# Patient Record
Sex: MALE | Race: WHITE | NOT HISPANIC OR LATINO | Employment: OTHER | RURAL
[De-identification: names, ages, dates, MRNs, and addresses within clinical notes are randomized per-mention and may not be internally consistent; named-entity substitution may affect disease eponyms.]

---

## 2020-03-10 ENCOUNTER — HISTORICAL (OUTPATIENT)
Dept: ADMINISTRATIVE | Facility: HOSPITAL | Age: 75
End: 2020-03-10

## 2020-03-10 LAB
APTT PPP: 31.1 SECONDS (ref 25.2–37.3)
BASOPHILS # BLD AUTO: 0.02 X10E3/UL (ref 0–0.2)
BASOPHILS NFR BLD AUTO: 0.1 % (ref 0–1)
BUN SERPL-MCNC: 35 MG/DL (ref 7–18)
CALCIUM SERPL-MCNC: 9.1 MG/DL (ref 8.5–10.1)
CHLORIDE SERPL-SCNC: 99 MMOL/L (ref 98–107)
CK MB SERPL-MCNC: 1.2 NG/ML (ref 1–3.6)
CK SERPL-CCNC: 45 U/L (ref 39–308)
CO2 SERPL-SCNC: 32 MMOL/L (ref 21–32)
CREAT SERPL-MCNC: 1.67 MG/DL (ref 0.7–1.3)
EOSINOPHIL # BLD AUTO: 0.08 X10E3/UL (ref 0–0.5)
EOSINOPHIL NFR BLD AUTO: 0.6 % (ref 1–4)
ERYTHROCYTE [DISTWIDTH] IN BLOOD BY AUTOMATED COUNT: 17.2 % (ref 11.5–14.5)
GLUCOSE SERPL-MCNC: 81 MG/DL (ref 74–106)
HCT VFR BLD AUTO: 42.7 % (ref 40–54)
HGB BLD-MCNC: 12.6 G/DL (ref 13.5–18)
IMM GRANULOCYTES # BLD AUTO: 0.1 X10E3/UL (ref 0–0.04)
IMM GRANULOCYTES NFR BLD: 0.7 % (ref 0–0.4)
INR BLD: 1.12 (ref 0–3.3)
LYMPHOCYTES # BLD AUTO: 1.12 X10E3/UL (ref 1–4.8)
LYMPHOCYTES NFR BLD AUTO: 7.8 % (ref 27–41)
MAGNESIUM SERPL-MCNC: 2 MG/DL (ref 1.7–2.3)
MCH RBC QN AUTO: 25.5 PG (ref 27–31)
MCHC RBC AUTO-ENTMCNC: 29.5 G/DL (ref 32–36)
MCV RBC AUTO: 86.3 FL (ref 80–96)
MONOCYTES # BLD AUTO: 0.93 X10E3/UL (ref 0–0.8)
MONOCYTES NFR BLD AUTO: 6.5 % (ref 2–6)
MPC BLD CALC-MCNC: 11.6 FL (ref 9.4–12.4)
MYOGLOBIN SERPL-MCNC: 95 NG/ML (ref 16–116)
NEUTROPHILS # BLD AUTO: 12.12 X10E3/UL (ref 1.8–7.7)
NEUTROPHILS NFR BLD AUTO: 84.3 % (ref 53–65)
NRBC # BLD AUTO: 0 X10E3/UL (ref 0–0)
NRBC, AUTO (.00): 0 /100 (ref 0–0)
NT-PROBNP SERPL-MCNC: 9515 PG/ML (ref 1–125)
PLATELET # BLD AUTO: 369 X10E3/UL (ref 150–400)
POTASSIUM SERPL-SCNC: 4.3 MMOL/L (ref 3.5–5.1)
PROTHROMBIN TIME: 14.5 SECONDS (ref 11.7–14.7)
RBC # BLD AUTO: 4.95 X10E6/UL (ref 4.6–6.2)
SODIUM SERPL-SCNC: 137 MMOL/L (ref 136–145)
TROPONIN I SERPL-MCNC: 0.03 NG/ML (ref 0–0.06)
WBC # BLD AUTO: 14.37 X10E3/UL (ref 4.5–11)

## 2020-03-11 ENCOUNTER — HISTORICAL (OUTPATIENT)
Dept: ADMINISTRATIVE | Facility: HOSPITAL | Age: 75
End: 2020-03-11

## 2020-03-11 LAB
ALBUMIN SERPL BCP-MCNC: 3.1 G/DL (ref 3.5–5)
ALP SERPL-CCNC: 67 U/L (ref 45–115)
ALT SERPL W P-5'-P-CCNC: 15 U/L (ref 16–61)
ANISOCYTOSIS BLD QL SMEAR: ABNORMAL
AST SERPL W P-5'-P-CCNC: 18 U/L (ref 15–37)
BASOPHILS # BLD AUTO: 0.01 X10E3/UL (ref 0–0.2)
BASOPHILS NFR BLD AUTO: 0.1 % (ref 0–1)
BILIRUB DIRECT SERPL-MCNC: 0.2 MG/DL (ref 0–0.2)
BILIRUB SERPL-MCNC: 0.5 MG/DL (ref 0–1.2)
BUN SERPL-MCNC: 40 MG/DL (ref 7–18)
CALCIUM SERPL-MCNC: 9.4 MG/DL (ref 8.5–10.1)
CHLORIDE SERPL-SCNC: 97 MMOL/L (ref 98–107)
CHOLEST SERPL-MCNC: 110 MG/DL
CHOLEST/HDLC SERPL: 2.4 {RATIO}
CK MB SERPL-MCNC: <1 NG/ML (ref 1–3.6)
CK SERPL-CCNC: 23 U/L (ref 39–308)
CK SERPL-CCNC: 28 U/L (ref 39–308)
CK SERPL-CCNC: 37 U/L (ref 39–308)
CO2 SERPL-SCNC: 33 MMOL/L (ref 21–32)
CREAT SERPL-MCNC: 1.66 MG/DL (ref 0.7–1.3)
EOSINOPHIL # BLD AUTO: 0 X10E3/UL (ref 0–0.5)
EOSINOPHIL NFR BLD AUTO: 0 % (ref 1–4)
ERYTHROCYTE [DISTWIDTH] IN BLOOD BY AUTOMATED COUNT: 17 % (ref 11.5–14.5)
GLUCOSE SERPL-MCNC: 132 MG/DL (ref 74–106)
HCT VFR BLD AUTO: 41.7 % (ref 40–54)
HDLC SERPL-MCNC: 45 MG/DL
HGB BLD-MCNC: 12.9 G/DL (ref 13.5–18)
IMM GRANULOCYTES # BLD AUTO: 0.05 X10E3/UL (ref 0–0.04)
IMM GRANULOCYTES NFR BLD: 0.5 % (ref 0–0.4)
LDLC SERPL CALC-MCNC: 55 MG/DL
LYMPHOCYTES # BLD AUTO: 0.39 X10E3/UL (ref 1–4.8)
LYMPHOCYTES NFR BLD AUTO: 3.7 % (ref 27–41)
LYMPHOCYTES NFR BLD MANUAL: 4 % (ref 27–41)
MAGNESIUM SERPL-MCNC: 2.1 MG/DL (ref 1.7–2.3)
MCH RBC QN AUTO: 26 PG (ref 27–31)
MCHC RBC AUTO-ENTMCNC: 30.9 G/DL (ref 32–36)
MCV RBC AUTO: 83.9 FL (ref 80–96)
MONOCYTES # BLD AUTO: 0.25 X10E3/UL (ref 0–0.8)
MONOCYTES NFR BLD AUTO: 2.4 % (ref 2–6)
MONOCYTES NFR BLD MANUAL: 1 % (ref 2–6)
MPC BLD CALC-MCNC: 11.4 FL (ref 9.4–12.4)
NEUTROPHILS # BLD AUTO: 9.83 X10E3/UL (ref 1.8–7.7)
NEUTROPHILS NFR BLD AUTO: 93.3 % (ref 53–65)
NEUTS SEG NFR BLD MANUAL: 95 % (ref 50–62)
NRBC # BLD AUTO: 0 X10E3/UL (ref 0–0)
NRBC, AUTO (.00): 0 /100 (ref 0–0)
NT-PROBNP SERPL-MCNC: ABNORMAL PG/ML (ref 1–125)
PLATELET # BLD AUTO: 354 X10E3/UL (ref 150–400)
PLATELET MORPHOLOGY: ABNORMAL
POLYCHROMASIA BLD QL SMEAR: ABNORMAL
POTASSIUM SERPL-SCNC: 4.6 MMOL/L (ref 3.5–5.1)
PROT SERPL-MCNC: 7 G/DL (ref 6.4–8.2)
RBC # BLD AUTO: 4.97 X10E6/UL (ref 4.6–6.2)
SODIUM SERPL-SCNC: 137 MMOL/L (ref 136–145)
T4 FREE SERPL-MCNC: 1.12 NG/DL (ref 0.76–1.46)
TRIGL SERPL-MCNC: 50 MG/DL
TROPONIN I SERPL-MCNC: 0.02 NG/ML (ref 0–0.06)
TROPONIN I SERPL-MCNC: 0.02 NG/ML (ref 0–0.06)
TROPONIN I SERPL-MCNC: <0.017 NG/ML (ref 0–0.06)
TSH SERPL DL<=0.005 MIU/L-ACNC: 0.67 UIU/ML (ref 0.36–3.74)
WBC # BLD AUTO: 10.53 X10E3/UL (ref 4.5–11)

## 2020-03-12 ENCOUNTER — HISTORICAL (OUTPATIENT)
Dept: ADMINISTRATIVE | Facility: HOSPITAL | Age: 75
End: 2020-03-12

## 2020-03-12 LAB
ANISOCYTOSIS BLD QL SMEAR: ABNORMAL
BASOPHILS # BLD AUTO: 0.05 X10E3/UL (ref 0–0.2)
BASOPHILS NFR BLD AUTO: 0.2 % (ref 0–1)
BUN SERPL-MCNC: 45 MG/DL (ref 7–18)
CALCIUM SERPL-MCNC: 10.1 MG/DL (ref 8.5–10.1)
CHLORIDE SERPL-SCNC: 91 MMOL/L (ref 98–107)
CO2 SERPL-SCNC: 34 MMOL/L (ref 21–32)
CREAT SERPL-MCNC: 1.96 MG/DL (ref 0.7–1.3)
EOSINOPHIL # BLD AUTO: 0.01 X10E3/UL (ref 0–0.5)
EOSINOPHIL NFR BLD AUTO: 0 % (ref 1–4)
ERYTHROCYTE [DISTWIDTH] IN BLOOD BY AUTOMATED COUNT: 16.4 % (ref 11.5–14.5)
GLUCOSE SERPL-MCNC: 236 MG/DL (ref 74–106)
HCT VFR BLD AUTO: 43.4 % (ref 40–54)
HGB BLD-MCNC: 13.6 G/DL (ref 13.5–18)
IMM GRANULOCYTES # BLD AUTO: 0.25 X10E3/UL (ref 0–0.04)
IMM GRANULOCYTES NFR BLD: 1.1 % (ref 0–0.4)
LYMPHOCYTES # BLD AUTO: 0.51 X10E3/UL (ref 1–4.8)
LYMPHOCYTES NFR BLD AUTO: 2.3 % (ref 27–41)
LYMPHOCYTES NFR BLD MANUAL: 3 % (ref 27–41)
MCH RBC QN AUTO: 26.1 PG (ref 27–31)
MCHC RBC AUTO-ENTMCNC: 31.3 G/DL (ref 32–36)
MCV RBC AUTO: 83.3 FL (ref 80–96)
MONOCYTES # BLD AUTO: 0.55 X10E3/UL (ref 0–0.8)
MONOCYTES NFR BLD AUTO: 2.5 % (ref 2–6)
MONOCYTES NFR BLD MANUAL: 2 % (ref 2–6)
MPC BLD CALC-MCNC: 11 FL (ref 9.4–12.4)
NEUTROPHILS # BLD AUTO: 20.8 X10E3/UL (ref 1.8–7.7)
NEUTROPHILS NFR BLD AUTO: 93.9 % (ref 53–65)
NEUTS SEG NFR BLD MANUAL: 95 % (ref 50–62)
NRBC # BLD AUTO: 0 X10E3/UL (ref 0–0)
NRBC, AUTO (.00): 0 /100 (ref 0–0)
NT-PROBNP SERPL-MCNC: 9812 PG/ML (ref 1–125)
OVALOCYTES BLD QL SMEAR: ABNORMAL
PLATELET # BLD AUTO: 381 X10E3/UL (ref 150–400)
PLATELET MORPHOLOGY: ABNORMAL
POTASSIUM SERPL-SCNC: 3.9 MMOL/L (ref 3.5–5.1)
RBC # BLD AUTO: 5.21 X10E6/UL (ref 4.6–6.2)
SODIUM SERPL-SCNC: 133 MMOL/L (ref 136–145)
TOXIC GRANULES BLD QL SMEAR: ABNORMAL
WBC # BLD AUTO: 22.17 X10E3/UL (ref 4.5–11)

## 2020-03-14 ENCOUNTER — HISTORICAL (OUTPATIENT)
Dept: ADMINISTRATIVE | Facility: HOSPITAL | Age: 75
End: 2020-03-14

## 2020-03-14 LAB
ANISOCYTOSIS BLD QL SMEAR: ABNORMAL
BASOPHILS # BLD AUTO: 0.05 X10E3/UL (ref 0–0.2)
BASOPHILS NFR BLD AUTO: 0.2 % (ref 0–1)
BUN SERPL-MCNC: 60 MG/DL (ref 7–18)
CALCIUM SERPL-MCNC: 9.2 MG/DL (ref 8.5–10.1)
CHLORIDE SERPL-SCNC: 96 MMOL/L (ref 98–107)
CO2 SERPL-SCNC: 36 MMOL/L (ref 21–32)
CREAT SERPL-MCNC: 2.07 MG/DL (ref 0.7–1.3)
EOSINOPHIL # BLD AUTO: 0 X10E3/UL (ref 0–0.5)
EOSINOPHIL NFR BLD AUTO: 0 % (ref 1–4)
ERYTHROCYTE [DISTWIDTH] IN BLOOD BY AUTOMATED COUNT: 16.6 % (ref 11.5–14.5)
GLUCOSE SERPL-MCNC: 110 MG/DL (ref 74–106)
HCT VFR BLD AUTO: 48.8 % (ref 40–54)
HGB BLD-MCNC: 14.7 G/DL (ref 13.5–18)
IMM GRANULOCYTES # BLD AUTO: 0.23 X10E3/UL (ref 0–0.04)
IMM GRANULOCYTES NFR BLD: 1.1 % (ref 0–0.4)
LYMPHOCYTES # BLD AUTO: 0.51 X10E3/UL (ref 1–4.8)
LYMPHOCYTES NFR BLD AUTO: 2.3 % (ref 27–41)
LYMPHOCYTES NFR BLD MANUAL: 2 % (ref 27–41)
MCH RBC QN AUTO: 25.7 PG (ref 27–31)
MCHC RBC AUTO-ENTMCNC: 30.1 G/DL (ref 32–36)
MCV RBC AUTO: 85.3 FL (ref 80–96)
MONOCYTES # BLD AUTO: 1 X10E3/UL (ref 0–0.8)
MONOCYTES NFR BLD AUTO: 4.6 % (ref 2–6)
MONOCYTES NFR BLD MANUAL: 3 % (ref 2–6)
MPC BLD CALC-MCNC: 10.8 FL (ref 9.4–12.4)
NEUTROPHILS # BLD AUTO: 19.94 X10E3/UL (ref 1.8–7.7)
NEUTROPHILS NFR BLD AUTO: 91.8 % (ref 53–65)
NEUTS SEG NFR BLD MANUAL: 95 % (ref 50–62)
NRBC # BLD AUTO: 0 X10E3/UL (ref 0–0)
NRBC, AUTO (.00): 0 /100 (ref 0–0)
OVALOCYTES BLD QL SMEAR: ABNORMAL
PLATELET # BLD AUTO: 395 X10E3/UL (ref 150–400)
PLATELET MORPHOLOGY: ABNORMAL
POLYCHROMASIA BLD QL SMEAR: ABNORMAL
POTASSIUM SERPL-SCNC: 4.6 MMOL/L (ref 3.5–5.1)
RBC # BLD AUTO: 5.72 X10E6/UL (ref 4.6–6.2)
SODIUM SERPL-SCNC: 136 MMOL/L (ref 136–145)
WBC # BLD AUTO: 21.73 X10E3/UL (ref 4.5–11)

## 2020-06-05 ENCOUNTER — HISTORICAL (OUTPATIENT)
Dept: ADMINISTRATIVE | Facility: HOSPITAL | Age: 75
End: 2020-06-05

## 2020-06-05 LAB
APTT PPP: 33.9 SECONDS (ref 25.2–37.3)
BACTERIA #/AREA URNS HPF: ABNORMAL /HPF
BASOPHILS # BLD AUTO: 0.09 X10E3/UL (ref 0–0.2)
BASOPHILS NFR BLD AUTO: 1.1 % (ref 0–1)
BILIRUB UR QL STRIP: NEGATIVE MG/DL
BUN SERPL-MCNC: 22 MG/DL (ref 7–18)
CALCIUM SERPL-MCNC: 8.7 MG/DL (ref 8.5–10.1)
CHLORIDE SERPL-SCNC: 106 MMOL/L (ref 98–107)
CK MB SERPL-MCNC: 1.2 NG/ML (ref 1–3.6)
CK MB SERPL-MCNC: 1.3 NG/ML (ref 1–3.6)
CK SERPL-CCNC: 40 U/L (ref 39–308)
CK SERPL-CCNC: 41 U/L (ref 39–308)
CLARITY UR: CLEAR
CLARITY UR: CLEAR
CO2 SERPL-SCNC: 27 MMOL/L (ref 21–32)
COLOR UR: YELLOW
COLOR UR: YELLOW
CREAT SERPL-MCNC: 1.69 MG/DL (ref 0.7–1.3)
EOSINOPHIL # BLD AUTO: 0.14 X10E3/UL (ref 0–0.5)
EOSINOPHIL NFR BLD AUTO: 1.7 % (ref 1–4)
ERYTHROCYTE [DISTWIDTH] IN BLOOD BY AUTOMATED COUNT: 16.2 % (ref 11.5–14.5)
GLUCOSE SERPL-MCNC: 69 MG/DL (ref 74–106)
GLUCOSE UR STRIP-MCNC: NEGATIVE MG/DL
HCT VFR BLD AUTO: 42.2 % (ref 40–54)
HGB BLD-MCNC: 12.7 G/DL (ref 13.5–18)
HYALINE CASTS #/AREA URNS LPF: ABNORMAL /LPF (ref 0–2)
IMM GRANULOCYTES # BLD AUTO: 0.02 X10E3/UL (ref 0–0.04)
IMM GRANULOCYTES NFR BLD: 0.2 % (ref 0–0.4)
INR BLD: 1.09 (ref 0–3.3)
KETONES UR STRIP-SCNC: NEGATIVE MG/DL
LEUKOCYTE ESTERASE UR QL STRIP: NEGATIVE LEU/UL
LYMPHOCYTES # BLD AUTO: 0.86 X10E3/UL (ref 1–4.8)
LYMPHOCYTES NFR BLD AUTO: 10.4 % (ref 27–41)
MAGNESIUM SERPL-MCNC: 1.7 MG/DL (ref 1.7–2.3)
MCH RBC QN AUTO: 26.8 PG (ref 27–31)
MCHC RBC AUTO-ENTMCNC: 30.1 G/DL (ref 32–36)
MCV RBC AUTO: 89 FL (ref 80–96)
MONOCYTES # BLD AUTO: 0.72 X10E3/UL (ref 0–0.8)
MONOCYTES NFR BLD AUTO: 8.7 % (ref 2–6)
MPC BLD CALC-MCNC: 12.3 FL (ref 9.4–12.4)
MYOGLOBIN SERPL-MCNC: 64 NG/ML (ref 16–116)
NEUTROPHILS # BLD AUTO: 6.41 X10E3/UL (ref 1.8–7.7)
NEUTROPHILS NFR BLD AUTO: 77.9 % (ref 53–65)
NITRITE UR QL STRIP: NEGATIVE
NRBC # BLD AUTO: 0 X10E3/UL (ref 0–0)
NRBC, AUTO (.00): 0 /100 (ref 0–0)
NT-PROBNP SERPL-MCNC: ABNORMAL PG/ML (ref 1–125)
PH UR STRIP: 6 PH UNITS (ref 5–8)
PLATELET # BLD AUTO: 215 X10E3/UL (ref 150–400)
POTASSIUM SERPL-SCNC: 4.2 MMOL/L (ref 3.5–5.1)
PROT UR QL STRIP: 100 MG/DL
PROTHROMBIN TIME: 14.2 SECONDS (ref 11.7–14.7)
RBC # BLD AUTO: 4.74 X10E6/UL (ref 4.6–6.2)
RBC # UR STRIP: NEGATIVE ERY/UL
RBC #/AREA URNS HPF: ABNORMAL /HPF (ref 0–3)
SARS-COV+SARS-COV-2 AG RESP QL IA.RAPID: NEGATIVE
SODIUM SERPL-SCNC: 138 MMOL/L (ref 136–145)
SP GR UR STRIP: >=1.03 (ref 1–1.03)
SQUAMOUS #/AREA URNS LPF: ABNORMAL /LPF
TROPONIN I SERPL-MCNC: 0.09 NG/ML (ref 0–0.06)
TROPONIN I SERPL-MCNC: 0.09 NG/ML (ref 0–0.06)
UROBILINOGEN UR STRIP-ACNC: 0.2 EU/DL
WBC # BLD AUTO: 8.24 X10E3/UL (ref 4.5–11)
WBC #/AREA URNS HPF: ABNORMAL /HPF (ref 0–5)

## 2021-01-01 ENCOUNTER — OFFICE VISIT (OUTPATIENT)
Dept: FAMILY MEDICINE | Facility: CLINIC | Age: 76
End: 2021-01-01
Payer: COMMERCIAL

## 2021-01-01 ENCOUNTER — CLINICAL SUPPORT (OUTPATIENT)
Dept: AUDIOLOGY | Facility: CLINIC | Age: 76
End: 2021-01-01
Payer: COMMERCIAL

## 2021-01-01 ENCOUNTER — OFFICE VISIT (OUTPATIENT)
Dept: FAMILY MEDICINE | Facility: CLINIC | Age: 76
End: 2021-01-01

## 2021-01-01 ENCOUNTER — TELEPHONE (OUTPATIENT)
Dept: CARDIOLOGY | Facility: CLINIC | Age: 76
End: 2021-01-01

## 2021-01-01 ENCOUNTER — OFFICE VISIT (OUTPATIENT)
Dept: OTOLARYNGOLOGY | Facility: CLINIC | Age: 76
End: 2021-01-01
Payer: COMMERCIAL

## 2021-01-01 ENCOUNTER — TELEPHONE (OUTPATIENT)
Dept: FAMILY MEDICINE | Facility: CLINIC | Age: 76
End: 2021-01-01

## 2021-01-01 ENCOUNTER — HOSPITAL ENCOUNTER (INPATIENT)
Facility: HOSPITAL | Age: 76
LOS: 7 days | Discharge: HOME | DRG: 291 | End: 2021-11-16
Attending: EMERGENCY MEDICINE | Admitting: HOSPITALIST
Payer: COMMERCIAL

## 2021-01-01 VITALS
DIASTOLIC BLOOD PRESSURE: 75 MMHG | TEMPERATURE: 98 F | WEIGHT: 184 LBS | RESPIRATION RATE: 22 BRPM | HEIGHT: 69 IN | SYSTOLIC BLOOD PRESSURE: 122 MMHG | BODY MASS INDEX: 27.25 KG/M2 | HEART RATE: 86 BPM

## 2021-01-01 VITALS
BODY MASS INDEX: 27.99 KG/M2 | SYSTOLIC BLOOD PRESSURE: 138 MMHG | HEART RATE: 90 BPM | DIASTOLIC BLOOD PRESSURE: 76 MMHG | HEIGHT: 69 IN | TEMPERATURE: 98 F | RESPIRATION RATE: 20 BRPM | WEIGHT: 189 LBS

## 2021-01-01 VITALS
SYSTOLIC BLOOD PRESSURE: 128 MMHG | DIASTOLIC BLOOD PRESSURE: 78 MMHG | TEMPERATURE: 98 F | BODY MASS INDEX: 26.66 KG/M2 | HEART RATE: 73 BPM | RESPIRATION RATE: 18 BRPM | WEIGHT: 180 LBS | HEIGHT: 69 IN

## 2021-01-01 VITALS
DIASTOLIC BLOOD PRESSURE: 73 MMHG | RESPIRATION RATE: 20 BRPM | SYSTOLIC BLOOD PRESSURE: 123 MMHG | WEIGHT: 184 LBS | HEART RATE: 93 BPM | TEMPERATURE: 98 F | BODY MASS INDEX: 27.25 KG/M2 | HEIGHT: 69 IN | BODY MASS INDEX: 27.99 KG/M2 | WEIGHT: 189 LBS | HEIGHT: 69 IN

## 2021-01-01 VITALS
DIASTOLIC BLOOD PRESSURE: 78 MMHG | BODY MASS INDEX: 26.81 KG/M2 | HEART RATE: 69 BPM | RESPIRATION RATE: 20 BRPM | SYSTOLIC BLOOD PRESSURE: 125 MMHG | WEIGHT: 181 LBS | TEMPERATURE: 98 F | HEIGHT: 69 IN

## 2021-01-01 VITALS
SYSTOLIC BLOOD PRESSURE: 118 MMHG | HEIGHT: 69 IN | HEART RATE: 87 BPM | BODY MASS INDEX: 30.01 KG/M2 | OXYGEN SATURATION: 96 % | TEMPERATURE: 98 F | RESPIRATION RATE: 18 BRPM | DIASTOLIC BLOOD PRESSURE: 64 MMHG | WEIGHT: 202.63 LBS

## 2021-01-01 DIAGNOSIS — H90.A31 MIXED CONDUCTIVE AND SENSORINEURAL HEARING LOSS OF RIGHT EAR WITH RESTRICTED HEARING OF LEFT EAR: Primary | ICD-10-CM

## 2021-01-01 DIAGNOSIS — I25.10 CAD (CORONARY ARTERY DISEASE): ICD-10-CM

## 2021-01-01 DIAGNOSIS — G89.4 CHRONIC PAIN SYNDROME: ICD-10-CM

## 2021-01-01 DIAGNOSIS — G89.29 CHRONIC BILATERAL LOW BACK PAIN: ICD-10-CM

## 2021-01-01 DIAGNOSIS — I48.92 ATRIAL FLUTTER: ICD-10-CM

## 2021-01-01 DIAGNOSIS — R07.9 CHEST PAIN: ICD-10-CM

## 2021-01-01 DIAGNOSIS — J06.9 UPPER RESPIRATORY TRACT INFECTION, UNSPECIFIED TYPE: Primary | ICD-10-CM

## 2021-01-01 DIAGNOSIS — I65.23 BILATERAL CAROTID ARTERY STENOSIS: ICD-10-CM

## 2021-01-01 DIAGNOSIS — R60.0 LOWER EXTREMITY EDEMA: ICD-10-CM

## 2021-01-01 DIAGNOSIS — R05.9 COUGH: ICD-10-CM

## 2021-01-01 DIAGNOSIS — N17.9 AKI (ACUTE KIDNEY INJURY): ICD-10-CM

## 2021-01-01 DIAGNOSIS — I50.23 ACUTE ON CHRONIC SYSTOLIC CONGESTIVE HEART FAILURE: ICD-10-CM

## 2021-01-01 DIAGNOSIS — I10 PRIMARY HYPERTENSION: ICD-10-CM

## 2021-01-01 DIAGNOSIS — I50.9 ACUTE EXACERBATION OF CHF (CONGESTIVE HEART FAILURE): ICD-10-CM

## 2021-01-01 DIAGNOSIS — J44.9 CHRONIC OBSTRUCTIVE PULMONARY DISEASE: ICD-10-CM

## 2021-01-01 DIAGNOSIS — M54.50 CHRONIC BILATERAL LOW BACK PAIN: ICD-10-CM

## 2021-01-01 DIAGNOSIS — I82.409 DEEP VEIN THROMBOSIS (DVT) OF LOWER EXTREMITY: ICD-10-CM

## 2021-01-01 DIAGNOSIS — I50.9 CONGESTIVE HEART FAILURE, UNSPECIFIED HF CHRONICITY, UNSPECIFIED HEART FAILURE TYPE: Primary | ICD-10-CM

## 2021-01-01 DIAGNOSIS — I50.9 CONGESTIVE HEART FAILURE, UNSPECIFIED HF CHRONICITY, UNSPECIFIED HEART FAILURE TYPE: ICD-10-CM

## 2021-01-01 DIAGNOSIS — R60.0 BILATERAL LEG EDEMA: ICD-10-CM

## 2021-01-01 DIAGNOSIS — I65.1 BASILAR ARTERY STENOSIS: ICD-10-CM

## 2021-01-01 DIAGNOSIS — J44.9 CHRONIC OBSTRUCTIVE PULMONARY DISEASE, UNSPECIFIED COPD TYPE: ICD-10-CM

## 2021-01-01 DIAGNOSIS — H90.A12 CONDUCTIVE HEARING LOSS OF LEFT EAR WITH RESTRICTED HEARING OF RIGHT EAR: ICD-10-CM

## 2021-01-01 DIAGNOSIS — H90.3 SENSORINEURAL HEARING LOSS (SNHL) OF BOTH EARS: Primary | ICD-10-CM

## 2021-01-01 DIAGNOSIS — D12.2 ADENOMATOUS POLYP OF ASCENDING COLON: ICD-10-CM

## 2021-01-01 DIAGNOSIS — K21.00 GASTROESOPHAGEAL REFLUX DISEASE WITH ESOPHAGITIS: ICD-10-CM

## 2021-01-01 DIAGNOSIS — I50.9 ACUTE ON CHRONIC CONGESTIVE HEART FAILURE, UNSPECIFIED HEART FAILURE TYPE: Primary | ICD-10-CM

## 2021-01-01 DIAGNOSIS — I50.9 HEART FAILURE: ICD-10-CM

## 2021-01-01 DIAGNOSIS — I10 HYPERTENSION, UNSPECIFIED TYPE: Primary | ICD-10-CM

## 2021-01-01 DIAGNOSIS — R53.1 GENERALIZED WEAKNESS: ICD-10-CM

## 2021-01-01 DIAGNOSIS — J44.1 COPD EXACERBATION: Primary | ICD-10-CM

## 2021-01-01 DIAGNOSIS — J06.9 UPPER RESPIRATORY TRACT INFECTION, UNSPECIFIED TYPE: ICD-10-CM

## 2021-01-01 DIAGNOSIS — I48.11 LONGSTANDING PERSISTENT ATRIAL FIBRILLATION: ICD-10-CM

## 2021-01-01 DIAGNOSIS — J44.1 COPD EXACERBATION: ICD-10-CM

## 2021-01-01 DIAGNOSIS — R06.00 DYSPNEA, UNSPECIFIED TYPE: Primary | ICD-10-CM

## 2021-01-01 DIAGNOSIS — J06.9 UPPER RESPIRATORY TRACT INFECTION: ICD-10-CM

## 2021-01-01 DIAGNOSIS — R05.9 COUGH: Primary | ICD-10-CM

## 2021-01-01 DIAGNOSIS — G89.29 OTHER CHRONIC PAIN: Primary | ICD-10-CM

## 2021-01-01 DIAGNOSIS — H93.19 TINNITUS, UNSPECIFIED LATERALITY: ICD-10-CM

## 2021-01-01 LAB
ALBUMIN SERPL BCP-MCNC: 2.5 G/DL (ref 3.5–5)
ALBUMIN/GLOB SERPL: 0.7 {RATIO}
ALP SERPL-CCNC: 137 U/L (ref 45–115)
ALT SERPL W P-5'-P-CCNC: 31 U/L (ref 16–61)
ANION GAP SERPL CALCULATED.3IONS-SCNC: 10 MMOL/L (ref 7–16)
ANION GAP SERPL CALCULATED.3IONS-SCNC: 13 MMOL/L (ref 7–16)
ANION GAP SERPL CALCULATED.3IONS-SCNC: 14 MMOL/L (ref 7–16)
ANION GAP SERPL CALCULATED.3IONS-SCNC: 15 MMOL/L (ref 7–16)
ANION GAP SERPL CALCULATED.3IONS-SCNC: 15 MMOL/L (ref 7–16)
ANISOCYTOSIS BLD QL SMEAR: ABNORMAL
AST SERPL W P-5'-P-CCNC: 25 U/L (ref 15–37)
BACTERIA #/AREA URNS HPF: ABNORMAL /HPF
BASOPHILS # BLD AUTO: 0.01 K/UL (ref 0–0.2)
BASOPHILS # BLD AUTO: 0.05 K/UL (ref 0–0.2)
BASOPHILS NFR BLD AUTO: 0.1 % (ref 0–1)
BASOPHILS NFR BLD AUTO: 0.6 % (ref 0–1)
BASOPHILS NFR BLD MANUAL: 1 % (ref 0–1)
BILIRUB SERPL-MCNC: 1.6 MG/DL (ref 0–1.2)
BILIRUB UR QL STRIP: NEGATIVE
BUN SERPL-MCNC: 31 MG/DL (ref 7–18)
BUN SERPL-MCNC: 38 MG/DL (ref 7–18)
BUN SERPL-MCNC: 39 MG/DL (ref 7–18)
BUN SERPL-MCNC: 41 MG/DL (ref 7–18)
BUN SERPL-MCNC: 46 MG/DL (ref 7–18)
BUN SERPL-MCNC: 46 MG/DL (ref 7–18)
BUN SERPL-MCNC: 49 MG/DL (ref 7–18)
BUN SERPL-MCNC: 51 MG/DL (ref 7–18)
BUN/CREAT SERPL: 16 (ref 6–20)
BUN/CREAT SERPL: 17 (ref 6–20)
BUN/CREAT SERPL: 17 (ref 6–20)
BUN/CREAT SERPL: 18 (ref 6–20)
BUN/CREAT SERPL: 20 (ref 6–20)
BUN/CREAT SERPL: 20 (ref 6–20)
CALCIUM SERPL-MCNC: 8.3 MG/DL (ref 8.5–10.1)
CALCIUM SERPL-MCNC: 8.4 MG/DL (ref 8.5–10.1)
CALCIUM SERPL-MCNC: 8.5 MG/DL (ref 8.5–10.1)
CALCIUM SERPL-MCNC: 8.7 MG/DL (ref 8.5–10.1)
CALCIUM SERPL-MCNC: 8.7 MG/DL (ref 8.5–10.1)
CALCIUM SERPL-MCNC: 9 MG/DL (ref 8.5–10.1)
CHLORIDE SERPL-SCNC: 101 MMOL/L (ref 98–107)
CHLORIDE SERPL-SCNC: 103 MMOL/L (ref 98–107)
CHLORIDE SERPL-SCNC: 104 MMOL/L (ref 98–107)
CHLORIDE SERPL-SCNC: 96 MMOL/L (ref 98–107)
CHLORIDE SERPL-SCNC: 97 MMOL/L (ref 98–107)
CHLORIDE SERPL-SCNC: 98 MMOL/L (ref 98–107)
CLARITY UR: CLEAR
CO2 SERPL-SCNC: 25 MMOL/L (ref 21–32)
CO2 SERPL-SCNC: 26 MMOL/L (ref 21–32)
CO2 SERPL-SCNC: 27 MMOL/L (ref 21–32)
CO2 SERPL-SCNC: 28 MMOL/L (ref 21–32)
CO2 SERPL-SCNC: 29 MMOL/L (ref 21–32)
CO2 SERPL-SCNC: 32 MMOL/L (ref 21–32)
COLOR UR: YELLOW
CREAT SERPL-MCNC: 1.8 MG/DL (ref 0.7–1.3)
CREAT SERPL-MCNC: 2.25 MG/DL (ref 0.7–1.3)
CREAT SERPL-MCNC: 2.32 MG/DL (ref 0.7–1.3)
CREAT SERPL-MCNC: 2.33 MG/DL (ref 0.7–1.3)
CREAT SERPL-MCNC: 2.49 MG/DL (ref 0.7–1.3)
CREAT SERPL-MCNC: 2.53 MG/DL (ref 0.7–1.3)
CREAT SERPL-MCNC: 2.55 MG/DL (ref 0.7–1.3)
CREAT SERPL-MCNC: 2.56 MG/DL (ref 0.7–1.3)
CRENATED CELLS: ABNORMAL
CTP QC/QA: YES
CTP QC/QA: YES
D DIMER PPP FEU-MCNC: 1.51 ΜG/ML (ref 0–0.47)
DIFFERENTIAL METHOD BLD: ABNORMAL
EOSINOPHIL # BLD AUTO: 0 K/UL (ref 0–0.5)
EOSINOPHIL # BLD AUTO: 0 K/UL (ref 0–0.5)
EOSINOPHIL # BLD AUTO: 0.01 K/UL (ref 0–0.5)
EOSINOPHIL # BLD AUTO: 0.07 K/UL (ref 0–0.5)
EOSINOPHIL # BLD AUTO: 0.16 K/UL (ref 0–0.5)
EOSINOPHIL NFR BLD AUTO: 0 % (ref 1–4)
EOSINOPHIL NFR BLD AUTO: 0 % (ref 1–4)
EOSINOPHIL NFR BLD AUTO: 0.1 % (ref 1–4)
EOSINOPHIL NFR BLD AUTO: 0.9 % (ref 1–4)
EOSINOPHIL NFR BLD AUTO: 1.6 % (ref 1–4)
EOSINOPHIL NFR BLD MANUAL: 1 % (ref 1–4)
ERYTHROCYTE [DISTWIDTH] IN BLOOD BY AUTOMATED COUNT: 17.9 % (ref 11.5–14.5)
ERYTHROCYTE [DISTWIDTH] IN BLOOD BY AUTOMATED COUNT: 18.2 % (ref 11.5–14.5)
ERYTHROCYTE [DISTWIDTH] IN BLOOD BY AUTOMATED COUNT: 18.2 % (ref 11.5–14.5)
ERYTHROCYTE [DISTWIDTH] IN BLOOD BY AUTOMATED COUNT: 18.3 % (ref 11.5–14.5)
ERYTHROCYTE [DISTWIDTH] IN BLOOD BY AUTOMATED COUNT: 18.6 % (ref 11.5–14.5)
FLUAV AG NPH QL: NEGATIVE
FLUAV AG UPPER RESP QL IA.RAPID: NEGATIVE
FLUBV AG NPH QL: NEGATIVE
FLUBV AG UPPER RESP QL IA.RAPID: NEGATIVE
GLOBULIN SER-MCNC: 3.8 G/DL (ref 2–4)
GLUCOSE SERPL-MCNC: 103 MG/DL (ref 74–106)
GLUCOSE SERPL-MCNC: 105 MG/DL (ref 74–106)
GLUCOSE SERPL-MCNC: 111 MG/DL (ref 74–106)
GLUCOSE SERPL-MCNC: 117 MG/DL (ref 74–106)
GLUCOSE SERPL-MCNC: 118 MG/DL (ref 74–106)
GLUCOSE SERPL-MCNC: 81 MG/DL (ref 74–106)
GLUCOSE SERPL-MCNC: 88 MG/DL (ref 74–106)
GLUCOSE SERPL-MCNC: 91 MG/DL (ref 74–106)
GLUCOSE UR STRIP-MCNC: NEGATIVE MG/DL
HCT VFR BLD AUTO: 33.9 % (ref 40–54)
HCT VFR BLD AUTO: 35.1 % (ref 40–54)
HCT VFR BLD AUTO: 36.4 % (ref 40–54)
HCT VFR BLD AUTO: 36.5 % (ref 40–54)
HCT VFR BLD AUTO: 38.1 % (ref 40–54)
HGB BLD-MCNC: 11.1 G/DL (ref 13.5–18)
HGB BLD-MCNC: 11.1 G/DL (ref 13.5–18)
HGB BLD-MCNC: 11.5 G/DL (ref 13.5–18)
HGB BLD-MCNC: 11.6 G/DL (ref 13.5–18)
HGB BLD-MCNC: 12.4 G/DL (ref 13.5–18)
HYALINE CASTS #/AREA URNS LPF: ABNORMAL /LPF
HYPOCHROMIA BLD QL SMEAR: ABNORMAL
IMM GRANULOCYTES # BLD AUTO: 0.03 K/UL (ref 0–0.04)
IMM GRANULOCYTES # BLD AUTO: 0.05 K/UL (ref 0–0.04)
IMM GRANULOCYTES # BLD AUTO: 0.06 K/UL (ref 0–0.04)
IMM GRANULOCYTES # BLD AUTO: 0.07 K/UL (ref 0–0.04)
IMM GRANULOCYTES # BLD AUTO: 0.07 K/UL (ref 0–0.04)
IMM GRANULOCYTES NFR BLD: 0.4 % (ref 0–0.4)
IMM GRANULOCYTES NFR BLD: 0.4 % (ref 0–0.4)
IMM GRANULOCYTES NFR BLD: 0.5 % (ref 0–0.4)
IMM GRANULOCYTES NFR BLD: 0.6 % (ref 0–0.4)
IMM GRANULOCYTES NFR BLD: 0.7 % (ref 0–0.4)
KETONES UR STRIP-SCNC: NEGATIVE MG/DL
LEUKOCYTE ESTERASE UR QL STRIP: NEGATIVE
LYMPHOCYTES # BLD AUTO: 0.21 K/UL (ref 1–4.8)
LYMPHOCYTES # BLD AUTO: 0.23 K/UL (ref 1–4.8)
LYMPHOCYTES # BLD AUTO: 0.24 K/UL (ref 1–4.8)
LYMPHOCYTES # BLD AUTO: 0.31 K/UL (ref 1–4.8)
LYMPHOCYTES # BLD AUTO: 0.43 K/UL (ref 1–4.8)
LYMPHOCYTES NFR BLD AUTO: 1.5 % (ref 27–41)
LYMPHOCYTES NFR BLD AUTO: 1.6 % (ref 27–41)
LYMPHOCYTES NFR BLD AUTO: 2.8 % (ref 27–41)
LYMPHOCYTES NFR BLD AUTO: 3.8 % (ref 27–41)
LYMPHOCYTES NFR BLD AUTO: 4.3 % (ref 27–41)
LYMPHOCYTES NFR BLD MANUAL: 1 % (ref 27–41)
LYMPHOCYTES NFR BLD MANUAL: 1 % (ref 27–41)
LYMPHOCYTES NFR BLD MANUAL: 3 % (ref 27–41)
LYMPHOCYTES NFR BLD MANUAL: 4 % (ref 27–41)
LYMPHOCYTES NFR BLD MANUAL: 8 % (ref 27–41)
MAGNESIUM SERPL-MCNC: 1.6 MG/DL (ref 1.7–2.3)
MAGNESIUM SERPL-MCNC: 1.9 MG/DL (ref 1.7–2.3)
MAGNESIUM SERPL-MCNC: 2 MG/DL (ref 1.7–2.3)
MCH RBC QN AUTO: 25.9 PG (ref 27–31)
MCH RBC QN AUTO: 26 PG (ref 27–31)
MCH RBC QN AUTO: 26.4 PG (ref 27–31)
MCH RBC QN AUTO: 26.4 PG (ref 27–31)
MCH RBC QN AUTO: 26.9 PG (ref 27–31)
MCHC RBC AUTO-ENTMCNC: 31.5 G/DL (ref 32–36)
MCHC RBC AUTO-ENTMCNC: 31.6 G/DL (ref 32–36)
MCHC RBC AUTO-ENTMCNC: 31.9 G/DL (ref 32–36)
MCHC RBC AUTO-ENTMCNC: 32.5 G/DL (ref 32–36)
MCHC RBC AUTO-ENTMCNC: 32.7 G/DL (ref 32–36)
MCV RBC AUTO: 79.4 FL (ref 80–96)
MCV RBC AUTO: 81.2 FL (ref 80–96)
MCV RBC AUTO: 82 FL (ref 80–96)
MCV RBC AUTO: 83.9 FL (ref 80–96)
MCV RBC AUTO: 84.5 FL (ref 80–96)
MONOCYTES # BLD AUTO: 0.35 K/UL (ref 0–0.8)
MONOCYTES # BLD AUTO: 0.41 K/UL (ref 0–0.8)
MONOCYTES # BLD AUTO: 0.56 K/UL (ref 0–0.8)
MONOCYTES # BLD AUTO: 0.89 K/UL (ref 0–0.8)
MONOCYTES # BLD AUTO: 1.04 K/UL (ref 0–0.8)
MONOCYTES NFR BLD AUTO: 4 % (ref 2–6)
MONOCYTES NFR BLD AUTO: 4.1 % (ref 2–6)
MONOCYTES NFR BLD AUTO: 5.1 % (ref 2–6)
MONOCYTES NFR BLD AUTO: 7.4 % (ref 2–6)
MONOCYTES NFR BLD AUTO: 9 % (ref 2–6)
MONOCYTES NFR BLD MANUAL: 10 % (ref 2–6)
MONOCYTES NFR BLD MANUAL: 3 % (ref 2–6)
MONOCYTES NFR BLD MANUAL: 3 % (ref 2–6)
MONOCYTES NFR BLD MANUAL: 5 % (ref 2–6)
MONOCYTES NFR BLD MANUAL: 8 % (ref 2–6)
MPC BLD CALC-MCNC: 10.9 FL (ref 9.4–12.4)
MPC BLD CALC-MCNC: 11.1 FL (ref 9.4–12.4)
MPC BLD CALC-MCNC: 11.2 FL (ref 9.4–12.4)
MPC BLD CALC-MCNC: 11.6 FL (ref 9.4–12.4)
MPC BLD CALC-MCNC: 11.8 FL (ref 9.4–12.4)
MUCOUS THREADS #/AREA URNS HPF: ABNORMAL /HPF
NEUTROPHILS # BLD AUTO: 12.64 K/UL (ref 1.8–7.7)
NEUTROPHILS # BLD AUTO: 12.86 K/UL (ref 1.8–7.7)
NEUTROPHILS # BLD AUTO: 7.23 K/UL (ref 1.8–7.7)
NEUTROPHILS # BLD AUTO: 8.07 K/UL (ref 1.8–7.7)
NEUTROPHILS # BLD AUTO: 8.38 K/UL (ref 1.8–7.7)
NEUTROPHILS NFR BLD AUTO: 84.3 % (ref 53–65)
NEUTROPHILS NFR BLD AUTO: 89.2 % (ref 53–65)
NEUTROPHILS NFR BLD AUTO: 90.3 % (ref 53–65)
NEUTROPHILS NFR BLD AUTO: 92.5 % (ref 53–65)
NEUTROPHILS NFR BLD AUTO: 93.9 % (ref 53–65)
NEUTS BAND NFR BLD MANUAL: 1 % (ref 1–5)
NEUTS BAND NFR BLD MANUAL: 4 % (ref 1–5)
NEUTS BAND NFR BLD MANUAL: 4 % (ref 1–5)
NEUTS SEG NFR BLD MANUAL: 81 % (ref 50–62)
NEUTS SEG NFR BLD MANUAL: 89 % (ref 50–62)
NEUTS SEG NFR BLD MANUAL: 90 % (ref 50–62)
NEUTS SEG NFR BLD MANUAL: 90 % (ref 50–62)
NEUTS SEG NFR BLD MANUAL: 93 % (ref 50–62)
NITRITE UR QL STRIP: NEGATIVE
NRBC # BLD AUTO: 0 X10E3/UL
NRBC BLD MANUAL-RTO: 1 /100 WBC
NRBC, AUTO (.00): 0 %
NT-PROBNP SERPL-MCNC: ABNORMAL PG/ML (ref 1–450)
OVALOCYTES BLD QL SMEAR: ABNORMAL
PH UR STRIP: 5.5 PH UNITS
PLATELET # BLD AUTO: 226 K/UL (ref 150–400)
PLATELET # BLD AUTO: 231 K/UL (ref 150–400)
PLATELET # BLD AUTO: 288 K/UL (ref 150–400)
PLATELET # BLD AUTO: 299 K/UL (ref 150–400)
PLATELET # BLD AUTO: 347 K/UL (ref 150–400)
PLATELET MORPHOLOGY: ABNORMAL
PLATELET MORPHOLOGY: NORMAL
POLYCHROMASIA BLD QL SMEAR: ABNORMAL
POTASSIUM SERPL-SCNC: 4.2 MMOL/L (ref 3.5–5.1)
POTASSIUM SERPL-SCNC: 4.3 MMOL/L (ref 3.5–5.1)
POTASSIUM SERPL-SCNC: 4.6 MMOL/L (ref 3.5–5.1)
POTASSIUM SERPL-SCNC: 4.7 MMOL/L (ref 3.5–5.1)
POTASSIUM SERPL-SCNC: 4.9 MMOL/L (ref 3.5–5.1)
POTASSIUM SERPL-SCNC: 5.4 MMOL/L (ref 3.5–5.1)
POTASSIUM SERPL-SCNC: 5.6 MMOL/L (ref 3.5–5.1)
POTASSIUM SERPL-SCNC: 5.8 MMOL/L (ref 3.5–5.1)
PROT SERPL-MCNC: 6.3 G/DL (ref 6.4–8.2)
PROT UR QL STRIP: 30
RBC # BLD AUTO: 4.27 M/UL (ref 4.6–6.2)
RBC # BLD AUTO: 4.28 M/UL (ref 4.6–6.2)
RBC # BLD AUTO: 4.31 M/UL (ref 4.6–6.2)
RBC # BLD AUTO: 4.35 M/UL (ref 4.6–6.2)
RBC # BLD AUTO: 4.69 M/UL (ref 4.6–6.2)
RBC # UR STRIP: NEGATIVE /UL
RBC #/AREA URNS HPF: ABNORMAL /HPF
S PYO RRNA THROAT QL PROBE: NEGATIVE
SARS-COV+SARS-COV-2 AG RESP QL IA.RAPID: NEGATIVE
SARS-COV-2 AG RESP QL IA.RAPID: NEGATIVE
SODIUM SERPL-SCNC: 133 MMOL/L (ref 136–145)
SODIUM SERPL-SCNC: 134 MMOL/L (ref 136–145)
SODIUM SERPL-SCNC: 135 MMOL/L (ref 136–145)
SODIUM SERPL-SCNC: 137 MMOL/L (ref 136–145)
SODIUM SERPL-SCNC: 139 MMOL/L (ref 136–145)
SODIUM SERPL-SCNC: 139 MMOL/L (ref 136–145)
SP GR UR STRIP: 1.02
SQUAMOUS #/AREA URNS LPF: ABNORMAL /LPF
TARGETS BLD QL SMEAR: ABNORMAL
TRICHOMONAS #/AREA URNS HPF: ABNORMAL /HPF
TROPONIN I SERPL HS-MCNC: 22.8 PG/ML
TROPONIN I SERPL HS-MCNC: 23 PG/ML
TROPONIN I SERPL HS-MCNC: 28 PG/ML
TROPONIN I SERPL HS-MCNC: 29 PG/ML
UA COMPLETE W REFLEX CULTURE PNL UR: NORMAL
UROBILINOGEN UR STRIP-ACNC: 1 MG/DL
WBC # BLD AUTO: 13.7 K/UL (ref 4.5–11)
WBC # BLD AUTO: 14 K/UL (ref 4.5–11)
WBC # BLD AUTO: 8.1 K/UL (ref 4.5–11)
WBC # BLD AUTO: 8.72 K/UL (ref 4.5–11)
WBC # BLD AUTO: 9.94 K/UL (ref 4.5–11)
WBC #/AREA URNS HPF: ABNORMAL /HPF
YEAST #/AREA URNS HPF: ABNORMAL /HPF

## 2021-01-01 PROCEDURE — 25000003 PHARM REV CODE 250: Performed by: STUDENT IN AN ORGANIZED HEALTH CARE EDUCATION/TRAINING PROGRAM

## 2021-01-01 PROCEDURE — 96372 PR INJECTION,THERAP/PROPH/DIAG2ST, IM OR SUBCUT: ICD-10-PCS | Mod: ,,, | Performed by: NURSE PRACTITIONER

## 2021-01-01 PROCEDURE — 94761 N-INVAS EAR/PLS OXIMETRY MLT: CPT

## 2021-01-01 PROCEDURE — 93010 ELECTROCARDIOGRAM REPORT: CPT | Mod: ,,, | Performed by: STUDENT IN AN ORGANIZED HEALTH CARE EDUCATION/TRAINING PROGRAM

## 2021-01-01 PROCEDURE — 25000242 PHARM REV CODE 250 ALT 637 W/ HCPCS: Performed by: HOSPITALIST

## 2021-01-01 PROCEDURE — 80048 BASIC METABOLIC PNL TOTAL CA: CPT | Performed by: NURSE PRACTITIONER

## 2021-01-01 PROCEDURE — 36415 COLL VENOUS BLD VENIPUNCTURE: CPT | Performed by: NURSE PRACTITIONER

## 2021-01-01 PROCEDURE — 99213 OFFICE O/P EST LOW 20 MIN: CPT | Mod: ,,, | Performed by: NURSE PRACTITIONER

## 2021-01-01 PROCEDURE — 87428 SARSCOV & INF VIR A&B AG IA: CPT | Mod: QW,,, | Performed by: NURSE PRACTITIONER

## 2021-01-01 PROCEDURE — 27000221 HC OXYGEN, UP TO 24 HOURS

## 2021-01-01 PROCEDURE — 99232 PR SUBSEQUENT HOSPITAL CARE,LEVL II: ICD-10-PCS | Mod: ,,, | Performed by: INTERNAL MEDICINE

## 2021-01-01 PROCEDURE — 63600175 PHARM REV CODE 636 W HCPCS: Performed by: STUDENT IN AN ORGANIZED HEALTH CARE EDUCATION/TRAINING PROGRAM

## 2021-01-01 PROCEDURE — 99900035 HC TECH TIME PER 15 MIN (STAT)

## 2021-01-01 PROCEDURE — 25000003 PHARM REV CODE 250: Performed by: INTERNAL MEDICINE

## 2021-01-01 PROCEDURE — 80048 BASIC METABOLIC PNL TOTAL CA: CPT | Performed by: STUDENT IN AN ORGANIZED HEALTH CARE EDUCATION/TRAINING PROGRAM

## 2021-01-01 PROCEDURE — 93005 ELECTROCARDIOGRAM TRACING: CPT

## 2021-01-01 PROCEDURE — 96374 THER/PROPH/DIAG INJ IV PUSH: CPT

## 2021-01-01 PROCEDURE — 99214 OFFICE O/P EST MOD 30 MIN: CPT | Mod: PBBFAC | Performed by: OTOLARYNGOLOGY

## 2021-01-01 PROCEDURE — 93010 EKG 12-LEAD: ICD-10-PCS | Mod: ,,, | Performed by: STUDENT IN AN ORGANIZED HEALTH CARE EDUCATION/TRAINING PROGRAM

## 2021-01-01 PROCEDURE — 99233 PR SUBSEQUENT HOSPITAL CARE,LEVL III: ICD-10-PCS | Mod: ,,, | Performed by: HOSPITALIST

## 2021-01-01 PROCEDURE — 25000003 PHARM REV CODE 250: Performed by: HOSPITALIST

## 2021-01-01 PROCEDURE — 36415 COLL VENOUS BLD VENIPUNCTURE: CPT | Performed by: STUDENT IN AN ORGANIZED HEALTH CARE EDUCATION/TRAINING PROGRAM

## 2021-01-01 PROCEDURE — 99204 PR OFFICE/OUTPT VISIT, NEW, LEVL IV, 45-59 MIN: ICD-10-PCS | Mod: S$PBB,,, | Performed by: OTOLARYNGOLOGY

## 2021-01-01 PROCEDURE — 92557 COMPREHENSIVE HEARING TEST: CPT | Mod: PBBFAC | Performed by: AUDIOLOGIST

## 2021-01-01 PROCEDURE — 99239 PR HOSPITAL DISCHARGE DAY,>30 MIN: ICD-10-PCS | Mod: ,,, | Performed by: INTERNAL MEDICINE

## 2021-01-01 PROCEDURE — 83735 ASSAY OF MAGNESIUM: CPT | Performed by: STUDENT IN AN ORGANIZED HEALTH CARE EDUCATION/TRAINING PROGRAM

## 2021-01-01 PROCEDURE — 80053 COMPREHEN METABOLIC PANEL: CPT | Performed by: EMERGENCY MEDICINE

## 2021-01-01 PROCEDURE — 99223 PR INITIAL HOSPITAL CARE,LEVL III: ICD-10-PCS | Mod: ,,, | Performed by: HOSPITALIST

## 2021-01-01 PROCEDURE — 25000242 PHARM REV CODE 250 ALT 637 W/ HCPCS: Performed by: EMERGENCY MEDICINE

## 2021-01-01 PROCEDURE — 94640 AIRWAY INHALATION TREATMENT: CPT

## 2021-01-01 PROCEDURE — 84484 ASSAY OF TROPONIN QUANT: CPT | Performed by: EMERGENCY MEDICINE

## 2021-01-01 PROCEDURE — 87428 POCT SARS-COV2 (COVID) WITH FLU ANTIGEN: ICD-10-PCS | Mod: QW,,, | Performed by: NURSE PRACTITIONER

## 2021-01-01 PROCEDURE — 96372 THER/PROPH/DIAG INJ SC/IM: CPT | Mod: 59

## 2021-01-01 PROCEDURE — 99233 SBSQ HOSP IP/OBS HIGH 50: CPT | Mod: ,,, | Performed by: HOSPITALIST

## 2021-01-01 PROCEDURE — 87880 POCT RAPID STREP A: ICD-10-PCS | Mod: QW,,, | Performed by: NURSE PRACTITIONER

## 2021-01-01 PROCEDURE — 84484 ASSAY OF TROPONIN QUANT: CPT | Performed by: HOSPITALIST

## 2021-01-01 PROCEDURE — 11000001 HC ACUTE MED/SURG PRIVATE ROOM

## 2021-01-01 PROCEDURE — 97161 PT EVAL LOW COMPLEX 20 MIN: CPT

## 2021-01-01 PROCEDURE — 63600175 PHARM REV CODE 636 W HCPCS: Performed by: HOSPITALIST

## 2021-01-01 PROCEDURE — 99213 OFFICE O/P EST LOW 20 MIN: CPT | Mod: 25,,, | Performed by: NURSE PRACTITIONER

## 2021-01-01 PROCEDURE — 97165 OT EVAL LOW COMPLEX 30 MIN: CPT

## 2021-01-01 PROCEDURE — 99213 PR OFFICE/OUTPT VISIT, EST, LEVL III, 20-29 MIN: ICD-10-PCS | Mod: 25,,, | Performed by: NURSE PRACTITIONER

## 2021-01-01 PROCEDURE — 85025 COMPLETE CBC W/AUTO DIFF WBC: CPT | Performed by: STUDENT IN AN ORGANIZED HEALTH CARE EDUCATION/TRAINING PROGRAM

## 2021-01-01 PROCEDURE — 83880 ASSAY OF NATRIURETIC PEPTIDE: CPT | Performed by: EMERGENCY MEDICINE

## 2021-01-01 PROCEDURE — 99223 PR INITIAL HOSPITAL CARE,LEVL III: ICD-10-PCS | Mod: ,,, | Performed by: STUDENT IN AN ORGANIZED HEALTH CARE EDUCATION/TRAINING PROGRAM

## 2021-01-01 PROCEDURE — 96375 TX/PRO/DX INJ NEW DRUG ADDON: CPT

## 2021-01-01 PROCEDURE — 99212 OFFICE O/P EST SF 10 MIN: CPT | Mod: ,,, | Performed by: NURSE PRACTITIONER

## 2021-01-01 PROCEDURE — 63600150 PHARM REV CODE 636: Performed by: STUDENT IN AN ORGANIZED HEALTH CARE EDUCATION/TRAINING PROGRAM

## 2021-01-01 PROCEDURE — 99239 HOSP IP/OBS DSCHRG MGMT >30: CPT | Mod: ,,, | Performed by: INTERNAL MEDICINE

## 2021-01-01 PROCEDURE — 99285 PR EMERGENCY DEPT VISIT,LEVEL V: ICD-10-PCS | Mod: ,,, | Performed by: EMERGENCY MEDICINE

## 2021-01-01 PROCEDURE — 99223 1ST HOSP IP/OBS HIGH 75: CPT | Mod: ,,, | Performed by: STUDENT IN AN ORGANIZED HEALTH CARE EDUCATION/TRAINING PROGRAM

## 2021-01-01 PROCEDURE — 4010F ACE/ARB THERAPY RXD/TAKEN: CPT | Mod: ,,, | Performed by: NURSE PRACTITIONER

## 2021-01-01 PROCEDURE — 85378 FIBRIN DEGRADE SEMIQUANT: CPT | Performed by: HOSPITALIST

## 2021-01-01 PROCEDURE — 83880 ASSAY OF NATRIURETIC PEPTIDE: CPT | Performed by: STUDENT IN AN ORGANIZED HEALTH CARE EDUCATION/TRAINING PROGRAM

## 2021-01-01 PROCEDURE — 87880 STREP A ASSAY W/OPTIC: CPT | Mod: QW,,, | Performed by: NURSE PRACTITIONER

## 2021-01-01 PROCEDURE — 25000003 PHARM REV CODE 250: Performed by: NURSE PRACTITIONER

## 2021-01-01 PROCEDURE — 63600175 PHARM REV CODE 636 W HCPCS: Performed by: EMERGENCY MEDICINE

## 2021-01-01 PROCEDURE — 99441 PR PHYSICIAN TELEPHONE EVALUATION 5-10 MIN: CPT | Mod: 95,,, | Performed by: NURSE PRACTITIONER

## 2021-01-01 PROCEDURE — 84484 ASSAY OF TROPONIN QUANT: CPT | Performed by: STUDENT IN AN ORGANIZED HEALTH CARE EDUCATION/TRAINING PROGRAM

## 2021-01-01 PROCEDURE — 96372 THER/PROPH/DIAG INJ SC/IM: CPT | Mod: ,,, | Performed by: NURSE PRACTITIONER

## 2021-01-01 PROCEDURE — 36415 COLL VENOUS BLD VENIPUNCTURE: CPT | Performed by: EMERGENCY MEDICINE

## 2021-01-01 PROCEDURE — 99232 SBSQ HOSP IP/OBS MODERATE 35: CPT | Mod: ,,, | Performed by: STUDENT IN AN ORGANIZED HEALTH CARE EDUCATION/TRAINING PROGRAM

## 2021-01-01 PROCEDURE — 99441 PR PHYSICIAN TELEPHONE EVALUATION 5-10 MIN: ICD-10-PCS | Mod: 95,,, | Performed by: NURSE PRACTITIONER

## 2021-01-01 PROCEDURE — 97535 SELF CARE MNGMENT TRAINING: CPT

## 2021-01-01 PROCEDURE — 99285 EMERGENCY DEPT VISIT HI MDM: CPT | Mod: 25

## 2021-01-01 PROCEDURE — 99204 OFFICE O/P NEW MOD 45 MIN: CPT | Mod: S$PBB,,, | Performed by: OTOLARYNGOLOGY

## 2021-01-01 PROCEDURE — 4010F PR ACE/ARB THEARPY RXD/TAKEN: ICD-10-PCS | Mod: ,,, | Performed by: NURSE PRACTITIONER

## 2021-01-01 PROCEDURE — 84075 ASSAY ALKALINE PHOSPHATASE: CPT | Performed by: EMERGENCY MEDICINE

## 2021-01-01 PROCEDURE — 97110 THERAPEUTIC EXERCISES: CPT

## 2021-01-01 PROCEDURE — 99232 SBSQ HOSP IP/OBS MODERATE 35: CPT | Mod: ,,, | Performed by: INTERNAL MEDICINE

## 2021-01-01 PROCEDURE — 99285 EMERGENCY DEPT VISIT HI MDM: CPT | Mod: ,,, | Performed by: EMERGENCY MEDICINE

## 2021-01-01 PROCEDURE — 87428 SARSCOV & INF VIR A&B AG IA: CPT | Performed by: EMERGENCY MEDICINE

## 2021-01-01 PROCEDURE — 99212 OFFICE O/P EST SF 10 MIN: CPT | Mod: PBBFAC | Performed by: AUDIOLOGIST

## 2021-01-01 PROCEDURE — 99223 1ST HOSP IP/OBS HIGH 75: CPT | Mod: ,,, | Performed by: HOSPITALIST

## 2021-01-01 PROCEDURE — 87086 URINE CULTURE/COLONY COUNT: CPT | Performed by: EMERGENCY MEDICINE

## 2021-01-01 PROCEDURE — 99213 PR OFFICE/OUTPT VISIT, EST, LEVL III, 20-29 MIN: ICD-10-PCS | Mod: ,,, | Performed by: NURSE PRACTITIONER

## 2021-01-01 PROCEDURE — 81001 URINALYSIS AUTO W/SCOPE: CPT | Performed by: EMERGENCY MEDICINE

## 2021-01-01 PROCEDURE — 36415 COLL VENOUS BLD VENIPUNCTURE: CPT | Performed by: HOSPITALIST

## 2021-01-01 PROCEDURE — 81003 URINALYSIS AUTO W/O SCOPE: CPT | Performed by: EMERGENCY MEDICINE

## 2021-01-01 PROCEDURE — 99233 SBSQ HOSP IP/OBS HIGH 50: CPT | Mod: ,,, | Performed by: STUDENT IN AN ORGANIZED HEALTH CARE EDUCATION/TRAINING PROGRAM

## 2021-01-01 PROCEDURE — 99212 PR OFFICE/OUTPT VISIT, EST, LEVL II, 10-19 MIN: ICD-10-PCS | Mod: ,,, | Performed by: NURSE PRACTITIONER

## 2021-01-01 PROCEDURE — 99232 PR SUBSEQUENT HOSPITAL CARE,LEVL II: ICD-10-PCS | Mod: ,,, | Performed by: STUDENT IN AN ORGANIZED HEALTH CARE EDUCATION/TRAINING PROGRAM

## 2021-01-01 PROCEDURE — 85025 COMPLETE CBC W/AUTO DIFF WBC: CPT | Performed by: EMERGENCY MEDICINE

## 2021-01-01 PROCEDURE — 99233 PR SUBSEQUENT HOSPITAL CARE,LEVL III: ICD-10-PCS | Mod: ,,, | Performed by: STUDENT IN AN ORGANIZED HEALTH CARE EDUCATION/TRAINING PROGRAM

## 2021-01-01 RX ORDER — METOPROLOL SUCCINATE 25 MG/1
12.5 TABLET, EXTENDED RELEASE ORAL DAILY
Qty: 30 TABLET | Refills: 0 | Status: ON HOLD | OUTPATIENT
Start: 2021-01-01 | End: 2022-01-01 | Stop reason: HOSPADM

## 2021-01-01 RX ORDER — NYSTATIN 100000 U/G
OINTMENT TOPICAL 2 TIMES DAILY
Qty: 30 G | Refills: 3 | Status: ON HOLD | OUTPATIENT
Start: 2021-01-01 | End: 2022-01-01 | Stop reason: HOSPADM

## 2021-01-01 RX ORDER — GUAIFENESIN 600 MG/1
600 TABLET, EXTENDED RELEASE ORAL 2 TIMES DAILY
Qty: 14 TABLET | Refills: 0 | Status: ON HOLD | OUTPATIENT
Start: 2021-01-01 | End: 2022-01-01 | Stop reason: HOSPADM

## 2021-01-01 RX ORDER — SUCRALFATE 1 G/1
1 TABLET ORAL 2 TIMES DAILY
Status: ON HOLD | COMMUNITY
Start: 2021-01-01 | End: 2022-01-01 | Stop reason: HOSPADM

## 2021-01-01 RX ORDER — MORPHINE SULFATE 2 MG/ML
2 INJECTION, SOLUTION INTRAMUSCULAR; INTRAVENOUS
Status: COMPLETED | OUTPATIENT
Start: 2021-01-01 | End: 2021-01-01

## 2021-01-01 RX ORDER — DEXAMETHASONE SODIUM PHOSPHATE 4 MG/ML
4 INJECTION, SOLUTION INTRA-ARTICULAR; INTRALESIONAL; INTRAMUSCULAR; INTRAVENOUS; SOFT TISSUE
Status: COMPLETED | OUTPATIENT
Start: 2021-01-01 | End: 2021-01-01

## 2021-01-01 RX ORDER — ALBUTEROL SULFATE 0.83 MG/ML
2.5 SOLUTION RESPIRATORY (INHALATION)
Status: DISCONTINUED | OUTPATIENT
Start: 2021-01-01 | End: 2022-01-01

## 2021-01-01 RX ORDER — AMOXICILLIN AND CLAVULANATE POTASSIUM 875; 125 MG/1; MG/1
1 TABLET, FILM COATED ORAL 2 TIMES DAILY
Qty: 20 TABLET | Refills: 0 | Status: SHIPPED | OUTPATIENT
Start: 2021-01-01 | End: 2021-01-01 | Stop reason: SDUPTHER

## 2021-01-01 RX ORDER — PROMETHAZINE HYDROCHLORIDE AND DEXTROMETHORPHAN HYDROBROMIDE 6.25; 15 MG/5ML; MG/5ML
5 SYRUP ORAL EVERY 8 HOURS PRN
Qty: 473 ML | Refills: 0 | Status: SHIPPED | OUTPATIENT
Start: 2021-01-01 | End: 2021-01-01

## 2021-01-01 RX ORDER — FUROSEMIDE 10 MG/ML
40 INJECTION INTRAMUSCULAR; INTRAVENOUS
Status: DISCONTINUED | OUTPATIENT
Start: 2021-01-01 | End: 2021-01-01

## 2021-01-01 RX ORDER — METHYLPREDNISOLONE 4 MG/1
TABLET ORAL
Qty: 1 PACKAGE | Refills: 0 | Status: SHIPPED | OUTPATIENT
Start: 2021-01-01 | End: 2021-01-01 | Stop reason: SDUPTHER

## 2021-01-01 RX ORDER — FUROSEMIDE 10 MG/ML
20 INJECTION INTRAMUSCULAR; INTRAVENOUS
Status: COMPLETED | OUTPATIENT
Start: 2021-01-01 | End: 2021-01-01

## 2021-01-01 RX ORDER — SIMETHICONE 80 MG
1 TABLET,CHEWABLE ORAL 3 TIMES DAILY PRN
Status: DISCONTINUED | OUTPATIENT
Start: 2021-01-01 | End: 2021-01-01 | Stop reason: HOSPADM

## 2021-01-01 RX ORDER — ONDANSETRON 2 MG/ML
4 INJECTION INTRAMUSCULAR; INTRAVENOUS EVERY 6 HOURS PRN
Status: DISCONTINUED | OUTPATIENT
Start: 2021-01-01 | End: 2021-01-01 | Stop reason: HOSPADM

## 2021-01-01 RX ORDER — PANTOPRAZOLE SODIUM 40 MG/1
40 TABLET, DELAYED RELEASE ORAL DAILY
Qty: 30 TABLET | Refills: 0 | Status: SHIPPED | OUTPATIENT
Start: 2021-01-01 | End: 2022-11-17

## 2021-01-01 RX ORDER — POLYETHYLENE GLYCOL 3350 17 G/17G
17 POWDER, FOR SOLUTION ORAL DAILY
Status: DISCONTINUED | OUTPATIENT
Start: 2021-01-01 | End: 2021-01-01 | Stop reason: HOSPADM

## 2021-01-01 RX ORDER — METOPROLOL SUCCINATE 25 MG/1
25 TABLET, EXTENDED RELEASE ORAL DAILY
Status: DISCONTINUED | OUTPATIENT
Start: 2021-01-01 | End: 2021-01-01

## 2021-01-01 RX ORDER — HYDRALAZINE HYDROCHLORIDE 25 MG/1
25 TABLET, FILM COATED ORAL EVERY 12 HOURS
Status: DISCONTINUED | OUTPATIENT
Start: 2021-01-01 | End: 2021-01-01

## 2021-01-01 RX ORDER — CEFTRIAXONE 500 MG/1
1000 INJECTION, POWDER, FOR SOLUTION INTRAMUSCULAR; INTRAVENOUS
Status: DISCONTINUED | OUTPATIENT
Start: 2021-01-01 | End: 2021-01-01

## 2021-01-01 RX ORDER — DOCUSATE SODIUM 100 MG/1
100 CAPSULE, LIQUID FILLED ORAL 2 TIMES DAILY
Status: ON HOLD | COMMUNITY
Start: 2021-01-01 | End: 2022-01-01 | Stop reason: HOSPADM

## 2021-01-01 RX ORDER — CARVEDILOL 3.12 MG/1
3.12 TABLET ORAL 2 TIMES DAILY
Status: DISCONTINUED | OUTPATIENT
Start: 2021-01-01 | End: 2021-01-01

## 2021-01-01 RX ORDER — METHYLPREDNISOLONE ACETATE 80 MG/ML
80 INJECTION, SUSPENSION INTRA-ARTICULAR; INTRALESIONAL; INTRAMUSCULAR; SOFT TISSUE ONCE
Status: DISCONTINUED | OUTPATIENT
Start: 2021-01-01 | End: 2021-01-01 | Stop reason: HOSPADM

## 2021-01-01 RX ORDER — DEXAMETHASONE SODIUM PHOSPHATE 4 MG/ML
8 INJECTION, SOLUTION INTRA-ARTICULAR; INTRALESIONAL; INTRAMUSCULAR; INTRAVENOUS; SOFT TISSUE ONCE
Status: COMPLETED | OUTPATIENT
Start: 2021-01-01 | End: 2021-01-01

## 2021-01-01 RX ORDER — MORPHINE SULFATE 2 MG/ML
2 INJECTION, SOLUTION INTRAMUSCULAR; INTRAVENOUS ONCE
Status: COMPLETED | OUTPATIENT
Start: 2021-01-01 | End: 2021-01-01

## 2021-01-01 RX ORDER — DOPAMINE HYDROCHLORIDE 320 MG/100ML
5 INJECTION, SOLUTION INTRAVENOUS CONTINUOUS
Status: DISCONTINUED | OUTPATIENT
Start: 2021-01-01 | End: 2021-01-01

## 2021-01-01 RX ORDER — LEVOFLOXACIN 500 MG/1
500 TABLET, FILM COATED ORAL DAILY
Status: DISCONTINUED | OUTPATIENT
Start: 2021-01-01 | End: 2021-01-01

## 2021-01-01 RX ORDER — PANTOPRAZOLE SODIUM 40 MG/1
40 TABLET, DELAYED RELEASE ORAL DAILY
Status: DISCONTINUED | OUTPATIENT
Start: 2021-01-01 | End: 2021-01-01 | Stop reason: HOSPADM

## 2021-01-01 RX ORDER — BUMETANIDE 2 MG/1
2 TABLET ORAL DAILY
Qty: 30 TABLET | Refills: 11 | Status: ON HOLD | OUTPATIENT
Start: 2021-01-01 | End: 2022-01-01 | Stop reason: HOSPADM

## 2021-01-01 RX ORDER — LIDOCAINE 50 MG/G
1 PATCH TOPICAL
Status: DISCONTINUED | OUTPATIENT
Start: 2021-01-01 | End: 2021-01-01

## 2021-01-01 RX ORDER — SENNOSIDES 8.6 MG/1
8.6 TABLET ORAL DAILY PRN
Status: DISCONTINUED | OUTPATIENT
Start: 2021-01-01 | End: 2021-01-01 | Stop reason: HOSPADM

## 2021-01-01 RX ORDER — AZITHROMYCIN 250 MG/1
TABLET, FILM COATED ORAL
Qty: 6 TABLET | Refills: 0 | Status: SHIPPED | OUTPATIENT
Start: 2021-01-01 | End: 2021-01-01

## 2021-01-01 RX ORDER — HYDROCODONE BITARTRATE AND ACETAMINOPHEN 5; 325 MG/1; MG/1
1 TABLET ORAL EVERY 4 HOURS PRN
Status: DISCONTINUED | OUTPATIENT
Start: 2021-01-01 | End: 2021-01-01 | Stop reason: HOSPADM

## 2021-01-01 RX ORDER — ALBUTEROL SULFATE 0.63 MG/3ML
SOLUTION RESPIRATORY (INHALATION)
Qty: 1 BOX | Refills: 1 | Status: ON HOLD | OUTPATIENT
Start: 2021-01-01 | End: 2021-01-01 | Stop reason: HOSPADM

## 2021-01-01 RX ORDER — ASCORBIC ACID 500 MG
500 TABLET ORAL 2 TIMES DAILY
Status: ON HOLD | COMMUNITY
Start: 2021-01-01 | End: 2022-01-01 | Stop reason: HOSPADM

## 2021-01-01 RX ORDER — TORSEMIDE 20 MG/1
20 TABLET ORAL DAILY
Qty: 30 TABLET | Refills: 0 | Status: ON HOLD | OUTPATIENT
Start: 2021-01-01 | End: 2022-01-01 | Stop reason: HOSPADM

## 2021-01-01 RX ORDER — FUROSEMIDE 10 MG/ML
60 INJECTION INTRAMUSCULAR; INTRAVENOUS
Status: COMPLETED | OUTPATIENT
Start: 2021-01-01 | End: 2021-01-01

## 2021-01-01 RX ORDER — PROMETHAZINE HYDROCHLORIDE AND DEXTROMETHORPHAN HYDROBROMIDE 6.25; 15 MG/5ML; MG/5ML
5 SYRUP ORAL EVERY 8 HOURS PRN
Qty: 473 ML | Refills: 0 | Status: SHIPPED | OUTPATIENT
Start: 2021-01-01 | End: 2021-01-01 | Stop reason: SDUPTHER

## 2021-01-01 RX ORDER — GUAIFENESIN 600 MG/1
600 TABLET, EXTENDED RELEASE ORAL 2 TIMES DAILY
Status: DISCONTINUED | OUTPATIENT
Start: 2021-01-01 | End: 2021-01-01 | Stop reason: HOSPADM

## 2021-01-01 RX ORDER — HYDRALAZINE HYDROCHLORIDE 50 MG/1
50 TABLET, FILM COATED ORAL EVERY 12 HOURS
Status: DISCONTINUED | OUTPATIENT
Start: 2021-01-01 | End: 2021-01-01

## 2021-01-01 RX ORDER — CARVEDILOL 3.12 MG/1
3.12 TABLET ORAL 2 TIMES DAILY
Status: ON HOLD | COMMUNITY
Start: 2021-01-01 | End: 2022-01-01 | Stop reason: HOSPADM

## 2021-01-01 RX ORDER — IPRATROPIUM BROMIDE AND ALBUTEROL SULFATE 2.5; .5 MG/3ML; MG/3ML
3 SOLUTION RESPIRATORY (INHALATION) EVERY 6 HOURS
Status: DISCONTINUED | OUTPATIENT
Start: 2021-01-01 | End: 2021-01-01 | Stop reason: HOSPADM

## 2021-01-01 RX ORDER — DEXAMETHASONE SODIUM PHOSPHATE 4 MG/ML
8 INJECTION, SOLUTION INTRA-ARTICULAR; INTRALESIONAL; INTRAMUSCULAR; INTRAVENOUS; SOFT TISSUE ONCE
Status: DISCONTINUED | OUTPATIENT
Start: 2021-01-01 | End: 2021-01-01 | Stop reason: HOSPADM

## 2021-01-01 RX ORDER — ALBUTEROL SULFATE 0.83 MG/ML
2.5 SOLUTION RESPIRATORY (INHALATION)
Status: COMPLETED | OUTPATIENT
Start: 2021-01-01 | End: 2021-01-01

## 2021-01-01 RX ORDER — AZITHROMYCIN 250 MG/1
TABLET, FILM COATED ORAL
Qty: 6 TABLET | Refills: 0 | Status: SHIPPED | OUTPATIENT
Start: 2021-01-01 | End: 2021-01-01 | Stop reason: SDUPTHER

## 2021-01-01 RX ORDER — FUROSEMIDE 10 MG/ML
40 INJECTION INTRAMUSCULAR; INTRAVENOUS ONCE
Status: COMPLETED | OUTPATIENT
Start: 2021-01-01 | End: 2021-01-01

## 2021-01-01 RX ORDER — METHYLPREDNISOLONE ACETATE 80 MG/ML
40 INJECTION, SUSPENSION INTRA-ARTICULAR; INTRALESIONAL; INTRAMUSCULAR; SOFT TISSUE
Status: COMPLETED | OUTPATIENT
Start: 2021-01-01 | End: 2021-01-01

## 2021-01-01 RX ORDER — CEFTRIAXONE 1 G/1
1 INJECTION, POWDER, FOR SOLUTION INTRAMUSCULAR; INTRAVENOUS
Status: COMPLETED | OUTPATIENT
Start: 2021-01-01 | End: 2021-01-01

## 2021-01-01 RX ORDER — POTASSIUM CHLORIDE 20 MEQ/1
20 TABLET, EXTENDED RELEASE ORAL DAILY
Qty: 30 TABLET | Refills: 0 | Status: ON HOLD | OUTPATIENT
Start: 2021-01-01 | End: 2022-01-01 | Stop reason: HOSPADM

## 2021-01-01 RX ORDER — HYDRALAZINE HYDROCHLORIDE 25 MG/1
25 TABLET, FILM COATED ORAL EVERY 8 HOURS
Status: DISCONTINUED | OUTPATIENT
Start: 2021-01-01 | End: 2021-01-01

## 2021-01-01 RX ORDER — IPRATROPIUM BROMIDE AND ALBUTEROL SULFATE 2.5; .5 MG/3ML; MG/3ML
3 SOLUTION RESPIRATORY (INHALATION)
Status: COMPLETED | OUTPATIENT
Start: 2021-01-01 | End: 2021-01-01

## 2021-01-01 RX ORDER — ALBUTEROL SULFATE 0.63 MG/3ML
SOLUTION RESPIRATORY (INHALATION)
Qty: 1 BOX | Refills: 1 | Status: SHIPPED | OUTPATIENT
Start: 2021-01-01 | End: 2021-01-01 | Stop reason: SDUPTHER

## 2021-01-01 RX ORDER — METHYLPREDNISOLONE 4 MG/1
TABLET ORAL
Qty: 1 PACKAGE | Refills: 0 | Status: SHIPPED | OUTPATIENT
Start: 2021-01-01 | End: 2021-01-01

## 2021-01-01 RX ORDER — LEVOFLOXACIN 250 MG/1
250 TABLET ORAL DAILY
Status: DISCONTINUED | OUTPATIENT
Start: 2021-01-01 | End: 2021-01-01 | Stop reason: HOSPADM

## 2021-01-01 RX ORDER — ATORVASTATIN CALCIUM 40 MG/1
40 TABLET, FILM COATED ORAL NIGHTLY
Status: ON HOLD | COMMUNITY
Start: 2021-01-01 | End: 2022-01-01 | Stop reason: SDUPTHER

## 2021-01-01 RX ORDER — CLOPIDOGREL BISULFATE 75 MG/1
75 TABLET ORAL DAILY
Status: DISCONTINUED | OUTPATIENT
Start: 2021-01-01 | End: 2021-01-01 | Stop reason: HOSPADM

## 2021-01-01 RX ORDER — MAGNESIUM SULFATE HEPTAHYDRATE 40 MG/ML
2 INJECTION, SOLUTION INTRAVENOUS ONCE
Status: COMPLETED | OUTPATIENT
Start: 2021-01-01 | End: 2021-01-01

## 2021-01-01 RX ORDER — AMIODARONE HYDROCHLORIDE 200 MG/1
200 TABLET ORAL DAILY
Qty: 30 TABLET | Refills: 0 | Status: ON HOLD | OUTPATIENT
Start: 2021-01-01 | End: 2022-01-01 | Stop reason: SDUPTHER

## 2021-01-01 RX ORDER — SODIUM CHLORIDE 0.9 % (FLUSH) 0.9 %
10 SYRINGE (ML) INJECTION
Status: DISCONTINUED | OUTPATIENT
Start: 2021-01-01 | End: 2021-01-01 | Stop reason: HOSPADM

## 2021-01-01 RX ORDER — PREDNISONE 20 MG/1
40 TABLET ORAL DAILY
Status: DISCONTINUED | OUTPATIENT
Start: 2021-01-01 | End: 2021-01-01

## 2021-01-01 RX ORDER — ATORVASTATIN CALCIUM 40 MG/1
40 TABLET, FILM COATED ORAL NIGHTLY
Status: DISCONTINUED | OUTPATIENT
Start: 2021-01-01 | End: 2021-01-01 | Stop reason: HOSPADM

## 2021-01-01 RX ORDER — AMIODARONE HYDROCHLORIDE 200 MG/1
200 TABLET ORAL DAILY
Status: DISCONTINUED | OUTPATIENT
Start: 2021-01-01 | End: 2021-01-01 | Stop reason: HOSPADM

## 2021-01-01 RX ORDER — ONDANSETRON 2 MG/ML
4 INJECTION INTRAMUSCULAR; INTRAVENOUS
Status: COMPLETED | OUTPATIENT
Start: 2021-01-01 | End: 2021-01-01

## 2021-01-01 RX ORDER — AMIODARONE HYDROCHLORIDE 200 MG/1
TABLET ORAL
COMMUNITY
Start: 2021-01-01 | End: 2021-01-01 | Stop reason: SDUPTHER

## 2021-01-01 RX ORDER — AMOXICILLIN AND CLAVULANATE POTASSIUM 875; 125 MG/1; MG/1
1 TABLET, FILM COATED ORAL 2 TIMES DAILY
Qty: 20 TABLET | Refills: 0 | Status: SHIPPED | OUTPATIENT
Start: 2021-01-01 | End: 2021-01-01

## 2021-01-01 RX ORDER — FUROSEMIDE 10 MG/ML
80 INJECTION INTRAMUSCULAR; INTRAVENOUS
Status: DISCONTINUED | OUTPATIENT
Start: 2021-01-01 | End: 2021-01-01

## 2021-01-01 RX ADMIN — FUROSEMIDE 40 MG: 10 INJECTION, SOLUTION INTRAMUSCULAR; INTRAVENOUS at 05:11

## 2021-01-01 RX ADMIN — IPRATROPIUM BROMIDE AND ALBUTEROL SULFATE 3 ML: 2.5; .5 SOLUTION RESPIRATORY (INHALATION) at 12:11

## 2021-01-01 RX ADMIN — GUAIFENESIN 600 MG: 600 TABLET, EXTENDED RELEASE ORAL at 08:11

## 2021-01-01 RX ADMIN — IPRATROPIUM BROMIDE AND ALBUTEROL SULFATE 3 ML: 2.5; .5 SOLUTION RESPIRATORY (INHALATION) at 01:11

## 2021-01-01 RX ADMIN — POLYETHYLENE GLYCOL 3350 17 G: 17 POWDER, FOR SOLUTION ORAL at 10:11

## 2021-01-01 RX ADMIN — FUROSEMIDE 40 MG: 10 INJECTION INTRAMUSCULAR; INTRAVENOUS at 10:12

## 2021-01-01 RX ADMIN — DOPAMINE HYDROCHLORIDE 2 MCG/KG/MIN: 320 INJECTION, SOLUTION INTRAVENOUS at 04:11

## 2021-01-01 RX ADMIN — METHYLPREDNISOLONE ACETATE 40 MG: 80 INJECTION, SUSPENSION INTRA-ARTICULAR; INTRALESIONAL; INTRAMUSCULAR; SOFT TISSUE at 10:12

## 2021-01-01 RX ADMIN — PANTOPRAZOLE SODIUM 40 MG: 40 TABLET, DELAYED RELEASE ORAL at 09:11

## 2021-01-01 RX ADMIN — ATORVASTATIN CALCIUM 40 MG: 40 TABLET, FILM COATED ORAL at 09:11

## 2021-01-01 RX ADMIN — APIXABAN 5 MG: 5 TABLET, FILM COATED ORAL at 08:11

## 2021-01-01 RX ADMIN — ONDANSETRON 4 MG: 2 INJECTION INTRAMUSCULAR; INTRAVENOUS at 06:11

## 2021-01-01 RX ADMIN — HYDROCODONE BITARTRATE AND ACETAMINOPHEN 1 TABLET: 5; 325 TABLET ORAL at 12:11

## 2021-01-01 RX ADMIN — MORPHINE SULFATE 2 MG: 2 INJECTION, SOLUTION INTRAMUSCULAR; INTRAVENOUS at 12:11

## 2021-01-01 RX ADMIN — APIXABAN 5 MG: 5 TABLET, FILM COATED ORAL at 09:11

## 2021-01-01 RX ADMIN — CLOPIDOGREL 75 MG: 75 TABLET, FILM COATED ORAL at 09:11

## 2021-01-01 RX ADMIN — IPRATROPIUM BROMIDE AND ALBUTEROL SULFATE 3 ML: 2.5; .5 SOLUTION RESPIRATORY (INHALATION) at 07:11

## 2021-01-01 RX ADMIN — PREDNISONE 40 MG: 20 TABLET ORAL at 09:11

## 2021-01-01 RX ADMIN — HYDRALAZINE HYDROCHLORIDE 25 MG: 25 TABLET ORAL at 05:11

## 2021-01-01 RX ADMIN — MORPHINE SULFATE 2 MG: 2 INJECTION, SOLUTION INTRAMUSCULAR; INTRAVENOUS at 01:11

## 2021-01-01 RX ADMIN — DEXAMETHASONE SODIUM PHOSPHATE 4 MG: 4 INJECTION, SOLUTION INTRA-ARTICULAR; INTRALESIONAL; INTRAMUSCULAR; INTRAVENOUS; SOFT TISSUE at 10:12

## 2021-01-01 RX ADMIN — DOPAMINE HYDROCHLORIDE 5 MCG/KG/MIN: 320 INJECTION, SOLUTION INTRAVENOUS at 05:11

## 2021-01-01 RX ADMIN — GUAIFENESIN 600 MG: 600 TABLET, EXTENDED RELEASE ORAL at 10:11

## 2021-01-01 RX ADMIN — DEXAMETHASONE SODIUM PHOSPHATE 4 MG: 4 INJECTION, SOLUTION INTRA-ARTICULAR; INTRALESIONAL; INTRAMUSCULAR; INTRAVENOUS; SOFT TISSUE at 02:09

## 2021-01-01 RX ADMIN — GUAIFENESIN 600 MG: 600 TABLET, EXTENDED RELEASE ORAL at 09:11

## 2021-01-01 RX ADMIN — FUROSEMIDE 40 MG: 10 INJECTION INTRAMUSCULAR; INTRAVENOUS at 05:11

## 2021-01-01 RX ADMIN — SIMETHICONE 80 MG: 80 TABLET, CHEWABLE ORAL at 09:11

## 2021-01-01 RX ADMIN — CARVEDILOL 3.12 MG: 3.12 TABLET, FILM COATED ORAL at 09:11

## 2021-01-01 RX ADMIN — POLYETHYLENE GLYCOL 3350 17 G: 17 POWDER, FOR SOLUTION ORAL at 09:11

## 2021-01-01 RX ADMIN — HYDROCODONE BITARTRATE AND ACETAMINOPHEN 1 TABLET: 5; 325 TABLET ORAL at 09:11

## 2021-01-01 RX ADMIN — LEVOFLOXACIN 250 MG: 250 TABLET, FILM COATED ORAL at 09:11

## 2021-01-01 RX ADMIN — HYDRALAZINE HYDROCHLORIDE 50 MG: 50 TABLET ORAL at 10:11

## 2021-01-01 RX ADMIN — LEVOFLOXACIN 250 MG: 250 TABLET, FILM COATED ORAL at 08:11

## 2021-01-01 RX ADMIN — AMIODARONE HYDROCHLORIDE 200 MG: 200 TABLET ORAL at 09:11

## 2021-01-01 RX ADMIN — LEVOFLOXACIN 500 MG: 500 TABLET, FILM COATED ORAL at 09:11

## 2021-01-01 RX ADMIN — APIXABAN 5 MG: 5 TABLET, FILM COATED ORAL at 10:11

## 2021-01-01 RX ADMIN — HYDRALAZINE HYDROCHLORIDE 25 MG: 25 TABLET ORAL at 01:11

## 2021-01-01 RX ADMIN — DEXAMETHASONE SODIUM PHOSPHATE 8 MG: 4 INJECTION, SOLUTION INTRAMUSCULAR; INTRAVENOUS at 04:11

## 2021-01-01 RX ADMIN — ONDANSETRON 4 MG: 2 INJECTION INTRAMUSCULAR; INTRAVENOUS at 01:11

## 2021-01-01 RX ADMIN — FUROSEMIDE 80 MG: 10 INJECTION, SOLUTION INTRAVENOUS at 04:11

## 2021-01-01 RX ADMIN — IPRATROPIUM BROMIDE AND ALBUTEROL SULFATE 3 ML: .5; 3 SOLUTION RESPIRATORY (INHALATION) at 12:11

## 2021-01-01 RX ADMIN — CEFTRIAXONE 1 G: 1 INJECTION, POWDER, FOR SOLUTION INTRAMUSCULAR; INTRAVENOUS at 02:09

## 2021-01-01 RX ADMIN — ATORVASTATIN CALCIUM 40 MG: 40 TABLET, FILM COATED ORAL at 08:11

## 2021-01-01 RX ADMIN — ATORVASTATIN CALCIUM 40 MG: 40 TABLET, FILM COATED ORAL at 10:11

## 2021-01-01 RX ADMIN — HYDRALAZINE HYDROCHLORIDE 25 MG: 25 TABLET ORAL at 09:11

## 2021-01-01 RX ADMIN — SODIUM ZIRCONIUM CYCLOSILICATE 10 G: 10 POWDER, FOR SUSPENSION ORAL at 09:11

## 2021-01-01 RX ADMIN — METHYLPREDNISOLONE ACETATE 40 MG: 80 INJECTION, SUSPENSION INTRA-ARTICULAR; INTRALESIONAL; INTRAMUSCULAR; SOFT TISSUE at 02:09

## 2021-01-01 RX ADMIN — SENNOSIDES 8.6 MG: 8.6 TABLET ORAL at 09:11

## 2021-01-01 RX ADMIN — POLYETHYLENE GLYCOL 3350 17 G: 17 POWDER, FOR SOLUTION ORAL at 08:11

## 2021-01-01 RX ADMIN — LIDOCAINE 5% 1 PATCH: 700 PATCH TOPICAL at 05:11

## 2021-01-01 RX ADMIN — MAGNESIUM SULFATE IN WATER 2 G: 40 INJECTION, SOLUTION INTRAVENOUS at 02:11

## 2021-01-01 RX ADMIN — AMIODARONE HYDROCHLORIDE 200 MG: 200 TABLET ORAL at 08:11

## 2021-01-01 RX ADMIN — PANTOPRAZOLE SODIUM 40 MG: 40 TABLET, DELAYED RELEASE ORAL at 08:11

## 2021-01-01 RX ADMIN — ALBUTEROL SULFATE 2.5 MG: 2.5 SOLUTION RESPIRATORY (INHALATION) at 04:11

## 2021-01-01 RX ADMIN — METOPROLOL SUCCINATE 12.5 MG: 25 TABLET, EXTENDED RELEASE ORAL at 01:11

## 2021-01-01 RX ADMIN — FUROSEMIDE 40 MG: 10 INJECTION INTRAMUSCULAR; INTRAVENOUS at 04:11

## 2021-01-01 RX ADMIN — FUROSEMIDE 20 MG: 10 INJECTION INTRAMUSCULAR; INTRAVENOUS at 02:09

## 2021-01-01 RX ADMIN — CLOPIDOGREL 75 MG: 75 TABLET, FILM COATED ORAL at 08:11

## 2021-01-01 RX ADMIN — FUROSEMIDE 80 MG: 10 INJECTION, SOLUTION INTRAVENOUS at 06:11

## 2021-01-01 RX ADMIN — HYDROCODONE BITARTRATE AND ACETAMINOPHEN 1 TABLET: 5; 325 TABLET ORAL at 11:11

## 2021-01-01 RX ADMIN — HYDROCODONE BITARTRATE AND ACETAMINOPHEN 1 TABLET: 5; 325 TABLET ORAL at 08:11

## 2021-01-01 RX ADMIN — CARVEDILOL 3.12 MG: 3.12 TABLET, FILM COATED ORAL at 10:11

## 2021-01-01 RX ADMIN — SENNOSIDES 8.6 MG: 8.6 TABLET ORAL at 10:11

## 2021-01-01 RX ADMIN — SODIUM POLYSTYRENE SULFONATE 15 G: 15 SUSPENSION ORAL; RECTAL at 08:11

## 2021-01-01 RX ADMIN — METOPROLOL SUCCINATE 12.5 MG: 25 TABLET, EXTENDED RELEASE ORAL at 04:11

## 2021-01-01 RX ADMIN — METOPROLOL SUCCINATE 12.5 MG: 25 TABLET, EXTENDED RELEASE ORAL at 08:11

## 2021-01-01 RX ADMIN — IPRATROPIUM BROMIDE AND ALBUTEROL SULFATE 3 ML: 2.5; .5 SOLUTION RESPIRATORY (INHALATION) at 08:11

## 2021-01-01 RX ADMIN — FUROSEMIDE 60 MG: 10 INJECTION, SOLUTION INTRAMUSCULAR; INTRAVENOUS at 12:11

## 2021-01-01 RX ADMIN — SODIUM CHLORIDE 250 ML: 9 INJECTION, SOLUTION INTRAVENOUS at 11:11

## 2021-09-07 ENCOUNTER — TELEPHONE (OUTPATIENT)
Dept: FAMILY MEDICINE | Facility: CLINIC | Age: 76
End: 2021-09-07

## 2021-09-14 ENCOUNTER — OFFICE VISIT (OUTPATIENT)
Dept: FAMILY MEDICINE | Facility: CLINIC | Age: 76
End: 2021-09-14
Payer: COMMERCIAL

## 2021-09-14 VITALS
HEIGHT: 69 IN | SYSTOLIC BLOOD PRESSURE: 132 MMHG | WEIGHT: 198 LBS | HEART RATE: 86 BPM | BODY MASS INDEX: 29.33 KG/M2 | TEMPERATURE: 98 F | DIASTOLIC BLOOD PRESSURE: 81 MMHG | RESPIRATION RATE: 20 BRPM

## 2021-09-14 DIAGNOSIS — D12.2 ADENOMATOUS POLYP OF ASCENDING COLON: ICD-10-CM

## 2021-09-14 DIAGNOSIS — R60.0 LOWER EXTREMITY EDEMA: Primary | ICD-10-CM

## 2021-09-14 DIAGNOSIS — I25.10 ARTERIOSCLEROSIS OF CORONARY ARTERY: ICD-10-CM

## 2021-09-14 DIAGNOSIS — I82.4Z9 DEEP VEIN THROMBOSIS (DVT) OF DISTAL VEIN OF LOWER EXTREMITY, UNSPECIFIED CHRONICITY, UNSPECIFIED LATERALITY: ICD-10-CM

## 2021-09-14 DIAGNOSIS — I65.23 BILATERAL CAROTID ARTERY STENOSIS: ICD-10-CM

## 2021-09-14 DIAGNOSIS — I48.92 ATRIAL FLUTTER, UNSPECIFIED TYPE: ICD-10-CM

## 2021-09-14 DIAGNOSIS — R60.0 BILATERAL LEG EDEMA: ICD-10-CM

## 2021-09-14 DIAGNOSIS — I65.1 BASILAR ARTERY STENOSIS: ICD-10-CM

## 2021-09-14 DIAGNOSIS — I50.9 CONGESTIVE HEART FAILURE, UNSPECIFIED HF CHRONICITY, UNSPECIFIED HEART FAILURE TYPE: ICD-10-CM

## 2021-09-14 DIAGNOSIS — J44.9 CHRONIC OBSTRUCTIVE PULMONARY DISEASE, UNSPECIFIED COPD TYPE: ICD-10-CM

## 2021-09-14 PROCEDURE — 99213 OFFICE O/P EST LOW 20 MIN: CPT | Mod: ,,, | Performed by: NURSE PRACTITIONER

## 2021-09-14 PROCEDURE — 99213 PR OFFICE/OUTPT VISIT, EST, LEVL III, 20-29 MIN: ICD-10-PCS | Mod: ,,, | Performed by: NURSE PRACTITIONER

## 2021-09-14 RX ORDER — FUROSEMIDE 40 MG/1
40 TABLET ORAL DAILY
COMMUNITY
Start: 2021-04-24 | End: 2021-09-14

## 2021-09-14 RX ORDER — HYDROCODONE BITARTRATE AND ACETAMINOPHEN 10; 325 MG/1; MG/1
1 TABLET ORAL
COMMUNITY
Start: 2011-11-23 | End: 2021-09-14

## 2021-09-14 RX ORDER — METHOCARBAMOL 750 MG/1
TABLET, FILM COATED ORAL
COMMUNITY
End: 2022-01-01 | Stop reason: SDUPTHER

## 2021-09-14 RX ORDER — ROSUVASTATIN CALCIUM 20 MG/1
TABLET, COATED ORAL
Status: ON HOLD | COMMUNITY
Start: 2020-08-13 | End: 2022-01-01 | Stop reason: HOSPADM

## 2021-09-14 RX ORDER — CARVEDILOL 6.25 MG/1
3.12 TABLET ORAL
Status: ON HOLD | COMMUNITY
Start: 2021-06-23 | End: 2021-01-01 | Stop reason: HOSPADM

## 2021-09-14 RX ORDER — FUROSEMIDE 40 MG/1
TABLET ORAL
COMMUNITY
End: 2021-09-14

## 2021-09-14 RX ORDER — CLOPIDOGREL BISULFATE 75 MG/1
TABLET ORAL
Status: ON HOLD | COMMUNITY
Start: 2020-01-10 | End: 2022-01-01 | Stop reason: HOSPADM

## 2021-09-14 RX ORDER — AMIODARONE HYDROCHLORIDE 200 MG/1
TABLET ORAL
Status: ON HOLD | COMMUNITY
Start: 2021-06-24 | End: 2021-01-01 | Stop reason: HOSPADM

## 2021-09-14 RX ORDER — SACUBITRIL AND VALSARTAN 24; 26 MG/1; MG/1
TABLET, FILM COATED ORAL
Status: ON HOLD | COMMUNITY
Start: 2019-06-13 | End: 2021-01-01 | Stop reason: HOSPADM

## 2021-09-14 RX ORDER — ALBUTEROL SULFATE 0.63 MG/3ML
SOLUTION RESPIRATORY (INHALATION)
COMMUNITY
End: 2021-01-01 | Stop reason: SDUPTHER

## 2021-09-14 RX ORDER — TORSEMIDE 20 MG/1
TABLET ORAL
COMMUNITY
Start: 2020-09-16 | End: 2021-09-14

## 2021-09-14 RX ORDER — FUROSEMIDE 20 MG/1
TABLET ORAL
Status: ON HOLD | COMMUNITY
Start: 2021-04-20 | End: 2021-01-01 | Stop reason: HOSPADM

## 2021-09-14 RX ORDER — POTASSIUM CHLORIDE 20 MEQ/1
TABLET, EXTENDED RELEASE ORAL
Status: ON HOLD | COMMUNITY
Start: 2020-09-11 | End: 2021-01-01 | Stop reason: HOSPADM

## 2021-09-14 RX ORDER — NITROGLYCERIN 400 UG/1
SPRAY ORAL
Status: ON HOLD | COMMUNITY
Start: 2021-01-07 | End: 2022-01-01 | Stop reason: HOSPADM

## 2021-09-14 RX ORDER — ONDANSETRON 4 MG/1
4 TABLET, FILM COATED ORAL EVERY 8 HOURS PRN
Status: CANCELLED | OUTPATIENT
Start: 2021-09-14

## 2021-09-14 RX ORDER — HYDROCODONE BITARTRATE AND ACETAMINOPHEN 10; 325 MG/1; MG/1
TABLET ORAL
COMMUNITY
Start: 2019-05-10 | End: 2021-09-14

## 2021-09-17 PROBLEM — J44.9 CHRONIC OBSTRUCTIVE PULMONARY DISEASE: Status: ACTIVE | Noted: 2019-04-10

## 2021-09-17 PROBLEM — K21.00 GASTROESOPHAGEAL REFLUX DISEASE WITH ESOPHAGITIS: Status: ACTIVE | Noted: 2017-12-18

## 2021-09-17 PROBLEM — D12.2 ADENOMATOUS POLYP OF ASCENDING COLON: Status: ACTIVE | Noted: 2017-12-18

## 2021-09-17 PROBLEM — I65.1 BASILAR ARTERY STENOSIS: Status: ACTIVE | Noted: 2020-08-13

## 2021-09-17 PROBLEM — I25.10 ARTERIOSCLEROSIS OF CORONARY ARTERY: Status: ACTIVE | Noted: 2019-04-10

## 2021-09-17 PROBLEM — I50.9 CONGESTIVE HEART FAILURE: Status: ACTIVE | Noted: 2019-04-10

## 2021-09-17 PROBLEM — I65.23 BILATERAL CAROTID ARTERY STENOSIS: Status: ACTIVE | Noted: 2020-01-10

## 2021-09-17 PROBLEM — I48.92 ATRIAL FLUTTER: Status: ACTIVE | Noted: 2019-04-10

## 2021-09-17 PROBLEM — I82.409 DEEP VEIN THROMBOSIS (DVT) OF LOWER EXTREMITY: Status: ACTIVE | Noted: 2021-05-12

## 2021-09-17 PROBLEM — R60.0 BILATERAL LEG EDEMA: Status: ACTIVE | Noted: 2021-07-02

## 2021-09-23 PROBLEM — J06.9 UPPER RESPIRATORY TRACT INFECTION: Status: ACTIVE | Noted: 2021-01-01

## 2021-09-30 PROBLEM — R05.9 COUGH: Status: ACTIVE | Noted: 2021-01-01

## 2021-10-15 PROBLEM — G89.4 CHRONIC PAIN SYNDROME: Status: ACTIVE | Noted: 2021-01-01

## 2021-10-15 PROBLEM — J44.1 COPD EXACERBATION: Status: ACTIVE | Noted: 2021-01-01

## 2021-11-10 PROBLEM — I48.11 LONGSTANDING PERSISTENT ATRIAL FIBRILLATION: Status: ACTIVE | Noted: 2021-01-01

## 2021-11-10 PROBLEM — G89.29 CHRONIC BILATERAL LOW BACK PAIN: Status: ACTIVE | Noted: 2021-01-01

## 2021-11-10 PROBLEM — M54.50 CHRONIC BILATERAL LOW BACK PAIN: Status: ACTIVE | Noted: 2021-01-01

## 2021-11-10 PROBLEM — I10 PRIMARY HYPERTENSION: Status: ACTIVE | Noted: 2021-01-01

## 2021-11-11 PROBLEM — R53.1 GENERALIZED WEAKNESS: Status: ACTIVE | Noted: 2021-01-01

## 2021-11-14 PROBLEM — N17.9 AKI (ACUTE KIDNEY INJURY): Status: ACTIVE | Noted: 2021-01-01

## 2021-11-16 PROBLEM — I50.9 ACUTE EXACERBATION OF CHF (CONGESTIVE HEART FAILURE): Status: RESOLVED | Noted: 2019-04-10 | Resolved: 2021-01-01

## 2021-12-01 PROBLEM — N17.9 ACUTE RENAL FAILURE: Status: ACTIVE | Noted: 2021-01-01

## 2021-12-01 PROBLEM — K56.7 ILEUS: Status: ACTIVE | Noted: 2021-01-01

## 2021-12-01 PROBLEM — Z95.810 AICD (AUTOMATIC CARDIOVERTER/DEFIBRILLATOR) PRESENT: Status: ACTIVE | Noted: 2021-01-01

## 2021-12-01 PROBLEM — I13.10 HYPERTENSIVE HEART AND CHRONIC KIDNEY DISEASE WITHOUT HEART FAILURE, WITH STAGE 1 THROUGH STAGE 4 CHRONIC KIDNEY DISEASE, OR UNSPECIFIED CHRONIC KIDNEY DISEASE: Status: ACTIVE | Noted: 2019-04-10

## 2021-12-01 PROBLEM — I42.9 CARDIOMYOPATHY: Status: ACTIVE | Noted: 2021-01-01

## 2021-12-01 PROBLEM — G89.29 CHRONIC PAIN: Status: ACTIVE | Noted: 2021-01-01

## 2021-12-01 PROBLEM — Z79.01 CHRONIC ANTICOAGULATION: Status: ACTIVE | Noted: 2021-01-01

## 2021-12-15 PROBLEM — J44.1 ACUTE EXACERBATION OF CHRONIC OBSTRUCTIVE AIRWAYS DISEASE: Status: ACTIVE | Noted: 2021-01-01

## 2021-12-15 PROBLEM — R15.9 FECAL INCONTINENCE: Status: ACTIVE | Noted: 2017-12-18

## 2021-12-15 PROBLEM — E78.5 DYSLIPIDEMIA: Status: ACTIVE | Noted: 2021-01-01

## 2021-12-15 PROBLEM — I51.9 HEART DISEASE: Status: ACTIVE | Noted: 2018-02-10

## 2021-12-15 PROBLEM — J44.9 COPD (CHRONIC OBSTRUCTIVE PULMONARY DISEASE): Status: ACTIVE | Noted: 2021-01-01

## 2021-12-15 PROBLEM — K85.90 PANCREATITIS: Status: ACTIVE | Noted: 2017-12-18

## 2021-12-15 PROBLEM — K44.9 HH (HIATUS HERNIA): Status: ACTIVE | Noted: 2017-12-18

## 2021-12-15 PROBLEM — G47.33 OBSTRUCTIVE SLEEP APNEA: Status: ACTIVE | Noted: 2019-04-10

## 2021-12-15 PROBLEM — Z72.0 TOBACCO ABUSE: Status: ACTIVE | Noted: 2021-01-01

## 2021-12-15 PROBLEM — Z95.810 HISTORY OF CARDIAC DEFIBRILLATOR PLACEMENT: Status: ACTIVE | Noted: 2021-01-01

## 2021-12-15 PROBLEM — Z86.010 HISTORY OF COLON POLYPS: Status: ACTIVE | Noted: 2017-12-18

## 2021-12-15 PROBLEM — Z29.9 PROPHYLACTIC MEASURE: Status: ACTIVE | Noted: 2021-01-01

## 2021-12-15 PROBLEM — I49.5 SICK SINUS SYNDROME: Status: ACTIVE | Noted: 2019-04-10

## 2021-12-15 PROBLEM — R09.89 PULMONARY CONGESTION: Status: ACTIVE | Noted: 2021-01-01

## 2021-12-15 PROBLEM — I34.0 MITRAL VALVE REGURGITATION: Status: ACTIVE | Noted: 2020-12-01

## 2021-12-15 PROBLEM — K29.80 DUODENITIS: Status: ACTIVE | Noted: 2017-12-18

## 2021-12-15 PROBLEM — R42 DIZZINESS: Status: ACTIVE | Noted: 2017-12-08

## 2021-12-15 PROBLEM — K57.30 DIVERTICULAR DISEASE OF COLON: Status: ACTIVE | Noted: 2017-12-18

## 2021-12-15 PROBLEM — I50.23 ACUTE ON CHRONIC SYSTOLIC CHF (CONGESTIVE HEART FAILURE): Status: ACTIVE | Noted: 2021-01-01

## 2021-12-15 PROBLEM — Z95.1 HX OF CABG: Status: ACTIVE | Noted: 2021-01-01

## 2021-12-15 PROBLEM — I50.9 CONGESTIVE HEART FAILURE: Status: ACTIVE | Noted: 2021-01-01

## 2021-12-15 PROBLEM — E78.5 HYPERLIPIDEMIA: Status: ACTIVE | Noted: 2019-04-10

## 2021-12-15 PROBLEM — R19.7 DIARRHEA: Status: ACTIVE | Noted: 2017-12-18

## 2021-12-15 PROBLEM — E55.9 VITAMIN D DEFICIENCY, UNSPECIFIED: Status: ACTIVE | Noted: 2019-09-24

## 2021-12-15 PROBLEM — K29.71 GASTRITIS WITH HEMORRHAGE: Status: ACTIVE | Noted: 2017-12-18

## 2021-12-15 PROBLEM — F17.200 NICOTINE ADDICTION: Status: ACTIVE | Noted: 2021-01-01

## 2021-12-15 PROBLEM — K31.84 GASTROPARESIS: Status: ACTIVE | Noted: 2017-12-18

## 2022-01-01 ENCOUNTER — OFFICE VISIT (OUTPATIENT)
Dept: FAMILY MEDICINE | Facility: CLINIC | Age: 77
End: 2022-01-01
Payer: MEDICARE

## 2022-01-01 ENCOUNTER — HOSPITAL ENCOUNTER (INPATIENT)
Facility: HOSPITAL | Age: 77
LOS: 7 days | Discharge: NURSING, REHAB OR LONG-TERM CARE | DRG: 069 | End: 2022-02-22
Attending: EMERGENCY MEDICINE | Admitting: INTERNAL MEDICINE
Payer: MEDICARE

## 2022-01-01 ENCOUNTER — OFFICE VISIT (OUTPATIENT)
Dept: CARDIOLOGY | Facility: CLINIC | Age: 77
End: 2022-01-01
Payer: MEDICARE

## 2022-01-01 ENCOUNTER — OUTSIDE PLACE OF SERVICE (OUTPATIENT)
Dept: FAMILY MEDICINE | Facility: CLINIC | Age: 77
End: 2022-01-01
Payer: MEDICARE

## 2022-01-01 ENCOUNTER — TELEPHONE (OUTPATIENT)
Dept: FAMILY MEDICINE | Facility: CLINIC | Age: 77
End: 2022-01-01
Payer: MEDICARE

## 2022-01-01 ENCOUNTER — TELEPHONE (OUTPATIENT)
Dept: CARDIOLOGY | Facility: HOSPITAL | Age: 77
End: 2022-01-01
Payer: MEDICARE

## 2022-01-01 ENCOUNTER — HOSPITAL ENCOUNTER (OUTPATIENT)
Dept: RADIOLOGY | Facility: HOSPITAL | Age: 77
Discharge: HOME | End: 2022-02-10
Attending: STUDENT IN AN ORGANIZED HEALTH CARE EDUCATION/TRAINING PROGRAM
Payer: MEDICARE

## 2022-01-01 ENCOUNTER — HOSPITAL ENCOUNTER (INPATIENT)
Facility: HOSPITAL | Age: 77
LOS: 6 days | DRG: 292 | End: 2022-09-19
Attending: EMERGENCY MEDICINE | Admitting: STUDENT IN AN ORGANIZED HEALTH CARE EDUCATION/TRAINING PROGRAM
Payer: MEDICARE

## 2022-01-01 ENCOUNTER — TELEPHONE (OUTPATIENT)
Dept: CARDIOLOGY | Facility: CLINIC | Age: 77
End: 2022-01-01
Payer: MEDICARE

## 2022-01-01 ENCOUNTER — HOSPITAL ENCOUNTER (OUTPATIENT)
Dept: RADIOLOGY | Facility: HOSPITAL | Age: 77
Discharge: HOME | End: 2022-02-10
Attending: NURSE PRACTITIONER
Payer: MEDICARE

## 2022-01-01 ENCOUNTER — HOSPITAL ENCOUNTER (OUTPATIENT)
Facility: HOSPITAL | Age: 77
Discharge: HOME | End: 2022-01-08
Attending: EMERGENCY MEDICINE | Admitting: INTERNAL MEDICINE
Payer: MEDICARE

## 2022-01-01 ENCOUNTER — HOSPITAL ENCOUNTER (EMERGENCY)
Facility: HOSPITAL | Age: 77
Discharge: HOME | End: 2022-01-01
Attending: EMERGENCY MEDICINE
Payer: MEDICARE

## 2022-01-01 VITALS
DIASTOLIC BLOOD PRESSURE: 78 MMHG | WEIGHT: 185 LBS | RESPIRATION RATE: 20 BRPM | SYSTOLIC BLOOD PRESSURE: 133 MMHG | SYSTOLIC BLOOD PRESSURE: 116 MMHG | BODY MASS INDEX: 28.86 KG/M2 | OXYGEN SATURATION: 96 % | DIASTOLIC BLOOD PRESSURE: 81 MMHG | OXYGEN SATURATION: 98 % | HEART RATE: 104 BPM | TEMPERATURE: 97 F | RESPIRATION RATE: 20 BRPM | WEIGHT: 195.38 LBS | BODY MASS INDEX: 27.32 KG/M2 | TEMPERATURE: 98 F | HEART RATE: 96 BPM

## 2022-01-01 VITALS
WEIGHT: 196 LBS | HEIGHT: 69 IN | OXYGEN SATURATION: 95 % | RESPIRATION RATE: 18 BRPM | HEART RATE: 73 BPM | TEMPERATURE: 97 F | DIASTOLIC BLOOD PRESSURE: 72 MMHG | HEART RATE: 87 BPM | SYSTOLIC BLOOD PRESSURE: 127 MMHG | BODY MASS INDEX: 29.03 KG/M2 | SYSTOLIC BLOOD PRESSURE: 121 MMHG | HEIGHT: 69 IN | TEMPERATURE: 98 F | DIASTOLIC BLOOD PRESSURE: 80 MMHG | WEIGHT: 183.19 LBS | RESPIRATION RATE: 18 BRPM | BODY MASS INDEX: 27.13 KG/M2

## 2022-01-01 VITALS
OXYGEN SATURATION: 99 % | HEART RATE: 62 BPM | TEMPERATURE: 96 F | HEIGHT: 69 IN | BODY MASS INDEX: 29.42 KG/M2 | RESPIRATION RATE: 20 BRPM | DIASTOLIC BLOOD PRESSURE: 59 MMHG | SYSTOLIC BLOOD PRESSURE: 93 MMHG | WEIGHT: 198.63 LBS

## 2022-01-01 VITALS
RESPIRATION RATE: 20 BRPM | BODY MASS INDEX: 27.11 KG/M2 | WEIGHT: 183 LBS | DIASTOLIC BLOOD PRESSURE: 84 MMHG | HEIGHT: 69 IN | HEIGHT: 69 IN | SYSTOLIC BLOOD PRESSURE: 130 MMHG | DIASTOLIC BLOOD PRESSURE: 78 MMHG | HEART RATE: 72 BPM | SYSTOLIC BLOOD PRESSURE: 132 MMHG | TEMPERATURE: 98 F | BODY MASS INDEX: 29.03 KG/M2 | HEART RATE: 93 BPM | WEIGHT: 196 LBS | RESPIRATION RATE: 14 BRPM

## 2022-01-01 VITALS
WEIGHT: 196 LBS | RESPIRATION RATE: 16 BRPM | SYSTOLIC BLOOD PRESSURE: 110 MMHG | BODY MASS INDEX: 29.03 KG/M2 | DIASTOLIC BLOOD PRESSURE: 70 MMHG | HEIGHT: 69 IN

## 2022-01-01 VITALS
HEART RATE: 87 BPM | DIASTOLIC BLOOD PRESSURE: 65 MMHG | SYSTOLIC BLOOD PRESSURE: 103 MMHG | HEIGHT: 69 IN | WEIGHT: 190 LBS | BODY MASS INDEX: 28.14 KG/M2 | RESPIRATION RATE: 18 BRPM | TEMPERATURE: 98 F

## 2022-01-01 VITALS
OXYGEN SATURATION: 97 % | RESPIRATION RATE: 18 BRPM | DIASTOLIC BLOOD PRESSURE: 51 MMHG | WEIGHT: 168.19 LBS | TEMPERATURE: 98 F | HEIGHT: 69 IN | BODY MASS INDEX: 24.91 KG/M2 | HEART RATE: 69 BPM | SYSTOLIC BLOOD PRESSURE: 123 MMHG

## 2022-01-01 VITALS
DIASTOLIC BLOOD PRESSURE: 70 MMHG | WEIGHT: 194 LBS | SYSTOLIC BLOOD PRESSURE: 100 MMHG | RESPIRATION RATE: 16 BRPM | BODY MASS INDEX: 28.73 KG/M2 | HEART RATE: 83 BPM | HEIGHT: 69 IN

## 2022-01-01 DIAGNOSIS — J44.9 CHRONIC OBSTRUCTIVE PULMONARY DISEASE: ICD-10-CM

## 2022-01-01 DIAGNOSIS — I48.91 A-FIB: ICD-10-CM

## 2022-01-01 DIAGNOSIS — N17.9 ACUTE RENAL FAILURE: ICD-10-CM

## 2022-01-01 DIAGNOSIS — M79.89 LEG SWELLING: ICD-10-CM

## 2022-01-01 DIAGNOSIS — R05.9 COUGH: ICD-10-CM

## 2022-01-01 DIAGNOSIS — I50.43 ACUTE ON CHRONIC COMBINED SYSTOLIC AND DIASTOLIC CONGESTIVE HEART FAILURE: ICD-10-CM

## 2022-01-01 DIAGNOSIS — R06.02 SHORTNESS OF BREATH: ICD-10-CM

## 2022-01-01 DIAGNOSIS — K21.00 GASTROESOPHAGEAL REFLUX DISEASE WITH ESOPHAGITIS: ICD-10-CM

## 2022-01-01 DIAGNOSIS — N18.4 CKD (CHRONIC KIDNEY DISEASE), STAGE IV: ICD-10-CM

## 2022-01-01 DIAGNOSIS — K29.80 DUODENITIS: ICD-10-CM

## 2022-01-01 DIAGNOSIS — I25.10 CAD (CORONARY ARTERY DISEASE): ICD-10-CM

## 2022-01-01 DIAGNOSIS — I50.814 BIVENTRICULAR HEART FAILURE WITH REDUCED LEFT VENTRICULAR FUNCTION: ICD-10-CM

## 2022-01-01 DIAGNOSIS — I10 HYPERTENSION, UNSPECIFIED TYPE: ICD-10-CM

## 2022-01-01 DIAGNOSIS — R40.4 TRANSIENT ALTERATION OF AWARENESS: ICD-10-CM

## 2022-01-01 DIAGNOSIS — K63.5 COLON POLYP: ICD-10-CM

## 2022-01-01 DIAGNOSIS — I50.9 ACUTE ON CHRONIC CONGESTIVE HEART FAILURE, UNSPECIFIED HEART FAILURE TYPE: Primary | ICD-10-CM

## 2022-01-01 DIAGNOSIS — I50.9 CONGESTIVE HEART FAILURE, UNSPECIFIED HF CHRONICITY, UNSPECIFIED HEART FAILURE TYPE: Primary | ICD-10-CM

## 2022-01-01 DIAGNOSIS — I50.9 ACUTE ON CHRONIC CONGESTIVE HEART FAILURE: ICD-10-CM

## 2022-01-01 DIAGNOSIS — I25.10 ARTERIOSCLEROSIS OF CORONARY ARTERY: ICD-10-CM

## 2022-01-01 DIAGNOSIS — I65.1 BASILAR ARTERY STENOSIS: ICD-10-CM

## 2022-01-01 DIAGNOSIS — I50.23 ACUTE ON CHRONIC SYSTOLIC CONGESTIVE HEART FAILURE: ICD-10-CM

## 2022-01-01 DIAGNOSIS — J44.1 ACUTE EXACERBATION OF CHRONIC OBSTRUCTIVE AIRWAYS DISEASE: ICD-10-CM

## 2022-01-01 DIAGNOSIS — G93.41 METABOLIC ENCEPHALOPATHY: ICD-10-CM

## 2022-01-01 DIAGNOSIS — I48.11 LONGSTANDING PERSISTENT ATRIAL FIBRILLATION: ICD-10-CM

## 2022-01-01 DIAGNOSIS — R60.0 BILATERAL LEG EDEMA: ICD-10-CM

## 2022-01-01 DIAGNOSIS — J44.1 COPD EXACERBATION: ICD-10-CM

## 2022-01-01 DIAGNOSIS — F17.200 NICOTINE ADDICTION: ICD-10-CM

## 2022-01-01 DIAGNOSIS — I25.5 ISCHEMIC CARDIOMYOPATHY: ICD-10-CM

## 2022-01-01 DIAGNOSIS — I42.9 CARDIOMYOPATHY: ICD-10-CM

## 2022-01-01 DIAGNOSIS — B35.6 TINEA CRURIS: Primary | ICD-10-CM

## 2022-01-01 DIAGNOSIS — J44.9 COPD (CHRONIC OBSTRUCTIVE PULMONARY DISEASE): ICD-10-CM

## 2022-01-01 DIAGNOSIS — N18.4 CKD (CHRONIC KIDNEY DISEASE) STAGE 4, GFR 15-29 ML/MIN: ICD-10-CM

## 2022-01-01 DIAGNOSIS — G47.33 OBSTRUCTIVE SLEEP APNEA: ICD-10-CM

## 2022-01-01 DIAGNOSIS — R06.00 DYSPNEA: ICD-10-CM

## 2022-01-01 DIAGNOSIS — I48.91 ATRIAL FIBRILLATION, UNSPECIFIED TYPE: Primary | ICD-10-CM

## 2022-01-01 DIAGNOSIS — M54.50 CHRONIC BILATERAL LOW BACK PAIN WITHOUT SCIATICA: ICD-10-CM

## 2022-01-01 DIAGNOSIS — I50.9 CHRONIC CONGESTIVE HEART FAILURE, UNSPECIFIED HEART FAILURE TYPE: ICD-10-CM

## 2022-01-01 DIAGNOSIS — I48.92 ATRIAL FLUTTER, UNSPECIFIED TYPE: ICD-10-CM

## 2022-01-01 DIAGNOSIS — Z79.01 CHRONIC ANTICOAGULATION: ICD-10-CM

## 2022-01-01 DIAGNOSIS — I82.409 DEEP VEIN THROMBOSIS (DVT) OF LOWER EXTREMITY: ICD-10-CM

## 2022-01-01 DIAGNOSIS — Z95.810 HISTORY OF CARDIAC DEFIBRILLATOR PLACEMENT: ICD-10-CM

## 2022-01-01 DIAGNOSIS — R06.2 WHEEZING: ICD-10-CM

## 2022-01-01 DIAGNOSIS — Z95.1 HX OF CABG: ICD-10-CM

## 2022-01-01 DIAGNOSIS — R07.9 CHEST PAIN: ICD-10-CM

## 2022-01-01 DIAGNOSIS — I25.810 CORONARY ARTERY DISEASE INVOLVING CORONARY BYPASS GRAFT OF NATIVE HEART WITHOUT ANGINA PECTORIS: ICD-10-CM

## 2022-01-01 DIAGNOSIS — E78.5 HYPERLIPIDEMIA: ICD-10-CM

## 2022-01-01 DIAGNOSIS — R60.0 LOWER EXTREMITY EDEMA: ICD-10-CM

## 2022-01-01 DIAGNOSIS — D12.2 ADENOMATOUS POLYP OF ASCENDING COLON: ICD-10-CM

## 2022-01-01 DIAGNOSIS — I10 HYPERTENSION: ICD-10-CM

## 2022-01-01 DIAGNOSIS — Z13.6 ENCOUNTER FOR SCREENING FOR ABDOMINAL AORTIC ANEURYSM (AAA) IN PATIENT 50 YEARS OF AGE OR OLDER WITHOUT OTHER RISK FACTORS FOR AAA: ICD-10-CM

## 2022-01-01 DIAGNOSIS — I71.40 ABDOMINAL AORTIC ANEURYSM (AAA) WITHOUT RUPTURE: ICD-10-CM

## 2022-01-01 DIAGNOSIS — R94.31 ABNORMAL EKG: ICD-10-CM

## 2022-01-01 DIAGNOSIS — J44.9 CHRONIC OBSTRUCTIVE PULMONARY DISEASE, UNSPECIFIED COPD TYPE: ICD-10-CM

## 2022-01-01 DIAGNOSIS — I50.9 CONGESTIVE HEART FAILURE, UNSPECIFIED HF CHRONICITY, UNSPECIFIED HEART FAILURE TYPE: ICD-10-CM

## 2022-01-01 DIAGNOSIS — R29.898 WEAKNESS OF BOTH LOWER EXTREMITIES: Primary | ICD-10-CM

## 2022-01-01 DIAGNOSIS — G89.29 CHRONIC BILATERAL LOW BACK PAIN WITHOUT SCIATICA: ICD-10-CM
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
## 2022-01-01 DIAGNOSIS — R29.898 WEAKNESS OF BOTH LOWER EXTREMITIES: ICD-10-CM

## 2022-01-01 DIAGNOSIS — Z20.822 COVID-19 RULED OUT BY LABORATORY TESTING: Primary | ICD-10-CM

## 2022-01-01 DIAGNOSIS — M62.81 MUSCLE WEAKNESS: Primary | ICD-10-CM

## 2022-01-01 DIAGNOSIS — I13.10 HYPERTENSIVE HEART AND CHRONIC KIDNEY DISEASE WITHOUT HEART FAILURE, WITH STAGE 1 THROUGH STAGE 4 CHRONIC KIDNEY DISEASE, OR UNSPECIFIED CHRONIC KIDNEY DISEASE: ICD-10-CM

## 2022-01-01 DIAGNOSIS — R57.0 CARDIOGENIC SHOCK: ICD-10-CM

## 2022-01-01 DIAGNOSIS — I95.2 HYPOTENSION DUE TO DRUGS: ICD-10-CM

## 2022-01-01 DIAGNOSIS — R60.0 PERIPHERAL EDEMA: Primary | ICD-10-CM

## 2022-01-01 DIAGNOSIS — I50.9 ACUTE DECOMPENSATED HEART FAILURE: ICD-10-CM

## 2022-01-01 DIAGNOSIS — Z12.11 COLON CANCER SCREENING: Primary | ICD-10-CM

## 2022-01-01 DIAGNOSIS — I50.9 HEART FAILURE: ICD-10-CM

## 2022-01-01 DIAGNOSIS — R60.0 PERIPHERAL EDEMA: ICD-10-CM

## 2022-01-01 DIAGNOSIS — I42.9 CARDIOMYOPATHY, UNSPECIFIED TYPE: ICD-10-CM

## 2022-01-01 DIAGNOSIS — I50.23 ACUTE ON CHRONIC SYSTOLIC CONGESTIVE HEART FAILURE: Primary | ICD-10-CM

## 2022-01-01 LAB
ALBUMIN SERPL BCP-MCNC: 3.1 G/DL (ref 3.5–5)
ALBUMIN SERPL BCP-MCNC: 3.2 G/DL (ref 3.5–5)
ALBUMIN SERPL BCP-MCNC: 3.3 G/DL (ref 3.5–5)
ALBUMIN SERPL BCP-MCNC: 3.4 G/DL (ref 3.5–5)
ALBUMIN SERPL BCP-MCNC: 3.4 G/DL (ref 3.5–5)
ALBUMIN SERPL BCP-MCNC: 3.5 G/DL (ref 3.5–5)
ALBUMIN SERPL BCP-MCNC: 3.8 G/DL (ref 3.5–5)
ALBUMIN/GLOB SERPL: 1 {RATIO}
ALBUMIN/GLOB SERPL: 1.1 {RATIO}
ALBUMIN/GLOB SERPL: 1.3 {RATIO}
ALP SERPL-CCNC: 170 U/L (ref 45–115)
ALP SERPL-CCNC: 71 U/L (ref 45–115)
ALP SERPL-CCNC: 71 U/L (ref 45–115)
ALP SERPL-CCNC: 74 U/L (ref 45–115)
ALP SERPL-CCNC: 76 U/L (ref 45–115)
ALP SERPL-CCNC: 81 U/L (ref 45–115)
ALP SERPL-CCNC: 84 U/L (ref 45–115)
ALP SERPL-CCNC: 84 U/L (ref 45–115)
ALP SERPL-CCNC: 86 U/L (ref 45–115)
ALP SERPL-CCNC: 98 U/L (ref 45–115)
ALT SERPL W P-5'-P-CCNC: 10 U/L (ref 16–61)
ALT SERPL W P-5'-P-CCNC: 11 U/L (ref 16–61)
ALT SERPL W P-5'-P-CCNC: 12 U/L (ref 16–61)
ALT SERPL W P-5'-P-CCNC: 12 U/L (ref 16–61)
ALT SERPL W P-5'-P-CCNC: 13 U/L (ref 16–61)
ALT SERPL W P-5'-P-CCNC: 273 U/L (ref 16–61)
ALT SERPL W P-5'-P-CCNC: 5 U/L (ref 16–61)
ALT SERPL W P-5'-P-CCNC: 9 U/L (ref 16–61)
AMMONIA PLAS-SCNC: 15 ΜMOL/L (ref 11–32)
AMMONIA PLAS-SCNC: 31 ΜMOL/L (ref 11–32)
AMPHET UR QL SCN: NEGATIVE
ANION GAP SERPL CALCULATED.3IONS-SCNC: 10 MMOL/L (ref 7–16)
ANION GAP SERPL CALCULATED.3IONS-SCNC: 11 MMOL/L (ref 7–16)
ANION GAP SERPL CALCULATED.3IONS-SCNC: 11 MMOL/L (ref 7–16)
ANION GAP SERPL CALCULATED.3IONS-SCNC: 12 MMOL/L (ref 7–16)
ANION GAP SERPL CALCULATED.3IONS-SCNC: 13 MMOL/L (ref 7–16)
ANION GAP SERPL CALCULATED.3IONS-SCNC: 14 MMOL/L (ref 7–16)
ANION GAP SERPL CALCULATED.3IONS-SCNC: 15 MMOL/L (ref 7–16)
ANION GAP SERPL CALCULATED.3IONS-SCNC: 16 MMOL/L (ref 7–16)
ANION GAP SERPL CALCULATED.3IONS-SCNC: 17 MMOL/L (ref 7–16)
ANISOCYTOSIS BLD QL SMEAR: ABNORMAL
AORTIC ROOT ANNULUS: 2.7 CM
AORTIC VALVE CUSP SEPERATION: 1.73 CM
APAP SERPL-MCNC: <2 ΜG/ML (ref 10–30)
APTT PPP: 29.8 SECONDS (ref 25.2–37.3)
APTT PPP: 40.9 SECONDS (ref 25.2–37.3)
AST SERPL W P-5'-P-CCNC: 15 U/L (ref 15–37)
AST SERPL W P-5'-P-CCNC: 16 U/L (ref 15–37)
AST SERPL W P-5'-P-CCNC: 17 U/L (ref 15–37)
AST SERPL W P-5'-P-CCNC: 18 U/L (ref 15–37)
AST SERPL W P-5'-P-CCNC: 181 U/L (ref 15–37)
AST SERPL W P-5'-P-CCNC: 19 U/L (ref 15–37)
AST SERPL W P-5'-P-CCNC: 21 U/L (ref 15–37)
AST SERPL W P-5'-P-CCNC: 21 U/L (ref 15–37)
AV INDEX (PROSTH): 1
AV MEAN GRADIENT: 1 MMHG
AV PEAK GRADIENT: 2 MMHG
AV VALVE AREA: 2.27 CM2
AV VELOCITY RATIO: 0.86
BACTERIA BLD CULT: NORMAL
BACTERIA BLD CULT: NORMAL
BARBITURATES UR QL SCN: NEGATIVE
BASOPHILS # BLD AUTO: 0.05 K/UL (ref 0–0.2)
BASOPHILS # BLD AUTO: 0.07 K/UL (ref 0–0.2)
BASOPHILS # BLD AUTO: 0.07 K/UL (ref 0–0.2)
BASOPHILS # BLD AUTO: 0.08 K/UL (ref 0–0.2)
BASOPHILS # BLD AUTO: 0.09 K/UL (ref 0–0.2)
BASOPHILS # BLD AUTO: 0.11 K/UL (ref 0–0.2)
BASOPHILS # BLD AUTO: 0.12 K/UL (ref 0–0.2)
BASOPHILS # BLD AUTO: 0.13 K/UL (ref 0–0.2)
BASOPHILS NFR BLD AUTO: 0.5 % (ref 0–1)
BASOPHILS NFR BLD AUTO: 0.9 % (ref 0–1)
BASOPHILS NFR BLD AUTO: 0.9 % (ref 0–1)
BASOPHILS NFR BLD AUTO: 1 % (ref 0–1)
BASOPHILS NFR BLD AUTO: 1.1 % (ref 0–1)
BASOPHILS NFR BLD AUTO: 1.2 % (ref 0–1)
BASOPHILS NFR BLD AUTO: 1.4 % (ref 0–1)
BASOPHILS NFR BLD AUTO: 1.4 % (ref 0–1)
BASOPHILS NFR BLD AUTO: 1.5 % (ref 0–1)
BASOPHILS NFR BLD MANUAL: 1 % (ref 0–1)
BASOPHILS NFR BLD MANUAL: 2 % (ref 0–1)
BENZODIAZ METAB UR QL SCN: NEGATIVE
BILIRUB SERPL-MCNC: 1.4 MG/DL (ref 0–1.2)
BILIRUB SERPL-MCNC: 1.4 MG/DL (ref ?–1.2)
BILIRUB SERPL-MCNC: 1.5 MG/DL (ref 0–1.2)
BILIRUB SERPL-MCNC: 1.5 MG/DL (ref 0–1.2)
BILIRUB SERPL-MCNC: 1.7 MG/DL (ref 0–1.2)
BILIRUB SERPL-MCNC: 1.7 MG/DL (ref 0–1.2)
BILIRUB SERPL-MCNC: 1.9 MG/DL (ref 0–1.2)
BILIRUB SERPL-MCNC: 2 MG/DL (ref 0–1.2)
BILIRUB UR QL STRIP: NEGATIVE
BILIRUB UR QL STRIP: NEGATIVE
BSA FOR ECHO PROCEDURE: 2.11 M2
BUN SERPL-MCNC: 32 MG/DL (ref 7–18)
BUN SERPL-MCNC: 35 MG/DL (ref 7–18)
BUN SERPL-MCNC: 36 MG/DL (ref 7–18)
BUN SERPL-MCNC: 36 MG/DL (ref 7–18)
BUN SERPL-MCNC: 38 MG/DL (ref 7–18)
BUN SERPL-MCNC: 39 MG/DL (ref 7–18)
BUN SERPL-MCNC: 40 MG/DL (ref 7–18)
BUN SERPL-MCNC: 43 MG/DL (ref 7–18)
BUN SERPL-MCNC: 43 MG/DL (ref 7–18)
BUN SERPL-MCNC: 47 MG/DL (ref 7–18)
BUN SERPL-MCNC: 50 MG/DL (ref 7–18)
BUN SERPL-MCNC: 52 MG/DL (ref 7–18)
BUN SERPL-MCNC: 53 MG/DL (ref 7–18)
BUN SERPL-MCNC: 53 MG/DL (ref 7–18)
BUN/CREAT SERPL: 10 (ref 6–20)
BUN/CREAT SERPL: 12 (ref 6–20)
BUN/CREAT SERPL: 12 (ref 6–20)
BUN/CREAT SERPL: 13 (ref 6–20)
BUN/CREAT SERPL: 14 (ref 6–20)
BUN/CREAT SERPL: 15 (ref 6–20)
BUN/CREAT SERPL: 15 (ref 6–20)
BUN/CREAT SERPL: 16 (ref 6–20)
BUN/CREAT SERPL: 17 (ref 6–20)
BUN/CREAT SERPL: 18 (ref 6–20)
BUN/CREAT SERPL: 18 (ref 6–20)
BUN/CREAT SERPL: 19 (ref 6–20)
BUN/CREAT SERPL: 9 (ref 6–20)
BUN/CREAT SERPL: 9 (ref 6–20)
CALCIUM SERPL-MCNC: 8.4 MG/DL (ref 8.5–10.1)
CALCIUM SERPL-MCNC: 8.4 MG/DL (ref 8.5–10.1)
CALCIUM SERPL-MCNC: 8.5 MG/DL (ref 8.5–10.1)
CALCIUM SERPL-MCNC: 8.5 MG/DL (ref 8.5–10.1)
CALCIUM SERPL-MCNC: 8.7 MG/DL (ref 8.5–10.1)
CALCIUM SERPL-MCNC: 8.8 MG/DL (ref 8.5–10.1)
CALCIUM SERPL-MCNC: 8.9 MG/DL (ref 8.5–10.1)
CALCIUM SERPL-MCNC: 9 MG/DL (ref 8.5–10.1)
CALCIUM SERPL-MCNC: 9.2 MG/DL (ref 8.5–10.1)
CALCIUM SERPL-MCNC: 9.3 MG/DL (ref 8.5–10.1)
CALCIUM SERPL-MCNC: 9.5 MG/DL (ref 8.5–10.1)
CANNABINOIDS UR QL SCN: NEGATIVE
CHLORIDE SERPL-SCNC: 100 MMOL/L (ref 98–107)
CHLORIDE SERPL-SCNC: 101 MMOL/L (ref 98–107)
CHLORIDE SERPL-SCNC: 102 MMOL/L (ref 98–107)
CHLORIDE SERPL-SCNC: 106 MMOL/L (ref 98–107)
CHLORIDE SERPL-SCNC: 96 MMOL/L (ref 98–107)
CHLORIDE SERPL-SCNC: 96 MMOL/L (ref 98–107)
CHLORIDE SERPL-SCNC: 97 MMOL/L (ref 98–107)
CHLORIDE SERPL-SCNC: 98 MMOL/L (ref 98–107)
CHLORIDE SERPL-SCNC: 98 MMOL/L (ref 98–107)
CHLORIDE SERPL-SCNC: 99 MMOL/L (ref 98–107)
CHOLEST SERPL-MCNC: 133 MG/DL (ref 0–200)
CHOLEST/HDLC SERPL: 3 {RATIO}
CLARITY UR: CLEAR
CLARITY UR: CLEAR
CO2 SERPL-SCNC: 23 MMOL/L (ref 21–32)
CO2 SERPL-SCNC: 25 MMOL/L (ref 21–32)
CO2 SERPL-SCNC: 26 MMOL/L (ref 21–32)
CO2 SERPL-SCNC: 26 MMOL/L (ref 21–32)
CO2 SERPL-SCNC: 27 MMOL/L (ref 21–32)
CO2 SERPL-SCNC: 28 MMOL/L (ref 21–32)
CO2 SERPL-SCNC: 29 MMOL/L (ref 21–32)
CO2 SERPL-SCNC: 30 MMOL/L (ref 21–32)
CO2 SERPL-SCNC: 31 MMOL/L (ref 21–32)
CO2 SERPL-SCNC: 32 MMOL/L (ref 21–32)
CO2 SERPL-SCNC: 33 MMOL/L (ref 21–32)
COCAINE UR QL SCN: NEGATIVE
COLOR UR: ABNORMAL
COLOR UR: NORMAL
CREAT SERPL-MCNC: 1.91 MG/DL (ref 0.7–1.3)
CREAT SERPL-MCNC: 2.06 MG/DL (ref 0.7–1.3)
CREAT SERPL-MCNC: 2.07 MG/DL (ref 0.7–1.3)
CREAT SERPL-MCNC: 2.15 MG/DL (ref 0.7–1.3)
CREAT SERPL-MCNC: 2.2 MG/DL (ref 0.7–1.3)
CREAT SERPL-MCNC: 2.5 MG/DL (ref 0.7–1.3)
CREAT SERPL-MCNC: 2.52 MG/DL (ref 0.7–1.3)
CREAT SERPL-MCNC: 2.58 MG/DL (ref 0.7–1.3)
CREAT SERPL-MCNC: 2.72 MG/DL (ref 0.7–1.3)
CREAT SERPL-MCNC: 2.92 MG/DL (ref 0.7–1.3)
CREAT SERPL-MCNC: 2.94 MG/DL (ref 0.7–1.3)
CREAT SERPL-MCNC: 3.03 MG/DL (ref 0.7–1.3)
CREAT SERPL-MCNC: 3.06 MG/DL (ref 0.7–1.3)
CREAT SERPL-MCNC: 3.93 MG/DL (ref 0.7–1.3)
CREAT SERPL-MCNC: 4.04 MG/DL (ref 0.7–1.3)
CREAT SERPL-MCNC: 4.07 MG/DL (ref 0.7–1.3)
CREAT SERPL-MCNC: 4.56 MG/DL (ref 0.7–1.3)
CREAT SERPL-MCNC: 4.59 MG/DL (ref 0.7–1.3)
CREAT SERPL-MCNC: 4.59 MG/DL (ref 0.7–1.3)
CREAT SERPL-MCNC: 4.6 MG/DL (ref 0.7–1.3)
CTP QC/QA: YES
CV ECHO LV RWT: 0.43 CM
DIFFERENTIAL METHOD BLD: ABNORMAL
DOP CALC AO PEAK VEL: 0.7 M/S
DOP CALC AO VTI: 6 CM
DOP CALC LVOT AREA: 2.3 CM2
DOP CALC LVOT DIAMETER: 1.7 CM
DOP CALC LVOT PEAK VEL: 0.6 M/S
DOP CALC LVOT STROKE VOLUME: 13.61 CM3
DOP CALC MV VTI: 56.5 CM
DOP CALCLVOT PEAK VEL VTI: 6 CM
E WAVE DECELERATION TIME: 163 MSEC
ECHO EF ESTIMATED: 20 %
ECHO LV POSTERIOR WALL: 1.29 CM (ref 0.6–1.1)
EGFR (NO RACE VARIABLE) (RUSH/TITUS): 13 ML/MIN/1.73M²
EGFR (NO RACE VARIABLE) (RUSH/TITUS): 15 ML/MIN/1.73M²
EJECTION FRACTION: 15 %
EOSINOPHIL # BLD AUTO: 0 K/UL (ref 0–0.5)
EOSINOPHIL # BLD AUTO: 0.07 K/UL (ref 0–0.5)
EOSINOPHIL # BLD AUTO: 0.09 K/UL (ref 0–0.5)
EOSINOPHIL # BLD AUTO: 0.12 K/UL (ref 0–0.5)
EOSINOPHIL # BLD AUTO: 0.12 K/UL (ref 0–0.5)
EOSINOPHIL # BLD AUTO: 0.13 K/UL (ref 0–0.5)
EOSINOPHIL # BLD AUTO: 0.14 K/UL (ref 0–0.5)
EOSINOPHIL # BLD AUTO: 0.14 K/UL (ref 0–0.5)
EOSINOPHIL # BLD AUTO: 0.15 K/UL (ref 0–0.5)
EOSINOPHIL # BLD AUTO: 0.2 K/UL (ref 0–0.5)
EOSINOPHIL # BLD AUTO: 0.22 K/UL (ref 0–0.5)
EOSINOPHIL # BLD AUTO: 0.3 K/UL (ref 0–0.5)
EOSINOPHIL # BLD AUTO: 0.31 K/UL (ref 0–0.5)
EOSINOPHIL # BLD AUTO: 0.37 K/UL (ref 0–0.5)
EOSINOPHIL # BLD AUTO: 0.38 K/UL (ref 0–0.5)
EOSINOPHIL # BLD AUTO: 0.38 K/UL (ref 0–0.5)
EOSINOPHIL NFR BLD AUTO: 0 % (ref 1–4)
EOSINOPHIL NFR BLD AUTO: 0.8 % (ref 1–4)
EOSINOPHIL NFR BLD AUTO: 1.2 % (ref 1–4)
EOSINOPHIL NFR BLD AUTO: 1.4 % (ref 1–4)
EOSINOPHIL NFR BLD AUTO: 1.4 % (ref 1–4)
EOSINOPHIL NFR BLD AUTO: 1.5 % (ref 1–4)
EOSINOPHIL NFR BLD AUTO: 1.6 % (ref 1–4)
EOSINOPHIL NFR BLD AUTO: 1.9 % (ref 1–4)
EOSINOPHIL NFR BLD AUTO: 2 % (ref 1–4)
EOSINOPHIL NFR BLD AUTO: 2.3 % (ref 1–4)
EOSINOPHIL NFR BLD AUTO: 2.8 % (ref 1–4)
EOSINOPHIL NFR BLD AUTO: 2.9 % (ref 1–4)
EOSINOPHIL NFR BLD AUTO: 3.8 % (ref 1–4)
EOSINOPHIL NFR BLD AUTO: 3.9 % (ref 1–4)
EOSINOPHIL NFR BLD AUTO: 4.3 % (ref 1–4)
EOSINOPHIL NFR BLD AUTO: 6 % (ref 1–4)
EOSINOPHIL NFR BLD MANUAL: 1 % (ref 1–4)
EOSINOPHIL NFR BLD MANUAL: 1 % (ref 1–4)
EOSINOPHIL NFR BLD MANUAL: 2 % (ref 1–4)
EOSINOPHIL NFR BLD MANUAL: 2 % (ref 1–4)
EOSINOPHIL NFR BLD MANUAL: 5 % (ref 1–4)
EOSINOPHIL NFR BLD MANUAL: 6 % (ref 1–4)
ERYTHROCYTE [DISTWIDTH] IN BLOOD BY AUTOMATED COUNT: 15.8 % (ref 11.5–14.5)
ERYTHROCYTE [DISTWIDTH] IN BLOOD BY AUTOMATED COUNT: 15.9 % (ref 11.5–14.5)
ERYTHROCYTE [DISTWIDTH] IN BLOOD BY AUTOMATED COUNT: 16 % (ref 11.5–14.5)
ERYTHROCYTE [DISTWIDTH] IN BLOOD BY AUTOMATED COUNT: 16.3 % (ref 11.5–14.5)
ERYTHROCYTE [DISTWIDTH] IN BLOOD BY AUTOMATED COUNT: 16.3 % (ref 11.5–14.5)
ERYTHROCYTE [DISTWIDTH] IN BLOOD BY AUTOMATED COUNT: 16.5 % (ref 11.5–14.5)
ERYTHROCYTE [DISTWIDTH] IN BLOOD BY AUTOMATED COUNT: 16.5 % (ref 11.5–14.5)
ERYTHROCYTE [DISTWIDTH] IN BLOOD BY AUTOMATED COUNT: 16.6 % (ref 11.5–14.5)
ERYTHROCYTE [DISTWIDTH] IN BLOOD BY AUTOMATED COUNT: 16.6 % (ref 11.5–14.5)
ERYTHROCYTE [DISTWIDTH] IN BLOOD BY AUTOMATED COUNT: 17 % (ref 11.5–14.5)
ERYTHROCYTE [DISTWIDTH] IN BLOOD BY AUTOMATED COUNT: 17.6 % (ref 11.5–14.5)
ERYTHROCYTE [DISTWIDTH] IN BLOOD BY AUTOMATED COUNT: 17.9 % (ref 11.5–14.5)
ERYTHROCYTE [DISTWIDTH] IN BLOOD BY AUTOMATED COUNT: 17.9 % (ref 11.5–14.5)
ERYTHROCYTE [DISTWIDTH] IN BLOOD BY AUTOMATED COUNT: 18.1 % (ref 11.5–14.5)
ERYTHROCYTE [DISTWIDTH] IN BLOOD BY AUTOMATED COUNT: 18.1 % (ref 11.5–14.5)
ERYTHROCYTE [DISTWIDTH] IN BLOOD BY AUTOMATED COUNT: 18.2 % (ref 11.5–14.5)
ETHANOL, BLOOD (CATEGORY): NOT DETECTED
FLUAV AG NPH QL: NEGATIVE
FLUAV AG UPPER RESP QL IA.RAPID: NEGATIVE
FLUBV AG NPH QL: NEGATIVE
FLUBV AG UPPER RESP QL IA.RAPID: NEGATIVE
FRACTIONAL SHORTENING: 9 % (ref 28–44)
GLOBULIN SER-MCNC: 2.5 G/DL (ref 2–4)
GLOBULIN SER-MCNC: 2.7 G/DL (ref 2–4)
GLOBULIN SER-MCNC: 2.9 G/DL (ref 2–4)
GLOBULIN SER-MCNC: 3 G/DL (ref 2–4)
GLOBULIN SER-MCNC: 3 G/DL (ref 2–4)
GLOBULIN SER-MCNC: 3.3 G/DL (ref 2–4)
GLOBULIN SER-MCNC: 3.3 G/DL (ref 2–4)
GLUCOSE SERPL-MCNC: 101 MG/DL (ref 74–106)
GLUCOSE SERPL-MCNC: 108 MG/DL (ref 70–105)
GLUCOSE SERPL-MCNC: 121 MG/DL (ref 70–105)
GLUCOSE SERPL-MCNC: 129 MG/DL (ref 74–106)
GLUCOSE SERPL-MCNC: 133 MG/DL (ref 74–106)
GLUCOSE SERPL-MCNC: 140 MG/DL (ref 70–105)
GLUCOSE SERPL-MCNC: 147 MG/DL (ref 74–106)
GLUCOSE SERPL-MCNC: 75 MG/DL (ref 74–106)
GLUCOSE SERPL-MCNC: 75 MG/DL (ref 74–106)
GLUCOSE SERPL-MCNC: 77 MG/DL (ref 74–106)
GLUCOSE SERPL-MCNC: 78 MG/DL (ref 74–106)
GLUCOSE SERPL-MCNC: 82 MG/DL (ref 74–106)
GLUCOSE SERPL-MCNC: 84 MG/DL (ref 74–106)
GLUCOSE SERPL-MCNC: 85 MG/DL (ref 74–106)
GLUCOSE SERPL-MCNC: 88 MG/DL (ref 70–105)
GLUCOSE SERPL-MCNC: 88 MG/DL (ref 74–106)
GLUCOSE SERPL-MCNC: 89 MG/DL (ref 74–106)
GLUCOSE SERPL-MCNC: 90 MG/DL (ref 74–106)
GLUCOSE SERPL-MCNC: 90 MG/DL (ref 74–106)
GLUCOSE SERPL-MCNC: 93 MG/DL (ref 74–106)
GLUCOSE SERPL-MCNC: 94 MG/DL (ref 74–106)
GLUCOSE SERPL-MCNC: 96 MG/DL (ref 74–106)
GLUCOSE UR STRIP-MCNC: NEGATIVE MG/DL
GLUCOSE UR STRIP-MCNC: NORMAL MG/DL
HAV IGM SER QL: NORMAL
HAV IGM SER QL: NORMAL
HBV CORE IGM SER QL: NORMAL
HBV CORE IGM SER QL: NORMAL
HBV SURFACE AG SERPL QL IA: NORMAL
HBV SURFACE AG SERPL QL IA: NORMAL
HCT VFR BLD AUTO: 29 % (ref 40–54)
HCT VFR BLD AUTO: 31.5 % (ref 40–54)
HCT VFR BLD AUTO: 31.6 % (ref 40–54)
HCT VFR BLD AUTO: 33.5 % (ref 40–54)
HCT VFR BLD AUTO: 35.1 % (ref 40–54)
HCT VFR BLD AUTO: 35.3 % (ref 40–54)
HCT VFR BLD AUTO: 35.5 % (ref 40–54)
HCT VFR BLD AUTO: 36.2 % (ref 40–54)
HCT VFR BLD AUTO: 36.5 % (ref 40–54)
HCT VFR BLD AUTO: 36.6 % (ref 40–54)
HCT VFR BLD AUTO: 36.9 % (ref 40–54)
HCT VFR BLD AUTO: 37.2 % (ref 40–54)
HCT VFR BLD AUTO: 37.7 % (ref 40–54)
HCT VFR BLD AUTO: 37.8 % (ref 40–54)
HCT VFR BLD AUTO: 38 % (ref 40–54)
HCT VFR BLD AUTO: 41.3 % (ref 40–54)
HCV AB SER QL: NORMAL
HCV AB SER QL: NORMAL
HDLC SERPL-MCNC: 45 MG/DL (ref 40–60)
HGB BLD-MCNC: 10.1 G/DL (ref 13.5–18)
HGB BLD-MCNC: 10.2 G/DL (ref 13.5–18)
HGB BLD-MCNC: 10.2 G/DL (ref 13.5–18)
HGB BLD-MCNC: 11.1 G/DL (ref 13.5–18)
HGB BLD-MCNC: 11.3 G/DL (ref 13.5–18)
HGB BLD-MCNC: 11.4 G/DL (ref 13.5–18)
HGB BLD-MCNC: 11.5 G/DL (ref 13.5–18)
HGB BLD-MCNC: 11.5 G/DL (ref 13.5–18)
HGB BLD-MCNC: 11.6 G/DL (ref 13.5–18)
HGB BLD-MCNC: 12 G/DL (ref 13.5–18)
HGB BLD-MCNC: 12 G/DL (ref 13.5–18)
HGB BLD-MCNC: 12.1 G/DL (ref 13.5–18)
HGB BLD-MCNC: 12.7 G/DL (ref 13.5–18)
HGB BLD-MCNC: 8.9 G/DL (ref 13.5–18)
HYPOCHROMIA BLD QL SMEAR: ABNORMAL
IMM GRANULOCYTES # BLD AUTO: 0.01 K/UL (ref 0–0.04)
IMM GRANULOCYTES # BLD AUTO: 0.02 K/UL (ref 0–0.04)
IMM GRANULOCYTES # BLD AUTO: 0.03 K/UL (ref 0–0.04)
IMM GRANULOCYTES # BLD AUTO: 0.04 K/UL (ref 0–0.04)
IMM GRANULOCYTES NFR BLD: 0.2 % (ref 0–0.4)
IMM GRANULOCYTES NFR BLD: 0.2 % (ref 0–0.4)
IMM GRANULOCYTES NFR BLD: 0.3 % (ref 0–0.4)
IMM GRANULOCYTES NFR BLD: 0.4 % (ref 0–0.4)
IMM GRANULOCYTES NFR BLD: 0.5 % (ref 0–0.4)
INR BLD: 1.07 (ref 0.9–1.1)
INR BLD: 2.46
INTERVENTRICULAR SEPTUM: 1.3 CM (ref 0.6–1.1)
IVC OSTIUM: 2 CM
KETONES UR STRIP-SCNC: NEGATIVE MG/DL
KETONES UR STRIP-SCNC: NEGATIVE MG/DL
LACTATE SERPL-SCNC: 1.4 MMOL/L (ref 0.4–2)
LDLC SERPL CALC-MCNC: 73 MG/DL
LDLC/HDLC SERPL: 1.6 {RATIO}
LEFT ATRIUM SIZE: 4.3 CM
LEFT INTERNAL DIMENSION IN SYSTOLE: 5.5 CM (ref 2.1–4)
LEFT VENTRICLE DIASTOLIC VOLUME INDEX: 89.28 ML/M2
LEFT VENTRICLE DIASTOLIC VOLUME: 184.8 ML
LEFT VENTRICLE MASS INDEX: 171 G/M2
LEFT VENTRICLE SYSTOLIC VOLUME INDEX: 71.5 ML/M2
LEFT VENTRICLE SYSTOLIC VOLUME: 148 ML
LEFT VENTRICULAR INTERNAL DIMENSION IN DIASTOLE: 6.06 CM (ref 3.5–6)
LEFT VENTRICULAR MASS: 353.93 G
LEUKOCYTE ESTERASE UR QL STRIP: NEGATIVE
LEUKOCYTE ESTERASE UR QL STRIP: NEGATIVE
LVOT MG: 1 MMHG
LYMPHOCYTES # BLD AUTO: 0.47 K/UL (ref 1–4.8)
LYMPHOCYTES # BLD AUTO: 0.5 K/UL (ref 1–4.8)
LYMPHOCYTES # BLD AUTO: 0.51 K/UL (ref 1–4.8)
LYMPHOCYTES # BLD AUTO: 0.55 K/UL (ref 1–4.8)
LYMPHOCYTES # BLD AUTO: 0.59 K/UL (ref 1–4.8)
LYMPHOCYTES # BLD AUTO: 0.59 K/UL (ref 1–4.8)
LYMPHOCYTES # BLD AUTO: 0.6 K/UL (ref 1–4.8)
LYMPHOCYTES # BLD AUTO: 0.61 K/UL (ref 1–4.8)
LYMPHOCYTES # BLD AUTO: 0.64 K/UL (ref 1–4.8)
LYMPHOCYTES # BLD AUTO: 0.68 K/UL (ref 1–4.8)
LYMPHOCYTES # BLD AUTO: 0.69 K/UL (ref 1–4.8)
LYMPHOCYTES # BLD AUTO: 0.71 K/UL (ref 1–4.8)
LYMPHOCYTES # BLD AUTO: 0.73 K/UL (ref 1–4.8)
LYMPHOCYTES # BLD AUTO: 0.79 K/UL (ref 1–4.8)
LYMPHOCYTES # BLD AUTO: 0.84 K/UL (ref 1–4.8)
LYMPHOCYTES # BLD AUTO: 0.86 K/UL (ref 1–4.8)
LYMPHOCYTES NFR BLD AUTO: 10.1 % (ref 27–41)
LYMPHOCYTES NFR BLD AUTO: 5 % (ref 27–41)
LYMPHOCYTES NFR BLD AUTO: 5.1 % (ref 27–41)
LYMPHOCYTES NFR BLD AUTO: 5.7 % (ref 27–41)
LYMPHOCYTES NFR BLD AUTO: 6 % (ref 27–41)
LYMPHOCYTES NFR BLD AUTO: 6.8 % (ref 27–41)
LYMPHOCYTES NFR BLD AUTO: 7.4 % (ref 27–41)
LYMPHOCYTES NFR BLD AUTO: 7.6 % (ref 27–41)
LYMPHOCYTES NFR BLD AUTO: 7.8 % (ref 27–41)
LYMPHOCYTES NFR BLD AUTO: 8.1 % (ref 27–41)
LYMPHOCYTES NFR BLD AUTO: 8.6 % (ref 27–41)
LYMPHOCYTES NFR BLD AUTO: 8.7 % (ref 27–41)
LYMPHOCYTES NFR BLD AUTO: 9 % (ref 27–41)
LYMPHOCYTES NFR BLD AUTO: 9.1 % (ref 27–41)
LYMPHOCYTES NFR BLD AUTO: 9.6 % (ref 27–41)
LYMPHOCYTES NFR BLD AUTO: 9.9 % (ref 27–41)
LYMPHOCYTES NFR BLD MANUAL: 1 % (ref 27–41)
LYMPHOCYTES NFR BLD MANUAL: 10 % (ref 27–41)
LYMPHOCYTES NFR BLD MANUAL: 4 % (ref 27–41)
LYMPHOCYTES NFR BLD MANUAL: 4 % (ref 27–41)
LYMPHOCYTES NFR BLD MANUAL: 6 % (ref 27–41)
LYMPHOCYTES NFR BLD MANUAL: 6 % (ref 27–41)
LYMPHOCYTES NFR BLD MANUAL: 7 % (ref 27–41)
LYMPHOCYTES NFR BLD MANUAL: 7 % (ref 27–41)
MAGNESIUM SERPL-MCNC: 1.6 MG/DL (ref 1.7–2.3)
MAGNESIUM SERPL-MCNC: 1.8 MG/DL (ref 1.7–2.3)
MAGNESIUM SERPL-MCNC: 1.9 MG/DL (ref 1.7–2.3)
MAGNESIUM SERPL-MCNC: 2 MG/DL (ref 1.7–2.3)
MAGNESIUM SERPL-MCNC: 2.1 MG/DL (ref 1.7–2.3)
MCH RBC QN AUTO: 26.7 PG (ref 27–31)
MCH RBC QN AUTO: 26.9 PG (ref 27–31)
MCH RBC QN AUTO: 27 PG (ref 27–31)
MCH RBC QN AUTO: 27 PG (ref 27–31)
MCH RBC QN AUTO: 27.1 PG (ref 27–31)
MCH RBC QN AUTO: 27.1 PG (ref 27–31)
MCH RBC QN AUTO: 27.2 PG (ref 27–31)
MCH RBC QN AUTO: 27.2 PG (ref 27–31)
MCH RBC QN AUTO: 27.3 PG (ref 27–31)
MCH RBC QN AUTO: 29.9 PG (ref 27–31)
MCH RBC QN AUTO: 30.2 PG (ref 27–31)
MCH RBC QN AUTO: 30.2 PG (ref 27–31)
MCH RBC QN AUTO: 30.4 PG (ref 27–31)
MCHC RBC AUTO-ENTMCNC: 30.1 G/DL (ref 32–36)
MCHC RBC AUTO-ENTMCNC: 30.4 G/DL (ref 32–36)
MCHC RBC AUTO-ENTMCNC: 30.4 G/DL (ref 32–36)
MCHC RBC AUTO-ENTMCNC: 30.7 G/DL (ref 32–36)
MCHC RBC AUTO-ENTMCNC: 30.8 G/DL (ref 32–36)
MCHC RBC AUTO-ENTMCNC: 31.5 G/DL (ref 32–36)
MCHC RBC AUTO-ENTMCNC: 31.5 G/DL (ref 32–36)
MCHC RBC AUTO-ENTMCNC: 31.6 G/DL (ref 32–36)
MCHC RBC AUTO-ENTMCNC: 31.7 G/DL (ref 32–36)
MCHC RBC AUTO-ENTMCNC: 31.8 G/DL (ref 32–36)
MCHC RBC AUTO-ENTMCNC: 32 G/DL (ref 32–36)
MCHC RBC AUTO-ENTMCNC: 32.2 G/DL (ref 32–36)
MCHC RBC AUTO-ENTMCNC: 32.4 G/DL (ref 32–36)
MCHC RBC AUTO-ENTMCNC: 32.4 G/DL (ref 32–36)
MCV RBC AUTO: 83.8 FL (ref 80–96)
MCV RBC AUTO: 84 FL (ref 80–96)
MCV RBC AUTO: 84.3 FL (ref 80–96)
MCV RBC AUTO: 84.6 FL (ref 80–96)
MCV RBC AUTO: 84.7 FL (ref 80–96)
MCV RBC AUTO: 84.7 FL (ref 80–96)
MCV RBC AUTO: 84.9 FL (ref 80–96)
MCV RBC AUTO: 85.1 FL (ref 80–96)
MCV RBC AUTO: 86.2 FL (ref 80–96)
MCV RBC AUTO: 88.2 FL (ref 80–96)
MCV RBC AUTO: 88.8 FL (ref 80–96)
MCV RBC AUTO: 88.9 FL (ref 80–96)
MCV RBC AUTO: 93.8 FL (ref 80–96)
MCV RBC AUTO: 94.6 FL (ref 80–96)
MCV RBC AUTO: 98.2 FL (ref 80–96)
MCV RBC AUTO: 98.3 FL (ref 80–96)
MONOCYTES # BLD AUTO: 0.65 K/UL (ref 0–0.8)
MONOCYTES # BLD AUTO: 0.71 K/UL (ref 0–0.8)
MONOCYTES # BLD AUTO: 0.72 K/UL (ref 0–0.8)
MONOCYTES # BLD AUTO: 0.72 K/UL (ref 0–0.8)
MONOCYTES # BLD AUTO: 0.74 K/UL (ref 0–0.8)
MONOCYTES # BLD AUTO: 0.75 K/UL (ref 0–0.8)
MONOCYTES # BLD AUTO: 0.75 K/UL (ref 0–0.8)
MONOCYTES # BLD AUTO: 0.78 K/UL (ref 0–0.8)
MONOCYTES # BLD AUTO: 0.79 K/UL (ref 0–0.8)
MONOCYTES # BLD AUTO: 0.8 K/UL (ref 0–0.8)
MONOCYTES # BLD AUTO: 0.81 K/UL (ref 0–0.8)
MONOCYTES # BLD AUTO: 0.82 K/UL (ref 0–0.8)
MONOCYTES # BLD AUTO: 0.82 K/UL (ref 0–0.8)
MONOCYTES # BLD AUTO: 0.92 K/UL (ref 0–0.8)
MONOCYTES # BLD AUTO: 0.92 K/UL (ref 0–0.8)
MONOCYTES # BLD AUTO: 1.14 K/UL (ref 0–0.8)
MONOCYTES NFR BLD AUTO: 10.6 % (ref 2–6)
MONOCYTES NFR BLD AUTO: 10.8 % (ref 2–6)
MONOCYTES NFR BLD AUTO: 11.7 % (ref 2–6)
MONOCYTES NFR BLD AUTO: 11.7 % (ref 2–6)
MONOCYTES NFR BLD AUTO: 11.9 % (ref 2–6)
MONOCYTES NFR BLD AUTO: 7.4 % (ref 2–6)
MONOCYTES NFR BLD AUTO: 8.4 % (ref 2–6)
MONOCYTES NFR BLD AUTO: 8.6 % (ref 2–6)
MONOCYTES NFR BLD AUTO: 8.6 % (ref 2–6)
MONOCYTES NFR BLD AUTO: 8.7 % (ref 2–6)
MONOCYTES NFR BLD AUTO: 8.9 % (ref 2–6)
MONOCYTES NFR BLD AUTO: 8.9 % (ref 2–6)
MONOCYTES NFR BLD AUTO: 9 % (ref 2–6)
MONOCYTES NFR BLD AUTO: 9.3 % (ref 2–6)
MONOCYTES NFR BLD AUTO: 9.3 % (ref 2–6)
MONOCYTES NFR BLD AUTO: 9.9 % (ref 2–6)
MONOCYTES NFR BLD MANUAL: 11 % (ref 2–6)
MONOCYTES NFR BLD MANUAL: 2 % (ref 2–6)
MONOCYTES NFR BLD MANUAL: 4 % (ref 2–6)
MONOCYTES NFR BLD MANUAL: 4 % (ref 2–6)
MONOCYTES NFR BLD MANUAL: 5 % (ref 2–6)
MONOCYTES NFR BLD MANUAL: 7 % (ref 2–6)
MONOCYTES NFR BLD MANUAL: 8 % (ref 2–6)
MONOCYTES NFR BLD MANUAL: 8 % (ref 2–6)
MONOCYTES NFR BLD MANUAL: 9 % (ref 2–6)
MONOCYTES NFR BLD MANUAL: 9 % (ref 2–6)
MPC BLD CALC-MCNC: 11.2 FL (ref 9.4–12.4)
MPC BLD CALC-MCNC: 11.4 FL (ref 9.4–12.4)
MPC BLD CALC-MCNC: 11.5 FL (ref 9.4–12.4)
MPC BLD CALC-MCNC: 11.7 FL (ref 9.4–12.4)
MPC BLD CALC-MCNC: 11.8 FL (ref 9.4–12.4)
MPC BLD CALC-MCNC: 11.9 FL (ref 9.4–12.4)
MPC BLD CALC-MCNC: 12.1 FL (ref 9.4–12.4)
MPC BLD CALC-MCNC: 12.1 FL (ref 9.4–12.4)
MPC BLD CALC-MCNC: 12.3 FL (ref 9.4–12.4)
MPC BLD CALC-MCNC: 12.4 FL (ref 9.4–12.4)
MPC BLD CALC-MCNC: 12.5 FL (ref 9.4–12.4)
MPC BLD CALC-MCNC: 12.8 FL (ref 9.4–12.4)
MPC BLD CALC-MCNC: 12.8 FL (ref 9.4–12.4)
MPC BLD CALC-MCNC: 12.9 FL (ref 9.4–12.4)
MPC BLD CALC-MCNC: 13 FL (ref 9.4–12.4)
MPC BLD CALC-MCNC: 13.7 FL (ref 9.4–12.4)
MV MEAN GRADIENT: 22 MMHG
MV PEAK E VEL: 2.18 M/S
MV PEAK GRADIENT: 36 MMHG
MV STENOSIS PRESSURE HALF TIME: 79 MS
MV VALVE AREA BY CONTINUITY EQUATION: 0.24 CM2
MV VALVE AREA P 1/2 METHOD: 2.78 CM2
NEUTROPHILS # BLD AUTO: 4.56 K/UL (ref 1.8–7.7)
NEUTROPHILS # BLD AUTO: 5.79 K/UL (ref 1.8–7.7)
NEUTROPHILS # BLD AUTO: 5.96 K/UL (ref 1.8–7.7)
NEUTROPHILS # BLD AUTO: 5.97 K/UL (ref 1.8–7.7)
NEUTROPHILS # BLD AUTO: 6.01 K/UL (ref 1.8–7.7)
NEUTROPHILS # BLD AUTO: 6.02 K/UL (ref 1.8–7.7)
NEUTROPHILS # BLD AUTO: 6.27 K/UL (ref 1.8–7.7)
NEUTROPHILS # BLD AUTO: 6.31 K/UL (ref 1.8–7.7)
NEUTROPHILS # BLD AUTO: 6.49 K/UL (ref 1.8–7.7)
NEUTROPHILS # BLD AUTO: 6.93 K/UL (ref 1.8–7.7)
NEUTROPHILS # BLD AUTO: 7.03 K/UL (ref 1.8–7.7)
NEUTROPHILS # BLD AUTO: 7.08 K/UL (ref 1.8–7.7)
NEUTROPHILS # BLD AUTO: 7.21 K/UL (ref 1.8–7.7)
NEUTROPHILS # BLD AUTO: 8.04 K/UL (ref 1.8–7.7)
NEUTROPHILS # BLD AUTO: 8.13 K/UL (ref 1.8–7.7)
NEUTROPHILS # BLD AUTO: 8.77 K/UL (ref 1.8–7.7)
NEUTROPHILS NFR BLD AUTO: 72.3 % (ref 53–65)
NEUTROPHILS NFR BLD AUTO: 73.2 % (ref 53–65)
NEUTROPHILS NFR BLD AUTO: 74.3 % (ref 53–65)
NEUTROPHILS NFR BLD AUTO: 74.7 % (ref 53–65)
NEUTROPHILS NFR BLD AUTO: 77 % (ref 53–65)
NEUTROPHILS NFR BLD AUTO: 77.4 % (ref 53–65)
NEUTROPHILS NFR BLD AUTO: 77.9 % (ref 53–65)
NEUTROPHILS NFR BLD AUTO: 78.4 % (ref 53–65)
NEUTROPHILS NFR BLD AUTO: 79.8 % (ref 53–65)
NEUTROPHILS NFR BLD AUTO: 79.9 % (ref 53–65)
NEUTROPHILS NFR BLD AUTO: 80.5 % (ref 53–65)
NEUTROPHILS NFR BLD AUTO: 80.6 % (ref 53–65)
NEUTROPHILS NFR BLD AUTO: 82.7 % (ref 53–65)
NEUTROPHILS NFR BLD AUTO: 83 % (ref 53–65)
NEUTROPHILS NFR BLD AUTO: 83.7 % (ref 53–65)
NEUTROPHILS NFR BLD AUTO: 85.6 % (ref 53–65)
NEUTS BAND NFR BLD MANUAL: 1 % (ref 1–5)
NEUTS BAND NFR BLD MANUAL: 2 % (ref 1–5)
NEUTS SEG NFR BLD MANUAL: 75 % (ref 50–62)
NEUTS SEG NFR BLD MANUAL: 76 % (ref 50–62)
NEUTS SEG NFR BLD MANUAL: 78 % (ref 50–62)
NEUTS SEG NFR BLD MANUAL: 83 % (ref 50–62)
NEUTS SEG NFR BLD MANUAL: 84 % (ref 50–62)
NEUTS SEG NFR BLD MANUAL: 86 % (ref 50–62)
NEUTS SEG NFR BLD MANUAL: 86 % (ref 50–62)
NEUTS SEG NFR BLD MANUAL: 87 % (ref 50–62)
NEUTS SEG NFR BLD MANUAL: 91 % (ref 50–62)
NEUTS SEG NFR BLD MANUAL: 91 % (ref 50–62)
NITRITE UR QL STRIP: NEGATIVE
NITRITE UR QL STRIP: NEGATIVE
NONHDLC SERPL-MCNC: 88 MG/DL
NRBC # BLD AUTO: 0 X10E3/UL
NRBC # BLD AUTO: 0.02 X10E3/UL
NRBC # BLD AUTO: 0.03 X10E3/UL
NRBC # BLD AUTO: 0.03 X10E3/UL
NRBC # BLD AUTO: 0.05 X10E3/UL
NRBC, AUTO (.00): 0 %
NRBC, AUTO (.00): 0.3 %
NRBC, AUTO (.00): 0.6 %
NT-PROBNP SERPL-MCNC: ABNORMAL PG/ML (ref 1–450)
OPIATES UR QL SCN: NEGATIVE
OVALOCYTES BLD QL SMEAR: ABNORMAL
PCP UR QL SCN: NEGATIVE
PH UR STRIP: 5 PH UNITS
PH UR STRIP: 6 PH UNITS
PHOSPHATE SERPL-MCNC: 4.4 MG/DL (ref 2.5–4.5)
PISA TR MAX VEL: 2.4 M/S
PLATELET # BLD AUTO: 125 K/UL (ref 150–400)
PLATELET # BLD AUTO: 149 K/UL (ref 150–400)
PLATELET # BLD AUTO: 151 K/UL (ref 150–400)
PLATELET # BLD AUTO: 153 K/UL (ref 150–400)
PLATELET # BLD AUTO: 156 K/UL (ref 150–400)
PLATELET # BLD AUTO: 157 K/UL (ref 150–400)
PLATELET # BLD AUTO: 158 K/UL (ref 150–400)
PLATELET # BLD AUTO: 162 K/UL (ref 150–400)
PLATELET # BLD AUTO: 164 K/UL (ref 150–400)
PLATELET # BLD AUTO: 167 K/UL (ref 150–400)
PLATELET # BLD AUTO: 177 K/UL (ref 150–400)
PLATELET # BLD AUTO: 178 K/UL (ref 150–400)
PLATELET # BLD AUTO: 182 K/UL (ref 150–400)
PLATELET # BLD AUTO: 188 K/UL (ref 150–400)
PLATELET # BLD AUTO: 192 K/UL (ref 150–400)
PLATELET # BLD AUTO: 195 K/UL (ref 150–400)
PLATELET MORPHOLOGY: ABNORMAL
PLATELET MORPHOLOGY: NORMAL
POLYCHROMASIA BLD QL SMEAR: ABNORMAL
POTASSIUM SERPL-SCNC: 3.4 MMOL/L (ref 3.5–5.1)
POTASSIUM SERPL-SCNC: 3.7 MMOL/L (ref 3.5–5.1)
POTASSIUM SERPL-SCNC: 3.8 MMOL/L (ref 3.5–5.1)
POTASSIUM SERPL-SCNC: 4.1 MMOL/L (ref 3.5–5.1)
POTASSIUM SERPL-SCNC: 4.2 MMOL/L (ref 3.5–5.1)
POTASSIUM SERPL-SCNC: 4.2 MMOL/L (ref 3.5–5.1)
POTASSIUM SERPL-SCNC: 4.3 MMOL/L (ref 3.5–5.1)
POTASSIUM SERPL-SCNC: 4.4 MMOL/L (ref 3.5–5.1)
POTASSIUM SERPL-SCNC: 4.4 MMOL/L (ref 3.5–5.1)
POTASSIUM SERPL-SCNC: 4.6 MMOL/L (ref 3.5–5.1)
POTASSIUM SERPL-SCNC: 4.6 MMOL/L (ref 3.5–5.1)
POTASSIUM SERPL-SCNC: 4.7 MMOL/L (ref 3.5–5.1)
POTASSIUM SERPL-SCNC: 4.8 MMOL/L (ref 3.5–5.1)
POTASSIUM SERPL-SCNC: 5.2 MMOL/L (ref 3.5–5.1)
POTASSIUM SERPL-SCNC: 5.3 MMOL/L (ref 3.5–5.1)
POTASSIUM SERPL-SCNC: 5.3 MMOL/L (ref 3.5–5.1)
POTASSIUM SERPL-SCNC: 5.5 MMOL/L (ref 3.5–5.1)
POTASSIUM SERPL-SCNC: 6 MMOL/L (ref 3.5–5.1)
PROCALCITONIN SERPL-MCNC: 0.32 NG/ML
PROT SERPL-MCNC: 5.8 G/DL (ref 6.4–8.2)
PROT SERPL-MCNC: 6.1 G/DL (ref 6.4–8.2)
PROT SERPL-MCNC: 6.2 G/DL (ref 6.4–8.2)
PROT SERPL-MCNC: 6.2 G/DL (ref 6.4–8.2)
PROT SERPL-MCNC: 6.8 G/DL (ref 6.4–8.2)
PROT UR QL STRIP: 10
PROT UR QL STRIP: NEGATIVE
PROTHROMBIN TIME: 13.9 SECONDS (ref 11.7–14.7)
PROTHROMBIN TIME: 25.6 SECONDS (ref 11.7–14.7)
RA MAJOR: 6 CM
RA PRESSURE: 15 MMHG
RBC # BLD AUTO: 2.95 M/UL (ref 4.6–6.2)
RBC # BLD AUTO: 3.34 M/UL (ref 4.6–6.2)
RBC # BLD AUTO: 3.36 M/UL (ref 4.6–6.2)
RBC # BLD AUTO: 3.41 M/UL (ref 4.6–6.2)
RBC # BLD AUTO: 4.15 M/UL (ref 4.6–6.2)
RBC # BLD AUTO: 4.15 M/UL (ref 4.6–6.2)
RBC # BLD AUTO: 4.19 M/UL (ref 4.6–6.2)
RBC # BLD AUTO: 4.19 M/UL (ref 4.6–6.2)
RBC # BLD AUTO: 4.2 M/UL (ref 4.6–6.2)
RBC # BLD AUTO: 4.21 M/UL (ref 4.6–6.2)
RBC # BLD AUTO: 4.31 M/UL (ref 4.6–6.2)
RBC # BLD AUTO: 4.32 M/UL (ref 4.6–6.2)
RBC # BLD AUTO: 4.45 M/UL (ref 4.6–6.2)
RBC # BLD AUTO: 4.49 M/UL (ref 4.6–6.2)
RBC # BLD AUTO: 4.49 M/UL (ref 4.6–6.2)
RBC # BLD AUTO: 4.68 M/UL (ref 4.6–6.2)
RBC # UR STRIP: NEGATIVE /UL
RBC # UR STRIP: NEGATIVE /UL
RIGHT VENTRICULAR END-DIASTOLIC DIMENSION: 5.4 CM
SARS-COV+SARS-COV-2 AG RESP QL IA.RAPID: NEGATIVE
SARS-COV+SARS-COV-2 AG RESP QL IA.RAPID: NEGATIVE
SARS-COV-2 AG RESP QL IA.RAPID: NEGATIVE
SARS-COV-2 RDRP RESP QL NAA+PROBE: NEGATIVE
SARS-COV-2 RDRP RESP QL NAA+PROBE: NEGATIVE
SARS-COV-2 RNA RESP QL NAA+PROBE: NEGATIVE
SODIUM SERPL-SCNC: 132 MMOL/L (ref 136–145)
SODIUM SERPL-SCNC: 132 MMOL/L (ref 136–145)
SODIUM SERPL-SCNC: 133 MMOL/L (ref 136–145)
SODIUM SERPL-SCNC: 134 MMOL/L (ref 136–145)
SODIUM SERPL-SCNC: 134 MMOL/L (ref 136–145)
SODIUM SERPL-SCNC: 135 MMOL/L (ref 136–145)
SODIUM SERPL-SCNC: 136 MMOL/L (ref 136–145)
SODIUM SERPL-SCNC: 138 MMOL/L (ref 136–145)
SODIUM SERPL-SCNC: 138 MMOL/L (ref 136–145)
SODIUM SERPL-SCNC: 139 MMOL/L (ref 136–145)
SODIUM SERPL-SCNC: 139 MMOL/L (ref 136–145)
SODIUM SERPL-SCNC: 140 MMOL/L (ref 136–145)
SODIUM SERPL-SCNC: 141 MMOL/L (ref 136–145)
SODIUM SERPL-SCNC: 141 MMOL/L (ref 136–145)
SODIUM SERPL-SCNC: 142 MMOL/L (ref 136–145)
SP GR UR STRIP: 1.01
SP GR UR STRIP: 1.01
T4 FREE SERPL-MCNC: 1.78 NG/DL (ref 0.76–1.46)
TARGETS BLD QL SMEAR: ABNORMAL
TR MAX PG: 23 MMHG
TRICUSPID ANNULAR PLANE SYSTOLIC EXCURSION: 1.6 CM
TRIGL SERPL-MCNC: 73 MG/DL (ref 35–150)
TROPONIN I SERPL HS-MCNC: 117.5 PG/ML
TROPONIN I SERPL HS-MCNC: 121.2 PG/ML
TROPONIN I SERPL HS-MCNC: 1247.6 PG/ML
TROPONIN I SERPL HS-MCNC: 1295.1 PG/ML
TROPONIN I SERPL HS-MCNC: 1614.2 PG/ML
TROPONIN I SERPL HS-MCNC: 1709.3 PG/ML
TROPONIN I SERPL HS-MCNC: 457.2 PG/ML
TROPONIN I SERPL HS-MCNC: 520.9 PG/ML
TROPONIN I SERPL HS-MCNC: 53 PG/ML
TROPONIN I SERPL HS-MCNC: 66.7 PG/ML
TROPONIN I SERPL HS-MCNC: 80.8 PG/ML
TROPONIN I SERPL HS-MCNC: 85.9 PG/ML
TROPONIN I SERPL HS-MCNC: 86.8 PG/ML
TROPONIN I SERPL HS-MCNC: 98.9 PG/ML
TSH SERPL DL<=0.005 MIU/L-ACNC: 0.42 UIU/ML (ref 0.36–3.74)
TV REST PULMONARY ARTERY PRESSURE: 38 MMHG
UROBILINOGEN UR STRIP-ACNC: 0.2 MG/DL
UROBILINOGEN UR STRIP-ACNC: NORMAL MG/DL
VLDLC SERPL-MCNC: 15 MG/DL
WBC # BLD AUTO: 10.61 K/UL (ref 4.5–11)
WBC # BLD AUTO: 6.31 K/UL (ref 4.5–11)
WBC # BLD AUTO: 7.52 K/UL (ref 4.5–11)
WBC # BLD AUTO: 7.59 K/UL (ref 4.5–11)
WBC # BLD AUTO: 7.72 K/UL (ref 4.5–11)
WBC # BLD AUTO: 7.78 K/UL (ref 4.5–11)
WBC # BLD AUTO: 7.9 K/UL (ref 4.5–11)
WBC # BLD AUTO: 7.98 K/UL (ref 4.5–11)
WBC # BLD AUTO: 8.06 K/UL (ref 4.5–11)
WBC # BLD AUTO: 8.47 K/UL (ref 4.5–11)
WBC # BLD AUTO: 8.55 K/UL (ref 4.5–11)
WBC # BLD AUTO: 8.62 K/UL (ref 4.5–11)
WBC # BLD AUTO: 8.86 K/UL (ref 4.5–11)
WBC # BLD AUTO: 9.4 K/UL (ref 4.5–11)
WBC # BLD AUTO: 9.72 K/UL (ref 4.5–11)
WBC # BLD AUTO: 9.73 K/UL (ref 4.5–11)

## 2022-01-01 PROCEDURE — 25000003 PHARM REV CODE 250: Performed by: NURSE PRACTITIONER

## 2022-01-01 PROCEDURE — 96376 TX/PRO/DX INJ SAME DRUG ADON: CPT | Mod: 59

## 2022-01-01 PROCEDURE — 76706 US AAA SCREENING: ICD-10-PCS | Mod: 26,,, | Performed by: RADIOLOGY

## 2022-01-01 PROCEDURE — 99308 SBSQ NF CARE LOW MDM 20: CPT | Mod: ,,, | Performed by: FAMILY MEDICINE

## 2022-01-01 PROCEDURE — 27000221 HC OXYGEN, UP TO 24 HOURS

## 2022-01-01 PROCEDURE — 25000003 PHARM REV CODE 250: Performed by: INTERNAL MEDICINE

## 2022-01-01 PROCEDURE — 63600175 PHARM REV CODE 636 W HCPCS: Performed by: EMERGENCY MEDICINE

## 2022-01-01 PROCEDURE — 99285 PR EMERGENCY DEPT VISIT,LEVEL V: ICD-10-PCS | Mod: ,,, | Performed by: EMERGENCY MEDICINE

## 2022-01-01 PROCEDURE — 84484 ASSAY OF TROPONIN QUANT: CPT | Performed by: EMERGENCY MEDICINE

## 2022-01-01 PROCEDURE — 1159F PR MEDICATION LIST DOCUMENTED IN MEDICAL RECORD: ICD-10-PCS | Mod: CPTII,,, | Performed by: STUDENT IN AN ORGANIZED HEALTH CARE EDUCATION/TRAINING PROGRAM

## 2022-01-01 PROCEDURE — 3008F BODY MASS INDEX DOCD: CPT | Mod: CPTII,,, | Performed by: STUDENT IN AN ORGANIZED HEALTH CARE EDUCATION/TRAINING PROGRAM

## 2022-01-01 PROCEDURE — 99212 OFFICE O/P EST SF 10 MIN: CPT | Mod: ,,, | Performed by: NURSE PRACTITIONER

## 2022-01-01 PROCEDURE — 94761 N-INVAS EAR/PLS OXIMETRY MLT: CPT | Mod: 59

## 2022-01-01 PROCEDURE — 4010F ACE/ARB THERAPY RXD/TAKEN: CPT | Mod: CPTII,,, | Performed by: STUDENT IN AN ORGANIZED HEALTH CARE EDUCATION/TRAINING PROGRAM

## 2022-01-01 PROCEDURE — 87426 SARSCOV CORONAVIRUS AG IA: CPT | Performed by: HOSPITALIST

## 2022-01-01 PROCEDURE — 99215 PR OFFICE/OUTPT VISIT, EST, LEVL V, 40-54 MIN: ICD-10-PCS | Mod: S$PBB,,, | Performed by: STUDENT IN AN ORGANIZED HEALTH CARE EDUCATION/TRAINING PROGRAM

## 2022-01-01 PROCEDURE — 85730 THROMBOPLASTIN TIME PARTIAL: CPT | Performed by: EMERGENCY MEDICINE

## 2022-01-01 PROCEDURE — 93010 EKG 12-LEAD: ICD-10-PCS | Mod: ,,, | Performed by: INTERNAL MEDICINE

## 2022-01-01 PROCEDURE — 11000001 HC ACUTE MED/SURG PRIVATE ROOM

## 2022-01-01 PROCEDURE — 99232 PR SUBSEQUENT HOSPITAL CARE,LEVL II: ICD-10-PCS | Mod: ,,, | Performed by: INTERNAL MEDICINE

## 2022-01-01 PROCEDURE — 99214 OFFICE O/P EST MOD 30 MIN: CPT | Mod: S$PBB,,, | Performed by: STUDENT IN AN ORGANIZED HEALTH CARE EDUCATION/TRAINING PROGRAM

## 2022-01-01 PROCEDURE — 99239 PR HOSPITAL DISCHARGE DAY,>30 MIN: ICD-10-PCS | Mod: ,,, | Performed by: FAMILY MEDICINE

## 2022-01-01 PROCEDURE — 87428 POCT SARS-COV2 (COVID) WITH FLU ANTIGEN: ICD-10-PCS | Mod: QW,,, | Performed by: NURSE PRACTITIONER

## 2022-01-01 PROCEDURE — 84132 ASSAY OF SERUM POTASSIUM: CPT | Performed by: NURSE PRACTITIONER

## 2022-01-01 PROCEDURE — 63600175 PHARM REV CODE 636 W HCPCS

## 2022-01-01 PROCEDURE — 99307 PR NURSING FAC CARE, SUBSEQ, IMPROVING: ICD-10-PCS | Mod: ,,, | Performed by: FAMILY MEDICINE

## 2022-01-01 PROCEDURE — 36415 COLL VENOUS BLD VENIPUNCTURE: CPT | Performed by: HOSPITALIST

## 2022-01-01 PROCEDURE — 93925 LOWER EXTREMITY STUDY: CPT | Mod: 26,,, | Performed by: RADIOLOGY

## 2022-01-01 PROCEDURE — 1159F PR MEDICATION LIST DOCUMENTED IN MEDICAL RECORD: ICD-10-PCS | Mod: ,,, | Performed by: NURSE PRACTITIONER

## 2022-01-01 PROCEDURE — 84484 ASSAY OF TROPONIN QUANT: CPT | Performed by: NURSE PRACTITIONER

## 2022-01-01 PROCEDURE — 93010 ELECTROCARDIOGRAM REPORT: CPT | Mod: ,,, | Performed by: HOSPITALIST

## 2022-01-01 PROCEDURE — 36415 COLL VENOUS BLD VENIPUNCTURE: CPT | Performed by: NURSE PRACTITIONER

## 2022-01-01 PROCEDURE — 96376 TX/PRO/DX INJ SAME DRUG ADON: CPT

## 2022-01-01 PROCEDURE — 25000003 PHARM REV CODE 250: Performed by: HOSPITALIST

## 2022-01-01 PROCEDURE — 63600175 PHARM REV CODE 636 W HCPCS: Performed by: REGISTERED NURSE

## 2022-01-01 PROCEDURE — 3075F SYST BP GE 130 - 139MM HG: CPT | Mod: CPTII,,, | Performed by: STUDENT IN AN ORGANIZED HEALTH CARE EDUCATION/TRAINING PROGRAM

## 2022-01-01 PROCEDURE — 99285 EMERGENCY DEPT VISIT HI MDM: CPT | Mod: 25

## 2022-01-01 PROCEDURE — 4010F PR ACE/ARB THEARPY RXD/TAKEN: ICD-10-PCS | Mod: ,,, | Performed by: NURSE PRACTITIONER

## 2022-01-01 PROCEDURE — 84145 PROCALCITONIN (PCT): CPT | Mod: 90 | Performed by: HOSPITALIST

## 2022-01-01 PROCEDURE — 25000003 PHARM REV CODE 250

## 2022-01-01 PROCEDURE — 99232 SBSQ HOSP IP/OBS MODERATE 35: CPT | Mod: ,,, | Performed by: FAMILY MEDICINE

## 2022-01-01 PROCEDURE — 93010 ELECTROCARDIOGRAM REPORT: CPT | Mod: ,,, | Performed by: INTERNAL MEDICINE

## 2022-01-01 PROCEDURE — 80053 COMPREHEN METABOLIC PANEL: CPT | Performed by: EMERGENCY MEDICINE

## 2022-01-01 PROCEDURE — 87040 BLOOD CULTURE FOR BACTERIA: CPT | Performed by: HOSPITALIST

## 2022-01-01 PROCEDURE — 63600175 PHARM REV CODE 636 W HCPCS: Performed by: NURSE PRACTITIONER

## 2022-01-01 PROCEDURE — 3078F PR MOST RECENT DIASTOLIC BLOOD PRESSURE < 80 MM HG: ICD-10-PCS | Mod: CPTII,,, | Performed by: STUDENT IN AN ORGANIZED HEALTH CARE EDUCATION/TRAINING PROGRAM

## 2022-01-01 PROCEDURE — 99233 SBSQ HOSP IP/OBS HIGH 50: CPT | Mod: ,,, | Performed by: INTERNAL MEDICINE

## 2022-01-01 PROCEDURE — 93461 R&L HRT ART/VENTRICLE ANGIO: CPT | Mod: 26,,, | Performed by: INTERNAL MEDICINE

## 2022-01-01 PROCEDURE — 36415 COLL VENOUS BLD VENIPUNCTURE: CPT | Performed by: EMERGENCY MEDICINE

## 2022-01-01 PROCEDURE — 36415 COLL VENOUS BLD VENIPUNCTURE: CPT | Performed by: INTERNAL MEDICINE

## 2022-01-01 PROCEDURE — G0378 HOSPITAL OBSERVATION PER HR: HCPCS

## 2022-01-01 PROCEDURE — 93922 UPR/L XTREMITY ART 2 LEVELS: CPT | Mod: 26,,, | Performed by: RADIOLOGY

## 2022-01-01 PROCEDURE — 3074F PR MOST RECENT SYSTOLIC BLOOD PRESSURE < 130 MM HG: ICD-10-PCS | Mod: ,,, | Performed by: NURSE PRACTITIONER

## 2022-01-01 PROCEDURE — 84100 ASSAY OF PHOSPHORUS: CPT | Performed by: INTERNAL MEDICINE

## 2022-01-01 PROCEDURE — 99215 OFFICE O/P EST HI 40 MIN: CPT | Mod: PBBFAC | Performed by: STUDENT IN AN ORGANIZED HEALTH CARE EDUCATION/TRAINING PROGRAM

## 2022-01-01 PROCEDURE — 85025 COMPLETE CBC W/AUTO DIFF WBC: CPT | Performed by: NURSE PRACTITIONER

## 2022-01-01 PROCEDURE — 20000000 HC ICU ROOM

## 2022-01-01 PROCEDURE — 25000242 PHARM REV CODE 250 ALT 637 W/ HCPCS: Performed by: INTERNAL MEDICINE

## 2022-01-01 PROCEDURE — 80074 ACUTE HEPATITIS PANEL: CPT | Performed by: HOSPITALIST

## 2022-01-01 PROCEDURE — 99223 PR INITIAL HOSPITAL CARE,LEVL III: ICD-10-PCS | Mod: $0,,, | Performed by: HOSPITALIST

## 2022-01-01 PROCEDURE — 99232 PR SUBSEQUENT HOSPITAL CARE,LEVL II: ICD-10-PCS | Mod: ,,, | Performed by: FAMILY MEDICINE

## 2022-01-01 PROCEDURE — 81003 URINALYSIS AUTO W/O SCOPE: CPT | Performed by: EMERGENCY MEDICINE

## 2022-01-01 PROCEDURE — 85610 PROTHROMBIN TIME: CPT | Performed by: EMERGENCY MEDICINE

## 2022-01-01 PROCEDURE — 93005 ELECTROCARDIOGRAM TRACING: CPT

## 2022-01-01 PROCEDURE — 94761 N-INVAS EAR/PLS OXIMETRY MLT: CPT

## 2022-01-01 PROCEDURE — 93010 EKG 12-LEAD: ICD-10-PCS | Mod: S$PBB,,, | Performed by: STUDENT IN AN ORGANIZED HEALTH CARE EDUCATION/TRAINING PROGRAM

## 2022-01-01 PROCEDURE — 3074F SYST BP LT 130 MM HG: CPT | Mod: ,,, | Performed by: NURSE PRACTITIONER

## 2022-01-01 PROCEDURE — 3074F PR MOST RECENT SYSTOLIC BLOOD PRESSURE < 130 MM HG: ICD-10-PCS | Mod: CPTII,,, | Performed by: STUDENT IN AN ORGANIZED HEALTH CARE EDUCATION/TRAINING PROGRAM

## 2022-01-01 PROCEDURE — 85025 COMPLETE CBC W/AUTO DIFF WBC: CPT | Performed by: EMERGENCY MEDICINE

## 2022-01-01 PROCEDURE — 80053 COMPREHEN METABOLIC PANEL: CPT | Performed by: NURSE PRACTITIONER

## 2022-01-01 PROCEDURE — 93010 ELECTROCARDIOGRAM REPORT: CPT | Mod: S$PBB,,, | Performed by: STUDENT IN AN ORGANIZED HEALTH CARE EDUCATION/TRAINING PROGRAM

## 2022-01-01 PROCEDURE — 3078F PR MOST RECENT DIASTOLIC BLOOD PRESSURE < 80 MM HG: ICD-10-PCS | Mod: ,,, | Performed by: NURSE PRACTITIONER

## 2022-01-01 PROCEDURE — 3079F PR MOST RECENT DIASTOLIC BLOOD PRESSURE 80-89 MM HG: ICD-10-PCS | Mod: ,,, | Performed by: NURSE PRACTITIONER

## 2022-01-01 PROCEDURE — 36415 COLL VENOUS BLD VENIPUNCTURE: CPT

## 2022-01-01 PROCEDURE — 94640 AIRWAY INHALATION TREATMENT: CPT

## 2022-01-01 PROCEDURE — 63600175 PHARM REV CODE 636 W HCPCS: Performed by: INTERNAL MEDICINE

## 2022-01-01 PROCEDURE — 25000003 PHARM REV CODE 250: Performed by: FAMILY MEDICINE

## 2022-01-01 PROCEDURE — 99309 SBSQ NF CARE MODERATE MDM 30: CPT | Mod: ,,, | Performed by: FAMILY MEDICINE

## 2022-01-01 PROCEDURE — 99284 PR EMERGENCY DEPT VISIT,LEVEL IV: ICD-10-PCS | Mod: ,,, | Performed by: EMERGENCY MEDICINE

## 2022-01-01 PROCEDURE — 96374 THER/PROPH/DIAG INJ IV PUSH: CPT

## 2022-01-01 PROCEDURE — 83880 ASSAY OF NATRIURETIC PEPTIDE: CPT | Performed by: EMERGENCY MEDICINE

## 2022-01-01 PROCEDURE — 99219 PR INITIAL OBSERVATION CARE,LEVL II: CPT | Mod: ,,, | Performed by: INTERNAL MEDICINE

## 2022-01-01 PROCEDURE — 87635 SARS-COV-2 COVID-19 AMP PRB: CPT | Performed by: FAMILY MEDICINE

## 2022-01-01 PROCEDURE — 84484 ASSAY OF TROPONIN QUANT: CPT | Performed by: INTERNAL MEDICINE

## 2022-01-01 PROCEDURE — 85025 COMPLETE CBC W/AUTO DIFF WBC: CPT | Performed by: HOSPITALIST

## 2022-01-01 PROCEDURE — 99304 1ST NF CARE SF/LOW MDM 25: CPT | Mod: ,,, | Performed by: FAMILY MEDICINE

## 2022-01-01 PROCEDURE — 63600175 PHARM REV CODE 636 W HCPCS: Performed by: HOSPITALIST

## 2022-01-01 PROCEDURE — 99223 1ST HOSP IP/OBS HIGH 75: CPT | Mod: ,,, | Performed by: INTERNAL MEDICINE

## 2022-01-01 PROCEDURE — 93925 US ARTERIAL LOWER EXTREMITY BILAT WITH ABI (XPD): ICD-10-PCS | Mod: 26,,, | Performed by: RADIOLOGY

## 2022-01-01 PROCEDURE — 99283 EMERGENCY DEPT VISIT LOW MDM: CPT | Mod: ,,, | Performed by: EMERGENCY MEDICINE

## 2022-01-01 PROCEDURE — 99213 PR OFFICE/OUTPT VISIT, EST, LEVL III, 20-29 MIN: ICD-10-PCS | Mod: ,,, | Performed by: NURSE PRACTITIONER

## 2022-01-01 PROCEDURE — 4010F ACE/ARB THERAPY RXD/TAKEN: CPT | Mod: ,,, | Performed by: NURSE PRACTITIONER

## 2022-01-01 PROCEDURE — 83735 ASSAY OF MAGNESIUM: CPT | Performed by: EMERGENCY MEDICINE

## 2022-01-01 PROCEDURE — 80053 COMPREHEN METABOLIC PANEL: CPT | Performed by: INTERNAL MEDICINE

## 2022-01-01 PROCEDURE — 82140 ASSAY OF AMMONIA: CPT | Performed by: NURSE PRACTITIONER

## 2022-01-01 PROCEDURE — 25000003 PHARM REV CODE 250: Performed by: STUDENT IN AN ORGANIZED HEALTH CARE EDUCATION/TRAINING PROGRAM

## 2022-01-01 PROCEDURE — 83735 ASSAY OF MAGNESIUM: CPT | Performed by: HOSPITALIST

## 2022-01-01 PROCEDURE — 3288F PR FALLS RISK ASSESSMENT DOCUMENTED: ICD-10-PCS | Mod: ,,, | Performed by: NURSE PRACTITIONER

## 2022-01-01 PROCEDURE — 99284 EMERGENCY DEPT VISIT MOD MDM: CPT | Mod: ,,, | Performed by: EMERGENCY MEDICINE

## 2022-01-01 PROCEDURE — 99220 PR INITIAL OBSERVATION CARE,LEVL III: ICD-10-PCS | Mod: GC,,, | Performed by: INTERNAL MEDICINE

## 2022-01-01 PROCEDURE — 93010 EKG 12-LEAD: ICD-10-PCS | Mod: ,,, | Performed by: HOSPITALIST

## 2022-01-01 PROCEDURE — 3008F PR BODY MASS INDEX (BMI) DOCUMENTED: ICD-10-PCS | Mod: ,,, | Performed by: NURSE PRACTITIONER

## 2022-01-01 PROCEDURE — 97116 GAIT TRAINING THERAPY: CPT

## 2022-01-01 PROCEDURE — 3288F FALL RISK ASSESSMENT DOCD: CPT | Mod: ,,, | Performed by: NURSE PRACTITIONER

## 2022-01-01 PROCEDURE — 99217 PR OBSERVATION CARE DISCHARGE: CPT | Mod: GC,,, | Performed by: FAMILY MEDICINE

## 2022-01-01 PROCEDURE — 1100F PR PT FALLS ASSESS DOC 2+ FALLS/FALL W/INJURY/YR: ICD-10-PCS | Mod: ,,, | Performed by: NURSE PRACTITIONER

## 2022-01-01 PROCEDURE — 99283 PR EMERGENCY DEPT VISIT,LEVEL III: ICD-10-PCS | Mod: ,,, | Performed by: EMERGENCY MEDICINE

## 2022-01-01 PROCEDURE — 85025 COMPLETE CBC W/AUTO DIFF WBC: CPT

## 2022-01-01 PROCEDURE — 80048 BASIC METABOLIC PNL TOTAL CA: CPT

## 2022-01-01 PROCEDURE — 3075F PR MOST RECENT SYSTOLIC BLOOD PRESS GE 130-139MM HG: ICD-10-PCS | Mod: CPTII,,, | Performed by: STUDENT IN AN ORGANIZED HEALTH CARE EDUCATION/TRAINING PROGRAM

## 2022-01-01 PROCEDURE — 82962 GLUCOSE BLOOD TEST: CPT

## 2022-01-01 PROCEDURE — 3079F DIAST BP 80-89 MM HG: CPT | Mod: ,,, | Performed by: NURSE PRACTITIONER

## 2022-01-01 PROCEDURE — 97530 THERAPEUTIC ACTIVITIES: CPT

## 2022-01-01 PROCEDURE — 99233 PR SUBSEQUENT HOSPITAL CARE,LEVL III: ICD-10-PCS | Mod: ,,, | Performed by: INTERNAL MEDICINE

## 2022-01-01 PROCEDURE — 92610 EVALUATE SWALLOWING FUNCTION: CPT

## 2022-01-01 PROCEDURE — 3074F SYST BP LT 130 MM HG: CPT | Mod: CPTII,,, | Performed by: STUDENT IN AN ORGANIZED HEALTH CARE EDUCATION/TRAINING PROGRAM

## 2022-01-01 PROCEDURE — 99239 HOSP IP/OBS DSCHRG MGMT >30: CPT | Mod: ,,, | Performed by: FAMILY MEDICINE

## 2022-01-01 PROCEDURE — G0508 PR CRITICAL CARE TELEHLTH INITIAL CONSULT 60MIN: ICD-10-PCS | Mod: GT,,, | Performed by: PSYCHIATRY & NEUROLOGY

## 2022-01-01 PROCEDURE — 99285 EMERGENCY DEPT VISIT HI MDM: CPT | Mod: 25,CS | Performed by: EMERGENCY MEDICINE

## 2022-01-01 PROCEDURE — 51798 US URINE CAPACITY MEASURE: CPT

## 2022-01-01 PROCEDURE — 1160F PR REVIEW ALL MEDS BY PRESCRIBER/CLIN PHARMACIST DOCUMENTED: ICD-10-PCS | Mod: ,,, | Performed by: NURSE PRACTITIONER

## 2022-01-01 PROCEDURE — 3078F DIAST BP <80 MM HG: CPT | Mod: ,,, | Performed by: NURSE PRACTITIONER

## 2022-01-01 PROCEDURE — 99900035 HC TECH TIME PER 15 MIN (STAT)

## 2022-01-01 PROCEDURE — C1760 CLOSURE DEV, VASC: HCPCS | Performed by: INTERNAL MEDICINE

## 2022-01-01 PROCEDURE — 4010F PR ACE/ARB THEARPY RXD/TAKEN: ICD-10-PCS | Mod: CPTII,,, | Performed by: STUDENT IN AN ORGANIZED HEALTH CARE EDUCATION/TRAINING PROGRAM

## 2022-01-01 PROCEDURE — 1160F RVW MEDS BY RX/DR IN RCRD: CPT | Mod: ,,, | Performed by: NURSE PRACTITIONER

## 2022-01-01 PROCEDURE — 97535 SELF CARE MNGMENT TRAINING: CPT

## 2022-01-01 PROCEDURE — 3008F BODY MASS INDEX DOCD: CPT | Mod: ,,, | Performed by: NURSE PRACTITIONER

## 2022-01-01 PROCEDURE — 99223 1ST HOSP IP/OBS HIGH 75: CPT | Mod: AI,,, | Performed by: INTERNAL MEDICINE

## 2022-01-01 PROCEDURE — 99304 PR NURSING FACILITY CARE, INIT, LOW SEVERITY: ICD-10-PCS | Mod: ,,, | Performed by: FAMILY MEDICINE

## 2022-01-01 PROCEDURE — 1159F MED LIST DOCD IN RCRD: CPT | Mod: CPTII,,, | Performed by: STUDENT IN AN ORGANIZED HEALTH CARE EDUCATION/TRAINING PROGRAM

## 2022-01-01 PROCEDURE — 99223 1ST HOSP IP/OBS HIGH 75: CPT | Mod: ,,, | Performed by: STUDENT IN AN ORGANIZED HEALTH CARE EDUCATION/TRAINING PROGRAM

## 2022-01-01 PROCEDURE — 99233 SBSQ HOSP IP/OBS HIGH 50: CPT | Mod: ,,, | Performed by: STUDENT IN AN ORGANIZED HEALTH CARE EDUCATION/TRAINING PROGRAM

## 2022-01-01 PROCEDURE — 93925 LOWER EXTREMITY STUDY: CPT | Mod: TC

## 2022-01-01 PROCEDURE — G0508 CRIT CARE TELEHEA CONSULT 60: HCPCS | Mod: GT,,, | Performed by: PSYCHIATRY & NEUROLOGY

## 2022-01-01 PROCEDURE — 93461 R&L HRT ART/VENTRICLE ANGIO: CPT | Performed by: INTERNAL MEDICINE

## 2022-01-01 PROCEDURE — 99285 EMERGENCY DEPT VISIT HI MDM: CPT | Mod: ,,, | Performed by: EMERGENCY MEDICINE

## 2022-01-01 PROCEDURE — 99223 PR INITIAL HOSPITAL CARE,LEVL III: ICD-10-PCS | Mod: AI,,, | Performed by: INTERNAL MEDICINE

## 2022-01-01 PROCEDURE — 83880 ASSAY OF NATRIURETIC PEPTIDE: CPT | Performed by: INTERNAL MEDICINE

## 2022-01-01 PROCEDURE — 87635 SARS-COV-2 COVID-19 AMP PRB: CPT | Performed by: EMERGENCY MEDICINE

## 2022-01-01 PROCEDURE — 96375 TX/PRO/DX INJ NEW DRUG ADDON: CPT

## 2022-01-01 PROCEDURE — 93922 US ARTERIAL LOWER EXTREMITY BILAT WITH ABI (XPD): ICD-10-PCS | Mod: 26,,, | Performed by: RADIOLOGY

## 2022-01-01 PROCEDURE — 1101F PT FALLS ASSESS-DOCD LE1/YR: CPT | Mod: ,,, | Performed by: NURSE PRACTITIONER

## 2022-01-01 PROCEDURE — 84439 ASSAY OF FREE THYROXINE: CPT | Performed by: INTERNAL MEDICINE

## 2022-01-01 PROCEDURE — 97110 THERAPEUTIC EXERCISES: CPT | Mod: CQ

## 2022-01-01 PROCEDURE — 1101F PR PT FALLS ASSESS DOC 0-1 FALLS W/OUT INJ PAST YR: ICD-10-PCS | Mod: ,,, | Performed by: NURSE PRACTITIONER

## 2022-01-01 PROCEDURE — 99495 TCM SERVICES (MODERATE COMPLEXITY): ICD-10-PCS | Mod: ,,, | Performed by: NURSE PRACTITIONER

## 2022-01-01 PROCEDURE — 96366 THER/PROPH/DIAG IV INF ADDON: CPT

## 2022-01-01 PROCEDURE — 63600175 PHARM REV CODE 636 W HCPCS: Performed by: STUDENT IN AN ORGANIZED HEALTH CARE EDUCATION/TRAINING PROGRAM

## 2022-01-01 PROCEDURE — 93461 PR CATH PLACE/COR ANG,IMG SUPR/INTRP,BYPASS ANG, R&L CATH, L HRT VENTRIC: ICD-10-PCS | Mod: 26,,, | Performed by: INTERNAL MEDICINE

## 2022-01-01 PROCEDURE — 82310 ASSAY OF CALCIUM: CPT

## 2022-01-01 PROCEDURE — 99225 PR SUBSEQUENT OBSERVATION CARE,LEVEL II: CPT | Mod: GC,,, | Performed by: INTERNAL MEDICINE

## 2022-01-01 PROCEDURE — 99217 PR OBSERVATION CARE DISCHARGE: ICD-10-PCS | Mod: GC,,, | Performed by: FAMILY MEDICINE

## 2022-01-01 PROCEDURE — 82077 ASSAY SPEC XCP UR&BREATH IA: CPT | Performed by: NURSE PRACTITIONER

## 2022-01-01 PROCEDURE — 3008F PR BODY MASS INDEX (BMI) DOCUMENTED: ICD-10-PCS | Mod: CPTII,,, | Performed by: STUDENT IN AN ORGANIZED HEALTH CARE EDUCATION/TRAINING PROGRAM

## 2022-01-01 PROCEDURE — 1159F MED LIST DOCD IN RCRD: CPT | Mod: ,,, | Performed by: NURSE PRACTITIONER

## 2022-01-01 PROCEDURE — 97162 PT EVAL MOD COMPLEX 30 MIN: CPT

## 2022-01-01 PROCEDURE — 99232 SBSQ HOSP IP/OBS MODERATE 35: CPT | Mod: ,,, | Performed by: INTERNAL MEDICINE

## 2022-01-01 PROCEDURE — 97110 THERAPEUTIC EXERCISES: CPT

## 2022-01-01 PROCEDURE — 84484 ASSAY OF TROPONIN QUANT: CPT | Performed by: HOSPITALIST

## 2022-01-01 PROCEDURE — 3075F PR MOST RECENT SYSTOLIC BLOOD PRESS GE 130-139MM HG: ICD-10-PCS | Mod: ,,, | Performed by: NURSE PRACTITIONER

## 2022-01-01 PROCEDURE — 96374 THER/PROPH/DIAG INJ IV PUSH: CPT | Performed by: EMERGENCY MEDICINE

## 2022-01-01 PROCEDURE — 99225 PR SUBSEQUENT OBSERVATION CARE,LEVEL II: ICD-10-PCS | Mod: GC,,, | Performed by: FAMILY MEDICINE

## 2022-01-01 PROCEDURE — 99219 PR INITIAL OBSERVATION CARE,LEVL II: ICD-10-PCS | Mod: ,,, | Performed by: INTERNAL MEDICINE

## 2022-01-01 PROCEDURE — 93005 ELECTROCARDIOGRAM TRACING: CPT | Mod: PBBFAC | Performed by: STUDENT IN AN ORGANIZED HEALTH CARE EDUCATION/TRAINING PROGRAM

## 2022-01-01 PROCEDURE — 97166 OT EVAL MOD COMPLEX 45 MIN: CPT

## 2022-01-01 PROCEDURE — 3078F DIAST BP <80 MM HG: CPT | Mod: CPTII,,, | Performed by: STUDENT IN AN ORGANIZED HEALTH CARE EDUCATION/TRAINING PROGRAM

## 2022-01-01 PROCEDURE — 99214 OFFICE O/P EST MOD 30 MIN: CPT | Mod: PBBFAC | Performed by: STUDENT IN AN ORGANIZED HEALTH CARE EDUCATION/TRAINING PROGRAM

## 2022-01-01 PROCEDURE — 99213 OFFICE O/P EST LOW 20 MIN: CPT | Mod: ,,, | Performed by: NURSE PRACTITIONER

## 2022-01-01 PROCEDURE — 99307 SBSQ NF CARE SF MDM 10: CPT | Mod: ,,, | Performed by: FAMILY MEDICINE

## 2022-01-01 PROCEDURE — 36592 COLLECT BLOOD FROM PICC: CPT | Performed by: HOSPITALIST

## 2022-01-01 PROCEDURE — C1769 GUIDE WIRE: HCPCS | Performed by: INTERNAL MEDICINE

## 2022-01-01 PROCEDURE — 99233 PR SUBSEQUENT HOSPITAL CARE,LEVL III: ICD-10-PCS | Mod: ,,, | Performed by: STUDENT IN AN ORGANIZED HEALTH CARE EDUCATION/TRAINING PROGRAM

## 2022-01-01 PROCEDURE — 99152 MOD SED SAME PHYS/QHP 5/>YRS: CPT | Performed by: INTERNAL MEDICINE

## 2022-01-01 PROCEDURE — 87428 SARSCOV & INF VIR A&B AG IA: CPT | Mod: QW,,, | Performed by: NURSE PRACTITIONER

## 2022-01-01 PROCEDURE — 99225 PR SUBSEQUENT OBSERVATION CARE,LEVEL II: ICD-10-PCS | Mod: GC,,, | Performed by: INTERNAL MEDICINE

## 2022-01-01 PROCEDURE — 80048 BASIC METABOLIC PNL TOTAL CA: CPT | Performed by: HOSPITALIST

## 2022-01-01 PROCEDURE — 80048 BASIC METABOLIC PNL TOTAL CA: CPT | Performed by: NURSE PRACTITIONER

## 2022-01-01 PROCEDURE — 92523 SPEECH SOUND LANG COMPREHEN: CPT

## 2022-01-01 PROCEDURE — 27100168 OPTIME MED/SURG SUP & DEVICES NON-STERILE SUPPLY: Performed by: INTERNAL MEDICINE

## 2022-01-01 PROCEDURE — 99213 OFFICE O/P EST LOW 20 MIN: CPT | Mod: PBBFAC | Performed by: STUDENT IN AN ORGANIZED HEALTH CARE EDUCATION/TRAINING PROGRAM

## 2022-01-01 PROCEDURE — 27000080 OPTIME MED/SURG SUP & DEVICES GENERAL CLASSIFICATION: Performed by: INTERNAL MEDICINE

## 2022-01-01 PROCEDURE — 96365 THER/PROPH/DIAG IV INF INIT: CPT

## 2022-01-01 PROCEDURE — 99309 PR NURSING FAC CARE, SUBSEQ, SIGNIF COMPLIC: ICD-10-PCS | Mod: ,,, | Performed by: FAMILY MEDICINE

## 2022-01-01 PROCEDURE — 99495 TRANSJ CARE MGMT MOD F2F 14D: CPT | Mod: ,,, | Performed by: NURSE PRACTITIONER

## 2022-01-01 PROCEDURE — 99212 PR OFFICE/OUTPT VISIT, EST, LEVL II, 10-19 MIN: ICD-10-PCS | Mod: ,,, | Performed by: NURSE PRACTITIONER

## 2022-01-01 PROCEDURE — 83735 ASSAY OF MAGNESIUM: CPT | Performed by: REGISTERED NURSE

## 2022-01-01 PROCEDURE — 25500020 PHARM REV CODE 255: Performed by: INTERNAL MEDICINE

## 2022-01-01 PROCEDURE — 27800903 OPTIME MED/SURG SUP & DEVICES OTHER IMPLANTS: Performed by: INTERNAL MEDICINE

## 2022-01-01 PROCEDURE — 76706 US ABDL AORTA SCREEN AAA: CPT | Mod: 26,,, | Performed by: RADIOLOGY

## 2022-01-01 PROCEDURE — 80307 DRUG TEST PRSMV CHEM ANLYZR: CPT | Performed by: NURSE PRACTITIONER

## 2022-01-01 PROCEDURE — 80061 LIPID PANEL: CPT | Performed by: INTERNAL MEDICINE

## 2022-01-01 PROCEDURE — 1111F DSCHRG MED/CURRENT MED MERGE: CPT | Mod: ,,, | Performed by: FAMILY MEDICINE

## 2022-01-01 PROCEDURE — 83605 ASSAY OF LACTIC ACID: CPT | Performed by: EMERGENCY MEDICINE

## 2022-01-01 PROCEDURE — 84443 ASSAY THYROID STIM HORMONE: CPT | Performed by: INTERNAL MEDICINE

## 2022-01-01 PROCEDURE — 99215 OFFICE O/P EST HI 40 MIN: CPT | Mod: S$PBB,,, | Performed by: STUDENT IN AN ORGANIZED HEALTH CARE EDUCATION/TRAINING PROGRAM

## 2022-01-01 PROCEDURE — 83735 ASSAY OF MAGNESIUM: CPT | Performed by: INTERNAL MEDICINE

## 2022-01-01 PROCEDURE — 83735 ASSAY OF MAGNESIUM: CPT

## 2022-01-01 PROCEDURE — 99232 PR SUBSEQUENT HOSPITAL CARE,LEVL II: ICD-10-PCS | Mod: ,,, | Performed by: STUDENT IN AN ORGANIZED HEALTH CARE EDUCATION/TRAINING PROGRAM

## 2022-01-01 PROCEDURE — 63600150 PHARM REV CODE 636: Performed by: HOSPITALIST

## 2022-01-01 PROCEDURE — 80074 ACUTE HEPATITIS PANEL: CPT | Performed by: INTERNAL MEDICINE

## 2022-01-01 PROCEDURE — 3075F SYST BP GE 130 - 139MM HG: CPT | Mod: ,,, | Performed by: NURSE PRACTITIONER

## 2022-01-01 PROCEDURE — 99223 PR INITIAL HOSPITAL CARE,LEVL III: ICD-10-PCS | Mod: ,,, | Performed by: INTERNAL MEDICINE

## 2022-01-01 PROCEDURE — 99220 PR INITIAL OBSERVATION CARE,LEVL III: CPT | Mod: GC,,, | Performed by: INTERNAL MEDICINE

## 2022-01-01 PROCEDURE — C1751 CATH, INF, PER/CENT/MIDLINE: HCPCS | Performed by: INTERNAL MEDICINE

## 2022-01-01 PROCEDURE — C1894 INTRO/SHEATH, NON-LASER: HCPCS | Performed by: INTERNAL MEDICINE

## 2022-01-01 PROCEDURE — 99214 PR OFFICE/OUTPT VISIT, EST, LEVL IV, 30-39 MIN: ICD-10-PCS | Mod: S$PBB,,, | Performed by: STUDENT IN AN ORGANIZED HEALTH CARE EDUCATION/TRAINING PROGRAM

## 2022-01-01 PROCEDURE — 99223 1ST HOSP IP/OBS HIGH 75: CPT | Mod: $0,,, | Performed by: HOSPITALIST

## 2022-01-01 PROCEDURE — C1887 CATHETER, GUIDING: HCPCS | Performed by: INTERNAL MEDICINE

## 2022-01-01 PROCEDURE — 1100F PTFALLS ASSESS-DOCD GE2>/YR: CPT | Mod: ,,, | Performed by: NURSE PRACTITIONER

## 2022-01-01 PROCEDURE — 96376 TX/PRO/DX INJ SAME DRUG ADON: CPT | Mod: 59 | Performed by: EMERGENCY MEDICINE

## 2022-01-01 PROCEDURE — 97161 PT EVAL LOW COMPLEX 20 MIN: CPT

## 2022-01-01 PROCEDURE — 99225 PR SUBSEQUENT OBSERVATION CARE,LEVEL II: CPT | Mod: GC,,, | Performed by: FAMILY MEDICINE

## 2022-01-01 PROCEDURE — 81003 URINALYSIS AUTO W/O SCOPE: CPT | Mod: 59 | Performed by: NURSE PRACTITIONER

## 2022-01-01 PROCEDURE — 87428 SARSCOV & INF VIR A&B AG IA: CPT | Performed by: EMERGENCY MEDICINE

## 2022-01-01 PROCEDURE — 96375 TX/PRO/DX INJ NEW DRUG ADDON: CPT | Performed by: EMERGENCY MEDICINE

## 2022-01-01 PROCEDURE — 63600150 PHARM REV CODE 636: Performed by: INTERNAL MEDICINE

## 2022-01-01 PROCEDURE — 1111F PR DISCHARGE MEDS RECONCILED W/ CURRENT OUTPATIENT MED LIST: ICD-10-PCS | Mod: ,,, | Performed by: FAMILY MEDICINE

## 2022-01-01 PROCEDURE — 99308 PR NURSING FAC CARE, SUBSEQ, MINOR COMPLIC: ICD-10-PCS | Mod: ,,, | Performed by: FAMILY MEDICINE

## 2022-01-01 PROCEDURE — 99232 SBSQ HOSP IP/OBS MODERATE 35: CPT | Mod: ,,, | Performed by: STUDENT IN AN ORGANIZED HEALTH CARE EDUCATION/TRAINING PROGRAM

## 2022-01-01 PROCEDURE — 76706 US ABDL AORTA SCREEN AAA: CPT | Mod: TC

## 2022-01-01 PROCEDURE — 84484 ASSAY OF TROPONIN QUANT: CPT | Performed by: REGISTERED NURSE

## 2022-01-01 PROCEDURE — 99153 MOD SED SAME PHYS/QHP EA: CPT | Performed by: INTERNAL MEDICINE

## 2022-01-01 PROCEDURE — 27201423 OPTIME MED/SURG SUP & DEVICES STERILE SUPPLY: Performed by: INTERNAL MEDICINE

## 2022-01-01 PROCEDURE — 80143 DRUG ASSAY ACETAMINOPHEN: CPT | Performed by: HOSPITALIST

## 2022-01-01 PROCEDURE — 99223 PR INITIAL HOSPITAL CARE,LEVL III: ICD-10-PCS | Mod: ,,, | Performed by: STUDENT IN AN ORGANIZED HEALTH CARE EDUCATION/TRAINING PROGRAM

## 2022-01-01 RX ORDER — MIDODRINE HYDROCHLORIDE 5 MG/1
5 TABLET ORAL 2 TIMES DAILY WITH MEALS
Status: DISCONTINUED | OUTPATIENT
Start: 2022-01-01 | End: 2022-01-01

## 2022-01-01 RX ORDER — HYDRALAZINE HYDROCHLORIDE 25 MG/1
25 TABLET, FILM COATED ORAL EVERY 12 HOURS
Qty: 60 TABLET | Refills: 1 | Status: SHIPPED | OUTPATIENT
Start: 2022-01-01 | End: 2022-01-01 | Stop reason: ALTCHOICE

## 2022-01-01 RX ORDER — FUROSEMIDE 10 MG/ML
40 INJECTION INTRAMUSCULAR; INTRAVENOUS
Status: COMPLETED | OUTPATIENT
Start: 2022-01-01 | End: 2022-01-01

## 2022-01-01 RX ORDER — HYDROCODONE BITARTRATE AND ACETAMINOPHEN 5; 325 MG/1; MG/1
1 TABLET ORAL EVERY 6 HOURS PRN
Status: DISCONTINUED | OUTPATIENT
Start: 2022-01-01 | End: 2022-01-01 | Stop reason: HOSPADM

## 2022-01-01 RX ORDER — HYDROCODONE BITARTRATE AND ACETAMINOPHEN 5; 325 MG/1; MG/1
1 TABLET ORAL EVERY 4 HOURS PRN
COMMUNITY

## 2022-01-01 RX ORDER — ACETAMINOPHEN 500 MG
1000 TABLET ORAL EVERY 8 HOURS PRN
Status: DISCONTINUED | OUTPATIENT
Start: 2022-01-01 | End: 2022-01-01 | Stop reason: HOSPADM

## 2022-01-01 RX ORDER — HYDROCODONE BITARTRATE AND ACETAMINOPHEN 5; 325 MG/1; MG/1
1 TABLET ORAL EVERY 6 HOURS PRN
Qty: 12 TABLET | Refills: 0 | Status: ON HOLD | OUTPATIENT
Start: 2022-01-01 | End: 2022-01-01 | Stop reason: HOSPADM

## 2022-01-01 RX ORDER — HYDRALAZINE HYDROCHLORIDE 25 MG/1
25 TABLET, FILM COATED ORAL EVERY 8 HOURS
Status: DISCONTINUED | OUTPATIENT
Start: 2022-01-01 | End: 2022-01-01 | Stop reason: HOSPADM

## 2022-01-01 RX ORDER — SACUBITRIL AND VALSARTAN 24; 26 MG/1; MG/1
1 TABLET, FILM COATED ORAL 2 TIMES DAILY
COMMUNITY
End: 2022-01-01 | Stop reason: SDUPTHER

## 2022-01-01 RX ORDER — DOBUTAMINE HYDROCHLORIDE 400 MG/100ML
3 INJECTION INTRAVENOUS CONTINUOUS
Status: DISCONTINUED | OUTPATIENT
Start: 2022-01-01 | End: 2022-01-01

## 2022-01-01 RX ORDER — LANOLIN ALCOHOL/MO/W.PET/CERES
800 CREAM (GRAM) TOPICAL
Status: DISCONTINUED | OUTPATIENT
Start: 2022-01-01 | End: 2022-01-01 | Stop reason: HOSPADM

## 2022-01-01 RX ORDER — FUROSEMIDE 10 MG/ML
40 INJECTION INTRAMUSCULAR; INTRAVENOUS 2 TIMES DAILY WITH MEALS
Status: DISCONTINUED | OUTPATIENT
Start: 2022-01-01 | End: 2022-01-01

## 2022-01-01 RX ORDER — LEVOTHYROXINE SODIUM 75 UG/1
75 TABLET ORAL
COMMUNITY

## 2022-01-01 RX ORDER — SODIUM CHLORIDE 0.9 % (FLUSH) 0.9 %
10 SYRINGE (ML) INJECTION EVERY 12 HOURS PRN
Status: DISCONTINUED | OUTPATIENT
Start: 2022-01-01 | End: 2022-01-01 | Stop reason: HOSPADM

## 2022-01-01 RX ORDER — DAPAGLIFLOZIN 10 MG/1
10 TABLET, FILM COATED ORAL DAILY
Qty: 90 TABLET | Refills: 3 | Status: ON HOLD | OUTPATIENT
Start: 2022-01-01 | End: 2022-01-01 | Stop reason: HOSPADM

## 2022-01-01 RX ORDER — BUTALBITAL, ACETAMINOPHEN AND CAFFEINE 50; 325; 40 MG/1; MG/1; MG/1
1 TABLET ORAL EVERY 4 HOURS PRN
Status: DISCONTINUED | OUTPATIENT
Start: 2022-01-01 | End: 2022-01-01 | Stop reason: HOSPADM

## 2022-01-01 RX ORDER — HYDRALAZINE HYDROCHLORIDE 10 MG/1
10 TABLET, FILM COATED ORAL EVERY 8 HOURS
Status: DISCONTINUED | OUTPATIENT
Start: 2022-01-01 | End: 2022-01-01 | Stop reason: HOSPADM

## 2022-01-01 RX ORDER — ATORVASTATIN CALCIUM 40 MG/1
40 TABLET, FILM COATED ORAL NIGHTLY
Qty: 30 TABLET | Refills: 1 | Status: SHIPPED | OUTPATIENT
Start: 2022-01-01 | End: 2022-01-01 | Stop reason: ALTCHOICE

## 2022-01-01 RX ORDER — NITROGLYCERIN 0.4 MG/1
0.4 TABLET SUBLINGUAL EVERY 5 MIN PRN
Status: DISCONTINUED | OUTPATIENT
Start: 2022-01-01 | End: 2022-01-01 | Stop reason: HOSPADM

## 2022-01-01 RX ORDER — AMIODARONE HYDROCHLORIDE 200 MG/1
200 TABLET ORAL DAILY
Status: DISCONTINUED | OUTPATIENT
Start: 2022-01-01 | End: 2022-01-01

## 2022-01-01 RX ORDER — CALCIUM GLUCONATE 20 MG/ML
1 INJECTION, SOLUTION INTRAVENOUS ONCE
Status: COMPLETED | OUTPATIENT
Start: 2022-01-01 | End: 2022-01-01

## 2022-01-01 RX ORDER — ACETAMINOPHEN 325 MG/1
650 TABLET ORAL EVERY 4 HOURS PRN
Status: DISCONTINUED | OUTPATIENT
Start: 2022-01-01 | End: 2022-01-01 | Stop reason: HOSPADM

## 2022-01-01 RX ORDER — POTASSIUM CHLORIDE 20 MEQ/1
20 TABLET, EXTENDED RELEASE ORAL DAILY
Status: DISCONTINUED | OUTPATIENT
Start: 2022-01-01 | End: 2022-01-01 | Stop reason: HOSPADM

## 2022-01-01 RX ORDER — ONDANSETRON HYDROCHLORIDE 8 MG/1
TABLET, FILM COATED ORAL EVERY 6 HOURS PRN
COMMUNITY

## 2022-01-01 RX ORDER — HYDROCODONE BITARTRATE AND ACETAMINOPHEN 10; 325 MG/1; MG/1
1 TABLET ORAL EVERY 6 HOURS PRN
Status: DISCONTINUED | OUTPATIENT
Start: 2022-01-01 | End: 2022-01-01 | Stop reason: HOSPADM

## 2022-01-01 RX ORDER — GLUCAGON 1 MG
1 KIT INJECTION
Status: DISCONTINUED | OUTPATIENT
Start: 2022-01-01 | End: 2022-01-01 | Stop reason: HOSPADM

## 2022-01-01 RX ORDER — MAG HYDROX/ALUMINUM HYD/SIMETH 200-200-20
30 SUSPENSION, ORAL (FINAL DOSE FORM) ORAL ONCE
Status: COMPLETED | OUTPATIENT
Start: 2022-01-01 | End: 2022-01-01

## 2022-01-01 RX ORDER — DOBUTAMINE HYDROCHLORIDE 400 MG/100ML
5 INJECTION INTRAVENOUS CONTINUOUS
Status: DISCONTINUED | OUTPATIENT
Start: 2022-01-01 | End: 2022-01-01

## 2022-01-01 RX ORDER — FUROSEMIDE 10 MG/ML
60 INJECTION INTRAMUSCULAR; INTRAVENOUS
Status: COMPLETED | OUTPATIENT
Start: 2022-01-01 | End: 2022-01-01

## 2022-01-01 RX ORDER — METOPROLOL TARTRATE 1 MG/ML
5 INJECTION, SOLUTION INTRAVENOUS EVERY 5 MIN PRN
Status: DISCONTINUED | OUTPATIENT
Start: 2022-01-01 | End: 2022-01-01 | Stop reason: HOSPADM

## 2022-01-01 RX ORDER — SACUBITRIL AND VALSARTAN 24; 26 MG/1; MG/1
1 TABLET, FILM COATED ORAL 2 TIMES DAILY
Status: ON HOLD | COMMUNITY
End: 2022-01-01 | Stop reason: HOSPADM

## 2022-01-01 RX ORDER — POLYETHYLENE GLYCOL 3350 17 G/17G
17 POWDER, FOR SOLUTION ORAL DAILY PRN
Status: DISCONTINUED | OUTPATIENT
Start: 2022-01-01 | End: 2022-01-01 | Stop reason: HOSPADM

## 2022-01-01 RX ORDER — HEPARIN SODIUM 5000 [USP'U]/ML
5000 INJECTION, SOLUTION INTRAVENOUS; SUBCUTANEOUS EVERY 8 HOURS
Status: DISCONTINUED | OUTPATIENT
Start: 2022-01-01 | End: 2022-01-01

## 2022-01-01 RX ORDER — LIDOCAINE HYDROCHLORIDE 10 MG/ML
INJECTION, SOLUTION EPIDURAL; INFILTRATION; INTRACAUDAL; PERINEURAL
Status: DISCONTINUED | OUTPATIENT
Start: 2022-01-01 | End: 2022-01-01 | Stop reason: HOSPADM

## 2022-01-01 RX ORDER — PRAVASTATIN SODIUM 10 MG/1
20 TABLET ORAL DAILY
Status: DISCONTINUED | OUTPATIENT
Start: 2022-01-01 | End: 2022-01-01 | Stop reason: HOSPADM

## 2022-01-01 RX ORDER — CLOPIDOGREL BISULFATE 75 MG/1
75 TABLET ORAL DAILY
Status: DISCONTINUED | OUTPATIENT
Start: 2022-01-01 | End: 2022-01-01 | Stop reason: HOSPADM

## 2022-01-01 RX ORDER — HYDROCODONE BITARTRATE AND ACETAMINOPHEN 5; 325 MG/1; MG/1
1 TABLET ORAL EVERY 6 HOURS PRN
Status: ON HOLD | COMMUNITY
End: 2022-01-01 | Stop reason: HOSPADM

## 2022-01-01 RX ORDER — TALC
6 POWDER (GRAM) TOPICAL NIGHTLY PRN
Status: DISCONTINUED | OUTPATIENT
Start: 2022-01-01 | End: 2022-01-01 | Stop reason: HOSPADM

## 2022-01-01 RX ORDER — PRAVASTATIN SODIUM 20 MG/1
20 TABLET ORAL DAILY
Qty: 90 TABLET | Refills: 3 | Status: SHIPPED | OUTPATIENT
Start: 2022-01-01 | End: 2023-02-04

## 2022-01-01 RX ORDER — PANTOPRAZOLE SODIUM 40 MG/1
40 TABLET, DELAYED RELEASE ORAL DAILY
Status: DISCONTINUED | OUTPATIENT
Start: 2022-01-01 | End: 2022-01-01 | Stop reason: HOSPADM

## 2022-01-01 RX ORDER — ATORVASTATIN CALCIUM 40 MG/1
40 TABLET, FILM COATED ORAL NIGHTLY
Qty: 30 TABLET | Refills: 1 | Status: SHIPPED | OUTPATIENT
Start: 2022-01-01 | End: 2022-01-01 | Stop reason: SDUPTHER

## 2022-01-01 RX ORDER — AMIODARONE HYDROCHLORIDE 200 MG/1
200 TABLET ORAL 2 TIMES DAILY
Status: DISCONTINUED | OUTPATIENT
Start: 2022-01-01 | End: 2022-01-01

## 2022-01-01 RX ORDER — HEPARIN SODIUM 1000 [USP'U]/ML
INJECTION, SOLUTION INTRAVENOUS; SUBCUTANEOUS
Status: DISCONTINUED | OUTPATIENT
Start: 2022-01-01 | End: 2022-01-01 | Stop reason: HOSPADM

## 2022-01-01 RX ORDER — ENOXAPARIN SODIUM 100 MG/ML
1 INJECTION SUBCUTANEOUS DAILY
Status: DISCONTINUED | OUTPATIENT
Start: 2022-01-01 | End: 2022-01-01

## 2022-01-01 RX ORDER — SACUBITRIL AND VALSARTAN 24; 26 MG/1; MG/1
1 TABLET, FILM COATED ORAL 2 TIMES DAILY
Qty: 180 TABLET | Refills: 1 | Status: SHIPPED | OUTPATIENT
Start: 2022-01-01

## 2022-01-01 RX ORDER — FUROSEMIDE 10 MG/ML
80 INJECTION INTRAMUSCULAR; INTRAVENOUS EVERY 8 HOURS
Status: CANCELLED | OUTPATIENT
Start: 2022-01-01

## 2022-01-01 RX ORDER — METHOCARBAMOL 500 MG/1
1000 TABLET, FILM COATED ORAL 3 TIMES DAILY
Status: DISCONTINUED | OUTPATIENT
Start: 2022-01-01 | End: 2022-01-01 | Stop reason: HOSPADM

## 2022-01-01 RX ORDER — DOPAMINE HYDROCHLORIDE 320 MG/100ML
5 INJECTION, SOLUTION INTRAVENOUS CONTINUOUS
Status: DISCONTINUED | OUTPATIENT
Start: 2022-01-01 | End: 2022-01-01

## 2022-01-01 RX ORDER — HYDROCODONE BITARTRATE AND ACETAMINOPHEN 5; 325 MG/1; MG/1
1 TABLET ORAL EVERY 6 HOURS PRN
Status: COMPLETED | OUTPATIENT
Start: 2022-01-01 | End: 2022-01-01

## 2022-01-01 RX ORDER — ISOSORBIDE MONONITRATE 30 MG/1
30 TABLET, EXTENDED RELEASE ORAL DAILY
Status: DISCONTINUED | OUTPATIENT
Start: 2022-01-01 | End: 2022-01-01 | Stop reason: HOSPADM

## 2022-01-01 RX ORDER — ONDANSETRON 4 MG/1
8 TABLET, ORALLY DISINTEGRATING ORAL EVERY 8 HOURS PRN
Status: DISCONTINUED | OUTPATIENT
Start: 2022-01-01 | End: 2022-01-01 | Stop reason: HOSPADM

## 2022-01-01 RX ORDER — ONDANSETRON 2 MG/ML
4 INJECTION INTRAMUSCULAR; INTRAVENOUS
Status: COMPLETED | OUTPATIENT
Start: 2022-01-01 | End: 2022-01-01

## 2022-01-01 RX ORDER — CLOTRIMAZOLE 1 %
CREAM (GRAM) TOPICAL
Qty: 15 G | Refills: 0 | Status: SHIPPED | OUTPATIENT
Start: 2022-01-01 | End: 2022-01-01

## 2022-01-01 RX ORDER — CARVEDILOL 3.12 MG/1
3.12 TABLET ORAL 2 TIMES DAILY
Status: DISCONTINUED | OUTPATIENT
Start: 2022-01-01 | End: 2022-01-01

## 2022-01-01 RX ORDER — ATORVASTATIN CALCIUM 40 MG/1
40 TABLET, FILM COATED ORAL NIGHTLY
Status: DISCONTINUED | OUTPATIENT
Start: 2022-01-01 | End: 2022-01-01 | Stop reason: HOSPADM

## 2022-01-01 RX ORDER — TORSEMIDE 20 MG/1
40 TABLET ORAL
Qty: 120 TABLET | Refills: 3 | Status: SHIPPED | OUTPATIENT
Start: 2022-01-01 | End: 2023-01-20

## 2022-01-01 RX ORDER — FENTANYL CITRATE 50 UG/ML
INJECTION, SOLUTION INTRAMUSCULAR; INTRAVENOUS
Status: DISCONTINUED | OUTPATIENT
Start: 2022-01-01 | End: 2022-01-01 | Stop reason: HOSPADM

## 2022-01-01 RX ORDER — HYDROCODONE BITARTRATE AND ACETAMINOPHEN 10; 325 MG/1; MG/1
1 TABLET ORAL EVERY 6 HOURS PRN
Qty: 28 TABLET | Refills: 0 | Status: SHIPPED | OUTPATIENT
Start: 2022-01-01

## 2022-01-01 RX ORDER — DOXYLAMINE SUCCINATE 25 MG
TABLET ORAL 2 TIMES DAILY
Status: DISCONTINUED | OUTPATIENT
Start: 2022-01-01 | End: 2022-01-01 | Stop reason: HOSPADM

## 2022-01-01 RX ORDER — NALOXONE HCL 0.4 MG/ML
0.02 VIAL (ML) INJECTION
Status: DISCONTINUED | OUTPATIENT
Start: 2022-01-01 | End: 2022-01-01 | Stop reason: HOSPADM

## 2022-01-01 RX ORDER — MUPIROCIN 20 MG/G
OINTMENT TOPICAL 2 TIMES DAILY
Status: COMPLETED | OUTPATIENT
Start: 2022-01-01 | End: 2022-01-01

## 2022-01-01 RX ORDER — FUROSEMIDE 10 MG/ML
40 INJECTION INTRAMUSCULAR; INTRAVENOUS 2 TIMES DAILY
Status: DISCONTINUED | OUTPATIENT
Start: 2022-01-01 | End: 2022-01-01

## 2022-01-01 RX ORDER — AMIODARONE HYDROCHLORIDE 200 MG/1
400 TABLET ORAL 2 TIMES DAILY
Status: DISCONTINUED | OUTPATIENT
Start: 2022-01-01 | End: 2022-01-01 | Stop reason: HOSPADM

## 2022-01-01 RX ORDER — SODIUM CHLORIDE 9 MG/ML
INJECTION, SOLUTION INTRAVENOUS CONTINUOUS
Status: DISPENSED | OUTPATIENT
Start: 2022-01-01 | End: 2022-01-01

## 2022-01-01 RX ORDER — AMIODARONE HYDROCHLORIDE 200 MG/1
200 TABLET ORAL DAILY
Status: DISCONTINUED | OUTPATIENT
Start: 2022-01-01 | End: 2022-01-01 | Stop reason: HOSPADM

## 2022-01-01 RX ORDER — HYDRALAZINE HYDROCHLORIDE 20 MG/ML
10 INJECTION INTRAMUSCULAR; INTRAVENOUS EVERY 8 HOURS PRN
Status: DISCONTINUED | OUTPATIENT
Start: 2022-01-01 | End: 2022-01-01

## 2022-01-01 RX ORDER — CYCLOBENZAPRINE HCL 10 MG
10 TABLET ORAL NIGHTLY
COMMUNITY

## 2022-01-01 RX ORDER — MORPHINE SULFATE 2 MG/ML
1 INJECTION, SOLUTION INTRAMUSCULAR; INTRAVENOUS ONCE
Status: DISCONTINUED | OUTPATIENT
Start: 2022-01-01 | End: 2022-01-01

## 2022-01-01 RX ORDER — HYDROCODONE BITARTRATE AND ACETAMINOPHEN 5; 325 MG/1; MG/1
1 TABLET ORAL ONCE
Status: COMPLETED | OUTPATIENT
Start: 2022-01-01 | End: 2022-01-01

## 2022-01-01 RX ORDER — AMIODARONE HYDROCHLORIDE 200 MG/1
200 TABLET ORAL DAILY
Qty: 30 TABLET | Refills: 1 | Status: SHIPPED | OUTPATIENT
Start: 2022-01-01 | End: 2023-01-08

## 2022-01-01 RX ORDER — FUROSEMIDE 20 MG/1
TABLET ORAL
Qty: 60 TABLET | Refills: 1 | Status: SHIPPED | OUTPATIENT
Start: 2022-01-01 | End: 2022-01-01

## 2022-01-01 RX ORDER — CARVEDILOL 3.12 MG/1
3.12 TABLET ORAL 2 TIMES DAILY
Status: CANCELLED | OUTPATIENT
Start: 2022-01-01

## 2022-01-01 RX ORDER — DOBUTAMINE HYDROCHLORIDE 400 MG/100ML
2.5 INJECTION INTRAVENOUS CONTINUOUS
Status: DISCONTINUED | OUTPATIENT
Start: 2022-01-01 | End: 2022-01-01

## 2022-01-01 RX ORDER — NAPROXEN SODIUM 220 MG/1
81 TABLET, FILM COATED ORAL DAILY
Status: DISCONTINUED | OUTPATIENT
Start: 2022-01-01 | End: 2022-01-01 | Stop reason: HOSPADM

## 2022-01-01 RX ORDER — PRAVASTATIN SODIUM 20 MG/1
20 TABLET ORAL DAILY
Qty: 90 TABLET | Refills: 3 | Status: SHIPPED | OUTPATIENT
Start: 2022-01-01 | End: 2022-01-01

## 2022-01-01 RX ORDER — MIDODRINE HYDROCHLORIDE 2.5 MG/1
2.5 TABLET ORAL 2 TIMES DAILY WITH MEALS
Status: DISCONTINUED | OUTPATIENT
Start: 2022-01-01 | End: 2022-01-01 | Stop reason: HOSPADM

## 2022-01-01 RX ORDER — LIDOCAINE HYDROCHLORIDE 20 MG/ML
15 SOLUTION OROPHARYNGEAL ONCE
Status: COMPLETED | OUTPATIENT
Start: 2022-01-01 | End: 2022-01-01

## 2022-01-01 RX ORDER — MAG HYDROX/ALUMINUM HYD/SIMETH 200-200-20
30 SUSPENSION, ORAL (FINAL DOSE FORM) ORAL 4 TIMES DAILY PRN
Status: DISCONTINUED | OUTPATIENT
Start: 2022-01-01 | End: 2022-01-01 | Stop reason: HOSPADM

## 2022-01-01 RX ORDER — FUROSEMIDE 10 MG/ML
20 INJECTION INTRAMUSCULAR; INTRAVENOUS
Status: COMPLETED | OUTPATIENT
Start: 2022-01-01 | End: 2022-01-01

## 2022-01-01 RX ORDER — SODIUM,POTASSIUM PHOSPHATES 280-250MG
2 POWDER IN PACKET (EA) ORAL
Status: DISCONTINUED | OUTPATIENT
Start: 2022-01-01 | End: 2022-01-01 | Stop reason: HOSPADM

## 2022-01-01 RX ORDER — ONDANSETRON 2 MG/ML
4 INJECTION INTRAMUSCULAR; INTRAVENOUS EVERY 8 HOURS PRN
Status: DISCONTINUED | OUTPATIENT
Start: 2022-01-01 | End: 2022-01-01 | Stop reason: HOSPADM

## 2022-01-01 RX ORDER — LEVOTHYROXINE SODIUM 75 UG/1
75 TABLET ORAL
Status: DISCONTINUED | OUTPATIENT
Start: 2022-01-01 | End: 2022-01-01 | Stop reason: HOSPADM

## 2022-01-01 RX ORDER — IPRATROPIUM BROMIDE AND ALBUTEROL SULFATE 2.5; .5 MG/3ML; MG/3ML
3 SOLUTION RESPIRATORY (INHALATION) EVERY 6 HOURS
Status: DISCONTINUED | OUTPATIENT
Start: 2022-01-01 | End: 2022-01-01 | Stop reason: HOSPADM

## 2022-01-01 RX ORDER — FUROSEMIDE 40 MG/1
40 TABLET ORAL 2 TIMES DAILY
Qty: 60 TABLET | Refills: 0 | Status: SHIPPED | OUTPATIENT
Start: 2022-01-01 | End: 2022-01-01 | Stop reason: ALTCHOICE

## 2022-01-01 RX ORDER — ONDANSETRON 2 MG/ML
4 INJECTION INTRAMUSCULAR; INTRAVENOUS EVERY 6 HOURS PRN
Status: DISCONTINUED | OUTPATIENT
Start: 2022-01-01 | End: 2022-01-01 | Stop reason: HOSPADM

## 2022-01-01 RX ORDER — METHOCARBAMOL 750 MG/1
750 TABLET, FILM COATED ORAL 4 TIMES DAILY
Qty: 120 TABLET | Refills: 0 | Status: SHIPPED | OUTPATIENT
Start: 2022-01-01

## 2022-01-01 RX ORDER — SODIUM CHLORIDE 0.9 % (FLUSH) 0.9 %
10 SYRINGE (ML) INJECTION
Status: DISCONTINUED | OUTPATIENT
Start: 2022-01-01 | End: 2022-01-01 | Stop reason: HOSPADM

## 2022-01-01 RX ORDER — CARVEDILOL 3.12 MG/1
3.12 TABLET ORAL 2 TIMES DAILY WITH MEALS
Status: DISCONTINUED | OUTPATIENT
Start: 2022-01-01 | End: 2022-01-01

## 2022-01-01 RX ORDER — DIGOXIN 0.25 MG/ML
250 INJECTION INTRAMUSCULAR; INTRAVENOUS ONCE
Status: COMPLETED | OUTPATIENT
Start: 2022-01-01 | End: 2022-01-01

## 2022-01-01 RX ORDER — CARVEDILOL 3.12 MG/1
3.12 TABLET ORAL 2 TIMES DAILY WITH MEALS
Qty: 180 TABLET | Refills: 3 | Status: SHIPPED | OUTPATIENT
Start: 2022-01-01

## 2022-01-01 RX ORDER — MIDAZOLAM HYDROCHLORIDE 1 MG/ML
INJECTION INTRAMUSCULAR; INTRAVENOUS
Status: DISCONTINUED | OUTPATIENT
Start: 2022-01-01 | End: 2022-01-01 | Stop reason: HOSPADM

## 2022-01-01 RX ORDER — CARVEDILOL 12.5 MG/1
12.5 TABLET ORAL 2 TIMES DAILY WITH MEALS
COMMUNITY
End: 2022-01-01 | Stop reason: SDUPTHER

## 2022-01-01 RX ORDER — CLOPIDOGREL BISULFATE 75 MG/1
75 TABLET ORAL DAILY
Status: CANCELLED | OUTPATIENT
Start: 2022-01-01

## 2022-01-01 RX ORDER — ACETAMINOPHEN 325 MG/1
650 TABLET ORAL EVERY 8 HOURS PRN
Status: DISCONTINUED | OUTPATIENT
Start: 2022-01-01 | End: 2022-01-01 | Stop reason: HOSPADM

## 2022-01-01 RX ORDER — METOPROLOL TARTRATE 25 MG/1
25 TABLET, FILM COATED ORAL ONCE
Status: COMPLETED | OUTPATIENT
Start: 2022-01-01 | End: 2022-01-01

## 2022-01-01 RX ORDER — MORPHINE SULFATE 2 MG/ML
1 INJECTION, SOLUTION INTRAMUSCULAR; INTRAVENOUS ONCE
Status: COMPLETED | OUTPATIENT
Start: 2022-01-01 | End: 2022-01-01

## 2022-01-01 RX ORDER — FUROSEMIDE 10 MG/ML
80 INJECTION INTRAMUSCULAR; INTRAVENOUS EVERY 8 HOURS
Status: DISCONTINUED | OUTPATIENT
Start: 2022-01-01 | End: 2022-01-01

## 2022-01-01 RX ORDER — ASPIRIN 81 MG/1
81 TABLET ORAL DAILY
Status: DISCONTINUED | OUTPATIENT
Start: 2022-01-01 | End: 2022-01-01 | Stop reason: HOSPADM

## 2022-01-01 RX ORDER — HEPARIN SOD,PORCINE/0.9 % NACL 1000/500ML
INTRAVENOUS SOLUTION INTRAVENOUS
Status: DISCONTINUED | OUTPATIENT
Start: 2022-01-01 | End: 2022-01-01 | Stop reason: HOSPADM

## 2022-01-01 RX ORDER — DIGOXIN 0.25 MG/ML
500 INJECTION INTRAMUSCULAR; INTRAVENOUS ONCE
Status: COMPLETED | OUTPATIENT
Start: 2022-01-01 | End: 2022-01-01

## 2022-01-01 RX ORDER — AMIODARONE HYDROCHLORIDE 200 MG/1
200 TABLET ORAL DAILY
Status: CANCELLED | OUTPATIENT
Start: 2022-01-01

## 2022-01-01 RX ORDER — FUROSEMIDE 10 MG/ML
80 INJECTION INTRAMUSCULAR; INTRAVENOUS 2 TIMES DAILY
Status: DISCONTINUED | OUTPATIENT
Start: 2022-01-01 | End: 2022-01-01

## 2022-01-01 RX ORDER — NITROGLYCERIN 400 UG/1
1 SPRAY ORAL EVERY 5 MIN PRN
COMMUNITY

## 2022-01-01 RX ORDER — CLOPIDOGREL BISULFATE 75 MG/1
75 TABLET ORAL DAILY
COMMUNITY

## 2022-01-01 RX ADMIN — POTASSIUM CHLORIDE 20 MEQ: 20 TABLET, EXTENDED RELEASE ORAL at 09:01

## 2022-01-01 RX ADMIN — AMIODARONE HYDROCHLORIDE 400 MG: 200 TABLET ORAL at 09:09

## 2022-01-01 RX ADMIN — AMIODARONE HYDROCHLORIDE 150 MG: 50 INJECTION, SOLUTION INTRAVENOUS at 12:09

## 2022-01-01 RX ADMIN — AMIODARONE HYDROCHLORIDE 0.5 MG/MIN: 50 INJECTION, SOLUTION INTRAVENOUS at 08:09

## 2022-01-01 RX ADMIN — HYDROCODONE BITARTRATE AND ACETAMINOPHEN 1 TABLET: 10; 325 TABLET ORAL at 09:02

## 2022-01-01 RX ADMIN — SACUBITRIL AND VALSARTAN 1 TABLET: 24; 26 TABLET, FILM COATED ORAL at 09:01

## 2022-01-01 RX ADMIN — ASPIRIN 81 MG: 81 TABLET, COATED ORAL at 08:09

## 2022-01-01 RX ADMIN — APIXABAN 5 MG: 5 TABLET, FILM COATED ORAL at 08:02

## 2022-01-01 RX ADMIN — MUPIROCIN: 20 OINTMENT TOPICAL at 08:09

## 2022-01-01 RX ADMIN — CLOPIDOGREL 75 MG: 75 TABLET, FILM COATED ORAL at 08:09

## 2022-01-01 RX ADMIN — HYDRALAZINE HYDROCHLORIDE 25 MG: 25 TABLET ORAL at 09:01

## 2022-01-01 RX ADMIN — MICONAZOLE NITRATE: 20 CREAM TOPICAL at 08:01

## 2022-01-01 RX ADMIN — ATORVASTATIN CALCIUM 40 MG: 40 TABLET, FILM COATED ORAL at 09:01

## 2022-01-01 RX ADMIN — POTASSIUM CHLORIDE 20 MEQ: 20 TABLET, EXTENDED RELEASE ORAL at 08:01

## 2022-01-01 RX ADMIN — DOPAMINE HYDROCHLORIDE 5 MCG/KG/MIN: 320 INJECTION, SOLUTION INTRAVENOUS at 09:09

## 2022-01-01 RX ADMIN — MICONAZOLE NITRATE: 20 CREAM TOPICAL at 09:01

## 2022-01-01 RX ADMIN — CLOPIDOGREL 75 MG: 75 TABLET, FILM COATED ORAL at 08:02

## 2022-01-01 RX ADMIN — AMIODARONE HYDROCHLORIDE 200 MG: 200 TABLET ORAL at 08:02

## 2022-01-01 RX ADMIN — MIDODRINE HYDROCHLORIDE 5 MG: 5 TABLET ORAL at 07:09

## 2022-01-01 RX ADMIN — ACETAMINOPHEN 650 MG: 325 TABLET ORAL at 10:01

## 2022-01-01 RX ADMIN — ONDANSETRON 4 MG: 2 INJECTION INTRAMUSCULAR; INTRAVENOUS at 09:02

## 2022-01-01 RX ADMIN — HYDROCODONE BITARTRATE AND ACETAMINOPHEN 1 TABLET: 5; 325 TABLET ORAL at 02:02

## 2022-01-01 RX ADMIN — ASPIRIN 81 MG 81 MG: 81 TABLET ORAL at 08:02

## 2022-01-01 RX ADMIN — MIDODRINE HYDROCHLORIDE 5 MG: 5 TABLET ORAL at 08:09

## 2022-01-01 RX ADMIN — SACUBITRIL AND VALSARTAN 1 TABLET: 24; 26 TABLET, FILM COATED ORAL at 09:02

## 2022-01-01 RX ADMIN — DIGOXIN 500 MCG: 0.25 INJECTION INTRAMUSCULAR; INTRAVENOUS at 12:09

## 2022-01-01 RX ADMIN — HYDROCODONE BITARTRATE AND ACETAMINOPHEN 1 TABLET: 5; 325 TABLET ORAL at 03:09

## 2022-01-01 RX ADMIN — IPRATROPIUM BROMIDE AND ALBUTEROL SULFATE 3 ML: 2.5; .5 SOLUTION RESPIRATORY (INHALATION) at 12:02

## 2022-01-01 RX ADMIN — HYDROCODONE BITARTRATE AND ACETAMINOPHEN 1 TABLET: 10; 325 TABLET ORAL at 03:02

## 2022-01-01 RX ADMIN — PANTOPRAZOLE SODIUM 40 MG: 40 TABLET, DELAYED RELEASE ORAL at 08:02

## 2022-01-01 RX ADMIN — METHOCARBAMOL TABLETS 1000 MG: 500 TABLET, COATED ORAL at 08:01

## 2022-01-01 RX ADMIN — IPRATROPIUM BROMIDE AND ALBUTEROL SULFATE 3 ML: 2.5; .5 SOLUTION RESPIRATORY (INHALATION) at 08:02

## 2022-01-01 RX ADMIN — NITROGLYCERIN 0.4 MG: 0.4 TABLET SUBLINGUAL at 06:09

## 2022-01-01 RX ADMIN — MORPHINE SULFATE 1 MG: 2 INJECTION, SOLUTION INTRAMUSCULAR; INTRAVENOUS at 06:09

## 2022-01-01 RX ADMIN — NOREPINEPHRINE BITARTRATE 0.18 MCG/KG/MIN: 1 SOLUTION INTRAVENOUS at 12:09

## 2022-01-01 RX ADMIN — ACETAMINOPHEN 1000 MG: 500 TABLET ORAL at 08:09

## 2022-01-01 RX ADMIN — BUTALBITAL, ACETAMINOPHEN AND CAFFEINE 1 TABLET: 50; 325; 40 TABLET ORAL at 04:02

## 2022-01-01 RX ADMIN — NITROGLYCERIN 0.4 MG: 0.4 TABLET SUBLINGUAL at 02:02

## 2022-01-01 RX ADMIN — MUPIROCIN: 20 OINTMENT TOPICAL at 09:09

## 2022-01-01 RX ADMIN — SODIUM ZIRCONIUM CYCLOSILICATE 10 G: 10 POWDER, FOR SUSPENSION ORAL at 08:09

## 2022-01-01 RX ADMIN — FUROSEMIDE 40 MG: 10 INJECTION INTRAMUSCULAR; INTRAVENOUS at 08:09

## 2022-01-01 RX ADMIN — MIDODRINE HYDROCHLORIDE 5 MG: 5 TABLET ORAL at 05:09

## 2022-01-01 RX ADMIN — AMIODARONE HYDROCHLORIDE 400 MG: 200 TABLET ORAL at 08:09

## 2022-01-01 RX ADMIN — IPRATROPIUM BROMIDE AND ALBUTEROL SULFATE 3 ML: 2.5; .5 SOLUTION RESPIRATORY (INHALATION) at 07:02

## 2022-01-01 RX ADMIN — ENOXAPARIN SODIUM 90 MG: 100 INJECTION SUBCUTANEOUS at 12:09

## 2022-01-01 RX ADMIN — APIXABAN 5 MG: 5 TABLET, FILM COATED ORAL at 09:02

## 2022-01-01 RX ADMIN — LEVOTHYROXINE SODIUM 75 MCG: 75 TABLET ORAL at 06:09

## 2022-01-01 RX ADMIN — FUROSEMIDE 80 MG: 10 INJECTION, SOLUTION INTRAMUSCULAR; INTRAVENOUS at 10:02

## 2022-01-01 RX ADMIN — ASPIRIN 81 MG 81 MG: 81 TABLET ORAL at 09:02

## 2022-01-01 RX ADMIN — HYDROCODONE BITARTRATE AND ACETAMINOPHEN 1 TABLET: 10; 325 TABLET ORAL at 01:02

## 2022-01-01 RX ADMIN — ATORVASTATIN CALCIUM 40 MG: 40 TABLET, FILM COATED ORAL at 08:01

## 2022-01-01 RX ADMIN — PRAVASTATIN SODIUM 20 MG: 10 TABLET ORAL at 09:02

## 2022-01-01 RX ADMIN — LEVOTHYROXINE SODIUM 75 MCG: 75 TABLET ORAL at 07:09

## 2022-01-01 RX ADMIN — FUROSEMIDE 60 MG: 10 INJECTION INTRAMUSCULAR; INTRAVENOUS at 08:02

## 2022-01-01 RX ADMIN — CLOPIDOGREL 75 MG: 75 TABLET, FILM COATED ORAL at 09:02

## 2022-01-01 RX ADMIN — ONDANSETRON 4 MG: 2 INJECTION INTRAMUSCULAR; INTRAVENOUS at 12:01

## 2022-01-01 RX ADMIN — AMIODARONE HYDROCHLORIDE 200 MG: 200 TABLET ORAL at 08:01

## 2022-01-01 RX ADMIN — AMIODARONE HYDROCHLORIDE 200 MG: 200 TABLET ORAL at 09:02

## 2022-01-01 RX ADMIN — APIXABAN 5 MG: 5 TABLET, FILM COATED ORAL at 08:01

## 2022-01-01 RX ADMIN — POLYETHYLENE GLYCOL 3350 17 G: 17 POWDER, FOR SOLUTION ORAL at 09:09

## 2022-01-01 RX ADMIN — NOREPINEPHRINE BITARTRATE 0.14 MCG/KG/MIN: 1 SOLUTION INTRAVENOUS at 11:09

## 2022-01-01 RX ADMIN — FUROSEMIDE 40 MG: 10 INJECTION INTRAMUSCULAR; INTRAVENOUS at 09:01

## 2022-01-01 RX ADMIN — PANTOPRAZOLE SODIUM 40 MG: 40 TABLET, DELAYED RELEASE ORAL at 09:02

## 2022-01-01 RX ADMIN — FUROSEMIDE 80 MG: 10 INJECTION INTRAMUSCULAR; INTRAVENOUS at 09:01

## 2022-01-01 RX ADMIN — FUROSEMIDE 80 MG: 10 INJECTION, SOLUTION INTRAMUSCULAR; INTRAVENOUS at 09:02

## 2022-01-01 RX ADMIN — METHOCARBAMOL TABLETS 1000 MG: 500 TABLET, COATED ORAL at 09:01

## 2022-01-01 RX ADMIN — AMIODARONE HYDROCHLORIDE 200 MG: 200 TABLET ORAL at 09:01

## 2022-01-01 RX ADMIN — METOPROLOL TARTRATE 25 MG: 25 TABLET, FILM COATED ORAL at 08:09

## 2022-01-01 RX ADMIN — ONDANSETRON 4 MG: 2 INJECTION INTRAMUSCULAR; INTRAVENOUS at 01:02

## 2022-01-01 RX ADMIN — METHOCARBAMOL TABLETS 1000 MG: 500 TABLET, COATED ORAL at 02:01

## 2022-01-01 RX ADMIN — AMIODARONE HYDROCHLORIDE 0.5 MG/MIN: 50 INJECTION, SOLUTION INTRAVENOUS at 07:09

## 2022-01-01 RX ADMIN — HYDROCODONE BITARTRATE AND ACETAMINOPHEN 1 TABLET: 10; 325 TABLET ORAL at 08:02

## 2022-01-01 RX ADMIN — AMIODARONE HYDROCHLORIDE 200 MG: 200 TABLET ORAL at 11:09

## 2022-01-01 RX ADMIN — ONDANSETRON 4 MG: 2 INJECTION INTRAMUSCULAR; INTRAVENOUS at 10:02

## 2022-01-01 RX ADMIN — DOBUTAMINE HYDROCHLORIDE 2.5 MCG/KG/MIN: 400 INJECTION INTRAVENOUS at 01:01

## 2022-01-01 RX ADMIN — PRAVASTATIN SODIUM 20 MG: 10 TABLET ORAL at 08:02

## 2022-01-01 RX ADMIN — DOPAMINE HYDROCHLORIDE 5 MCG/KG/MIN: 320 INJECTION, SOLUTION INTRAVENOUS at 12:09

## 2022-01-01 RX ADMIN — FUROSEMIDE 40 MG: 10 INJECTION INTRAMUSCULAR; INTRAVENOUS at 03:01

## 2022-01-01 RX ADMIN — FUROSEMIDE 80 MG: 10 INJECTION, SOLUTION INTRAMUSCULAR; INTRAVENOUS at 05:02

## 2022-01-01 RX ADMIN — CLOPIDOGREL 75 MG: 75 TABLET, FILM COATED ORAL at 09:09

## 2022-01-01 RX ADMIN — SODIUM ZIRCONIUM CYCLOSILICATE 10 G: 10 POWDER, FOR SUSPENSION ORAL at 09:09

## 2022-01-01 RX ADMIN — LEVOTHYROXINE SODIUM 75 MCG: 75 TABLET ORAL at 05:09

## 2022-01-01 RX ADMIN — SODIUM CHLORIDE 500 ML: 9 INJECTION, SOLUTION INTRAVENOUS at 11:01

## 2022-01-01 RX ADMIN — HEPARIN SODIUM 5000 UNITS: 5000 INJECTION INTRAVENOUS; SUBCUTANEOUS at 06:09

## 2022-01-01 RX ADMIN — HUMAN INSULIN 10 UNITS: 100 INJECTION, SOLUTION SUBCUTANEOUS at 08:09

## 2022-01-01 RX ADMIN — ACETAMINOPHEN 650 MG: 325 TABLET ORAL at 01:01

## 2022-01-01 RX ADMIN — SODIUM CHLORIDE: 9 INJECTION, SOLUTION INTRAVENOUS at 09:02

## 2022-01-01 RX ADMIN — BUTALBITAL, ACETAMINOPHEN AND CAFFEINE 1 TABLET: 50; 325; 40 TABLET ORAL at 10:02

## 2022-01-01 RX ADMIN — Medication 6 MG: at 09:09

## 2022-01-01 RX ADMIN — ALUMINUM HYDROXIDE, MAGNESIUM HYDROXIDE, AND SIMETHICONE 30 ML: 200; 200; 20 SUSPENSION ORAL at 08:09

## 2022-01-01 RX ADMIN — SODIUM CHLORIDE 500 ML: 9 INJECTION, SOLUTION INTRAVENOUS at 12:09

## 2022-01-01 RX ADMIN — APIXABAN 5 MG: 5 TABLET, FILM COATED ORAL at 12:02

## 2022-01-01 RX ADMIN — DEXTROSE MONOHYDRATE 25 G: 25 INJECTION, SOLUTION INTRAVENOUS at 08:09

## 2022-01-01 RX ADMIN — NITROGLYCERIN 0.4 MG: 0.4 TABLET SUBLINGUAL at 10:01

## 2022-01-01 RX ADMIN — FUROSEMIDE 80 MG: 10 INJECTION, SOLUTION INTRAMUSCULAR; INTRAVENOUS at 02:02

## 2022-01-01 RX ADMIN — ONDANSETRON 8 MG: 4 TABLET, ORALLY DISINTEGRATING ORAL at 07:09

## 2022-01-01 RX ADMIN — DOBUTAMINE HYDROCHLORIDE 2.5 MCG/KG/MIN: 400 INJECTION INTRAVENOUS at 02:02

## 2022-01-01 RX ADMIN — ONDANSETRON 8 MG: 4 TABLET, ORALLY DISINTEGRATING ORAL at 08:09

## 2022-01-01 RX ADMIN — ASPIRIN 81 MG: 81 TABLET, COATED ORAL at 09:09

## 2022-01-01 RX ADMIN — FUROSEMIDE 40 MG: 10 INJECTION INTRAMUSCULAR; INTRAVENOUS at 08:01

## 2022-01-01 RX ADMIN — LEVOTHYROXINE SODIUM 75 MCG: 75 TABLET ORAL at 08:09

## 2022-01-01 RX ADMIN — CALCIUM GLUCONATE 1 G: 20 INJECTION, SOLUTION INTRAVENOUS at 08:09

## 2022-01-01 RX ADMIN — NOREPINEPHRINE BITARTRATE 0.24 MCG/KG/MIN: 1 SOLUTION INTRAVENOUS at 06:09

## 2022-01-01 RX ADMIN — HYDROCODONE BITARTRATE AND ACETAMINOPHEN 1 TABLET: 5; 325 TABLET ORAL at 04:09

## 2022-01-01 RX ADMIN — HYDROCODONE BITARTRATE AND ACETAMINOPHEN 1 TABLET: 5; 325 TABLET ORAL at 08:02

## 2022-01-01 RX ADMIN — DOBUTAMINE HYDROCHLORIDE 7.5 MCG/KG/MIN: 400 INJECTION INTRAVENOUS at 11:09

## 2022-01-01 RX ADMIN — LIDOCAINE HYDROCHLORIDE 15 ML: 20 SOLUTION ORAL; TOPICAL at 07:09

## 2022-01-01 RX ADMIN — Medication 6 MG: at 10:02

## 2022-01-01 RX ADMIN — DIGOXIN 250 MCG: 0.25 INJECTION INTRAMUSCULAR; INTRAVENOUS at 04:09

## 2022-01-01 RX ADMIN — ALUMINUM HYDROXIDE, MAGNESIUM HYDROXIDE, AND SIMETHICONE 30 ML: 200; 200; 20 SUSPENSION ORAL at 07:09

## 2022-01-01 RX ADMIN — APIXABAN 5 MG: 5 TABLET, FILM COATED ORAL at 09:01

## 2022-01-01 RX ADMIN — AMIODARONE HYDROCHLORIDE 1 MG/MIN: 50 INJECTION, SOLUTION INTRAVENOUS at 12:09

## 2022-01-01 RX ADMIN — CLOPIDOGREL 75 MG: 75 TABLET, FILM COATED ORAL at 12:02

## 2022-01-01 RX ADMIN — Medication 6 MG: at 08:02

## 2022-01-01 RX ADMIN — FUROSEMIDE 60 MG: 10 INJECTION, SOLUTION INTRAMUSCULAR; INTRAVENOUS at 02:01

## 2022-01-01 RX ADMIN — HEPARIN SODIUM 5000 UNITS: 5000 INJECTION INTRAVENOUS; SUBCUTANEOUS at 09:09

## 2022-01-01 RX ADMIN — DOBUTAMINE HYDROCHLORIDE 2.5 MCG/KG/MIN: 400 INJECTION INTRAVENOUS at 04:01

## 2022-01-01 RX ADMIN — PANTOPRAZOLE SODIUM 40 MG: 40 TABLET, DELAYED RELEASE ORAL at 12:02

## 2022-01-01 RX ADMIN — ACETAMINOPHEN 650 MG: 325 TABLET ORAL at 02:01

## 2022-01-01 RX ADMIN — DOBUTAMINE HYDROCHLORIDE 2.5 MCG/KG/MIN: 400 INJECTION INTRAVENOUS at 10:02

## 2022-01-01 RX ADMIN — PERFLUTREN 1.5 ML: 6.52 INJECTION, SUSPENSION INTRAVENOUS at 11:09

## 2022-01-01 RX ADMIN — METHOCARBAMOL TABLETS 1000 MG: 500 TABLET, COATED ORAL at 04:01

## 2022-01-01 RX ADMIN — NOREPINEPHRINE BITARTRATE 0.02 MCG/KG/MIN: 1 SOLUTION INTRAVENOUS at 04:09

## 2022-01-01 RX ADMIN — HYDROCODONE BITARTRATE AND ACETAMINOPHEN 1 TABLET: 5; 325 TABLET ORAL at 10:01

## 2022-01-01 RX ADMIN — FUROSEMIDE 20 MG: 10 INJECTION, SOLUTION INTRAMUSCULAR; INTRAVENOUS at 04:01

## 2022-01-01 RX ADMIN — HYDROCODONE BITARTRATE AND ACETAMINOPHEN 1 TABLET: 5; 325 TABLET ORAL at 06:01

## 2022-01-01 RX ADMIN — NITROGLYCERIN 1 INCH: 20 OINTMENT TOPICAL at 07:09

## 2022-01-01 RX ADMIN — FUROSEMIDE 80 MG: 10 INJECTION, SOLUTION INTRAMUSCULAR; INTRAVENOUS at 06:02

## 2022-01-01 RX ADMIN — NOREPINEPHRINE BITARTRATE 0.04 MCG/KG/MIN: 1 SOLUTION INTRAVENOUS at 05:09

## 2022-01-01 RX ADMIN — FUROSEMIDE 40 MG: 10 INJECTION INTRAMUSCULAR; INTRAVENOUS at 09:09

## 2022-01-01 RX ADMIN — METHOCARBAMOL TABLETS 1000 MG: 500 TABLET, COATED ORAL at 03:01

## 2022-01-01 NOTE — DISCHARGE INSTRUCTIONS
Use gold Bond powder or Tinactin spray 4 year groin problems.    Take an extra dose of Lasix in the afternoons for the next 3 days.    Talk with your primary care doctor about your lower back pain and the need to follow-up with pain treatment.    Return if symptoms worsen or new symptoms develop

## 2022-01-01 NOTE — ED PROVIDER NOTES
Encounter Date: 1/1/2022       History     Chief Complaint   Patient presents with    Leg Swelling     ED Course as of 01/01/22 1636  Sat Jan 01, 2022  1629 Patient seen by nurse practitioner and by attending physician.  Patient reports that he has been short of breath for several months but worse over the past several days.  Associated with some mild orthopnea and lower extremity swelling which is worsened over the past week.  Patient is compliant with his Lasix and torsemide although he did not take his morning medications as he is coming to the ER.  Also patient complains of chronic lower back pain that is worse over the past few weeks.  He was taken off of Norco a few months ago and is awaiting his primary care doctor to [facility].  Also he has a jock itch/tinea cruris which isn't present over the past 3 days and has not been treated.  Physical exam shows patient is nontoxic appearing.  O2 sats are in the high 90s on room air.  Lungs have mild rhonchi.  Bilateral lower extremities have symmetric edema.  Patient will be treated with a total 60 mg Lasix in the ER.  Over the next 3 days he will increase his Lasix with an additional 40 mg dose in the afternoons.  He will have ambulatory referral to cardiologist.  Also patient will follow-up with his primary care and he was told to use gold Bond powder or Tinactin for his tinea cruris (PK)    ED Course User Index  (PK) Salty Parsons MD          Review of patient's allergies indicates:   Allergen Reactions    Indomethacin Itching and Other (See Comments)     Other reaction(s): ITCHING    Lipitor [atorvastatin] Other (See Comments)     Muscle cramps     Past Medical History:   Diagnosis Date    Atrial fibrillation     CHF (congestive heart failure)     COPD (chronic obstructive pulmonary disease)     Coronary artery disease     Disorder of kidney and ureter     Hypertension      Past Surgical History:   Procedure Laterality Date     APPENDECTOMY      CARDIAC DEFIBRILLATOR PLACEMENT      CORONARY ANGIOGRAPHY INCLUDING BYPASS GRAFTS WITH CATHETERIZATION OF LEFT HEART N/A 1/5/2022    Procedure: ANGIOGRAM, CORONARY, INCLUDING BYPASS GRAFT, WITH LEFT HEART CATHETERIZATION;  Surgeon: Gerda Castellanos MD;  Location: [facility] CATH LAB;  Service: Cardiology;  Laterality: N/A;    JOINT REPLACEMENT      KIDNEY TRANSPLANT      RIGHT HEART CATHETERIZATION Right 1/5/2022    Procedure: INSERTION, CATHETER, RIGHT HEART;  Surgeon: Gerda Castellanos MD;  Location: [facility] CATH LAB;  Service: Cardiology;  Laterality: Right;     Family History   Problem Relation Age of Onset    Cancer Mother     Heart disease Father      Social History     Tobacco Use    Smoking status: Current Every Day Smoker     Packs/day: 1.00     Years: 46.00     Pack years: 46.00     Types: Cigarettes    Smokeless tobacco: Never Used   Substance Use Topics    Alcohol use: Not Currently    Drug use: Never     Review of Systems   Constitutional: Negative for fever.   HENT: Negative for sore throat.    Respiratory: Positive for shortness of breath.    Cardiovascular: Positive for leg swelling. Negative for chest pain.   Gastrointestinal: Negative for nausea.   Genitourinary: Negative for dysuria.   Musculoskeletal: Negative for back pain.   Skin: Negative for rash.   Neurological: Negative for weakness.   Hematological: Does not bruise/bleed easily.       Physical Exam     Initial Vitals [01/01/22 1224]   BP Pulse Resp Temp SpO2   135/81 98 16 97.8 °F (36.6 °C) 96 %      MAP       --         Physical Exam    Constitutional: He appears well-developed and well-nourished.   HENT:   Head: Normocephalic and atraumatic.   Eyes: EOM are normal. Pupils are equal, round, and reactive to light.   Neck: Neck supple.   Normal range of motion.  Cardiovascular: Normal rate.   Pulmonary/Chest: He is in respiratory distress.   Abdominal: Abdomen is soft.   Musculoskeletal:         General:  Normal range of motion.      Cervical back: Normal range of motion and neck supple.     Neurological: He is alert and oriented to person, place, and time.   Skin: Skin is warm and dry.   Psychiatric: He has a normal mood and affect. Thought content normal.         Medical Screening Exam   See Full Note    ED Course   Procedures  Labs Reviewed   COMPREHENSIVE METABOLIC PANEL - Abnormal; Notable for the following components:       Result Value    BUN 36 (*)     Creatinine 1.91 (*)     Bilirubin, Total 1.9 (*)     ALT 13 (*)     eGFR 37 (*)     All other components within normal limits   NT-PRO NATRIURETIC PEPTIDE - Abnormal; Notable for the following components:    ProBNP 23,154 (*)     All other components within normal limits   CBC WITH DIFFERENTIAL - Abnormal; Notable for the following components:    RBC 4.49 (*)     Hemoglobin 12.0 (*)     Hematocrit 38.0 (*)     MCH 26.7 (*)     MCHC 31.6 (*)     RDW 18.1 (*)     Neutrophils % 79.8 (*)     Lymphocytes % 7.8 (*)     Monocytes % 9.3 (*)     Basophils % 1.4 (*)     Lymphocytes, Absolute 0.69 (*)     Monocytes, Absolute 0.82 (*)     All other components within normal limits   MANUAL DIFFERENTIAL - Abnormal; Notable for the following components:    Segmented Neutrophils, Man % 91 (*)     Lymphocytes, Man % 4 (*)     All other components within normal limits   TROPONIN I - Abnormal; Notable for the following components:    Troponin I High Sensitivity 85.9 (*)     All other components within normal limits   TROPONIN I - Abnormal; Notable for the following components:    Troponin I High Sensitivity 80.8 (*)     All other components within normal limits   MAGNESIUM - Normal   CBC W/ AUTO DIFFERENTIAL    Narrative:     The following orders were created for panel order CBC auto differential.  Procedure                               Abnormality         Status                     ---------                               -----------         ------                     CBC with  Differential[[id number]]        Abnormal            Final result               Manual Differential[[id number]]          Abnormal            Final result                 Please view results for these tests on the individual orders.        ECG Results          EKG 12-lead (Final result)  Result time 01/07/22 11:14:28    Final result by Interface, Lab In [facility] (01/07/22 11:14:28)                 Narrative:    Test Reason :     Vent. Rate : 082 BPM     Atrial Rate : 000 BPM     P-R Int : 154 ms          QRS Dur : 080 ms      QT Int : 368 ms       P-R-T Axes : 063 079 064 degrees     QTc Int : 407 ms    Sinus rhythm  Normal ECG    Confirmed by Paige FIGUEROA, Joyce UJose ([id number]) on 1/7/2022 11:14:19 AM    Referred By: SEGUNDO   SELF           Confirmed By:Joyce Gibson MD                             EKG 12-lead (Final result)  Result time 01/06/22 11:22:26    Final result by Interface, Lab In [facility] (01/06/22 11:22:26)                 Narrative:    Test Reason : R06.02,    Vent. Rate : 101 BPM     Atrial Rate : 000 BPM     P-R Int : 000 ms          QRS Dur : 164 ms      QT Int : 388 ms       P-R-T Axes : 000 121 -56 degrees     QTc Int : 460 ms    Atrial fibrillation  with rapid ventricular response  venticur demand pacing  Possible sequence error: V1,V2 omitted  --- Possible arm lead reversal - hence only aVF, V1-V6 analyzed ---  IV conduction defect  Left ventricular hypertrophy  Widespread ST-T abnormality  may be due to the hypertrophy and/or ischemia  Abnormal ECG    Confirmed by Adonis Winston DO ([id number]) on 1/6/2022 11:22:14 AM    Referred By: SEGUNDO   SELF           Confirmed By:Adonis Winston DO                            Imaging Results          X-Ray Chest 1 View (Final result)  Result time 01/01/22 13:17:30    Final result by Kinsey Hagan MD (01/01/22 13:17:30)                 Impression:      Cardiomegaly.  Chronic lung changes.      Electronically signed by: Kinsey  Danya  Date:    01/01/2022  Time:    13:17             Narrative:    EXAMINATION:  XR CHEST 1 VIEW    CLINICAL HISTORY:  Shortness of breath    TECHNIQUE:  Single frontal view of the chest was performed.    COMPARISON:  11/11/2021    FINDINGS:  The heart is enlarged.  Post median sternotomy.  Cardiac hardware is intact and in satisfactory position.  Wires traverse the right thorax.  Pulmonary vasculature is normal.  Fibrotic changes of the lung fields are stable.  No consolidation.  No pneumothorax.  No pleural effusion.    Healed rib fractures along the right lateral thorax.    Skin fold over the right chest.                                 Medications   furosemide injection 40 mg (40 mg Intravenous Given 1/1/22 1506)   furosemide injection 20 mg (20 mg Intravenous Given 1/1/22 1634)     Medical Decision Making:   ED Management:  NOTE THAT THIS CHART WAS DONE DUE TO BEING FLAGGED BY CODING BUT SHOULD BE CONSIDERED AN ATTESTATION OF CHART DONE BY NURSE CAPO CASTREJON ON THE SAME DATE OF SERVICE FOR WHICH WE HAD A SHARED VISIT WITH NP AND MD.            Attending Attestation:     Physician Attestation Statement for NP/PA:   I have conducted a face to face encounter with this patient in addition to the NP/PA, due to NP/PA Request              ED Course as of 01/11/22 0140   Sat Jan 01, 2022   1629 Patient seen by nurse practitioner and by attending physician.  Patient reports that he has been short of breath for several months but worse over the past several days.  Associated with some mild orthopnea and lower extremity swelling which is worsened over the past week.  Patient is compliant with his Lasix and torsemide although he did not take his morning medications as he is coming to the ER.  Also patient complains of chronic lower back pain that is worse over the past few weeks.  He was taken off of Norco a few months ago and is awaiting his primary care doctor to [facility].  Also he has a  jock itch/tinea cruris which isn't present over the past 3 days and has not been treated.  Physical exam shows patient is nontoxic appearing.  O2 sats are in the high 90s on room air.  Lungs have mild rhonchi.  Bilateral lower extremities have symmetric edema.  Patient will be treated with a total 60 mg Lasix in the ER.  Over the next 3 days he will increase his Lasix with an additional 40 mg dose in the afternoons.  He will have ambulatory referral to cardiologist.  Also patient will follow-up with his primary care and he was told to use gold Bond powder or Tinactin for his tinea cruris [PK]      ED Course User Index  [PK] Salty Parsons MD          Clinical Impression:   Final diagnoses:  [R06.02] Shortness of breath  [B35.6] Tinea cruris (Primary)  [M54.50, G89.29] Chronic bilateral low back pain without sciatica  [I50.9] Chronic congestive heart failure, unspecified heart failure type  [J44.1] Acute exacerbation of chronic obstructive airways disease          ED Disposition Condition    Discharge Stable        ED Prescriptions     Medication Sig Dispense Start Date End Date Auth. Provider    clotrimazole (LOTRIMIN) 1 % cream () Apply to affected area 2 times daily 15 g 2022 LADARIUS Atkins    HYDROcodone-acetaminophen (NORCO) 5-325 mg per tablet () Take 1 tablet by mouth every 6 (six) hours as needed for Pain. 12 tablet 2022 Salty Parsons MD        Follow-up Information     Follow up With Specialties Details Why Contact Info    [facility], Emergency Medicine Schedule an appointment as soon as possible for a visit   [address] [facility] [address]  [phone]      Melvin Mckee MD INTERVENTIONAL CARDIOLOGY, Cardiology Schedule an appointment as soon as possible for a visit   [address] [place] [address]  [phone]             Salty Parsons MD  [phone]        Salty Parsons MD  01/11/22 0140

## 2022-01-01 NOTE — ED PROVIDER NOTES
"Encounter Date: 1/1/2022       History     Chief Complaint   Patient presents with    Leg Swelling     76y-old  male received to [facility] 6 with complaint of bilateral lower ext swelling, dyspnea, and chest pain since PM of 12/31/2021. Patient states that he normally takes 80mg of Lasix daily but recently "ran out" with last dose reported as Thursday 12/30/2021. Chest pain described as mid-sternal "stabbing" pain that started "on the drive over," currently rated as an 8 on a 0-10 point scale. Stage 4 pitting edema noted to bilateral lower ext. Also c/o redness/irritation to inguinal area "it burns and stinks."         Review of patient's allergies indicates:   Allergen Reactions    Acetaminophen      Other reaction(s): ITCHING    Indomethacin Itching and Other (See Comments)     Other reaction(s): ITCHING    Ketorolac Itching    Oxycodone      Other reaction(s): ITCHING     Past Medical History:   Diagnosis Date    Atrial fibrillation     CHF (congestive heart failure)     COPD (chronic obstructive pulmonary disease)     Coronary artery disease     Disorder of kidney and ureter     Hypertension      Past Surgical History:   Procedure Laterality Date    APPENDECTOMY      CARDIAC DEFIBRILLATOR PLACEMENT      JOINT REPLACEMENT      KIDNEY TRANSPLANT       History reviewed. No pertinent family history.  Social History     Tobacco Use    Smoking status: Current Every Day Smoker     Packs/day: 0.50     Types: Cigarettes    Smokeless tobacco: Never Used   Substance Use Topics    Alcohol use: Not Currently    Drug use: Never     Review of Systems    Physical Exam     Initial Vitals [01/01/22 1224]   BP Pulse Resp Temp SpO2   135/81 98 16 97.8 °F (36.6 °C) 96 %      MAP       --         Physical Exam    Medical Screening Exam   See Full Note    ED Course   Procedures  Labs Reviewed   CBC W/ AUTO DIFFERENTIAL    Narrative:     The following orders were created for panel order CBC auto " differential.  Procedure                               Abnormality         Status                     ---------                               -----------         ------                     CBC with Differential[[id number]]                                                         Please view results for these tests on the individual orders.   COMPREHENSIVE METABOLIC PANEL   NT-PRO NATRIURETIC PEPTIDE   CBC WITH DIFFERENTIAL          Imaging Results    None          Medications - No data to display              ED Course as of 01/10/22 0857   Sat Jan 01, 2022   7287 Patient seen by nurse practitioner and by attending physician.  Patient reports that he has been short of breath for several months but worse over the past several days.  Associated with some mild orthopnea and lower extremity swelling which is worsened over the past week.  Patient is compliant with his Lasix and torsemide although he did not take his morning medications as he is coming to the ER.  Also patient complains of chronic lower back pain that is worse over the past few weeks.  He was taken off of Norco a few months ago and is awaiting his primary care doctor to [facility].  Also he has a jock itch/tinea cruris which isn't present over the past 3 days and has not been treated.  Physical exam shows patient is nontoxic appearing.  O2 sats are in the high 90s on room air.  Lungs have mild rhonchi.  Bilateral lower extremities have symmetric edema.  Patient will be treated with a total 60 mg Lasix in the ER.  Over the next 3 days he will increase his Lasix with an additional 40 mg dose in the afternoons.  He will have ambulatory referral to cardiologist.  Also patient will follow-up with his primary care and he was told to use gold Bond powder or Tinactin for his tinea cruris [PK]      ED Course User Index  [PK] Salty Parsons MD          Clinical Impression:   Final diagnoses:  [R06.02] Shortness of breath                  LADARIUS Atkins  01/01/22 1517       LADARIUS Atkins  01/10/22 0857

## 2022-01-01 NOTE — ED TRIAGE NOTES
"Pt presents to ed with c/o cp and LE swelling. Pt has hx of chf. Pt states taking his meds and being up all night urinating. Pt states not taking "fluid pill" today because he was coming here for leg swelling but took all other meds. Pt states he ran out of lasix on Thursday. Med bottle shows one refill until 4/22  "

## 2022-01-03 PROBLEM — I51.9 HEART DISEASE: Status: RESOLVED | Noted: 2018-02-10 | Resolved: 2022-01-01

## 2022-01-03 PROBLEM — I48.91 A-FIB: Status: ACTIVE | Noted: 2022-01-01

## 2022-01-03 PROBLEM — I50.9 CONGESTIVE HEART FAILURE: Status: RESOLVED | Noted: 2021-01-01 | Resolved: 2022-01-01

## 2022-01-03 PROBLEM — K85.90 PANCREATITIS: Status: RESOLVED | Noted: 2017-12-18 | Resolved: 2022-01-01

## 2022-01-03 PROBLEM — Z72.0 TOBACCO ABUSE: Status: RESOLVED | Noted: 2021-01-01 | Resolved: 2022-01-01

## 2022-01-03 PROBLEM — R05.9 COUGH: Status: RESOLVED | Noted: 2021-01-01 | Resolved: 2022-01-01

## 2022-01-03 PROBLEM — I25.10 CAD (CORONARY ARTERY DISEASE): Status: RESOLVED | Noted: 2019-04-10 | Resolved: 2022-01-01

## 2022-01-03 PROBLEM — Z29.9 PROPHYLACTIC MEASURE: Status: RESOLVED | Noted: 2021-01-01 | Resolved: 2022-01-01

## 2022-01-03 PROBLEM — I49.5 SICK SINUS SYNDROME: Status: RESOLVED | Noted: 2019-04-10 | Resolved: 2022-01-01

## 2022-01-03 PROBLEM — G89.29 CHRONIC PAIN: Status: RESOLVED | Noted: 2021-01-01 | Resolved: 2022-01-01

## 2022-01-03 PROBLEM — I65.23 BILATERAL CAROTID ARTERY STENOSIS: Status: RESOLVED | Noted: 2020-01-10 | Resolved: 2022-01-01

## 2022-01-03 PROBLEM — E55.9 VITAMIN D DEFICIENCY, UNSPECIFIED: Status: RESOLVED | Noted: 2019-09-24 | Resolved: 2022-01-01

## 2022-01-03 PROBLEM — J44.1 ACUTE EXACERBATION OF CHRONIC OBSTRUCTIVE AIRWAYS DISEASE: Status: RESOLVED | Noted: 2021-01-01 | Resolved: 2022-01-01

## 2022-01-03 NOTE — PROGRESS NOTES
PCP: [facility]    Referring Provider:     Subjective:   Luisito Stuart is a 76 y.o. male with hx of CABG (2004;[place] - s/p C with PCI of Lcx 3/6/2021 which also revealed patent FINNEGAN to LAD, patent saphenous vein graft to PDA and known occlusion of the vein graft to Lcx), Ischemic cardiomyopathy with EF 10%, AICD (last shock 9 months ago, reports ablation performed 6 months ago in [place]), CKD stage 4 followed by nephrology, CVA 2019, current smoker (60+ years), COPD, and chronic atrial fibrillation on Eliquis presents for followup.     Patient was in the ED with symptoms of HF; Was discharged post IV lasix. Continues to be symptomatic in clinic with +3 leg swelling, PND and severe SOB.     Patient was last in the ED in 11/2021 for similar compliants; LHC was deferred due to [place] at the time. Reports issues with medication complinace due to finacial issues. Did not bring his medications with him to clinic today.     Fhx: noncontributory   Shx:     EKG 1/1/22 - NSR  ECHO 11/2021  · The left ventricle is moderately enlarged with concentric remodeling and severely decreased systolic function.  · The estimated ejection fraction is 20%.  · Grade III left ventricular diastolic dysfunction.  · Mild right ventricular enlargement with mildly reduced right ventricular systolic function.  · Moderate to severe left atrial enlargement.  · Moderate right atrial enlargement.  · Moderate-to-severe mitral regurgitation.  · Severe tricuspid regurgitation.  · Elevated central venous pressure (15 mmHg).  · The estimated PA systolic pressure is 44 mmHg.  · There is pulmonary hypertension.            Lab Results   Component Value Date     01/01/2022    K 4.3 01/01/2022     01/01/2022    CO2 31 01/01/2022    BUN 36 (H) 01/01/2022    CREATININE 1.91 (H) 01/01/2022    CALCIUM 9.5 01/01/2022    ANIONGAP 11 01/01/2022    ESTGFRAFRICA 52 06/05/2020    EGFRNONAA 37 (L) 01/01/2022       Lab Results   Component  "Value Date    CHOL 110 03/11/2020     Lab Results   Component Value Date    HDL 45 03/11/2020     Lab Results   Component Value Date    LDLCALC 55 03/11/2020     Lab Results   Component Value Date    TRIG 50 03/11/2020     Lab Results   Component Value Date    CHOLHDL 2.4 03/11/2020       Lab Results   Component Value Date    WBC 8.86 01/01/2022    HGB 12.0 (L) 01/01/2022    HCT 38.0 (L) 01/01/2022    MCV 84.6 01/01/2022     01/01/2022           Review of Systems   Respiratory: Positive for shortness of breath. Negative for cough.    Cardiovascular: Positive for leg swelling and PND. Negative for chest pain, palpitations, orthopnea and claudication.         Objective:   /78   Pulse 72   Resp 14   Ht 5' 9" (1.753 m)   Wt 88.9 kg (196 lb)   BMI 28.94 kg/m²     Physical Exam  Vitals and nursing note reviewed.   Cardiovascular:      Rate and Rhythm: Normal rate.      Pulses: Normal pulses.      Heart sounds: Normal heart sounds.   Pulmonary:      Breath sounds: Rales present.   Musculoskeletal:      Right lower leg: Edema present.      Left lower leg: Edema present.           Assessment:     1. Acute exacerbation of chronic obstructive airways disease  Ambulatory referral/consult to Cardiology   2. Acute on chronic systolic congestive heart failure           Plan:   Acute on chronic systolic congestive heart failure  Ischemic cardiomyopathy; LVEF < 20%  Volume overloaded - need hospital admission for IV diuresis  GDMT - will need to get medication reconciliation done correctly. Will need to go home on BB, ACEi and SGLT2i  AICD in place      Longstanding persistent atrial fibrillation  Currently in sinus rhythm  On eliquis at home      "

## 2022-01-03 NOTE — Clinical Note
The catheter was removed from the right atrium. Hemodynamics were performed.  An angiography was performed of the right coronary arteries. Multiple views were taken.

## 2022-01-03 NOTE — ASSESSMENT & PLAN NOTE
Ischemic cardiomyopathy; LVEF < 20%  Volume overloaded - need hospital admission for IV diuresis  GDMT - will need to get medication reconciliation done correctly. Will need to go home on BB, ACEi and SGLT2i  AICD in place

## 2022-01-03 NOTE — SUBJECTIVE & OBJECTIVE
Past Medical History:   Diagnosis Date    Atrial fibrillation     CHF (congestive heart failure)     COPD (chronic obstructive pulmonary disease)     Coronary artery disease     Disorder of kidney and ureter     Hypertension        Past Surgical History:   Procedure Laterality Date    APPENDECTOMY      CARDIAC DEFIBRILLATOR PLACEMENT      JOINT REPLACEMENT      KIDNEY TRANSPLANT         Review of patient's allergies indicates:   Allergen Reactions    Indomethacin Itching and Other (See Comments)     Other reaction(s): ITCHING       Current Facility-Administered Medications on File Prior to Encounter   Medication    [DISCONTINUED] albuterol nebulizer solution 2.5 mg     Current Outpatient Medications on File Prior to Encounter   Medication Sig    amiodarone (PACERONE) 200 MG Tab Take 1 tablet (200 mg total) by mouth once daily.    apixaban (ELIQUIS) 5 mg Tab Take 1 tablet (5 mg total) by mouth 2 (two) times daily.    ascorbic acid, vitamin C, (VITAMIN C) 500 MG tablet Take 500 mg by mouth 2 (two) times a day.    atorvastatin (LIPITOR) 40 MG tablet Take 40 mg by mouth every evening.    bumetanide (BUMEX) 2 MG tablet Take 1 tablet (2 mg total) by mouth once daily.    carvediloL (COREG) 3.125 MG tablet Take 3.125 mg by mouth 2 (two) times a day.    clopidogreL (PLAVIX) 75 mg tablet clopidogrel 75 mg tablet    clotrimazole (LOTRIMIN) 1 % cream Apply to affected area 2 times daily    docusate sodium (COLACE) 100 MG capsule Take 100 mg by mouth 2 (two) times a day.    guaiFENesin (MUCINEX) 600 mg 12 hr tablet Take 1 tablet (600 mg total) by mouth 2 (two) times daily.    HYDROcodone-acetaminophen (NORCO) 5-325 mg per tablet Take 1 tablet by mouth every 6 (six) hours as needed for Pain.    methocarbamoL (ROBAXIN) 750 MG Tab methocarbamol 750 mg tablet    metoprolol succinate (TOPROL-XL) 25 MG 24 hr tablet Take 0.5 tablets (12.5 mg total) by mouth once daily.    nitroGLYCERIN 0.4 MG/DOSE TL SPRY  (NITROLINGUAL) 400 mcg/spray spray nitroglycerin 400 mcg/spray translingual   USE 1 SPRAY UNDER THE TONGUE EVERY 5 MINUTES AS NEEDED FOR CHEST PAIN    nystatin (MYCOSTATIN) ointment Apply topically 2 (two) times daily.    pantoprazole (PROTONIX) 40 MG tablet Take 1 tablet (40 mg total) by mouth once daily.    potassium chloride SA (K-DUR,KLOR-CON) 20 MEQ tablet Take 1 tablet (20 mEq total) by mouth once daily.    rosuvastatin (CRESTOR) 20 MG tablet rosuvastatin 20 mg tablet   TAKE 1 TABLET BY MOUTH ONCE DAILY    sacubitriL-valsartan (ENTRESTO) 24-26 mg per tablet Take 1 tablet by mouth 2 (two) times daily.    sucralfate (CARAFATE) 1 gram tablet Take 1 g by mouth 2 (two) times a day.    torsemide (DEMADEX) 20 MG Tab Take 1 tablet (20 mg total) by mouth once daily.     Family History     Problem Relation (Age of Onset)    Cancer Mother    Heart disease Father        Tobacco Use    Smoking status: Current Every Day Smoker     Packs/day: 1.00     Years: 46.00     Pack years: 46.00     Types: Cigarettes    Smokeless tobacco: Never Used   Substance and Sexual Activity    Alcohol use: Not Currently    Drug use: Never    Sexual activity: Not Currently     Review of Systems   Constitutional: Negative for activity change, appetite change, chills, diaphoresis and fever.   HENT: Positive for congestion. Negative for hearing loss, nosebleeds, postnasal drip, rhinorrhea, sore throat and trouble swallowing.    Eyes: Negative for visual disturbance.   Respiratory: Positive for cough and shortness of breath. Negative for chest tightness.    Cardiovascular: Positive for leg swelling. Negative for chest pain.   Gastrointestinal: Negative for abdominal pain, blood in stool, constipation, diarrhea, nausea and vomiting.   Genitourinary: Negative for dysuria and hematuria.   Musculoskeletal: Positive for arthralgias. Negative for joint swelling.        Patient reports Lt hand cramping   Skin: Negative for rash.   Neurological:  Negative for dizziness, weakness, light-headedness and headaches.   All other systems reviewed and are negative.    Objective:     Vital Signs (Most Recent):  Temp: 97.2 °F (36.2 °C) (22 1404)  Pulse: 91 (22 1519)  Resp: 20 (22 1519)  BP: 109/61 (22 1519)  SpO2: 96 % (22 1519) Vital Signs (24h Range):  Temp:  [97.2 °F (36.2 °C)] 97.2 °F (36.2 °C)  Pulse:  [] 91  Resp:  [14-21] 20  SpO2:  [96 %-97 %] 96 %  BP: (109-141)/(59-84) 109/61     Weight: 86.2 kg (190 lb)  Body mass index is 28.06 kg/m².    Physical Exam  Vitals and nursing note reviewed.   Constitutional:       General: He is not in acute distress.     Appearance: Normal appearance. He is not ill-appearing, toxic-appearing or diaphoretic.   HENT:      Head: Normocephalic and atraumatic.      Nose: Nose normal. No congestion or rhinorrhea.      Mouth/Throat:      Mouth: Mucous membranes are moist.      Pharynx: Oropharynx is clear. No oropharyngeal exudate or posterior oropharyngeal erythema.   Eyes:      Conjunctiva/sclera: Conjunctivae normal.      Pupils: Pupils are equal, round, and reactive to light.   Cardiovascular:      Rate and Rhythm: Normal rate and regular rhythm.      Pulses: Normal pulses.      Heart sounds: Normal heart sounds. No murmur heard.  No friction rub.   Pulmonary:      Effort: Pulmonary effort is normal. No respiratory distress.      Breath sounds: Normal breath sounds. No wheezing, rhonchi or rales.   Abdominal:      Tenderness: There is no abdominal tenderness. There is no guarding.   Musculoskeletal:         General: Tenderness present.      Right lower le+ Edema present.      Left lower le+ Edema present.   Skin:     Capillary Refill: Capillary refill takes less than 2 seconds.   Neurological:      General: No focal deficit present.      Mental Status: He is alert and oriented to person, place, and time.   Psychiatric:         Mood and Affect: Mood normal.         Behavior: Behavior  normal.         Thought Content: Thought content normal.         Judgment: Judgment normal.           CRANIAL NERVES     CN III, IV, VI   Pupils are equal, round, and reactive to light.       Significant Labs:   Recent Lab Results       01/03/22  1559   01/03/22  1452   01/03/22  1412        Albumin/Globulin Ratio   1.1         Albumin   3.5         Alkaline Phosphatase   86         ALT   12         Anion Gap   10         Aniso     1+       AST   16         Bands     1       Baso #     0.11       Basophil %     1.1            1       BILIRUBIN TOTAL   1.7         BUN   38         BUN/CREAT RATIO   18         Calcium   9.3         Chloride   101         CO2   33         SARS COV-2 MOLECULAR Negative           Creatinine   2.06         Differential Type     Manual       eGFR if non    34         Eos #     0.14       Eosinophil %     1.4            2       Globulin, Total   3.3         Glucose   84         Hematocrit     37.8       Hemoglobin     12.1       Immature Grans (Abs)     0.03       Immature Granulocytes     0.3       Lymph #     0.50       Lymph %     5.1            6       MCH     27.2       MCHC     32.0       MCV     84.9       Mono #     0.82       Mono %     8.4            4       MPV     12.3       Neutrophils, Abs     8.13       Neutrophils Relative     83.7       nRBC     0.0       NT-proBNP   12,981         NUCLEATED RBC ABSOLUTE     0.00       Ovalocytes     Few       PLATELET MORPHOLOGY     Few Large Platelets       Platelets     167       Potassium   4.2         PROTEIN TOTAL   6.8         RBC     4.45       RDW     18.1       Segmented Neutrophils, Man %     86       Sodium   140         Troponin I High Sensitivity   86.8         WBC     9.73             Significant Imaging: I have reviewed all pertinent imaging results/findings within the past 24 hours.

## 2022-01-03 NOTE — Clinical Note
A handoff report was given to MARIANA Daniel RN 6N, right groin dsg C/D/I and without hematoma, right distal pulse 2+, transferred to bed without difficulty

## 2022-01-03 NOTE — Clinical Note
artery was performed. A percutaneous stick to the right groin was performed. Ultrasound guidance was used to obtain access.

## 2022-01-03 NOTE — Clinical Note
The catheter was removed from the ostium   left main. An angiography was performed of the left coronary arteries.

## 2022-01-03 NOTE — ED PROVIDER NOTES
Encounter Date: 1/3/2022    SCRIBE #1 NOTE: I, Shahida Roa, am scribing for, and in the presence of,  Salty Parsons. I have scribed the entire note.       History     Chief Complaint   Patient presents with    Shortness of Breath    Leg Swelling     The patient is a 76 year old male with complaints of lower leg swelling and shortness of breath. He was seen in the ED on Saturday for the same symptoms and was told to increase his Lasix prescription to 2 20mg daily. He has since followed medical advice and has been taking the increased Lasix as prescribed with no change in symptoms. Today he was seen at the cardiology clinic and they recommended he be admitted for IV diuresis treatment. He states he has leg pain where the swelling is, shortness of breath for the last two weeks, and a cough. He denies any fever. The patient is not Covid vaccinated.    The history is provided by the patient and medical records. No  was used.     Review of patient's allergies indicates:   Allergen Reactions    Indomethacin Itching and Other (See Comments)     Other reaction(s): ITCHING    Lipitor [atorvastatin] Other (See Comments)     Muscle cramps     Past Medical History:   Diagnosis Date    Atrial fibrillation     CHF (congestive heart failure)     COPD (chronic obstructive pulmonary disease)     Coronary artery disease     Disorder of kidney and ureter     Hypertension      Past Surgical History:   Procedure Laterality Date    APPENDECTOMY      CARDIAC DEFIBRILLATOR PLACEMENT      CORONARY ANGIOGRAPHY INCLUDING BYPASS GRAFTS WITH CATHETERIZATION OF LEFT HEART N/A 1/5/2022    Procedure: ANGIOGRAM, CORONARY, INCLUDING BYPASS GRAFT, WITH LEFT HEART CATHETERIZATION;  Surgeon: Gerda Castellanos MD;  Location: [facility] CATH LAB;  Service: Cardiology;  Laterality: N/A;    JOINT REPLACEMENT      KIDNEY TRANSPLANT      RIGHT HEART CATHETERIZATION Right 1/5/2022    Procedure: INSERTION, CATHETER, RIGHT  HEART;  Surgeon: Gerda Castellanos MD;  Location: [facility] CATH LAB;  Service: Cardiology;  Laterality: Right;     Family History   Problem Relation Age of Onset    Cancer Mother     Heart disease Father      Social History     Tobacco Use    Smoking status: Current Every Day Smoker     Packs/day: 1.00     Years: 46.00     Pack years: 46.00     Types: Cigarettes    Smokeless tobacco: Never Used   Substance Use Topics    Alcohol use: Not Currently    Drug use: Never     Review of Systems   Constitutional: Negative for fever.   Respiratory: Positive for cough and shortness of breath.    Cardiovascular: Positive for leg swelling.   All other systems reviewed and are negative.      Physical Exam     Initial Vitals [01/03/22 1404]   BP Pulse Resp Temp SpO2   (!) 141/81 101 (!) 21 97.2 °F (36.2 °C) 97 %      MAP       --         Physical Exam    Constitutional: He appears well-developed and well-nourished.   HENT:   Head: Normocephalic and atraumatic.   Eyes: Conjunctivae and EOM are normal. Pupils are equal, round, and reactive to light.   Neck: Neck supple.   Normal range of motion.  Cardiovascular: Normal rate, regular rhythm, normal heart sounds and intact distal pulses. The patient has a device (subcutaneous AICD).   Pulmonary/Chest: Breath sounds normal. No respiratory distress.   ICD in left side of chest   Abdominal: Abdomen is soft. He exhibits no distension.   Musculoskeletal:         General: Normal range of motion.      Cervical back: Normal range of motion and neck supple.      Right lower leg: Pitting Edema present.      Left lower leg: Pitting Edema present.      Comments: Moderate pitting edema     Neurological: He is alert and oriented to person, place, and time.   Skin: Skin is warm and dry.   Psychiatric: He has a normal mood and affect. Thought content normal.         ED Course   Procedures  Labs Reviewed   COMPREHENSIVE METABOLIC PANEL - Abnormal; Notable for the following components:        Result Value    CO2 33 (*)     BUN 38 (*)     Creatinine 2.06 (*)     Bilirubin, Total 1.7 (*)     ALT 12 (*)     eGFR 34 (*)     All other components within normal limits   NT-PRO NATRIURETIC PEPTIDE - Abnormal; Notable for the following components:    ProBNP 12,981 (*)     All other components within normal limits   CBC WITH DIFFERENTIAL - Abnormal; Notable for the following components:    RBC 4.45 (*)     Hemoglobin 12.1 (*)     Hematocrit 37.8 (*)     RDW 18.1 (*)     Neutrophils % 83.7 (*)     Lymphocytes % 5.1 (*)     Monocytes % 8.4 (*)     Basophils % 1.1 (*)     Neutrophils, Abs 8.13 (*)     Lymphocytes, Absolute 0.50 (*)     Monocytes, Absolute 0.82 (*)     All other components within normal limits   TROPONIN I - Abnormal; Notable for the following components:    Troponin I High Sensitivity 86.8 (*)     All other components within normal limits   MANUAL DIFFERENTIAL - Abnormal; Notable for the following components:    Segmented Neutrophils, Man % 86 (*)     Lymphocytes, Man % 6 (*)     Platelet Morphology Few Large Platelets (*)     All other components within normal limits   BASIC METABOLIC PANEL - Abnormal; Notable for the following components:    Glucose 129 (*)     BUN 36 (*)     Creatinine 2.07 (*)     eGFR 34 (*)     All other components within normal limits   CBC WITH DIFFERENTIAL - Abnormal; Notable for the following components:    RBC 4.15 (*)     Hemoglobin 11.1 (*)     Hematocrit 36.9 (*)     MCH 26.7 (*)     MCHC 30.1 (*)     RDW 18.2 (*)     Neutrophils % 78.4 (*)     Lymphocytes % 9.6 (*)     Monocytes % 8.6 (*)     Basophils % 1.1 (*)     Lymphocytes, Absolute 0.73 (*)     All other components within normal limits   SARS-COV-2 RNA AMPLIFICATION, QUAL - Normal   MAGNESIUM - Normal   CBC W/ AUTO DIFFERENTIAL    Narrative:     The following orders were created for panel order CBC auto differential.  Procedure                               Abnormality         Status                     ---------                                -----------         ------                     CBC with Differential[[id number]]        Abnormal            Final result               Manual Differential[[id number]]          Abnormal            Final result                 Please view results for these tests on the individual orders.   CBC W/ AUTO DIFFERENTIAL    Narrative:     The following orders were created for panel order CBC Auto Differential.  Procedure                               Abnormality         Status                     ---------                               -----------         ------                     CBC with Differential[[id number]]        Abnormal            Final result                 Please view results for these tests on the individual orders.   EXTRA TUBES    Narrative:     The following orders were created for panel order EXTRA TUBES.  Procedure                               Abnormality         Status                     ---------                               -----------         ------                     Lavender Top Hold[[id number]]                                In process                   Please view results for these tests on the individual orders.   LAVENDER TOP HOLD     EKG Readings: (Independently Interpreted)   Initial Reading: No STEMI. Rhythm: Atrial Fibrillation. Heart Rate: 103. Other Impression: Rapid ventricular response, demand pacing   Interpreted by  at 14:02     ECG Results          EKG 12-lead (Final result)  Result time 01/04/22 05:57:22    Final result by Interface, Lab In [facility] (01/04/22 05:57:22)                 Narrative:    Test Reason : R06.02,    Vent. Rate : 103 BPM     Atrial Rate : 000 BPM     P-R Int : 000 ms          QRS Dur : 158 ms      QT Int : 402 ms       P-R-T Axes : 000 262 066 degrees     QTc Int : 477 ms    Atrial fibrillation  with rapid ventricular response  Demand pacing  Marked left axis deviation  IV conduction defect  q wave inferior leads  Tall R  V1/V2 probably reflect the infarct  Septal ST-T abnormality  may be due to myocardial ischemia  Abnormal ECG    Confirmed by Morales FIGUEROA, Alessandro DOLAN ([id number]) on 1/4/2022 5:57:17 AM    Referred By: [email or web address]   SELF           Confirmed By:Alessandro Nielsen MD                            Imaging Results          X-Ray Chest 1 View (Final result)  Result time 01/03/22 14:19:49    Final result by Ozzie Vasquez MD (01/03/22 14:19:49)                 Impression:      Similar appearance of the chest      Electronically signed by: Ozzie Vasquez  Date:    01/03/2022  Time:    14:19             Narrative:    EXAMINATION:  XR CHEST 1 VIEW    CLINICAL HISTORY:  Shortness of breath    TECHNIQUE:  Single frontal view of the chest was performed.    COMPARISON:  01/01/2022    FINDINGS:  Cardiac device similar position.  The enlarged cardiac silhouette is unchanged.  The lungs are clear in similar prior.  No pneumothorax.                                 Medications   furosemide injection 60 mg (60 mg Intravenous Given 1/3/22 1416)   HYDROcodone-acetaminophen 5-325 mg per tablet 1 tablet (1 tablet Oral Given 1/4/22 1835)   HYDROcodone-acetaminophen 5-325 mg per tablet 1 tablet (1 tablet Oral Given 1/5/22 1005)                Attending Attestation:           Physician Attestation for Scribe:  Physician Attestation Statement for Scribe #1: IIssa, reviewed documentation, as scribed by Crispin in my presence, and it is both accurate and complete.             ED Course as of 01/10/22 2152   Mon Jan 03, 2022   6629 Patient with acute exacerbation of chronic CHF and history of cardiomyopathy presents with shortness of breath for the past several days.  Patient was treated in the ER 2 days ago with IV Lasix and double his Lasix but has not gotten any better.  He was seen as a follow-up appointment with his cardiologist today who recommended hospitalization for IV diuresis.  Patient says he is no worse than before.  He is nontoxic appearing.   Bilateral peripheral edema.  Mild rales on exam.  Good air movement.  Case was discussed with hospitalist Dr. Gibson and will be admitted to family service/hospitalist service Dr. Silver [PK]      ED Course User Index  [PK] Salty Parsons MD             Clinical Impression:   Final diagnoses:  [R06.02] Shortness of breath  [I50.9] Acute on chronic congestive heart failure, unspecified heart failure type (Primary)          ED Disposition Condition    Observation               Salty Parsons MD  01/10/22 8796

## 2022-01-03 NOTE — ED TRIAGE NOTES
Presents to ED from Dr. Mckee's office for c/o SOB and bilateral leg swelling that has been ongoing and worsening for the last week.

## 2022-01-03 NOTE — Clinical Note
The catheter was inserted over the wire into the ostium   right coronary artery. An angiography was performed of the right coronary arteries. Multiple views were taken.

## 2022-01-03 NOTE — HPI
Luisito Stuart is a 76 y.o.m. with a PMHx of Afib, CHF, COPD, CAD HTN, and IBS that presents to the ED today due to lower leg edema and leg pain bilaterally. Prince reports his legs have been swollen and pain for about 4 days. He reports he came to the ED 3 days ago for these symptoms and was told to double the dose of his Lasix 20mg. Patient had an appointment with Dr. Mckee today. During the visit patient was instructed to go to the ED for IV diuretics. Patient was admitted to inpatient  Family Medicine care. Patient states he is taking all his home medication however, when his daughter was contacted, she stated he is only taking Entresto. Will consult cardiology for help with medications.

## 2022-01-03 NOTE — Clinical Note
The right groin was prepped. The site was prepped with ChloraPrep. The site was clipped. The patient was draped. The patient was positioned supine. The patient was secured using safety straps, with ulnar pads and with a wedge under the patient.

## 2022-01-04 PROBLEM — R09.89 PULMONARY CONGESTION: Status: RESOLVED | Noted: 2021-01-01 | Resolved: 2022-01-01

## 2022-01-04 PROBLEM — K31.84 GASTROPARESIS: Status: RESOLVED | Noted: 2017-12-18 | Resolved: 2022-01-01

## 2022-01-04 PROBLEM — I34.0 MITRAL VALVE REGURGITATION: Status: RESOLVED | Noted: 2020-12-01 | Resolved: 2022-01-01

## 2022-01-04 PROBLEM — K44.9 HH (HIATUS HERNIA): Status: RESOLVED | Noted: 2017-12-18 | Resolved: 2022-01-01

## 2022-01-04 PROBLEM — I50.23 ACUTE ON CHRONIC SYSTOLIC CHF (CONGESTIVE HEART FAILURE): Status: RESOLVED | Noted: 2021-01-01 | Resolved: 2022-01-01

## 2022-01-04 PROBLEM — Z86.010 HISTORY OF COLON POLYPS: Status: RESOLVED | Noted: 2017-12-18 | Resolved: 2022-01-01

## 2022-01-04 NOTE — SUBJECTIVE & OBJECTIVE
Past Medical History:   Diagnosis Date    Atrial fibrillation     CHF (congestive heart failure)     COPD (chronic obstructive pulmonary disease)     Coronary artery disease     Disorder of kidney and ureter     Hypertension        Past Surgical History:   Procedure Laterality Date    APPENDECTOMY      CARDIAC DEFIBRILLATOR PLACEMENT      JOINT REPLACEMENT      KIDNEY TRANSPLANT         Review of patient's allergies indicates:   Allergen Reactions    Indomethacin Itching and Other (See Comments)     Other reaction(s): ITCHING       Current Facility-Administered Medications on File Prior to Encounter   Medication    [DISCONTINUED] albuterol nebulizer solution 2.5 mg     Current Outpatient Medications on File Prior to Encounter   Medication Sig    amiodarone (PACERONE) 200 MG Tab Take 1 tablet (200 mg total) by mouth once daily.    apixaban (ELIQUIS) 5 mg Tab Take 1 tablet (5 mg total) by mouth 2 (two) times daily.    ascorbic acid, vitamin C, (VITAMIN C) 500 MG tablet Take 500 mg by mouth 2 (two) times a day.    atorvastatin (LIPITOR) 40 MG tablet Take 40 mg by mouth every evening.    bumetanide (BUMEX) 2 MG tablet Take 1 tablet (2 mg total) by mouth once daily.    carvediloL (COREG) 3.125 MG tablet Take 3.125 mg by mouth 2 (two) times a day.    clopidogreL (PLAVIX) 75 mg tablet clopidogrel 75 mg tablet    clotrimazole (LOTRIMIN) 1 % cream Apply to affected area 2 times daily    docusate sodium (COLACE) 100 MG capsule Take 100 mg by mouth 2 (two) times a day.    guaiFENesin (MUCINEX) 600 mg 12 hr tablet Take 1 tablet (600 mg total) by mouth 2 (two) times daily.    HYDROcodone-acetaminophen (NORCO) 5-325 mg per tablet Take 1 tablet by mouth every 6 (six) hours as needed for Pain.    methocarbamoL (ROBAXIN) 750 MG Tab methocarbamol 750 mg tablet    metoprolol succinate (TOPROL-XL) 25 MG 24 hr tablet Take 0.5 tablets (12.5 mg total) by mouth once daily.    nitroGLYCERIN 0.4 MG/DOSE TL SPRY  (NITROLINGUAL) 400 mcg/spray spray nitroglycerin 400 mcg/spray translingual   USE 1 SPRAY UNDER THE TONGUE EVERY 5 MINUTES AS NEEDED FOR CHEST PAIN    nystatin (MYCOSTATIN) ointment Apply topically 2 (two) times daily.    pantoprazole (PROTONIX) 40 MG tablet Take 1 tablet (40 mg total) by mouth once daily.    potassium chloride SA (K-DUR,KLOR-CON) 20 MEQ tablet Take 1 tablet (20 mEq total) by mouth once daily.    rosuvastatin (CRESTOR) 20 MG tablet rosuvastatin 20 mg tablet   TAKE 1 TABLET BY MOUTH ONCE DAILY    sacubitriL-valsartan (ENTRESTO) 24-26 mg per tablet Take 1 tablet by mouth 2 (two) times daily.    sucralfate (CARAFATE) 1 gram tablet Take 1 g by mouth 2 (two) times a day.    torsemide (DEMADEX) 20 MG Tab Take 1 tablet (20 mg total) by mouth once daily.     Family History     Problem Relation (Age of Onset)    Cancer Mother    Heart disease Father        Tobacco Use    Smoking status: Current Every Day Smoker     Packs/day: 1.00     Years: 46.00     Pack years: 46.00     Types: Cigarettes    Smokeless tobacco: Never Used   Substance and Sexual Activity    Alcohol use: Not Currently    Drug use: Never    Sexual activity: Not Currently     Review of Systems   Constitutional: Negative for chills and fever.   HENT: Positive for hearing loss. Negative for congestion and sore throat.    Cardiovascular: Positive for chest pain, dyspnea on exertion, leg swelling and orthopnea. Negative for palpitations and syncope.   Respiratory: Positive for cough, shortness of breath and wheezing. Negative for hemoptysis.    Endocrine: Negative for polydipsia and polyphagia.   Hematologic/Lymphatic: Negative for bleeding problem.   Gastrointestinal: Positive for constipation. Negative for abdominal pain, hematemesis, hematochezia, jaundice, melena, nausea and vomiting.   Genitourinary: Negative for dysuria and hematuria.   Neurological: Positive for dizziness and loss of balance.        Occasional dizziness with loss  of balance   All other systems reviewed and are negative.    Objective:     Vital Signs (Most Recent):  Temp: 97.3 °F (36.3 °C) (01/04/22 0753)  Pulse: 91 (01/04/22 0954)  Resp: 14 (01/04/22 0954)  BP: 118/65 (01/04/22 0954)  SpO2: 97 % (01/04/22 0954) Vital Signs (24h Range):  Temp:  [97.3 °F (36.3 °C)-98.1 °F (36.7 °C)] 97.3 °F (36.3 °C)  Pulse:  [] 91  Resp:  [11-27] 14  SpO2:  [91 %-98 %] 97 %  BP: ()/(51-81) 118/65     Weight: 80 kg (176 lb 5.9 oz)  Body mass index is 26.05 kg/m².    SpO2: 97 %  O2 Device (Oxygen Therapy): room air      Intake/Output Summary (Last 24 hours) at 1/4/2022 1440  Last data filed at 1/4/2022 0128  Gross per 24 hour   Intake --   Output 1200 ml   Net -1200 ml       Lines/Drains/Airways     Peripheral Intravenous Line                 Peripheral IV - Single Lumen 01/03/22 1405 20 G Left Forearm 1 day                Physical Exam  Constitutional:       General: He is not in acute distress.  HENT:      Nose: Nose normal. No congestion.      Mouth/Throat:      Mouth: Mucous membranes are moist.      Pharynx: Oropharynx is clear.   Eyes:      General: No scleral icterus.     Pupils: Pupils are equal, round, and reactive to light.   Neck:      Vascular: Carotid bruit and JVD present.   Cardiovascular:      Rate and Rhythm: Normal rate. Rhythm irregular.      Pulses: Normal pulses.      Heart sounds: Murmur heard.       Pulmonary:      Effort: Tachypnea present.      Breath sounds: Wheezing present. No rhonchi or rales.   Abdominal:      General: Bowel sounds are normal.      Palpations: Abdomen is soft.      Tenderness: There is no abdominal tenderness. There is no guarding.   Musculoskeletal:      Cervical back: Neck supple. No rigidity.      Right lower leg: Edema present.      Left lower leg: Edema present.   Skin:     General: Skin is warm and dry.      Coloration: Skin is not jaundiced or pale.   Neurological:      Mental Status: He is alert and oriented to person, place,  and time.         Significant Labs:   ABG: No results for input(s): PH, PCO2, HCO3, POCSATURATED, BE in the last 48 hours., Blood Culture: No results for input(s): LABBLOO in the last 48 hours., BMP:   Recent Labs   Lab 01/03/22  1452 01/03/22  1852 01/04/22  0434   GLU 84  --  129*     --  138   K 4.2  --  3.8     --  100   CO2 33*  --  30   BUN 38*  --  36*   CREATININE 2.06*  --  2.07*   CALCIUM 9.3  --  8.9   MG  --  1.8  --    , CMP   Recent Labs   Lab 01/03/22  1452 01/04/22  0434    138   K 4.2 3.8    100   CO2 33* 30   GLU 84 129*   BUN 38* 36*   CREATININE 2.06* 2.07*   CALCIUM 9.3 8.9   PROT 6.8  --    ALBUMIN 3.5  --    BILITOT 1.7*  --    ALKPHOS 86  --    AST 16  --    ALT 12*  --    ANIONGAP 10 12   EGFRNONAA 34* 34*   , CBC   Recent Labs   Lab 01/03/22  1412 01/03/22  1412 01/04/22  0434   WBC 9.73  --  7.59   HGB 12.1*  --  11.1*   HCT 37.8*   < > 36.9*     --  177    < > = values in this interval not displayed.   , INR No results for input(s): INR, PROTIME in the last 48 hours., Lipid Panel No results for input(s): CHOL, HDL, LDLCALC, TRIG, CHOLHDL in the last 48 hours. and Troponin No results for input(s): TROPONINI in the last 48 hours.    Significant Imaging: Echocardiogram:   Transthoracic echo (TTE) complete (Cupid Only):   Results for orders placed or performed during the hospital encounter of 11/10/21   Echo   Result Value Ref Range    Posterior Wall 1.32 (A) 0.6 - 1.1 cm    E wave deceleration time 154 msec    MV Peak E Jim 1.11 m/s    IVS 1.36 (A) 0.6 - 1.1 cm    LVIDd 6.60 (A) 3.5 - 6.0 cm    LVIDs 5.17 (A) 2.1 - 4.0 cm    Echo EF Estimated 43 %    LV Systolic Volume 127.80 mL    LV Diastolic Volume 223.60 mL    RVDD 4.33 cm    AORTIC VALVE CUSP SEPERATION 1.67 cm    FS 22 %    LV mass 426.32 g    Left Ventricle Relative Wall Thickness 0.40 cm    LV Systolic Volume Index 61.4 mL/m2    LV Diastolic Volume Index 107.50 mL/m2    LV Mass Index 205 g/m2    BSA  2.11 m2    IVC diameter 2.82 cm    RV mid diameter 3.75 cm    EF 20 %    Left Ventricular Outflow Tract Mean Gradient 0.70 mmHg    LA size 5.80 cm    TAPSE 1.91 cm    Right ventricular length in diastole (apical 4-chamber view) 7.06 cm    AV mean gradient 2 mmHg    AV valve area 2.09 cm2    AV index (prosthetic) 0.67     E/A ratio 3.47     LVOT diameter 2.00 cm    LVOT area 3.1 cm2    LVOT peak VTI 9.73 cm    Ao VTI 14.60 cm    LVOT stroke volume 30.55 cm3    TR Max Jim 2.69 m/s    MV Peak A Jim 0.32 m/s    Triscuspid Valve Regurgitation Peak Gradient 29 mmHg    Right Atrial Pressure (from IVC) 15 mmHg    TV rest pulmonary artery pressure 44 mmHg    Narrative    · The left ventricle is moderately enlarged with concentric remodeling and   severely decreased systolic function.  · The estimated ejection fraction is 20%.  · Grade III left ventricular diastolic dysfunction.  · Mild right ventricular enlargement with mildly reduced right ventricular   systolic function.  · Moderate to severe left atrial enlargement.  · Moderate right atrial enlargement.  · Moderate-to-severe mitral regurgitation.  · Severe tricuspid regurgitation.  · Elevated central venous pressure (15 mmHg).  · The estimated PA systolic pressure is 44 mmHg.  · There is pulmonary hypertension.       and X-Ray: CXR: X-Ray Chest 1 View (CXR):   Results for orders placed or performed during the hospital encounter of 01/03/22   X-Ray Chest 1 View    Narrative    EXAMINATION:  XR CHEST 1 VIEW    CLINICAL HISTORY:  Shortness of breath    TECHNIQUE:  Single frontal view of the chest was performed.    COMPARISON:  01/01/2022    FINDINGS:  Cardiac device similar position.  The enlarged cardiac silhouette is unchanged.  The lungs are clear in similar prior.  No pneumothorax.      Impression    Similar appearance of the chest      Electronically signed by: Ozzie Vasquez  Date:    01/03/2022  Time:    14:19

## 2022-01-04 NOTE — ED NOTES
Reclined in bed resting with eyes closed. respiraitons evena dn unlabored. NADN. States needs something for mucle cramps will notifiy MD

## 2022-01-04 NOTE — ASSESSMENT & PLAN NOTE
- cardiology consult due to CHF exacerbation,  patient not taking any home meds  - Hydralazine w/parameters

## 2022-01-04 NOTE — HPI
Luisito Stuart is a 76 y.o. 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      Patient was in the ED with symptoms of HF; Was discharged post IV lasix. Continues to be symptomatic in clinic with +3 leg swelling, PND and severe SOB.      Patient was last in the hospital in 11/2021 for similar compliants; LHC was deferred due to [place] at the time. Reports issues with medication complinace due to finacial issues. Did not bring his medications with him to clinic today. He was sent to ED from Dr. Mckee's clinic for further evaluation and admission for IV diuresis and possible LHC.

## 2022-01-04 NOTE — ASSESSMENT & PLAN NOTE
Patient has a PMHx of CHF with EF of 20% and diastolic and systolic dysfunction  - Cardiology consult due to CHF exacerbation,  patient not taking any home meds  - SS consulted for home health for medication management  - IV dobutamine added and scheduled for cath lab tomorrow   - BNP 00620  - on exam pateint had 2+ pitting edema  - Lasix 60 mg IV x1 in ED with good urinary output response   - Lasix 40mg IV BID  - strict I/Os  - daily weights  - monitoring potassium

## 2022-01-04 NOTE — ASSESSMENT & PLAN NOTE
- Lasix 60 mg IV x1 in ED  - Lasix 40mg IV BID  - strict I/Os  - daily weights  - monitoring potassium

## 2022-01-04 NOTE — ASSESSMENT & PLAN NOTE
Patient has a PMHx of CHF with EF of 20% and diastolic and systolic dysfunction  - Cardiology consult due to CHF exacerbation,  patient not taking any home meds  - Lasix 60 mg IV x1 in ED with good urinary output response   - Lasix 40mg IV BID  - strict I/Os  - daily weights  - monitoring potassium

## 2022-01-04 NOTE — CONSULTS
[facility] - Emergency Department  Cardiology  Consult Note    Patient Name: Luisito Stuart  MRN: [id number]  Admission Date: 1/3/2022  Hospital Length of Stay: 0 days  Code Status: Partial Code   Attending Provider: No att. providers found   Consulting Provider: JOYA Murillo  Primary Care Physician: JOYA Hill  Principal Problem:Acute on chronic congestive heart failure    Patient information was obtained from patient, past medical records and ER records.     Inpatient consult to Cardiology  Consult performed by: JOYA Murillo  Consult ordered by: Dhiraj Prince DO  Reason for consult: acute on chronic chf        Subjective:     Chief Complaint:  Sob and leg swelling    HPI:   Luisito Stuart is a 76 y.o. male with hx of CABG (2004;[facility] s/p C with PCI of Lcx 3/6/2021 which also revealed patent FINNEGAN to LAD, patent saphenous vein graft to PDA and known occlusion of the vein graft to Lcx), Ischemic cardiomyopathy with EF 10%, AICD (last shock 9 months ago, reports ablation performed 6 months ago in [place]), CKD stage 4 followed by nephrology, CVA 2019, current smoker (60+ years), COPD, hearing loss, and chronic atrial fibrillation on Eliquis presents for followup.      Patient was in the ED with symptoms of HF; Was discharged post IV lasix. Continues to be symptomatic in clinic with +3 leg swelling, PND and severe SOB.      Patient was last in the hospital in 11/2021 for similar compliants; LHC was deferred due to SHELDON at the time. Reports issues with medication complinace due to finacial issues. Did not bring his medications with him to clinic today. He was sent to ED from Dr. Mckee's clinic for further evaluation and admission for IV diuresis and possible LHC.        Past Medical History:   Diagnosis Date    Atrial fibrillation     CHF (congestive heart failure)     COPD (chronic obstructive pulmonary disease)     Coronary artery disease     Disorder of kidney  and ureter     Hypertension        Past Surgical History:   Procedure Laterality Date    APPENDECTOMY      CARDIAC DEFIBRILLATOR PLACEMENT      JOINT REPLACEMENT      KIDNEY TRANSPLANT         Review of patient's allergies indicates:   Allergen Reactions    Indomethacin Itching and Other (See Comments)     Other reaction(s): ITCHING       Current Facility-Administered Medications on File Prior to Encounter   Medication    [DISCONTINUED] albuterol nebulizer solution 2.5 mg     Current Outpatient Medications on File Prior to Encounter   Medication Sig    amiodarone (PACERONE) 200 MG Tab Take 1 tablet (200 mg total) by mouth once daily.    apixaban (ELIQUIS) 5 mg Tab Take 1 tablet (5 mg total) by mouth 2 (two) times daily.    ascorbic acid, vitamin C, (VITAMIN C) 500 MG tablet Take 500 mg by mouth 2 (two) times a day.    atorvastatin (LIPITOR) 40 MG tablet Take 40 mg by mouth every evening.    bumetanide (BUMEX) 2 MG tablet Take 1 tablet (2 mg total) by mouth once daily.    carvediloL (COREG) 3.125 MG tablet Take 3.125 mg by mouth 2 (two) times a day.    clopidogreL (PLAVIX) 75 mg tablet clopidogrel 75 mg tablet    clotrimazole (LOTRIMIN) 1 % cream Apply to affected area 2 times daily    docusate sodium (COLACE) 100 MG capsule Take 100 mg by mouth 2 (two) times a day.    guaiFENesin (MUCINEX) 600 mg 12 hr tablet Take 1 tablet (600 mg total) by mouth 2 (two) times daily.    HYDROcodone-acetaminophen (NORCO) 5-325 mg per tablet Take 1 tablet by mouth every 6 (six) hours as needed for Pain.    methocarbamoL (ROBAXIN) 750 MG Tab methocarbamol 750 mg tablet    metoprolol succinate (TOPROL-XL) 25 MG 24 hr tablet Take 0.5 tablets (12.5 mg total) by mouth once daily.    nitroGLYCERIN 0.4 MG/DOSE TL SPRY (NITROLINGUAL) 400 mcg/spray spray nitroglycerin 400 mcg/spray translingual   USE 1 SPRAY UNDER THE TONGUE EVERY 5 MINUTES AS NEEDED FOR CHEST PAIN    nystatin (MYCOSTATIN) ointment Apply topically 2 (two)  times daily.    pantoprazole (PROTONIX) 40 MG tablet Take 1 tablet (40 mg total) by mouth once daily.    potassium chloride SA (K-DUR,KLOR-CON) 20 MEQ tablet Take 1 tablet (20 mEq total) by mouth once daily.    rosuvastatin (CRESTOR) 20 MG tablet rosuvastatin 20 mg tablet   TAKE 1 TABLET BY MOUTH ONCE DAILY    sacubitriL-valsartan (ENTRESTO) 24-26 mg per tablet Take 1 tablet by mouth 2 (two) times daily.    sucralfate (CARAFATE) 1 gram tablet Take 1 g by mouth 2 (two) times a day.    torsemide (DEMADEX) 20 MG Tab Take 1 tablet (20 mg total) by mouth once daily.     Family History     Problem Relation (Age of Onset)    Cancer Mother    Heart disease Father        Tobacco Use    Smoking status: Current Every Day Smoker     Packs/day: 1.00     Years: 46.00     Pack years: 46.00     Types: Cigarettes    Smokeless tobacco: Never Used   Substance and Sexual Activity    Alcohol use: Not Currently    Drug use: Never    Sexual activity: Not Currently     Review of Systems   Constitutional: Negative for chills and fever.   HENT: Positive for hearing loss. Negative for congestion and sore throat.    Cardiovascular: Positive for chest pain, dyspnea on exertion, leg swelling and orthopnea. Negative for palpitations and syncope.   Respiratory: Positive for cough, shortness of breath and wheezing. Negative for hemoptysis.    Endocrine: Negative for polydipsia and polyphagia.   Hematologic/Lymphatic: Negative for bleeding problem.   Gastrointestinal: Positive for constipation. Negative for abdominal pain, hematemesis, hematochezia, jaundice, melena, nausea and vomiting.   Genitourinary: Negative for dysuria and hematuria.   Neurological: Positive for dizziness and loss of balance.        Occasional dizziness with loss of balance   All other systems reviewed and are negative.    Objective:     Vital Signs (Most Recent):  Temp: 97.3 °F (36.3 °C) (01/04/22 0753)  Pulse: 91 (01/04/22 0954)  Resp: 14 (01/04/22 0954)  BP:  118/65 (01/04/22 0954)  SpO2: 97 % (01/04/22 0954) Vital Signs (24h Range):  Temp:  [97.3 °F (36.3 °C)-98.1 °F (36.7 °C)] 97.3 °F (36.3 °C)  Pulse:  [] 91  Resp:  [11-27] 14  SpO2:  [91 %-98 %] 97 %  BP: ()/(51-81) 118/65     Weight: 80 kg (176 lb 5.9 oz)  Body mass index is 26.05 kg/m².    SpO2: 97 %  O2 Device (Oxygen Therapy): room air      Intake/Output Summary (Last 24 hours) at 1/4/2022 1440  Last data filed at 1/4/2022 0128  Gross per 24 hour   Intake --   Output 1200 ml   Net -1200 ml       Lines/Drains/Airways     Peripheral Intravenous Line                 Peripheral IV - Single Lumen 01/03/22 1405 20 G Left Forearm 1 day                Physical Exam  Constitutional:       General: He is not in acute distress.  HENT:      Nose: Nose normal. No congestion.      Mouth/Throat:      Mouth: Mucous membranes are moist.      Pharynx: Oropharynx is clear.   Eyes:      General: No scleral icterus.     Pupils: Pupils are equal, round, and reactive to light.   Neck:      Vascular: Carotid bruit and JVD present.   Cardiovascular:      Rate and Rhythm: Normal rate. Rhythm irregular.      Pulses: Normal pulses.      Heart sounds: Murmur heard.       Pulmonary:      Effort: Tachypnea present.      Breath sounds: Wheezing present. No rhonchi or rales.   Abdominal:      General: Bowel sounds are normal.      Palpations: Abdomen is soft.      Tenderness: There is no abdominal tenderness. There is no guarding.   Musculoskeletal:      Cervical back: Neck supple. No rigidity.      Right lower leg: Edema present.      Left lower leg: Edema present.   Skin:     General: Skin is warm and dry.      Coloration: Skin is not jaundiced or pale.   Neurological:      Mental Status: He is alert and oriented to person, place, and time.         Significant Labs:   ABG: No results for input(s): PH, PCO2, HCO3, POCSATURATED, BE in the last 48 hours., Blood Culture: No results for input(s): LABBLOO in the last 48 hours., BMP:    Recent Labs   Lab 01/03/22  1452 01/03/22  1852 01/04/22  0434   GLU 84  --  129*     --  138   K 4.2  --  3.8     --  100   CO2 33*  --  30   BUN 38*  --  36*   CREATININE 2.06*  --  2.07*   CALCIUM 9.3  --  8.9   MG  --  1.8  --    , CMP   Recent Labs   Lab 01/03/22  1452 01/04/22  0434    138   K 4.2 3.8    100   CO2 33* 30   GLU 84 129*   BUN 38* 36*   CREATININE 2.06* 2.07*   CALCIUM 9.3 8.9   PROT 6.8  --    ALBUMIN 3.5  --    BILITOT 1.7*  --    ALKPHOS 86  --    AST 16  --    ALT 12*  --    ANIONGAP 10 12   EGFRNONAA 34* 34*   , CBC   Recent Labs   Lab 01/03/22  1412 01/03/22  1412 01/04/22  0434   WBC 9.73  --  7.59   HGB 12.1*  --  11.1*   HCT 37.8*   < > 36.9*     --  177    < > = values in this interval not displayed.   , INR No results for input(s): INR, PROTIME in the last 48 hours., Lipid Panel No results for input(s): CHOL, HDL, LDLCALC, TRIG, CHOLHDL in the last 48 hours. and Troponin No results for input(s): TROPONINI in the last 48 hours.    Significant Imaging: Echocardiogram:   Transthoracic echo (TTE) complete (Cupid Only):   Results for orders placed or performed during the hospital encounter of 11/10/21   Echo   Result Value Ref Range    Posterior Wall 1.32 (A) 0.6 - 1.1 cm    E wave deceleration time 154 msec    MV Peak E Jim 1.11 m/s    IVS 1.36 (A) 0.6 - 1.1 cm    LVIDd 6.60 (A) 3.5 - 6.0 cm    LVIDs 5.17 (A) 2.1 - 4.0 cm    Echo EF Estimated 43 %    LV Systolic Volume 127.80 mL    LV Diastolic Volume 223.60 mL    RVDD 4.33 cm    AORTIC VALVE CUSP SEPERATION 1.67 cm    FS 22 %    LV mass 426.32 g    Left Ventricle Relative Wall Thickness 0.40 cm    LV Systolic Volume Index 61.4 mL/m2    LV Diastolic Volume Index 107.50 mL/m2    LV Mass Index 205 g/m2    BSA 2.11 m2    IVC diameter 2.82 cm    RV mid diameter 3.75 cm    EF 20 %    Left Ventricular Outflow Tract Mean Gradient 0.70 mmHg    LA size 5.80 cm    TAPSE 1.91 cm    Right ventricular length in diastole  (apical 4-chamber view) 7.06 cm    AV mean gradient 2 mmHg    AV valve area 2.09 cm2    AV index (prosthetic) 0.67     E/A ratio 3.47     LVOT diameter 2.00 cm    LVOT area 3.1 cm2    LVOT peak VTI 9.73 cm    Ao VTI 14.60 cm    LVOT stroke volume 30.55 cm3    TR Max Jim 2.69 m/s    MV Peak A Jim 0.32 m/s    Triscuspid Valve Regurgitation Peak Gradient 29 mmHg    Right Atrial Pressure (from IVC) 15 mmHg    TV rest pulmonary artery pressure 44 mmHg    Narrative    · The left ventricle is moderately enlarged with concentric remodeling and   severely decreased systolic function.  · The estimated ejection fraction is 20%.  · Grade III left ventricular diastolic dysfunction.  · Mild right ventricular enlargement with mildly reduced right ventricular   systolic function.  · Moderate to severe left atrial enlargement.  · Moderate right atrial enlargement.  · Moderate-to-severe mitral regurgitation.  · Severe tricuspid regurgitation.  · Elevated central venous pressure (15 mmHg).  · The estimated PA systolic pressure is 44 mmHg.  · There is pulmonary hypertension.       and X-Ray: CXR: X-Ray Chest 1 View (CXR):   Results for orders placed or performed during the hospital encounter of 01/03/22   X-Ray Chest 1 View    Narrative    EXAMINATION:  XR CHEST 1 VIEW    CLINICAL HISTORY:  Shortness of breath    TECHNIQUE:  Single frontal view of the chest was performed.    COMPARISON:  01/01/2022    FINDINGS:  Cardiac device similar position.  The enlarged cardiac silhouette is unchanged.  The lungs are clear in similar prior.  No pneumothorax.      Impression    Similar appearance of the chest      Electronically signed by: Ozzie Vasquez  Date:    01/03/2022  Time:    14:19     Assessment and Plan:     * Acute on chronic congestive heart failure  - Patient seen and evaluated by Dr. Pierson    Echo    Interpretation Summary  · The left ventricle is moderately enlarged with concentric remodeling and severely decreased systolic  function.  · The estimated ejection fraction is 20%.  · Grade III left ventricular diastolic dysfunction.  · Mild right ventricular enlargement with mildly reduced right ventricular systolic function.  · Moderate to severe left atrial enlargement.  · Moderate right atrial enlargement.  · Moderate-to-severe mitral regurgitation.  · Severe tricuspid regurgitation.  · Elevated central venous pressure (15 mmHg).  · The estimated PA systolic pressure is 44 mmHg.  · There is pulmonary hypertension.    - Continue IV lasix 40mg BID, will add IV dobutamine.  - Creatinine 2.07 today, repeat BMP in am  - Strict I & O and daily weight  - Plan for L/RHC tomorrow. Procedure, risk and benefits discussed in detail with patient, including increased risk for kidney failure, he verbalized understanding and wishes to proceed.   - Consent obtained and taken to the cath lab.  - NPO after midnight  - Further recommendations to follow.      A-fib  - Eliquis held today after morning dose with anticipation for LHC tomorrow        VTE Risk Mitigation (From admission, onward)         Ordered     Reason for No Pharmacological VTE Prophylaxis  Once        Question:  Reasons:  Answer:  Already adequately anticoagulated on oral Anticoagulants    01/03/22 1638     IP VTE HIGH RISK PATIENT  Once         01/03/22 1638     Place sequential compression device  Until discontinued         01/03/22 1638                Thank you for your consult. I will follow-up with patient. Please contact us if you have any additional questions.    JOYA Murillo  Cardiology   [facility] - Emergency Department

## 2022-01-04 NOTE — ED NOTES
HS medications given. NADN. Moved to [facility] bed. Needs adressed. WTCM.

## 2022-01-04 NOTE — H&P
[facility] Emergency Department  Hospital Medicine  History & Physical    Patient Name: Luisito Stuart  MRN: [id number]  Patient Class: OP- Observation  Admission Date: 1/3/2022  Attending Physician: Dr. Chayito Silver  Primary Care Provider: JOYA Hill         Patient information was obtained from patient, past medical records and ER records.     Subjective:     Principal Problem:Acute on chronic congestive heart failure    Chief Complaint:   Chief Complaint   Patient presents with    Shortness of Breath    Leg Swelling        HPI: Luisito Stuart is a 76 y.o.m. with a PMHx of Afib, CHF, COPD, CAD HTN, and IBS that presents to the ED today due to lower leg edema and leg pain bilaterally. Prince reports his legs have been swollen and pain for about 4 days. He reports he came to the ED 3 days ago for these symptoms and was told to double the dose of his Lasix 20mg. Patient had an appointment with Dr. Mckee today. During the visit patient was instructed to go to the ED for IV diuretics. Patient was admitted to inpatient  Family Medicine care. Patient states he is taking all his home medication however, when his daughter was contacted, she stated he is only taking Entresto. Will consult cardiology for help with medications.            Past Medical History:   Diagnosis Date    Atrial fibrillation     CHF (congestive heart failure)     COPD (chronic obstructive pulmonary disease)     Coronary artery disease     Disorder of kidney and ureter     Hypertension        Past Surgical History:   Procedure Laterality Date    APPENDECTOMY      CARDIAC DEFIBRILLATOR PLACEMENT      JOINT REPLACEMENT      KIDNEY TRANSPLANT         Review of patient's allergies indicates:   Allergen Reactions    Indomethacin Itching and Other (See Comments)     Other reaction(s): ITCHING       Current Facility-Administered Medications on File Prior to Encounter   Medication    [DISCONTINUED] albuterol  nebulizer solution 2.5 mg     Current Outpatient Medications on File Prior to Encounter   Medication Sig    amiodarone (PACERONE) 200 MG Tab Take 1 tablet (200 mg total) by mouth once daily.    apixaban (ELIQUIS) 5 mg Tab Take 1 tablet (5 mg total) by mouth 2 (two) times daily.    ascorbic acid, vitamin C, (VITAMIN C) 500 MG tablet Take 500 mg by mouth 2 (two) times a day.    atorvastatin (LIPITOR) 40 MG tablet Take 40 mg by mouth every evening.    bumetanide (BUMEX) 2 MG tablet Take 1 tablet (2 mg total) by mouth once daily.    carvediloL (COREG) 3.125 MG tablet Take 3.125 mg by mouth 2 (two) times a day.    clopidogreL (PLAVIX) 75 mg tablet clopidogrel 75 mg tablet    clotrimazole (LOTRIMIN) 1 % cream Apply to affected area 2 times daily    docusate sodium (COLACE) 100 MG capsule Take 100 mg by mouth 2 (two) times a day.    guaiFENesin (MUCINEX) 600 mg 12 hr tablet Take 1 tablet (600 mg total) by mouth 2 (two) times daily.    HYDROcodone-acetaminophen (NORCO) 5-325 mg per tablet Take 1 tablet by mouth every 6 (six) hours as needed for Pain.    methocarbamoL (ROBAXIN) 750 MG Tab methocarbamol 750 mg tablet    metoprolol succinate (TOPROL-XL) 25 MG 24 hr tablet Take 0.5 tablets (12.5 mg total) by mouth once daily.    nitroGLYCERIN 0.4 MG/DOSE TL SPRY (NITROLINGUAL) 400 mcg/spray spray nitroglycerin 400 mcg/spray translingual   USE 1 SPRAY UNDER THE TONGUE EVERY 5 MINUTES AS NEEDED FOR CHEST PAIN    nystatin (MYCOSTATIN) ointment Apply topically 2 (two) times daily.    pantoprazole (PROTONIX) 40 MG tablet Take 1 tablet (40 mg total) by mouth once daily.    potassium chloride SA (K-DUR,KLOR-CON) 20 MEQ tablet Take 1 tablet (20 mEq total) by mouth once daily.    rosuvastatin (CRESTOR) 20 MG tablet rosuvastatin 20 mg tablet   TAKE 1 TABLET BY MOUTH ONCE DAILY    sacubitriL-valsartan (ENTRESTO) 24-26 mg per tablet Take 1 tablet by mouth 2 (two) times daily.    sucralfate (CARAFATE) 1 gram tablet Take  1 g by mouth 2 (two) times a day.    torsemide (DEMADEX) 20 MG Tab Take 1 tablet (20 mg total) by mouth once daily.     Family History     Problem Relation (Age of Onset)    Cancer Mother    Heart disease Father        Tobacco Use    Smoking status: Current Every Day Smoker     Packs/day: 1.00     Years: 46.00     Pack years: 46.00     Types: Cigarettes    Smokeless tobacco: Never Used   Substance and Sexual Activity    Alcohol use: Not Currently    Drug use: Never    Sexual activity: Not Currently     Review of Systems   Constitutional: Negative for activity change, appetite change, chills, diaphoresis and fever.   HENT: Positive for congestion. Negative for hearing loss, nosebleeds, postnasal drip, rhinorrhea, sore throat and trouble swallowing.    Eyes: Negative for visual disturbance.   Respiratory: Positive for cough and shortness of breath. Negative for chest tightness.    Cardiovascular: Positive for leg swelling. Negative for chest pain.   Gastrointestinal: Negative for abdominal pain, blood in stool, constipation, diarrhea, nausea and vomiting.   Genitourinary: Negative for dysuria and hematuria.   Musculoskeletal: Positive for arthralgias. Negative for joint swelling.        Patient reports Lt hand cramping   Skin: Negative for rash.   Neurological: Negative for dizziness, weakness, light-headedness and headaches.   All other systems reviewed and are negative.    Objective:     Vital Signs (Most Recent):  Temp: 97.2 °F (36.2 °C) (01/03/22 1404)  Pulse: 91 (01/03/22 1519)  Resp: 20 (01/03/22 1519)  BP: 109/61 (01/03/22 1519)  SpO2: 96 % (01/03/22 1519) Vital Signs (24h Range):  Temp:  [97.2 °F (36.2 °C)] 97.2 °F (36.2 °C)  Pulse:  [] 91  Resp:  [14-21] 20  SpO2:  [96 %-97 %] 96 %  BP: (109-141)/(59-84) 109/61     Weight: 86.2 kg (190 lb)  Body mass index is 28.06 kg/m².    Physical Exam  Vitals and nursing note reviewed.   Constitutional:       General: He is not in acute distress.      Appearance: Normal appearance. He is not ill-appearing, toxic-appearing or diaphoretic.   HENT:      Head: Normocephalic and atraumatic.      Nose: Nose normal. No congestion or rhinorrhea.      Mouth/Throat:      Mouth: Mucous membranes are moist.      Pharynx: Oropharynx is clear. No oropharyngeal exudate or posterior oropharyngeal erythema.   Eyes:      Conjunctiva/sclera: Conjunctivae normal.      Pupils: Pupils are equal, round, and reactive to light.   Cardiovascular:      Rate and Rhythm: Normal rate and regular rhythm.      Pulses: Normal pulses.      Heart sounds: Normal heart sounds. No murmur heard.  No friction rub.   Pulmonary:      Effort: Pulmonary effort is normal. No respiratory distress.      Breath sounds: Normal breath sounds. No wheezing, rhonchi or rales.   Abdominal:      Tenderness: There is no abdominal tenderness. There is no guarding.   Musculoskeletal:         General: Tenderness present.      Right lower le+ Edema present.      Left lower le+ Edema present.   Skin:     Capillary Refill: Capillary refill takes less than 2 seconds.   Neurological:      General: No focal deficit present.      Mental Status: He is alert and oriented to person, place, and time.   Psychiatric:         Mood and Affect: Mood normal.         Behavior: Behavior normal.         Thought Content: Thought content normal.         Judgment: Judgment normal.           CRANIAL NERVES     CN III, IV, VI   Pupils are equal, round, and reactive to light.       Significant Labs:   Recent Lab Results       22  1559   22  1452   22  1412        Albumin/Globulin Ratio   1.1         Albumin   3.5         Alkaline Phosphatase   86         ALT   12         Anion Gap   10         Aniso     1+       AST   16         Bands     1       Baso #     0.11       Basophil %     1.1            1       BILIRUBIN TOTAL   1.7         BUN   38         BUN/CREAT RATIO   18         Calcium   9.3         Chloride   101          CO2   33         SARS COV-2 MOLECULAR Negative           Creatinine   2.06         Differential Type     Manual       eGFR if non    34         Eos #     0.14       Eosinophil %     1.4            2       Globulin, Total   3.3         Glucose   84         Hematocrit     37.8       Hemoglobin     12.1       Immature Grans (Abs)     0.03       Immature Granulocytes     0.3       Lymph #     0.50       Lymph %     5.1            6       MCH     27.2       MCHC     32.0       MCV     84.9       Mono #     0.82       Mono %     8.4            4       MPV     12.3       Neutrophils, Abs     8.13       Neutrophils Relative     83.7       nRBC     0.0       NT-proBNP   12,981         NUCLEATED RBC ABSOLUTE     0.00       Ovalocytes     Few       PLATELET MORPHOLOGY     Few Large Platelets       Platelets     167       Potassium   4.2         PROTEIN TOTAL   6.8         RBC     4.45       RDW     18.1       Segmented Neutrophils, Man %     86       Sodium   140         Troponin I High Sensitivity   86.8         WBC     9.73             Significant Imaging: I have reviewed all pertinent imaging results/findings within the past 24 hours.    Assessment/Plan:     * Acute on chronic congestive heart failure  Patient has a PMHx of CHF with EF of 20% and diastolic and systolic dysfunction  - Cardiology consult due to CHF exacerbation,  patient not taking any home meds  - BNP 57048  - on exam pateint had 2+ pitting edema  - Lasix 60 mg IV x1 in ED with good urinary output response   - Lasix 40mg IV BID  - strict I/Os  - daily weights  - monitoring potassium        Lower extremity edema  - Lasix 60 mg IV x1 in ED  - Lasix 40mg IV BID  - strict I/Os  - daily weights  - monitoring potassium        A-fib  PMHx of Afib  - Amiodarone 200mg PO QD  - Eliquis 5mg PO BID  - on telemetry          Hyperlipidemia  Atorvastatin 40mg PO QD      Hypertension  - cardiology consult due to CHF exacerbation,  patient not taking any  home meds  - Hydralazine w/parameters    VTE Risk Mitigation (From admission, onward)         Ordered     apixaban tablet 5 mg  2 times daily         01/03/22 1828     Reason for No Pharmacological VTE Prophylaxis  Once        Question:  Reasons:  Answer:  Already adequately anticoagulated on oral Anticoagulants    01/03/22 1638     IP VTE HIGH RISK PATIENT  Once         01/03/22 1638     Place sequential compression device  Until discontinued         01/03/22 1638                   Dhiraj Prince DO  Department of Hospital Medicine   [facility] - Emergency Department

## 2022-01-04 NOTE — ASSESSMENT & PLAN NOTE
- Patient seen and evaluated by Dr. Pierson    Echo    Interpretation Summary  · The left ventricle is moderately enlarged with concentric remodeling and severely decreased systolic function.  · The estimated ejection fraction is 20%.  · Grade III left ventricular diastolic dysfunction.  · Mild right ventricular enlargement with mildly reduced right ventricular systolic function.  · Moderate to severe left atrial enlargement.  · Moderate right atrial enlargement.  · Moderate-to-severe mitral regurgitation.  · Severe tricuspid regurgitation.  · Elevated central venous pressure (15 mmHg).  · The estimated PA systolic pressure is 44 mmHg.  · There is pulmonary hypertension.    - Continue IV lasix 40mg BID, will add IV dobutamine.  - Creatinine 2.07 today, repeat BMP in am  - Strict I & O and daily weight  - Plan for L/RHC tomorrow. Procedure, risk and benefits discussed in detail with patient, including increased risk for kidney failure, he verbalized understanding and wishes to proceed.   - Consent obtained and taken to the cath lab.  - NPO after midnight  - Further recommendations to follow.

## 2022-01-04 NOTE — ED NOTES
Reclined  in bed resting with eyes closed. respirations even and unlabored. NADN. CB within reach.WTCM

## 2022-01-05 PROBLEM — E78.5 DYSLIPIDEMIA: Status: RESOLVED | Noted: 2021-01-01 | Resolved: 2022-01-01

## 2022-01-05 PROBLEM — K56.7 ILEUS: Status: RESOLVED | Noted: 2021-01-01 | Resolved: 2022-01-01

## 2022-01-05 PROBLEM — Z95.810 AICD (AUTOMATIC CARDIOVERTER/DEFIBRILLATOR) PRESENT: Status: RESOLVED | Noted: 2021-01-01 | Resolved: 2022-01-01

## 2022-01-05 PROBLEM — K57.30 DIVERTICULAR DISEASE OF COLON: Status: RESOLVED | Noted: 2017-12-18 | Resolved: 2022-01-01

## 2022-01-05 PROBLEM — I48.11 LONGSTANDING PERSISTENT ATRIAL FIBRILLATION: Status: RESOLVED | Noted: 2021-01-01 | Resolved: 2022-01-01

## 2022-01-05 PROBLEM — R42 DIZZINESS: Status: RESOLVED | Noted: 2017-12-08 | Resolved: 2022-01-01

## 2022-01-05 PROBLEM — M54.50 CHRONIC BILATERAL LOW BACK PAIN: Status: RESOLVED | Noted: 2021-01-01 | Resolved: 2022-01-01

## 2022-01-05 PROBLEM — K29.71 GASTRITIS WITH HEMORRHAGE: Status: RESOLVED | Noted: 2017-12-18 | Resolved: 2022-01-01

## 2022-01-05 PROBLEM — R19.7 DIARRHEA: Status: RESOLVED | Noted: 2017-12-18 | Resolved: 2022-01-01

## 2022-01-05 PROBLEM — R15.9 FECAL INCONTINENCE: Status: RESOLVED | Noted: 2017-12-18 | Resolved: 2022-01-01

## 2022-01-05 PROBLEM — G89.29 CHRONIC BILATERAL LOW BACK PAIN: Status: RESOLVED | Noted: 2021-01-01 | Resolved: 2022-01-01

## 2022-01-05 NOTE — ASSESSMENT & PLAN NOTE
Patient has a PMHx of CHF with EF of 20% and diastolic and systolic dysfunction  - Cardiology consult due to CHF exacerbation,  patient not taking any home meds  - SS consulted for home health for medication management, patient denied HH  - IV dobutamine added  - BNP 88295  - on exam pateint had 2+ pitting edema  - Lasix 60 mg IV x1 in ED with good urinary output response   - Lasix 40mg IV BID  - strict I/Os  - daily weights  - monitoring potassium  - Scheduled for LH cath today

## 2022-01-05 NOTE — NURSING
PETR Melvin from cath lab here to take patient down to cath lab for procedure. Bedside report given. Pt taken down via stretcher. LONI

## 2022-01-05 NOTE — ASSESSMENT & PLAN NOTE
- Lasix 60 mg IV x1 in ED  - Lasix 40mg IV BID  - strict I/Os  - daily weights  - monitoring potassium  - Norco 5 x1 given for leg pain on 1/4

## 2022-01-05 NOTE — NURSING
Rec'd awake in the bed. Resp even nonlabored. Denies any Cp or SOB. VS stable. Afebrile. Dobutamine remains at 3ml/hr or 2.5 mcg/kg/min. Fall prevention measures in progress. nad

## 2022-01-05 NOTE — ASSESSMENT & PLAN NOTE
PMHx of Afib  - Amiodarone 200mg PO QD  - Eliquis 5mg PO BID  - on telemetry  - heart cath tomorrow

## 2022-01-05 NOTE — SUBJECTIVE & OBJECTIVE
Interval History: Patient seen this morning resting in bed with no acute over night events. Patient denies SOB but still has a cough. Cardiology consulted (thank you) and added IV dobutamine and will be taken to cath lab tomorrow. Educated patient on smoking cessation and plan to do a PHQ-9 once patient is out of the ER.      Review of Systems   Constitutional: Negative for activity change, appetite change, chills, diaphoresis and fever.   HENT: Negative for hearing loss, nosebleeds, postnasal drip, rhinorrhea, sore throat and trouble swallowing.    Eyes: Negative for visual disturbance.   Respiratory: Positive for cough. Negative for chest tightness and shortness of breath.    Cardiovascular: Negative for chest pain and leg swelling.   Gastrointestinal: Negative for abdominal pain, blood in stool, constipation, diarrhea, nausea and vomiting.   Genitourinary: Negative for dysuria and hematuria.   Musculoskeletal: Negative for joint swelling.   Skin: Negative for rash.   Neurological: Negative for dizziness, weakness, light-headedness and headaches.   All other systems reviewed and are negative.    Objective:     Vital Signs (Most Recent):  Temp: 97.3 °F (36.3 °C) (01/04/22 0753)  Pulse: 98 (01/04/22 1548)  Resp: 17 (01/04/22 1548)  BP: (!) 122/57 (01/04/22 1654)  SpO2: 96 % (01/04/22 1548) Vital Signs (24h Range):  Temp:  [97.3 °F (36.3 °C)-98.1 °F (36.7 °C)] 97.3 °F (36.3 °C)  Pulse:  [] 98  Resp:  [14-27] 17  SpO2:  [91 %-98 %] 96 %  BP: (107-131)/(51-71) 122/57     Weight: 80 kg (176 lb 5.9 oz)  Body mass index is 26.05 kg/m².    Intake/Output Summary (Last 24 hours) at 1/4/2022 1809  Last data filed at 1/4/2022 0128  Gross per 24 hour   Intake --   Output 1200 ml   Net -1200 ml      Physical Exam  Vitals and nursing note reviewed.   Constitutional:       General: He is not in acute distress.     Appearance: Normal appearance. He is not ill-appearing, toxic-appearing or diaphoretic.   HENT:      Head:  Normocephalic and atraumatic.      Nose: Nose normal. No congestion or rhinorrhea.      Mouth/Throat:      Mouth: Mucous membranes are moist.      Pharynx: Oropharynx is clear. No oropharyngeal exudate or posterior oropharyngeal erythema.   Eyes:      Conjunctiva/sclera: Conjunctivae normal.      Pupils: Pupils are equal, round, and reactive to light.   Cardiovascular:      Rate and Rhythm: Normal rate and regular rhythm.      Pulses: Normal pulses.      Heart sounds: Normal heart sounds. No murmur heard.  No friction rub.   Pulmonary:      Effort: Pulmonary effort is normal. No respiratory distress.      Breath sounds: Rhonchi present. No wheezing or rales.   Abdominal:      Tenderness: There is no abdominal tenderness. There is no guarding.   Musculoskeletal:      Right lower leg: Edema present.      Left lower leg: Edema present.   Skin:     General: Skin is warm and dry.      Capillary Refill: Capillary refill takes less than 2 seconds.   Neurological:      General: No focal deficit present.      Mental Status: He is alert and oriented to person, place, and time.   Psychiatric:         Mood and Affect: Mood normal.         Behavior: Behavior normal.         Thought Content: Thought content normal.         Judgment: Judgment normal.         Significant Labs:   Recent Lab Results       01/04/22  0434   01/03/22  1852        Anion Gap 12         Baso # 0.08         Basophil % 1.1         BUN 36         BUN/CREAT RATIO 17         Calcium 8.9         Chloride 100         CO2 30         Creatinine 2.07         Differential Type Auto         eGFR if non  34         Eos # 0.15         Eosinophil % 2.0         Glucose 129         Hematocrit 36.9         Hemoglobin 11.1         Immature Grans (Abs) 0.02         Immature Granulocytes 0.3         Lymph # 0.73         Lymph % 9.6         Magnesium   1.8       MCH 26.7         MCHC 30.1         MCV 88.9         Mono # 0.65         Mono % 8.6         MPV 11.2          Neutrophils, Abs 5.96         Neutrophils Relative 78.4         nRBC 0.0         NUCLEATED RBC ABSOLUTE 0.00         Platelets 177         Potassium 3.8         RBC 4.15         RDW 18.2         Sodium 138         WBC 7.59               Significant Imaging: I have reviewed all pertinent imaging results/findings within the past 24 hours.

## 2022-01-05 NOTE — NURSING
Awake in the bed. Resp even nonlabored. Tolerating Dobutamine at 3ml/hr or 2.5mcg/kg/min. VS stable. Afebrile. No acute changes during the night

## 2022-01-05 NOTE — ASSESSMENT & PLAN NOTE
- patient not taking any home meds, cardiology consulted for medication advice   - Lasix 40mg IV BID  - Hydralazine w/parameters

## 2022-01-05 NOTE — PLAN OF CARE
SS rec'd consult for home health. Pt states his daughter is a CNA who helps take care of him and he does not need home health care services. MD informed.

## 2022-01-05 NOTE — ASSESSMENT & PLAN NOTE
- Lasix 60 mg IV x1 in ED  - Lasix 40mg IV BID  - strict I/Os  - daily weights  - monitoring potassium  - Norco 5 x1 given for leg pain

## 2022-01-05 NOTE — NURSING
C/O sternal chest pain 8/10. Dr. Lema notified. Stat ekg performed. Pt reading Afib HR 98 on the monitor. Pt states he has a long history of Afib. Skin wd. AAO. Resp even nonlabored. Dr. Lema notified. New orders rec'd for EKG and nitro sublingual stat.

## 2022-01-05 NOTE — PROGRESS NOTES
[facility] Emergency Department  Hospital Medicine  Progress Note    Patient Name: Luisito Stuart  MRN: [id number]  Patient Class: OP- Observation   Admission Date: 1/3/2022  Length of Stay: 0 days  Attending Physician: No att. providers found  Primary Care Provider: JOYA Hill        Subjective:     Principal Problem:Acute on chronic congestive heart failure        HPI:  Luisito Stuart is a 76 y.o.m. with a PMHx of Afib, CHF, COPD, CAD HTN, and IBS that presents to the ED today due to lower leg edema and leg pain bilaterally. Patilele reports his legs have been swollen and pain for about 4 days. He reports he came to the ED 3 days ago for these symptoms and was told to double the dose of his Lasix 20mg. Patient had an appointment with Dr. Mckee today. During the visit patient was instructed to go to the ED for IV diuretics. Patient was admitted to inpatient  Family Medicine care. Patient states he is taking all his home medication however, when his daughter was contacted, she stated he is only taking Entresto. Will consult cardiology for help with medications.            Overview/Hospital Course:  No notes on file    Interval History: Patient seen this morning resting in bed with no acute over night events. Patient denies SOB but still has a cough. Cardiology consulted (thank you) and added IV dobutamine and will be taken to cath lab tomorrow. Educated patient on smoking cessation and plan to do a PHQ-9 once patient is out of the ER.      Review of Systems   Constitutional: Negative for activity change, appetite change, chills, diaphoresis and fever.   HENT: Negative for hearing loss, nosebleeds, postnasal drip, rhinorrhea, sore throat and trouble swallowing.    Eyes: Negative for visual disturbance.   Respiratory: Positive for cough. Negative for chest tightness and shortness of breath.    Cardiovascular: Negative for chest pain and leg swelling.   Gastrointestinal: Negative for abdominal  pain, blood in stool, constipation, diarrhea, nausea and vomiting.   Genitourinary: Negative for dysuria and hematuria.   Musculoskeletal: Negative for joint swelling.   Skin: Negative for rash.   Neurological: Negative for dizziness, weakness, light-headedness and headaches.   All other systems reviewed and are negative.    Objective:     Vital Signs (Most Recent):  Temp: 97.3 °F (36.3 °C) (01/04/22 0753)  Pulse: 98 (01/04/22 1548)  Resp: 17 (01/04/22 1548)  BP: (!) 122/57 (01/04/22 1654)  SpO2: 96 % (01/04/22 1548) Vital Signs (24h Range):  Temp:  [97.3 °F (36.3 °C)-98.1 °F (36.7 °C)] 97.3 °F (36.3 °C)  Pulse:  [] 98  Resp:  [14-27] 17  SpO2:  [91 %-98 %] 96 %  BP: (107-131)/(51-71) 122/57     Weight: 80 kg (176 lb 5.9 oz)  Body mass index is 26.05 kg/m².    Intake/Output Summary (Last 24 hours) at 1/4/2022 1809  Last data filed at 1/4/2022 0128  Gross per 24 hour   Intake --   Output 1200 ml   Net -1200 ml      Physical Exam  Vitals and nursing note reviewed.   Constitutional:       General: He is not in acute distress.     Appearance: Normal appearance. He is not ill-appearing, toxic-appearing or diaphoretic.   HENT:      Head: Normocephalic and atraumatic.      Nose: Nose normal. No congestion or rhinorrhea.      Mouth/Throat:      Mouth: Mucous membranes are moist.      Pharynx: Oropharynx is clear. No oropharyngeal exudate or posterior oropharyngeal erythema.   Eyes:      Conjunctiva/sclera: Conjunctivae normal.      Pupils: Pupils are equal, round, and reactive to light.   Cardiovascular:      Rate and Rhythm: Normal rate and regular rhythm.      Pulses: Normal pulses.      Heart sounds: Normal heart sounds. No murmur heard.  No friction rub.   Pulmonary:      Effort: Pulmonary effort is normal. No respiratory distress.      Breath sounds: Rhonchi present. No wheezing or rales.   Abdominal:      Tenderness: There is no abdominal tenderness. There is no guarding.   Musculoskeletal:      Right lower leg:  Edema present.      Left lower leg: Edema present.   Skin:     General: Skin is warm and dry.      Capillary Refill: Capillary refill takes less than 2 seconds.   Neurological:      General: No focal deficit present.      Mental Status: He is alert and oriented to person, place, and time.   Psychiatric:         Mood and Affect: Mood normal.         Behavior: Behavior normal.         Thought Content: Thought content normal.         Judgment: Judgment normal.         Significant Labs:   Recent Lab Results       01/04/22  0434   01/03/22  1852        Anion Gap 12         Baso # 0.08         Basophil % 1.1         BUN 36         BUN/CREAT RATIO 17         Calcium 8.9         Chloride 100         CO2 30         Creatinine 2.07         Differential Type Auto         eGFR if non  34         Eos # 0.15         Eosinophil % 2.0         Glucose 129         Hematocrit 36.9         Hemoglobin 11.1         Immature Grans (Abs) 0.02         Immature Granulocytes 0.3         Lymph # 0.73         Lymph % 9.6         Magnesium   1.8       MCH 26.7         MCHC 30.1         MCV 88.9         Mono # 0.65         Mono % 8.6         MPV 11.2         Neutrophils, Abs 5.96         Neutrophils Relative 78.4         nRBC 0.0         NUCLEATED RBC ABSOLUTE 0.00         Platelets 177         Potassium 3.8         RBC 4.15         RDW 18.2         Sodium 138         WBC 7.59               Significant Imaging: I have reviewed all pertinent imaging results/findings within the past 24 hours.      Assessment/Plan:      * Acute on chronic congestive heart failure  Patient has a PMHx of CHF with EF of 20% and diastolic and systolic dysfunction  - Cardiology consult due to CHF exacerbation,  patient not taking any home meds  -  consulted for home health for medication management  - IV dobutamine added and scheduled for cath lab tomorrow   - BNP 45111  - on exam pateint had 2+ pitting edema  - Lasix 60 mg IV x1 in ED with good urinary  output response   - Lasix 40mg IV BID  - strict I/Os  - daily weights  - monitoring potassium        Lower extremity edema  - Lasix 60 mg IV x1 in ED  - Lasix 40mg IV BID  - strict I/Os  - daily weights  - monitoring potassium  - Norco 5 x1 given for leg pain        A-fib  PMHx of Afib  - Amiodarone 200mg PO QD  - Eliquis 5mg PO BID  - on telemetry  - heart cath tomorrow         Hyperlipidemia  Atorvastatin 40mg PO QD      Hypertension  - patient not taking any home meds, cardiology consulted for medication advice   - Lasix 40mg IV BID  - Hydralazine w/parameters      VTE Risk Mitigation (From admission, onward)         Ordered     Reason for No Pharmacological VTE Prophylaxis  Once        Question:  Reasons:  Answer:  Already adequately anticoagulated on oral Anticoagulants    01/03/22 1638     IP VTE HIGH RISK PATIENT  Once         01/03/22 1638     Place sequential compression device  Until discontinued         01/03/22 1638                Discharge Planning   MORGAN:      Code Status: Partial Code   Is the patient medically ready for discharge?:     Reason for patient still in hospital (select all that apply): Treatment               Dhiraj Prince DO  Department of Hospital Medicine   [facility] - Emergency Department

## 2022-01-05 NOTE — PROGRESS NOTES
[facility] - [address] [place] [address] Medicine  Progress Note    Patient Name: Luisito Stuart  MRN: [id number]  Patient Class: OP- Observation   Admission Date: 1/3/2022  Length of Stay: 0 days  Attending Physician: Yanique Basurto,*  Primary Care Provider: JOYA Hill        Subjective:     Principal Problem:Acute on chronic congestive heart failure        HPI:  Luisito Stuart is a 76 y.o.m. with a PMHx of Afib, CHF, COPD, CAD HTN, and IBS that presents to the ED today due to lower leg edema and leg pain bilaterally. Prince reports his legs have been swollen and pain for about 4 days. He reports he came to the ED 3 days ago for these symptoms and was told to double the dose of his Lasix 20mg. Patient had an appointment with Dr. Mckee today. During the visit patient was instructed to go to the ED for IV diuretics. Patient was admitted to inpatient  Family Medicine care. Patient states he is taking all his home medication however, when his daughter was contacted, she stated he is only taking Entresto. Will consult cardiology for help with medications.            Overview/Hospital Course:  No notes on file    Interval History: Patient seen this morning resting in bed with c/o chest wall pain located at mid-right upper sternum. Pain is reproduced on palpitation, pain does not radiate nor associated with exertion. Patient had an episode of leg pain last night and Norco 5 was give x1. Patient given PHQ-9 with a score of 2, no action required. Patients leg swelling is slightly improved from yesterday. Patient is schedule for LH cath today. Will continue to monitor and follow cardiology's recommendations.     Review of Systems   Constitutional: Negative for activity change, appetite change, chills, diaphoresis and fever.   HENT: Negative for congestion, hearing loss, nosebleeds, postnasal drip, rhinorrhea, sore throat and trouble swallowing.    Eyes: Negative for visual  disturbance.   Respiratory: Negative for cough, chest tightness and shortness of breath.    Cardiovascular: Positive for chest pain and leg swelling.   Gastrointestinal: Negative for abdominal pain, blood in stool, constipation, diarrhea, nausea and vomiting.   Genitourinary: Negative for dysuria and hematuria.   Musculoskeletal: Negative for joint swelling.   Skin: Negative for rash.   Neurological: Negative for dizziness, weakness, light-headedness and headaches.   All other systems reviewed and are negative.    Objective:     Vital Signs (Most Recent):  Temp: 97.7 °F (36.5 °C) (01/05/22 0701)  Pulse: 97 (01/05/22 0810)  Resp: (!) 22 (01/05/22 1005)  BP: 117/72 (01/05/22 0701)  SpO2: (!) 93 % (Pt. did not have O2 on) (01/05/22 0810) Vital Signs (24h Range):  Temp:  [96.1 °F (35.6 °C)-97.9 °F (36.6 °C)] 97.7 °F (36.5 °C)  Pulse:  [] 97  Resp:  [13-22] 22  SpO2:  [91 %-99 %] 93 %  BP: (110-122)/(54-72) 117/72     Weight: 82.9 kg (182 lb 12.2 oz)  Body mass index is 26.99 kg/m².    Intake/Output Summary (Last 24 hours) at 1/5/2022 1005  Last data filed at 1/5/2022 0800  Gross per 24 hour   Intake 597 ml   Output 1951 ml   Net -1354 ml      Physical Exam  Vitals and nursing note reviewed.   Constitutional:       Appearance: Normal appearance. He is not ill-appearing, toxic-appearing or diaphoretic.   HENT:      Head: Normocephalic and atraumatic.      Nose: Nose normal. No congestion or rhinorrhea.      Mouth/Throat:      Mouth: Mucous membranes are moist.      Pharynx: Oropharynx is clear. No oropharyngeal exudate or posterior oropharyngeal erythema.   Eyes:      Conjunctiva/sclera: Conjunctivae normal.      Pupils: Pupils are equal, round, and reactive to light.   Cardiovascular:      Rate and Rhythm: Normal rate and regular rhythm.      Pulses: Normal pulses.      Heart sounds: Normal heart sounds. No murmur heard.  No friction rub.   Pulmonary:      Effort: Pulmonary effort is normal. No respiratory  distress.      Breath sounds: Normal breath sounds. No wheezing, rhonchi or rales.   Abdominal:      Tenderness: There is no abdominal tenderness. There is no guarding.   Musculoskeletal:      Right lower leg: Edema present.      Left lower leg: Edema present.   Skin:     Capillary Refill: Capillary refill takes less than 2 seconds.   Neurological:      General: No focal deficit present.      Mental Status: He is alert and oriented to person, place, and time.   Psychiatric:         Mood and Affect: Mood normal.         Behavior: Behavior normal.         Thought Content: Thought content normal.         Judgment: Judgment normal.         Significant Labs:   Recent Lab Results       01/05/22  0331        Anion Gap 13       Aniso 1+       Bands 2       Baso # 0.09       Basophil % 1.1        2       BUN 39       BUN/CREAT RATIO 18       Calcium 8.9       Chloride 99       CO2 31       Creatinine 2.15       Differential Type Manual       eGFR if non  32       Eos # 0.12       Eosinophil % 1.5        6       Glucose 89       Hematocrit 36.6       Hemoglobin 11.6       Hypo Few       Immature Grans (Abs) 0.03       Immature Granulocytes 0.4       Lymph # 0.64       Lymph % 8.1        10       MCH 26.9       MCHC 31.7       MCV 84.7       Mono # 0.71       Mono % 9.0        2       MPV 11.8       Neutrophils, Abs 6.31       Neutrophils Relative 79.9       nRBC 0.0       NUCLEATED RBC ABSOLUTE 0.00       Ovalocytes Few       PLATELET MORPHOLOGY Few Large Platelets       Platelets 182       Potassium 4.2       RBC 4.32       RDW 17.6       Segmented Neutrophils, Man % 78       Sodium 139       Target Cells Few       WBC 7.90             Significant Imaging: I have reviewed all pertinent imaging results/findings within the past 24 hours.      Assessment/Plan:      * Acute on chronic congestive heart failure  Patient has a PMHx of CHF with EF of 20% and diastolic and systolic dysfunction  - Cardiology consult  due to CHF exacerbation,  patient not taking any home meds  - SS consulted for home health for medication management, patient denied HH  - IV dobutamine added  - BNP 00835  - on exam pateint had 2+ pitting edema  - Lasix 60 mg IV x1 in ED with good urinary output response   - Lasix 40mg IV BID  - strict I/Os  - daily weights  - monitoring potassium  - Scheduled for LH cath today        Lower extremity edema  - Lasix 60 mg IV x1 in ED  - Lasix 40mg IV BID  - strict I/Os  - daily weights  - monitoring potassium  - Norco 5 x1 given for leg pain on 1/4        A-fib  PMHx of Afib  - Amiodarone 200mg PO QD  - Eliquis 5mg PO BID held for LH cath  - on telemetry  - heart cath tomorrow         Hyperlipidemia  Atorvastatin 40mg PO QD      Hypertension  - patient not taking any home meds, cardiology consulted for medication advice   - Lasix 40mg IV BID  - Hydralazine w/parameters      VTE Risk Mitigation (From admission, onward)         Ordered     Reason for No Pharmacological VTE Prophylaxis  Once        Question:  Reasons:  Answer:  Already adequately anticoagulated on oral Anticoagulants    01/03/22 1638     IP VTE HIGH RISK PATIENT  Once         01/03/22 1638     Place sequential compression device  Until discontinued         01/03/22 1638                Discharge Planning   MORGAN:      Code Status: Partial Code   Is the patient medically ready for discharge?:     Reason for patient still in hospital (select all that apply): Treatment               Dhiraj Prince DO  Department of Hospital Medicine   [address]

## 2022-01-05 NOTE — ASSESSMENT & PLAN NOTE
PMHx of Afib  - Amiodarone 200mg PO QD  - Eliquis 5mg PO BID held for LH cath  - on telemetry  - heart cath tomorrow

## 2022-01-05 NOTE — NURSING
Pt returned from cath lab via stretcher. Brought back by PETR Melvin. Pt transferred to regular bed. Pt alert and opens eyes spontaneously. Right groin dressing dry & intact. No bleeding or hematoma noted with pulse on right leg faint but palpable.   VS obtained. Denies pain or needs. Dobutamine drip rate changed per PETR Melvin. Pt denies any needs. Safety ms ongoing. cb in reach. Bed low. Wctm.

## 2022-01-06 PROBLEM — I95.2 HYPOTENSION DUE TO DRUGS: Status: ACTIVE | Noted: 2022-01-01

## 2022-01-06 NOTE — ASSESSMENT & PLAN NOTE
Consulted ICU for hypotension at 80/30  Per pulmonology:  -denies symptoms  - continues to appear volume overloaded  - would continue current management, continue to monitor  - given his low EF and diminished cardiac output he will have difficulty generating a normal BP  - would consider transfer if patient shows signs of malperfusion or symptoms of hypotension

## 2022-01-06 NOTE — ASSESSMENT & PLAN NOTE
Patient is breathing comfortably lying flat. He has some residual lower ext edema but we feel this is most likely related to hypoalbuminemia.

## 2022-01-06 NOTE — ASSESSMENT & PLAN NOTE
Cardiac cath films reviewed by Dr. Pierson.   Patient appears to be euvolemic at this time.  He has no angina.   Plan to wean the dobutamine today and if no issues overnight should be fine to dc tomorrow and follow up with cardiology in one week.  After dobutamine is off would restart beta blocker as taken prior to admission.  His renal function has continued to decline and would hold ACE/ARB/Entresto until his renal function has stablized.  Also would no tart SGLT2 inhibitor at this time due to issues with hypotension and poor renal function. These issues may be reassessed at follow up in cardiology clinic with Dr. Wesley.

## 2022-01-06 NOTE — SUBJECTIVE & OBJECTIVE
Interval History:     This morning, pt is lying in bed and resting. Denies any chest pain or SOB. Leg edema improved from yesterday. Denies any dizziness or nausea. Pt had R/LHC yesterday and shows 70% stenosis in Ost Cx lesion, 60% stenosis in Dist LM to Ost LAD lesion, 100% stenosis in Ost RCA to Prox RCA lesion, Mid LAD lesion stenosis 70%, 50% stenosis in dist Graft to Insertion lesion. Given complexity of the lesion and pts CKD with low outpt, further attempts at revascularization deferred per cardiology. Will consider PCI to ostial circumflex once pt is euvolemic. Per cardiology, lasix increased to 80mg IV BID, increased dobutamine at 5mcg/kg/min.     This morning, pt was noted to have hypotension at 80/30 with continued dobutamine. Consulted ICU. Per pulmonology, pt was not symptomatic and given his low EF and diminished cardiac outpt, generating a normal BP not expected. Would consider transfer if pt shows signs of malperfusion or symptoms of hypotension.       Review of Systems   Constitutional: Negative for activity change, appetite change, chills, diaphoresis and fever.   HENT: Negative for congestion, hearing loss, nosebleeds, postnasal drip, rhinorrhea, sore throat and trouble swallowing.    Eyes: Negative for visual disturbance.   Respiratory: Negative for cough, chest tightness and shortness of breath.    Cardiovascular: Positive for leg swelling. Negative for chest pain.   Gastrointestinal: Negative for abdominal pain, blood in stool, constipation, diarrhea, nausea and vomiting.   Genitourinary: Negative for dysuria and hematuria.   Musculoskeletal: Negative for joint swelling.   Skin: Negative for rash.   Neurological: Negative for dizziness, weakness, light-headedness and headaches.   All other systems reviewed and are negative.    Objective:     Vital Signs (Most Recent):  Temp: 98.1 °F (36.7 °C) (01/06/22 1100)  Pulse: 81 (01/06/22 1100)  Resp: 18 (01/06/22 1100)  BP: (!) 98/54 (Nurse Sri was  informed of the blood pressure.) (01/06/22 1100)  SpO2: 98 % (01/06/22 1100) Vital Signs (24h Range):  Temp:  [97.5 °F (36.4 °C)-98.4 °F (36.9 °C)] 98.1 °F (36.7 °C)  Pulse:  [] 81  Resp:  [18-20] 18  SpO2:  [94 %-98 %] 98 %  BP: ()/(40-89) 98/54     Weight: 82.9 kg (182 lb 12.2 oz)  Body mass index is 26.99 kg/m².    Intake/Output Summary (Last 24 hours) at 1/6/2022 1546  Last data filed at 1/6/2022 1500  Gross per 24 hour   Intake 240 ml   Output 1525 ml   Net -1285 ml      Physical Exam  Vitals and nursing note reviewed.   Constitutional:       Appearance: Normal appearance. He is not ill-appearing, toxic-appearing or diaphoretic.   HENT:      Head: Normocephalic and atraumatic.      Nose: Nose normal. No congestion or rhinorrhea.      Mouth/Throat:      Mouth: Mucous membranes are moist.      Pharynx: Oropharynx is clear. No oropharyngeal exudate or posterior oropharyngeal erythema.   Eyes:      Conjunctiva/sclera: Conjunctivae normal.      Pupils: Pupils are equal, round, and reactive to light.   Cardiovascular:      Rate and Rhythm: Normal rate and regular rhythm.      Pulses: Normal pulses.      Heart sounds: Normal heart sounds. No murmur heard.  No friction rub.   Pulmonary:      Effort: Pulmonary effort is normal. No respiratory distress.      Breath sounds: Normal breath sounds. No wheezing, rhonchi or rales.   Abdominal:      Tenderness: There is no abdominal tenderness. There is no guarding.   Musculoskeletal:      Right lower leg: Edema present.      Left lower leg: Edema present.   Skin:     Capillary Refill: Capillary refill takes less than 2 seconds.   Neurological:      General: No focal deficit present.      Mental Status: He is alert and oriented to person, place, and time.   Psychiatric:         Mood and Affect: Mood normal.         Behavior: Behavior normal.         Thought Content: Thought content normal.         Judgment: Judgment normal.         Significant Labs:   Recent Lab  Results       01/06/22  1102   01/06/22  0554   01/06/22  0549   01/05/22 2027        Albumin/Globulin Ratio     1.0         Albumin     3.1         Alkaline Phosphatase     76         ALT     11         Anion Gap     11         Aniso   1+           AST     16         Bands   1           Baso #   0.07           Basophil %   0.9              1           BILIRUBIN TOTAL     1.5         BUN     40         BUN/CREAT RATIO     16         Calcium     8.4         Chloride     97         CO2     32         Creatinine     2.50         Differential Type   Manual           eGFR if non      27         Eos #   0.09           Eosinophil %   1.2           Globulin, Total     3.0         Glucose     93         Hematocrit   36.2           Hemoglobin   11.4           Immature Grans (Abs)   0.04           Immature Granulocytes   0.5           Lymph #   0.59           Lymph %   7.6              7           Magnesium     2.0         MCH   27.1           MCHC   31.5           MCV   86.2           Mono #   0.92           Mono %   11.9              8           MPV   11.9           Neutrophils, Abs   6.01           Neutrophils Relative   77.9           nRBC   0.0           NUCLEATED RBC ABSOLUTE   0.00           Ovalocytes   Few           PLATELET MORPHOLOGY   Few Large Platelets           Platelets   153           POC Glucose 88       140       Potassium     4.1         PROTEIN TOTAL     6.1         RBC   4.20           RDW   17.9           Segmented Neutrophils, Man %   83           Sodium     136         WBC   7.72                 Significant Imaging: I have reviewed all pertinent imaging results/findings within the past 24 hours.

## 2022-01-06 NOTE — HPI
"76 y.o.with a PMHx of Afib, HFrEF and diastolic dysfunction, COPD, CAD HTN, and IBS that was admitted 1/3 for decompensated heart failure. On day of admission he was seen in cardiology clinic by Dr Mckee with bilateral LE edema and shortness of breath. He reports he came to the ED 3 days ago for these symptoms and was told to double the dose of his Lasix 20mg. He has an extensive ischemic cardiac history "CABG (2004;[place] - s/[facility] with PCI of Lcx 3/6/2021 which also revealed patent FINNEGAN to LAD, patent saphenous vein graft to PDA and known occlusion of the vein graft to Lcx), Ischemic cardiomyopathy with EF 10%, AICD (last shock 9 months ago, reports ablation performed 6 months ago in [place])". 60 + year smoker and continues to smoke. On my exam his only complaint is that people keep bothering him and he is unable to sleep. Denies chest pain. Breathing has improved as has his LE edema with dobutamine and IV diuresis.  Net negative about 4L since admission. ICU consulted today for hypotension. He denies any symptoms including dizziness, nausea.   "

## 2022-01-06 NOTE — CONSULTS
"[address]  Pulmonology  Consult Note    Patient Name: Luisito Stuart  MRN: [id number]  Admission Date: 1/3/2022  Hospital Length of Stay: 0 days  Code Status: Partial Code  Attending Physician: Stephen Malcolm MD  Primary Care Provider: JOYA Hill   Principal Problem: Acute on chronic congestive heart failure    Consults  Subjective:     HPI:  76 y.o.with a PMHx of Afib, HFrEF and diastolic dysfunction, COPD, CAD HTN, and IBS that was admitted 1/3 for decompensated heart failure. On day of admission he was seen in cardiology clinic by Dr Mckee with bilateral LE edema and shortness of breath. He reports he came to the ED 3 days ago for these symptoms and was told to double the dose of his Lasix 20mg. He has an extensive ischemic cardiac history "CABG (2004;[facility] s/[facility] with PCI of Lcx 3/6/2021 which also revealed patent FINNEGAN to LAD, patent saphenous vein graft to PDA and known occlusion of the vein graft to Lcx), Ischemic cardiomyopathy with EF 10%, AICD (last shock 9 months ago, reports ablation performed 6 months ago in [place])". 60 + year smoker and continues to smoke. On my exam his only complaint is that people keep bothering him and he is unable to sleep. Denies chest pain. Breathing has improved as has his LE edema with dobutamine and IV diuresis.  Net negative about 4L since admission. ICU consulted today for hypotension. He denies any symptoms including dizziness, nausea.       Past Medical History:   Diagnosis Date    Atrial fibrillation     CHF (congestive heart failure)     COPD (chronic obstructive pulmonary disease)     Coronary artery disease     Disorder of kidney and ureter     Hypertension        Past Surgical History:   Procedure Laterality Date    APPENDECTOMY      CARDIAC DEFIBRILLATOR PLACEMENT      JOINT REPLACEMENT      KIDNEY TRANSPLANT         Review of patient's allergies indicates:   Allergen Reactions    " Indomethacin Itching and Other (See Comments)     Other reaction(s): ITCHING       Family History     Problem Relation (Age of Onset)    Cancer Mother    Heart disease Father        Tobacco Use    Smoking status: Current Every Day Smoker     Packs/day: 1.00     Years: 46.00     Pack years: 46.00     Types: Cigarettes    Smokeless tobacco: Never Used   Substance and Sexual Activity    Alcohol use: Not Currently    Drug use: Never    Sexual activity: Not Currently         Review of Systems   Constitutional: Negative for appetite change, fatigue, fever and unexpected weight change.   HENT: Negative for congestion, postnasal drip, rhinorrhea, sinus pressure and sore throat.    Eyes: Negative for visual disturbance.   Respiratory: Positive for shortness of breath. Negative for cough and wheezing.    Cardiovascular: Positive for leg swelling. Negative for chest pain and palpitations.   Gastrointestinal: Negative for abdominal distention, abdominal pain, nausea and vomiting.   Musculoskeletal: Negative for arthralgias and joint swelling.   Skin: Negative for color change, pallor and rash.   Allergic/Immunologic: Negative for environmental allergies, food allergies and immunocompromised state.   Neurological: Negative for dizziness, syncope, light-headedness and headaches.   Hematological: Negative for adenopathy. Does not bruise/bleed easily.   Psychiatric/Behavioral: Negative for sleep disturbance. The patient is not nervous/anxious.      Objective:     Vital Signs (Most Recent):  Temp: 98.1 °F (36.7 °C) (01/06/22 1100)  Pulse: 81 (01/06/22 1100)  Resp: 18 (01/06/22 1100)  BP: (!) 98/54 (Nurse Sri was informed of the blood pressure.) (01/06/22 1100)  SpO2: 98 % (01/06/22 1100) Vital Signs (24h Range):  Temp:  [97.4 °F (36.3 °C)-98.6 °F (37 °C)] 98.1 °F (36.7 °C)  Pulse:  [] 81  Resp:  [18-20] 18  SpO2:  [92 %-98 %] 98 %  BP: ()/(40-92) 98/54     Weight: 82.9 kg (182 lb 12.2 oz)  Body mass index is  26.99 kg/m².      Intake/Output Summary (Last 24 hours) at 1/6/2022 1249  Last data filed at 1/6/2022 1100  Gross per 24 hour   Intake 240 ml   Output 1400 ml   Net -1160 ml       Physical Exam  Vitals reviewed.   Constitutional:       General: He is not in acute distress.  HENT:      Head: Normocephalic and atraumatic.      Right Ear: External ear normal.      Left Ear: External ear normal.      Mouth/Throat:      Mouth: Mucous membranes are moist.      Pharynx: Oropharynx is clear.   Eyes:      Conjunctiva/sclera: Conjunctivae normal.      Pupils: Pupils are equal, round, and reactive to light.   Cardiovascular:      Rate and Rhythm: Normal rate and regular rhythm.      Heart sounds: Normal heart sounds.      Comments: JVP elevated to mandible  Pulmonary:      Effort: Pulmonary effort is normal. No respiratory distress.      Breath sounds: Normal breath sounds. No wheezing, rhonchi or rales.   Abdominal:      General: Abdomen is flat. Bowel sounds are normal.      Palpations: Abdomen is soft.      Tenderness: There is no abdominal tenderness.   Musculoskeletal:      Cervical back: Neck supple.      Right lower leg: Edema present.      Left lower leg: Edema present.   Lymphadenopathy:      Cervical: No cervical adenopathy.   Skin:     General: Skin is warm and dry.      Coloration: Skin is pale.   Neurological:      General: No focal deficit present.      Mental Status: He is alert and oriented to person, place, and time. Mental status is at baseline.   Psychiatric:         Mood and Affect: Mood normal.         Vents:  Oxygen Concentration (%): 100 (01/05/22 1122)    Lines/Drains/Airways     Peripheral Intravenous Line                 Peripheral IV - Single Lumen 01/05/22 0455 20 G Posterior;Right Forearm 1 day                Significant Labs:    CBC/Anemia Profile:  Recent Labs   Lab 01/05/22  0331 01/06/22  0554   WBC 7.90 7.72   HGB 11.6* 11.4*   HCT 36.6* 36.2*    153   MCV 84.7 86.2   RDW 17.6* 17.9*         Chemistries:  Recent Labs   Lab 01/05/22  0331 01/06/22  0549    136   K 4.2 4.1   CL 99 97*   CO2 31 32   BUN 39* 40*   CREATININE 2.15* 2.50*   CALCIUM 8.9 8.4*   ALBUMIN  --  3.1*   PROT  --  6.1*   BILITOT  --  1.5*   ALKPHOS  --  76   ALT  --  11*   AST  --  16   MG  --  2.0       All pertinent labs within the past 24 hours have been reviewed.    Significant Imaging:   I have reviewed all pertinent imaging results/findings within the past 24 hours.    Assessment/Plan:     * Acute on chronic congestive heart failure  Patient is identified as having Combined Systolic and Diastolic heart failure that is Acute on chronic. CHF is currently uncontrolled due to Continued edema of extremities and JVD. Latest ECHO performed and demonstrates- Results for orders placed during the hospital encounter of 11/10/21    Echo    Interpretation Summary  · The left ventricle is moderately enlarged with concentric remodeling and severely decreased systolic function.  · The estimated ejection fraction is 20%.  · Grade III left ventricular diastolic dysfunction.  · Mild right ventricular enlargement with mildly reduced right ventricular systolic function.  · Moderate to severe left atrial enlargement.  · Moderate right atrial enlargement.  · Moderate-to-severe mitral regurgitation.  · Severe tricuspid regurgitation.  · Elevated central venous pressure (15 mmHg).  · The estimated PA systolic pressure is 44 mmHg.  · There is pulmonary hypertension.  . Continue Furosemide and monitor clinical status closely. Monitor on telemetry. Patient is on CHF pathway.  Monitor strict Is&Os and daily weights.  Place on fluid restriction of 1 L.   - continue dobutamine and lasix    Hypotension due to drugs  - BP low 98/54  - denies symptoms  - continues to appear volume overloaded  - would continue current management, continue to monitor  - given his low EF and diminished cardiac output he will have difficulty generating a normal BP  -  would consider transfer if patient shows signs of malperfusion or symptoms of hypotension          Thank you for your consult. I will sign off. Please contact us if you have any additional questions.     Mohamud Casarez MD  Pulmonology  [facility] - [address] [place] [address]

## 2022-01-06 NOTE — ASSESSMENT & PLAN NOTE
PMHx of Afib  - Amiodarone 200mg PO QD  - Eliquis 5mg PO BID held due to L/RHC (1/5/21)  - on telemetry

## 2022-01-06 NOTE — ASSESSMENT & PLAN NOTE
Patient is identified as having Combined Systolic and Diastolic heart failure that is Acute on chronic. CHF is currently uncontrolled due to Continued edema of extremities and JVD. Latest ECHO performed and demonstrates- Results for orders placed during the hospital encounter of 11/10/21    Echo    Interpretation Summary  · The left ventricle is moderately enlarged with concentric remodeling and severely decreased systolic function.  · The estimated ejection fraction is 20%.  · Grade III left ventricular diastolic dysfunction.  · Mild right ventricular enlargement with mildly reduced right ventricular systolic function.  · Moderate to severe left atrial enlargement.  · Moderate right atrial enlargement.  · Moderate-to-severe mitral regurgitation.  · Severe tricuspid regurgitation.  · Elevated central venous pressure (15 mmHg).  · The estimated PA systolic pressure is 44 mmHg.  · There is pulmonary hypertension.  . Continue Furosemide and monitor clinical status closely. Monitor on telemetry. Patient is on CHF pathway.  Monitor strict Is&Os and daily weights.  Place on fluid restriction of 1 L.   - continue dobutamine and lasix

## 2022-01-06 NOTE — SUBJECTIVE & OBJECTIVE
Past Medical History:   Diagnosis Date    Atrial fibrillation     CHF (congestive heart failure)     COPD (chronic obstructive pulmonary disease)     Coronary artery disease     Disorder of kidney and ureter     Hypertension        Past Surgical History:   Procedure Laterality Date    APPENDECTOMY      CARDIAC DEFIBRILLATOR PLACEMENT      JOINT REPLACEMENT      KIDNEY TRANSPLANT         Review of patient's allergies indicates:   Allergen Reactions    Indomethacin Itching and Other (See Comments)     Other reaction(s): ITCHING       Family History     Problem Relation (Age of Onset)    Cancer Mother    Heart disease Father        Tobacco Use    Smoking status: Current Every Day Smoker     Packs/day: 1.00     Years: 46.00     Pack years: 46.00     Types: Cigarettes    Smokeless tobacco: Never Used   Substance and Sexual Activity    Alcohol use: Not Currently    Drug use: Never    Sexual activity: Not Currently         Review of Systems   Constitutional: Negative for appetite change, fatigue, fever and unexpected weight change.   HENT: Negative for congestion, postnasal drip, rhinorrhea, sinus pressure and sore throat.    Eyes: Negative for visual disturbance.   Respiratory: Positive for shortness of breath. Negative for cough and wheezing.    Cardiovascular: Positive for leg swelling. Negative for chest pain and palpitations.   Gastrointestinal: Negative for abdominal distention, abdominal pain, nausea and vomiting.   Musculoskeletal: Negative for arthralgias and joint swelling.   Skin: Negative for color change, pallor and rash.   Allergic/Immunologic: Negative for environmental allergies, food allergies and immunocompromised state.   Neurological: Negative for dizziness, syncope, light-headedness and headaches.   Hematological: Negative for adenopathy. Does not bruise/bleed easily.   Psychiatric/Behavioral: Negative for sleep disturbance. The patient is not nervous/anxious.      Objective:     Vital  Signs (Most Recent):  Temp: 98.1 °F (36.7 °C) (01/06/22 1100)  Pulse: 81 (01/06/22 1100)  Resp: 18 (01/06/22 1100)  BP: (!) 98/54 (Nurse Sri was informed of the blood pressure.) (01/06/22 1100)  SpO2: 98 % (01/06/22 1100) Vital Signs (24h Range):  Temp:  [97.4 °F (36.3 °C)-98.6 °F (37 °C)] 98.1 °F (36.7 °C)  Pulse:  [] 81  Resp:  [18-20] 18  SpO2:  [92 %-98 %] 98 %  BP: ()/(40-92) 98/54     Weight: 82.9 kg (182 lb 12.2 oz)  Body mass index is 26.99 kg/m².      Intake/Output Summary (Last 24 hours) at 1/6/2022 1249  Last data filed at 1/6/2022 1100  Gross per 24 hour   Intake 240 ml   Output 1400 ml   Net -1160 ml       Physical Exam  Vitals reviewed.   Constitutional:       General: He is not in acute distress.  HENT:      Head: Normocephalic and atraumatic.      Right Ear: External ear normal.      Left Ear: External ear normal.      Mouth/Throat:      Mouth: Mucous membranes are moist.      Pharynx: Oropharynx is clear.   Eyes:      Conjunctiva/sclera: Conjunctivae normal.      Pupils: Pupils are equal, round, and reactive to light.   Cardiovascular:      Rate and Rhythm: Normal rate and regular rhythm.      Heart sounds: Normal heart sounds.      Comments: JVP elevated to mandible  Pulmonary:      Effort: Pulmonary effort is normal. No respiratory distress.      Breath sounds: Normal breath sounds. No wheezing, rhonchi or rales.   Abdominal:      General: Abdomen is flat. Bowel sounds are normal.      Palpations: Abdomen is soft.      Tenderness: There is no abdominal tenderness.   Musculoskeletal:      Cervical back: Neck supple.      Right lower leg: Edema present.      Left lower leg: Edema present.   Lymphadenopathy:      Cervical: No cervical adenopathy.   Skin:     General: Skin is warm and dry.      Coloration: Skin is pale.   Neurological:      General: No focal deficit present.      Mental Status: He is alert and oriented to person, place, and time. Mental status is at baseline.    Psychiatric:         Mood and Affect: Mood normal.         Vents:  Oxygen Concentration (%): 100 (01/05/22 1122)    Lines/Drains/Airways     Peripheral Intravenous Line                 Peripheral IV - Single Lumen 01/05/22 0455 20 G Posterior;Right Forearm 1 day                Significant Labs:    CBC/Anemia Profile:  Recent Labs   Lab 01/05/22  0331 01/06/22  0554   WBC 7.90 7.72   HGB 11.6* 11.4*   HCT 36.6* 36.2*    153   MCV 84.7 86.2   RDW 17.6* 17.9*        Chemistries:  Recent Labs   Lab 01/05/22  0331 01/06/22  0549    136   K 4.2 4.1   CL 99 97*   CO2 31 32   BUN 39* 40*   CREATININE 2.15* 2.50*   CALCIUM 8.9 8.4*   ALBUMIN  --  3.1*   PROT  --  6.1*   BILITOT  --  1.5*   ALKPHOS  --  76   ALT  --  11*   AST  --  16   MG  --  2.0       All pertinent labs within the past 24 hours have been reviewed.    Significant Imaging:   I have reviewed all pertinent imaging results/findings within the past 24 hours.

## 2022-01-06 NOTE — ASSESSMENT & PLAN NOTE
- Lasix 60 mg IV x1 in ED  -Increased lasix to 80 IV BID  - strict I/Os  - daily weights  - monitoring potassium  - Norco 5 x1 given for leg pain on 1/4

## 2022-01-06 NOTE — ASSESSMENT & PLAN NOTE
- BP low 98/54  - denies symptoms  - continues to appear volume overloaded  - would continue current management, continue to monitor  - given his low EF and diminished cardiac output he will have difficulty generating a normal BP  - would consider transfer if patient shows signs of malperfusion or symptoms of hypotension

## 2022-01-06 NOTE — ASSESSMENT & PLAN NOTE
- patient not taking any home meds, cardiology consulted for medication advice   - Hydralazine w/ parameters  -Per cardiology, Plan to wean the dobutamine today and if no issues overnight should be fine to dc tomorrow and follow up with cardiology in one week.\  -After dobutamine is off would restart beta blocker as taken prior to admission.  -His renal function has continued to decline and would hold ACE/ARB/Entresto until his renal function has stablized.  -Also would not start SGLT2 inhibitor at this time due to issues with hypotension and poor renal function. These issues may be reassessed at follow up in cardiology clinic with Dr. Wesley.

## 2022-01-06 NOTE — ASSESSMENT & PLAN NOTE
Patient has a PMHx of CHF with EF of 20% and diastolic and systolic dysfunction  - Cardiology consult due to CHF exacerbation,  patient not taking any home meds  - SS consulted for home health for medication management, patient denied HH  - BNP [id number]  -LE edema improving  - Lasix 60 mg IV x1 in ED with good urinary output response   - Lasix increased to 80 BID IV (1/5/21)  -IV dobutamine  - strict I/Os  - daily weights  - monitoring potassium  S/P R/LHC(1/5): 70% stenosis in Ost Cx lesion, 60% stenosis in Dist LM to Ost LAD lesion, 100% stenosis in Ost RCA to Prox RCA lesion, Mid LAD lesion stenosis 70%, 50% stenosis in dist Graft to Insertion lesion. Given complexity of the lesion and pts CKD with low outpt, further attempts at revascularization deferred per cardiology. Will consider PCI to ostial circumflex once pt is euvolemic.

## 2022-01-06 NOTE — PROGRESS NOTES
[facility] - [address] [place], [place] [address]  Cardiology  Progress Note    Patient Name: Luisito Stuart  MRN: [id number]  Admission Date: 1/3/2022  Hospital Length of Stay: 0 days  Code Status: Partial Code   Attending Physician: Stephen Malcolm MD   Primary Care Physician: JOYA Hill  Expected Discharge Date:   Principal Problem:Acute on chronic congestive heart failure    Subjective:     Hospital Course:   No notes on file    Interval History: 75 y/o wm admitted with SOB and lower ext edema    Review of Systems   Cardiovascular: Positive for leg swelling. Negative for chest pain, irregular heartbeat, near-syncope, orthopnea, palpitations, paroxysmal nocturnal dyspnea and syncope.   All other systems reviewed and are negative.    Objective:     Vital Signs (Most Recent):  Temp: 98.1 °F (36.7 °C) (01/06/22 1100)  Pulse: 81 (01/06/22 1100)  Resp: 18 (01/06/22 1100)  BP: (!) 98/54 (Nurse Sri was informed of the blood pressure.) (01/06/22 1100)  SpO2: 98 % (01/06/22 1100) Vital Signs (24h Range):  Temp:  [97.5 °F (36.4 °C)-98.6 °F (37 °C)] 98.1 °F (36.7 °C)  Pulse:  [] 81  Resp:  [18-20] 18  SpO2:  [92 %-98 %] 98 %  BP: ()/(40-92) 98/54     Weight: 82.9 kg (182 lb 12.2 oz)  Body mass index is 26.99 kg/m².     SpO2: 98 %  O2 Device (Oxygen Therapy): room air      Intake/Output Summary (Last 24 hours) at 1/6/2022 1456  Last data filed at 1/6/2022 1100  Gross per 24 hour   Intake 240 ml   Output 1200 ml   Net -960 ml       Lines/Drains/Airways     Peripheral Intravenous Line                 Peripheral IV - Single Lumen 01/05/22 0455 20 G Posterior;Right Forearm 1 day                Physical Exam  Vitals and nursing note reviewed.   Constitutional:       Appearance: Normal appearance. He is normal weight.      Comments: Comfortable lying flat in bed   HENT:      Head: Normocephalic and atraumatic.   Eyes:      Pupils: Pupils are equal, round, and reactive to light.   Neck:      Vascular: No  carotid bruit.   Cardiovascular:      Rate and Rhythm: Normal rate and regular rhythm.      Pulses: Normal pulses.      Heart sounds: Normal heart sounds.   Pulmonary:      Effort: Pulmonary effort is normal.      Breath sounds: Normal breath sounds.   Abdominal:      General: Bowel sounds are normal.      Palpations: Abdomen is soft.   Musculoskeletal:      Cervical back: Neck supple.      Right lower leg: Edema present.      Left lower leg: Edema present.      Comments: 1+ edema bilateral lower ext   Skin:     General: Skin is warm and dry.      Capillary Refill: Capillary refill takes less than 2 seconds.   Neurological:      General: No focal deficit present.      Mental Status: He is alert and oriented to person, place, and time.   Psychiatric:         Mood and Affect: Mood normal.         Behavior: Behavior normal.         Significant Labs:   CMP   Recent Labs   Lab 01/05/22  0331 01/06/22  0549    136   K 4.2 4.1   CL 99 97*   CO2 31 32   GLU 89 93   BUN 39* 40*   CREATININE 2.15* 2.50*   CALCIUM 8.9 8.4*   PROT  --  6.1*   ALBUMIN  --  3.1*   BILITOT  --  1.5*   ALKPHOS  --  76   AST  --  16   ALT  --  11*   ANIONGAP 13 11   EGFRNONAA 32* 27*       Significant Imaging: Echocardiogram:   Transthoracic echo (TTE) complete (Cupid Only):   Results for orders placed or performed during the hospital encounter of 11/10/21   Echo   Result Value Ref Range    Posterior Wall 1.32 (A) 0.6 - 1.1 cm    E wave deceleration time 154 msec    MV Peak E Jim 1.11 m/s    IVS 1.36 (A) 0.6 - 1.1 cm    LVIDd 6.60 (A) 3.5 - 6.0 cm    LVIDs 5.17 (A) 2.1 - 4.0 cm    Echo EF Estimated 43 %    LV Systolic Volume 127.80 mL    LV Diastolic Volume 223.60 mL    RVDD 4.33 cm    AORTIC VALVE CUSP SEPERATION 1.67 cm    FS 22 %    LV mass 426.32 g    Left Ventricle Relative Wall Thickness 0.40 cm    LV Systolic Volume Index 61.4 mL/m2    LV Diastolic Volume Index 107.50 mL/m2    LV Mass Index 205 g/m2    BSA 2.11 m2    IVC diameter 2.82  cm    RV mid diameter 3.75 cm    EF 20 %    Left Ventricular Outflow Tract Mean Gradient 0.70 mmHg    LA size 5.80 cm    TAPSE 1.91 cm    Right ventricular length in diastole (apical 4-chamber view) 7.06 cm    AV mean gradient 2 mmHg    AV valve area 2.09 cm2    AV index (prosthetic) 0.67     E/A ratio 3.47     LVOT diameter 2.00 cm    LVOT area 3.1 cm2    LVOT peak VTI 9.73 cm    Ao VTI 14.60 cm    LVOT stroke volume 30.55 cm3    TR Max Jim 2.69 m/s    MV Peak A Jim 0.32 m/s    Triscuspid Valve Regurgitation Peak Gradient 29 mmHg    Right Atrial Pressure (from IVC) 15 mmHg    TV rest pulmonary artery pressure 44 mmHg    Narrative    · The left ventricle is moderately enlarged with concentric remodeling and   severely decreased systolic function.  · The estimated ejection fraction is 20%.  · Grade III left ventricular diastolic dysfunction.  · Mild right ventricular enlargement with mildly reduced right ventricular   systolic function.  · Moderate to severe left atrial enlargement.  · Moderate right atrial enlargement.  · Moderate-to-severe mitral regurgitation.  · Severe tricuspid regurgitation.  · Elevated central venous pressure (15 mmHg).  · The estimated PA systolic pressure is 44 mmHg.  · There is pulmonary hypertension.        Assessment and Plan:     Brief HPI: 75 y/o admitted from Dr. Wesley's clinic with acute on chronic chf.     * Acute on chronic congestive heart failure  - Patient seen and evaluated by Dr. Pierson    Echo    Interpretation Summary  · The left ventricle is moderately enlarged with concentric remodeling and severely decreased systolic function.  · The estimated ejection fraction is 20%.  · Grade III left ventricular diastolic dysfunction.  · Mild right ventricular enlargement with mildly reduced right ventricular systolic function.  · Moderate to severe left atrial enlargement.  · Moderate right atrial enlargement.  · Moderate-to-severe mitral regurgitation.  · Severe tricuspid  regurgitation.  · Elevated central venous pressure (15 mmHg).  · The estimated PA systolic pressure is 44 mmHg.  · There is pulmonary hypertension.    - Continue IV lasix 40mg BID, will add IV dobutamine.  - Creatinine 2.07 today, repeat BMP in am  - Strict I & O and daily weight  - Plan for L/RHC tomorrow. Procedure, risk and benefits discussed in detail with patient, including increased risk for kidney failure, he verbalized understanding and wishes to proceed.   - Consent obtained and taken to the cath lab.  - NPO after midnight  - Further recommendations to follow.      A-fib  - Eliquis held today after morning dose with anticipation for [facility] tomorrow    Hypertension  Cardiac cath films reviewed by Dr. Pierson.   Patient appears to be euvolemic at this time.  He has no angina.   Plan to wean the dobutamine today and if no issues overnight should be fine to dc tomorrow and follow up with cardiology in one week.  After dobutamine is off would restart beta blocker as taken prior to admission.  His renal function has continued to decline and would hold ACE/ARB/Entresto until his renal function has stablized.  Also would no tart SGLT2 inhibitor at this time due to issues with hypotension and poor renal function. These issues may be reassessed at follow up in cardiology clinic with Dr. Wesley.     Lower extremity edema  Patient is breathing comfortably lying flat. He has some residual lower ext edema but we feel this is most likely related to hypoalbuminemia.        VTE Risk Mitigation (From admission, onward)         Ordered     Reason for No Pharmacological VTE Prophylaxis  Once        Question:  Reasons:  Answer:  Already adequately anticoagulated on oral Anticoagulants    01/03/22 1638     IP VTE HIGH RISK PATIENT  Once         01/03/22 1638     Place sequential compression device  Until discontinued         01/03/22 1638                VIRAL Childress  Cardiology  [address]  Telemetry

## 2022-01-06 NOTE — SUBJECTIVE & OBJECTIVE
Interval History: 77 y/o wm admitted with SOB and lower ext edema    Review of Systems   Cardiovascular: Positive for leg swelling. Negative for chest pain, irregular heartbeat, near-syncope, orthopnea, palpitations, paroxysmal nocturnal dyspnea and syncope.   All other systems reviewed and are negative.    Objective:     Vital Signs (Most Recent):  Temp: 98.1 °F (36.7 °C) (01/06/22 1100)  Pulse: 81 (01/06/22 1100)  Resp: 18 (01/06/22 1100)  BP: (!) 98/54 (Nurse Sri was informed of the blood pressure.) (01/06/22 1100)  SpO2: 98 % (01/06/22 1100) Vital Signs (24h Range):  Temp:  [97.5 °F (36.4 °C)-98.6 °F (37 °C)] 98.1 °F (36.7 °C)  Pulse:  [] 81  Resp:  [18-20] 18  SpO2:  [92 %-98 %] 98 %  BP: ()/(40-92) 98/54     Weight: 82.9 kg (182 lb 12.2 oz)  Body mass index is 26.99 kg/m².     SpO2: 98 %  O2 Device (Oxygen Therapy): room air      Intake/Output Summary (Last 24 hours) at 1/6/2022 1456  Last data filed at 1/6/2022 1100  Gross per 24 hour   Intake 240 ml   Output 1200 ml   Net -960 ml       Lines/Drains/Airways     Peripheral Intravenous Line                 Peripheral IV - Single Lumen 01/05/22 0455 20 G Posterior;Right Forearm 1 day                Physical Exam  Vitals and nursing note reviewed.   Constitutional:       Appearance: Normal appearance. He is normal weight.      Comments: Comfortable lying flat in bed   HENT:      Head: Normocephalic and atraumatic.   Eyes:      Pupils: Pupils are equal, round, and reactive to light.   Neck:      Vascular: No carotid bruit.   Cardiovascular:      Rate and Rhythm: Normal rate and regular rhythm.      Pulses: Normal pulses.      Heart sounds: Normal heart sounds.   Pulmonary:      Effort: Pulmonary effort is normal.      Breath sounds: Normal breath sounds.   Abdominal:      General: Bowel sounds are normal.      Palpations: Abdomen is soft.   Musculoskeletal:      Cervical back: Neck supple.      Right lower leg: Edema present.      Left lower leg:  Edema present.      Comments: 1+ edema bilateral lower ext   Skin:     General: Skin is warm and dry.      Capillary Refill: Capillary refill takes less than 2 seconds.   Neurological:      General: No focal deficit present.      Mental Status: He is alert and oriented to person, place, and time.   Psychiatric:         Mood and Affect: Mood normal.         Behavior: Behavior normal.         Significant Labs:   CMP   Recent Labs   Lab 01/05/22  0331 01/06/22  0549    136   K 4.2 4.1   CL 99 97*   CO2 31 32   GLU 89 93   BUN 39* 40*   CREATININE 2.15* 2.50*   CALCIUM 8.9 8.4*   PROT  --  6.1*   ALBUMIN  --  3.1*   BILITOT  --  1.5*   ALKPHOS  --  76   AST  --  16   ALT  --  11*   ANIONGAP 13 11   EGFRNONAA 32* 27*       Significant Imaging: Echocardiogram:   Transthoracic echo (TTE) complete (Cupid Only):   Results for orders placed or performed during the hospital encounter of 11/10/21   Echo   Result Value Ref Range    Posterior Wall 1.32 (A) 0.6 - 1.1 cm    E wave deceleration time 154 msec    MV Peak E Jim 1.11 m/s    IVS 1.36 (A) 0.6 - 1.1 cm    LVIDd 6.60 (A) 3.5 - 6.0 cm    LVIDs 5.17 (A) 2.1 - 4.0 cm    Echo EF Estimated 43 %    LV Systolic Volume 127.80 mL    LV Diastolic Volume 223.60 mL    RVDD 4.33 cm    AORTIC VALVE CUSP SEPERATION 1.67 cm    FS 22 %    LV mass 426.32 g    Left Ventricle Relative Wall Thickness 0.40 cm    LV Systolic Volume Index 61.4 mL/m2    LV Diastolic Volume Index 107.50 mL/m2    LV Mass Index 205 g/m2    BSA 2.11 m2    IVC diameter 2.82 cm    RV mid diameter 3.75 cm    EF 20 %    Left Ventricular Outflow Tract Mean Gradient 0.70 mmHg    LA size 5.80 cm    TAPSE 1.91 cm    Right ventricular length in diastole (apical 4-chamber view) 7.06 cm    AV mean gradient 2 mmHg    AV valve area 2.09 cm2    AV index (prosthetic) 0.67     E/A ratio 3.47     LVOT diameter 2.00 cm    LVOT area 3.1 cm2    LVOT peak VTI 9.73 cm    Ao VTI 14.60 cm    LVOT stroke volume 30.55 cm3    TR Max Jim  2.69 m/s    MV Peak A Jim 0.32 m/s    Triscuspid Valve Regurgitation Peak Gradient 29 mmHg    Right Atrial Pressure (from IVC) 15 mmHg    TV rest pulmonary artery pressure 44 mmHg    Narrative    · The left ventricle is moderately enlarged with concentric remodeling and   severely decreased systolic function.  · The estimated ejection fraction is 20%.  · Grade III left ventricular diastolic dysfunction.  · Mild right ventricular enlargement with mildly reduced right ventricular   systolic function.  · Moderate to severe left atrial enlargement.  · Moderate right atrial enlargement.  · Moderate-to-severe mitral regurgitation.  · Severe tricuspid regurgitation.  · Elevated central venous pressure (15 mmHg).  · The estimated PA systolic pressure is 44 mmHg.  · There is pulmonary hypertension.

## 2022-01-07 NOTE — PROGRESS NOTES
CC: SOB    HPI:  76-year-old male with PMH of atrial fibrillation, CHF, COPD, CAD, hypertension is admitted [facility] where he was evaluated for shortness of breath and lower extremity edema and admitted for decompensated congestive heart failure.  He was placed on dobutamine to affective diuresis.  He underwent right and left heart catheterization demonstrating stable coronary artery disease and severe ischemic cardiomyopathy ejection fraction 20%.  He has become prerenal azotemic with greater than 20-1 B to C ratio.  He retains lower extremity edema which I think is chronic and unrelated to CHF.  He is lying flat in bed without shortness of breath.    Physical examination:    Unchanged    A/P:    Ischemic cardiomyopathy with stable coronary artery disease status post dobutamine infusion now prerenal azotemic on diuretics.    Will DC dobutamine.  Will encourage hydration by mouth.  Cannot add or increase any afterload reduction due to hypotension.  These can be initiated as an outpatient in Dr. Carter who did in clinic.    Continue NOAC for atrial fibrillation.    Davie Pierson MD  [facility]

## 2022-01-07 NOTE — ASSESSMENT & PLAN NOTE
Patient has a PMHx of CHF with EF of 20% and diastolic and systolic dysfunction  - Cardiology consult due to CHF exacerbation,  patient not taking any home meds  - SS consulted for home health for medication management, patient denied HH  - BNP [id number]  - Lasix increased to 80 BID IV (1/5/21)  -IV dobutamine  - strict I/Os  - daily weights  - monitoring potassium  S/P R/LHC(1/5): 70% stenosis in Ost Cx lesion, 60% stenosis in Dist LM to Ost LAD lesion, 100% stenosis in Ost RCA to Prox RCA lesion, Mid LAD lesion stenosis 70%, 50% stenosis in dist Graft to Insertion lesion. Given complexity of the lesion and pts CKD with low outpt, further attempts at revascularization deferred per cardiology. Will consider PCI to ostial circumflex once pt is euvolemic.  - Cardiology to manage Lasix and dobutamine tonight and possible d/c tomorrow.

## 2022-01-07 NOTE — ASSESSMENT & PLAN NOTE
- patient not taking any home meds, cardiology consulted for medication advice   - Hydralazine w/ parameters  - Per cardiology, Plan to wean the dobutamine today and if no issues overnight should be fine to dc tomorrow and follow up with cardiology in one week.\  - After dobutamine is off would restart beta blocker as taken prior to admission.  - His renal function has continued to decline and would hold ACE/ARB/Entresto until his renal function has stablized.  - Also would not start SGLT2 inhibitor at this time due to issues with hypotension and poor renal function. These issues may be reassessed at follow up in cardiology clinic with Dr. Wesley.

## 2022-01-07 NOTE — ASSESSMENT & PLAN NOTE
PMHx of Afib  - Amiodarone 200mg PO QD  - Eliquis 5mg PO BID    - on telemetry  - EKG on 1/7 showed NSR

## 2022-01-07 NOTE — PROGRESS NOTES
[facility], [place] - [address] [place] [address] Medicine  Progress Note    Patient Name: Luisito Stuart  MRN: [id number]  Patient Class: OP- Observation   Admission Date: 1/3/2022  Length of Stay: 0 days  Attending Physician: Stephen Malcolm MD  Primary Care Provider: JOYA Hill        Subjective:     Principal Problem:Acute on chronic congestive heart failure        HPI:  Luisito Stuart is a 76 y.o.m. with a PMHx of Afib, CHF, COPD, CAD HTN, and IBS that presents to the ED today due to lower leg edema and leg pain bilaterally. Patilele reports his legs have been swollen and pain for about 4 days. He reports he came to the ED 3 days ago for these symptoms and was told to double the dose of his Lasix 20mg. Patient had an appointment with Dr. Mckee today. During the visit patient was instructed to go to the ED for IV diuretics. Patient was admitted to inpatient  Family Medicine care. Patient states he is taking all his home medication however, when his daughter was contacted, she stated he is only taking Entresto. Will consult cardiology for help with medications.            Overview/Hospital Course:  No notes on file    Interval History: Patient seen this morning sitting up in bed. Patient states he is ready to be d/c but cardiology needs to adjust Lasix and stop Dobutamine, therefore will d/c tomorrow if no changes. Patient c/o pain where blood is being drawn from, therefore, POC blood glucose checks were d/c. Blood sugar has been wnl. Patient continues to have minor swelling in the Lt leg but he states it has been swollen since his CABG surgery. Eliquis was started back today. EKG today showed NSR.    Review of Systems   Constitutional: Negative for activity change, appetite change, chills, diaphoresis and fever.   HENT: Negative for congestion, nosebleeds, postnasal drip, rhinorrhea, sore throat and trouble swallowing.    Eyes: Negative for visual disturbance.   Respiratory: Negative  for cough, chest tightness and shortness of breath.    Cardiovascular: Positive for leg swelling. Negative for chest pain.   Gastrointestinal: Negative for abdominal pain, blood in stool, constipation, diarrhea, nausea and vomiting.   Genitourinary: Negative for dysuria and hematuria.   Musculoskeletal: Negative for joint swelling.   Skin: Negative for rash.   Neurological: Negative for dizziness, weakness, light-headedness and headaches.   Psychiatric/Behavioral: Negative for confusion.   All other systems reviewed and are negative.    Objective:     Vital Signs (Most Recent):  Temp: 97.5 °F (36.4 °C) (01/07/22 0701)  Pulse: 89 (01/07/22 0701)  Resp: 17 (01/07/22 0701)  BP: (!) 104/56 (01/07/22 0701)  SpO2: (!) 94 % (01/07/22 0701) Vital Signs (24h Range):  Temp:  [97.5 °F (36.4 °C)-97.8 °F (36.6 °C)] 97.5 °F (36.4 °C)  Pulse:  [77-96] 89  Resp:  [17-18] 17  SpO2:  [93 %-98 %] 94 %  BP: ()/(33-56) 104/56     Weight: 83.1 kg (183 lb 3.2 oz)  Body mass index is 27.05 kg/m².    Intake/Output Summary (Last 24 hours) at 1/7/2022 1100  Last data filed at 1/7/2022 0908  Gross per 24 hour   Intake 400 ml   Output 1427 ml   Net -1027 ml      Physical Exam  Vitals and nursing note reviewed.   Constitutional:       General: He is not in acute distress.     Appearance: Normal appearance. He is not ill-appearing, toxic-appearing or diaphoretic.   HENT:      Head: Normocephalic and atraumatic.      Nose: Nose normal. No congestion or rhinorrhea.      Mouth/Throat:      Mouth: Mucous membranes are moist.      Pharynx: Oropharynx is clear. No oropharyngeal exudate or posterior oropharyngeal erythema.   Eyes:      Conjunctiva/sclera: Conjunctivae normal.      Pupils: Pupils are equal, round, and reactive to light.   Cardiovascular:      Rate and Rhythm: Normal rate and regular rhythm.      Pulses: Normal pulses.      Heart sounds: Normal heart sounds. No murmur heard.  No friction rub.   Pulmonary:      Effort: Pulmonary  effort is normal. No respiratory distress.      Breath sounds: Normal breath sounds. No wheezing, rhonchi or rales.   Abdominal:      Tenderness: There is no abdominal tenderness. There is no guarding.   Musculoskeletal:      Right lower leg: No edema.      Left lower leg: Edema present.   Skin:     Capillary Refill: Capillary refill takes less than 2 seconds.      Findings: Bruising and erythema (around venous puncture sights) present.   Neurological:      General: No focal deficit present.      Mental Status: He is alert and oriented to person, place, and time.   Psychiatric:         Mood and Affect: Mood normal.         Behavior: Behavior normal.         Thought Content: Thought content normal.         Judgment: Judgment normal.         Significant Labs:   Recent Lab Results       01/07/22  0504        Anion Gap 12       Aniso 1+       Bands 1       Baso # 0.09       Basophil % 1.2        1       BUN 38       BUN/CREAT RATIO 13       Calcium 8.4       Chloride 96       CO2 30       Creatinine 2.92       Differential Type Manual       eGFR if non  23       Eos # 0.14       Eosinophil % 1.9        1       Glucose 84       Hematocrit 37.2       Hemoglobin 11.3       Hypo Few       Immature Grans (Abs) 0.02       Immature Granulocytes 0.3       Lymph # 0.68       Lymph % 9.0        10       MCH 27.0       MCHC 30.4       MCV 88.8       Mono # 0.80       Mono % 10.6        11       MPV 11.4       Neutrophils, Abs 5.79       Neutrophils Relative 77.0       nRBC 0.0       NUCLEATED RBC ABSOLUTE 0.00       PLATELET MORPHOLOGY Few Large Platelets       Platelets 192       Potassium 4.6       RBC 4.19       RDW 17.9       Segmented Neutrophils, Man % 76       Sodium 133       WBC 7.52             Significant Imaging: I have reviewed all pertinent imaging results/findings within the past 24 hours.      Assessment/Plan:      * Acute on chronic congestive heart failure  Patient has a PMHx of CHF with EF of 20%  and diastolic and systolic dysfunction  - Cardiology consult due to CHF exacerbation,  patient not taking any home meds  - SS consulted for home health for medication management, patient denied HH  - BNP 68219  - Lasix increased to 80 BID IV (1/5/21)  -IV dobutamine  - strict I/Os  - daily weights  - monitoring potassium  S/P R/LHC(1/5): 70% stenosis in Ost Cx lesion, 60% stenosis in Dist LM to Ost LAD lesion, 100% stenosis in Ost RCA to Prox RCA lesion, Mid LAD lesion stenosis 70%, 50% stenosis in dist Graft to Insertion lesion. Given complexity of the lesion and pts CKD with low outpt, further attempts at revascularization deferred per cardiology. Will consider PCI to ostial circumflex once pt is euvolemic.  - Cardiology to manage Lasix and dobutamine tonight and possible d/c tomorrow.      Lower extremity edema  Rt leg edema resolved, pateint reports Lt leg has chronic edema since CABG surgery  - Increased lasix to 80 IV BID  - strict I/Os  - daily weights  - monitoring potassium  - Norco 5 x1 given for leg pain on 1/4  - most likely related to hypoalbuminemia        A-fib  PMHx of Afib  - Amiodarone 200mg PO QD  - Eliquis 5mg PO BID    - on telemetry  - EKG on 1/7 showed NSR           Hypotension due to drugs  Consulted ICU for hypotension at 80/30  Per pulmonology:  -denies symptoms  - continues to appear volume overloaded  - would continue current management, continue to monitor  - given his low EF and diminished cardiac output he will have difficulty generating a normal BP  - would consider transfer if patient shows signs of malperfusion or symptoms of hypotension          Hyperlipidemia  Atorvastatin 40mg PO QD      Hypertension  - patient not taking any home meds, cardiology consulted for medication advice   - Hydralazine w/ parameters  - Per cardiology, Plan to wean the dobutamine today and if no issues overnight should be fine to dc tomorrow and follow up with cardiology in one week.\  - After dobutamine  is off would restart beta blocker as taken prior to admission.  - His renal function has continued to decline and would hold ACE/ARB/Entresto until his renal function has stablized.  - Also would not start SGLT2 inhibitor at this time due to issues with hypotension and poor renal function. These issues may be reassessed at follow up in cardiology clinic with Dr. Wesley.       VTE Risk Mitigation (From admission, onward)         Ordered     apixaban tablet 5 mg  2 times daily         01/07/22 0533     Reason for No Pharmacological VTE Prophylaxis  Once        Question:  Reasons:  Answer:  Already adequately anticoagulated on oral Anticoagulants    01/03/22 1638     IP VTE HIGH RISK PATIENT  Once         01/03/22 1638     Place sequential compression device  Until discontinued         01/03/22 1638                Discharge Planning   MORGAN:      Code Status: Partial Code   Is the patient medically ready for discharge?:     Reason for patient still in hospital (select all that apply): Treatment               Dhiraj Prince DO  Department of Hospital Medicine   [address]

## 2022-01-07 NOTE — SUBJECTIVE & OBJECTIVE
Interval History: Patient seen this morning sitting up in bed. Patient states he is ready to be d/c but cardiology needs to adjust Lasix and stop Dobutamine, therefore will d/c tomorrow if no changes. Patient c/o pain where blood is being drawn from, therefore, POC blood glucose checks were d/c. Blood sugar has been wnl. Patient continues to have minor swelling in the Lt leg but he states it has been swollen since his CABG surgery. Eliquis was started back today.       Review of Systems   Constitutional: Negative for activity change, appetite change, chills, diaphoresis and fever.   HENT: Negative for congestion, nosebleeds, postnasal drip, rhinorrhea, sore throat and trouble swallowing.    Eyes: Negative for visual disturbance.   Respiratory: Negative for cough, chest tightness and shortness of breath.    Cardiovascular: Positive for leg swelling. Negative for chest pain.   Gastrointestinal: Negative for abdominal pain, blood in stool, constipation, diarrhea, nausea and vomiting.   Genitourinary: Negative for dysuria and hematuria.   Musculoskeletal: Negative for joint swelling.   Skin: Negative for rash.   Neurological: Negative for dizziness, weakness, light-headedness and headaches.   Psychiatric/Behavioral: Negative for confusion.   All other systems reviewed and are negative.    Objective:     Vital Signs (Most Recent):  Temp: 97.5 °F (36.4 °C) (01/07/22 0701)  Pulse: 89 (01/07/22 0701)  Resp: 17 (01/07/22 0701)  BP: (!) 104/56 (01/07/22 0701)  SpO2: (!) 94 % (01/07/22 0701) Vital Signs (24h Range):  Temp:  [97.5 °F (36.4 °C)-97.8 °F (36.6 °C)] 97.5 °F (36.4 °C)  Pulse:  [77-96] 89  Resp:  [17-18] 17  SpO2:  [93 %-98 %] 94 %  BP: ()/(33-56) 104/56     Weight: 83.1 kg (183 lb 3.2 oz)  Body mass index is 27.05 kg/m².    Intake/Output Summary (Last 24 hours) at 1/7/2022 1100  Last data filed at 1/7/2022 0908  Gross per 24 hour   Intake 400 ml   Output 1427 ml   Net -1027 ml      Physical Exam  Vitals and  nursing note reviewed.   Constitutional:       General: He is not in acute distress.     Appearance: Normal appearance. He is not ill-appearing, toxic-appearing or diaphoretic.   HENT:      Head: Normocephalic and atraumatic.      Nose: Nose normal. No congestion or rhinorrhea.      Mouth/Throat:      Mouth: Mucous membranes are moist.      Pharynx: Oropharynx is clear. No oropharyngeal exudate or posterior oropharyngeal erythema.   Eyes:      Conjunctiva/sclera: Conjunctivae normal.      Pupils: Pupils are equal, round, and reactive to light.   Cardiovascular:      Rate and Rhythm: Normal rate and regular rhythm.      Pulses: Normal pulses.      Heart sounds: Normal heart sounds. No murmur heard.  No friction rub.   Pulmonary:      Effort: Pulmonary effort is normal. No respiratory distress.      Breath sounds: Normal breath sounds. No wheezing, rhonchi or rales.   Abdominal:      Tenderness: There is no abdominal tenderness. There is no guarding.   Musculoskeletal:      Right lower leg: No edema.      Left lower leg: Edema present.   Skin:     Capillary Refill: Capillary refill takes less than 2 seconds.      Findings: Bruising and erythema (around venous puncture sights) present.   Neurological:      General: No focal deficit present.      Mental Status: He is alert and oriented to person, place, and time.   Psychiatric:         Mood and Affect: Mood normal.         Behavior: Behavior normal.         Thought Content: Thought content normal.         Judgment: Judgment normal.         Significant Labs:   Recent Lab Results       01/07/22  0504        Anion Gap 12       Aniso 1+       Bands 1       Baso # 0.09       Basophil % 1.2        1       BUN 38       BUN/CREAT RATIO 13       Calcium 8.4       Chloride 96       CO2 30       Creatinine 2.92       Differential Type Manual       eGFR if non  23       Eos # 0.14       Eosinophil % 1.9        1       Glucose 84       Hematocrit 37.2        Hemoglobin 11.3       Hypo Few       Immature Grans (Abs) 0.02       Immature Granulocytes 0.3       Lymph # 0.68       Lymph % 9.0        10       MCH 27.0       MCHC 30.4       MCV 88.8       Mono # 0.80       Mono % 10.6        11       MPV 11.4       Neutrophils, Abs 5.79       Neutrophils Relative 77.0       nRBC 0.0       NUCLEATED RBC ABSOLUTE 0.00       PLATELET MORPHOLOGY Few Large Platelets       Platelets 192       Potassium 4.6       RBC 4.19       RDW 17.9       Segmented Neutrophils, Man % 76       Sodium 133       WBC 7.52             Significant Imaging: I have reviewed all pertinent imaging results/findings within the past 24 hours.

## 2022-01-07 NOTE — PLAN OF CARE
Problem: Adult Inpatient Plan of Care  Goal: Plan of Care Review  Outcome: Ongoing, Progressing  Goal: Patient-Specific Goal (Individualized)  Outcome: Ongoing, Progressing  Goal: Absence of Hospital-Acquired Illness or Injury  Outcome: Ongoing, Progressing  Goal: Optimal Comfort and Wellbeing  Outcome: Ongoing, Progressing  Goal: Readiness for Transition of Care  Outcome: Ongoing, Progressing     Problem: Fluid and Electrolyte Imbalance (Acute Kidney Injury/Impairment)  Goal: Fluid and Electrolyte Balance  Outcome: Ongoing, Progressing     Problem: Oral Intake Inadequate (Acute Kidney Injury/Impairment)  Goal: Optimal Nutrition Intake  Outcome: Ongoing, Progressing     Problem: Renal Function Impairment (Acute Kidney Injury/Impairment)  Goal: Effective Renal Function  Outcome: Ongoing, Progressing     Problem: Fall Injury Risk  Goal: Absence of Fall and Fall-Related Injury  Outcome: Ongoing, Progressing

## 2022-01-07 NOTE — HOSPITAL COURSE
Luisito Stuart was admitted to inpatient family medicine for CHF exacerbation. Patient presented with SOB and LE edema bilaterally. It was discovered that patient was not taking his home medications which is most likely the reason for the CHF exacerbation. Patient was offered medication management by home health but patient denied the help. Patient was adequately diuresed with the help of cardiology. During the patient's hospital stay he experienced hypotensive episodes and dobutamine was used to support BP. Patient had a LH cath performed during this hospital stay which showed several blockages that were not stented at the time of the cath due to patients volume status. Cardiology recommends potential repeat LHC with PCI on an outpatient basis, when he is in a euvolemic condition. Cardiology has adjusted patients medications and he is to f/u in 2 weeks with Dr. Mckee. Patient is also instructed to f/u with his PCP in 1 week. At this time patient has reached maximum medical benefit from the hospital and will be d/c home.     Home medications reconciled. Care and follow up instructions given to patient and daughter who is his primary caregiver.

## 2022-01-07 NOTE — ASSESSMENT & PLAN NOTE
Rt leg edema resolved, pateint reports Lt leg has chronic edema since CABG surgery  - Increased lasix to 80 IV BID  - strict I/Os  - daily weights  - monitoring potassium  - Norco 5 x1 given for leg pain on 1/4  - most likely related to hypoalbuminemia

## 2022-01-08 PROBLEM — I95.2 HYPOTENSION DUE TO DRUGS: Status: RESOLVED | Noted: 2022-01-01 | Resolved: 2022-01-01

## 2022-01-08 PROBLEM — I50.9 ACUTE ON CHRONIC CONGESTIVE HEART FAILURE: Status: RESOLVED | Noted: 2019-04-10 | Resolved: 2022-01-01

## 2022-01-08 NOTE — PROGRESS NOTES
CC: SOB    HPI:  76-year-old male with PMH of atrial fibrillation, CHF, COPD, CAD, hypertension is admitted from Dr. Rylee Caballero where he was evaluated for shortness of breath and lower extremity edema and admitted for decompensated congestive heart failure.  He was placed on dobutamine to affective diuresis.  He underwent right and left heart catheterization demonstrating stable coronary artery disease and severe ischemic cardiomyopathy ejection fraction 20%.  He has become prerenal azotemic with greater than 20-1 B to C ratio.  He retains lower extremity edema which I think is chronic and unrelated to CHF.  He is lying flat in bed without shortness of breath. Today, he has no complaints and wishes to go home.    Physical exam:  No change    A/P    iCMP with stable CAD, compensated CHF    Patient will follow-up in Dr. Avril Isidro clinic for initiation and titration of afterload reducing agents as the patient remains pre renal azotemic.     Follow-up with Dr. Coleman who Dwaine in 2-3 weeks after discharge    Continue NOAC for stroke reduction secondary to atrial fibrillation.    Will sign off thank you for this consultation.    Davie Pierson MD  [facility]

## 2022-01-08 NOTE — DISCHARGE SUMMARY
[facility] - [address] [place] [address] Medicine  Discharge Summary      Patient Name: Luisito Stuart  MRN: [id number]  Patient Class: OP- Observation  Admission Date: 1/3/2022  Hospital Length of Stay: 0 days  Discharge Date and Time:  01/08/2022 11:35 AM  Attending Physician: Stephen Malcolm MD   Discharging Provider: Chio Rodriguez DO  Primary Care Provider: JOYA Hill      HPI:   Luisito Stuart is a 76 y.o.m. with a PMHx of Afib, CHF, COPD, CAD HTN, and IBS that presents to the ED today due to lower leg edema and leg pain bilaterally. Prince reports his legs have been swollen and pain for about 4 days. He reports he came to the ED 3 days ago for these symptoms and was told to double the dose of his Lasix 20mg. Patient had an appointment with Dr. Mckee today. During the visit patient was instructed to go to the ED for IV diuretics. Patient was admitted to inpatient  Family Medicine care. Patient states he is taking all his home medication however, when his daughter was contacted, she stated he is only taking Entresto. Will consult cardiology for help with medications.            Procedure(s) (LRB):  ANGIOGRAM, CORONARY, INCLUDING BYPASS GRAFT, WITH LEFT HEART CATHETERIZATION (N/A)  INSERTION, CATHETER, RIGHT HEART (Right)  Percutaneous coronary intervention (N/A)      Hospital Course:   Luisito Stuart was admitted to inpatient family medicine for CHF exacerbation. Patient presented with SOB and LE edema bilaterally. It was discovered that patient was not taking his home medications which is most likely the reason for the CHF exacerbation. Patient was offered medication management by home health but patient denied the help. Patient was adequately diuresed with the help of cardiology. During the patient's hospital stay he experienced hypotensive episodes and dobutamine was used to support BP. Patient had a LH cath performed during this hospital stay which showed several  blockages that were not stented at the time of the cath due to patients volume status. Cardiology recommends potential repeat LHC with PCI on an outpatient basis, when he is in a euvolemic condition. Cardiology has adjusted patients medications and he is to f/u in 2 weeks with Dr. Mckee. Patient is also instructed to f/u with his PCP in 1 week. At this time patient has reached maximum medical benefit from the hospital and will be d/c home.     Home medications reconciled. Care and follow up instructions given to patient and daughter who is his primary caregiver.        Goals of Care Treatment Preferences:  Code Status: Partial Code      Consults:   Consults (From admission, onward)        Status Ordering Provider     Inpatient consult to Social Work  Once        Provider:  (Not yet assigned)    Completed ESTUARDO YOU     Inpatient consult to Cardiology  Once        Provider:  (Not yet assigned)    Completed PIEDAD CARPENTER     Inpatient consult to Registered Dietitian/Nutritionist  Once        Provider:  (Not yet assigned)    Acknowledged HEATHER PAULINO          A-fib  PMHx of Afib  - Amiodarone 200mg PO QD  - Eliquis 5mg PO BID    - on telemetry  - EKG on 1/7 showed NSR             Final Active Diagnoses:    Diagnosis Date Noted POA    A-fib [I48.91] 01/03/2022 Yes    Hypertension [I10] 11/10/2021 Yes    Lower extremity edema [R60.0] 09/14/2021 Yes    Hyperlipidemia [E78.5] 04/10/2019 Yes      Problems Resolved During this Admission:    Diagnosis Date Noted Date Resolved POA    PRINCIPAL PROBLEM:  Acute on chronic congestive heart failure [I50.9] 04/10/2019 01/08/2022 Yes    Hypotension due to drugs [I95.2] 01/06/2022 01/08/2022 Yes    CAD (coronary artery disease) [I25.10] 04/10/2019 01/03/2022 Yes       Discharged Condition: good    Disposition: Home or Self Care    Follow Up:   Follow-up Information     Melvin Mckee MD In 1 week.    Specialties: INTERVENTIONAL CARDIOLOGY,  Cardiology  Why: for medication adjustment and for severe CHF  Contact information:  [address] [facility] [place] [address]  [phone]             JOYA Hill In 1 week.    Specialties: Family Medicine, Emergency Medicine  Contact information:  [address] [facility] [address]  [phone]                       Patient Instructions:   No discharge procedures on file.    Significant Diagnostic Studies: Labs: All labs within the past 24 hours have been reviewed    Pending Diagnostic Studies:     Procedure Component Value Units Date/Time    EKG 12-lead [[id number]] Collected: 01/04/22 2336    Order Status: Sent Lab Status: In process Updated: 01/05/22 0619    Narrative:      Test Reason : R07.9,    Vent. Rate : 120 BPM     Atrial Rate : 120 BPM     P-R Int : 000 ms          QRS Dur : 112 ms      QT Int : 344 ms       P-R-T Axes : 000 108 -34 degrees     QTc Int : 486 ms    Atrial fibrillation with rapid ventricular response  Possible Right ventricular hypertrophy  Inferior infarct ,age undetermined  Anterior infarct ,age undetermined  ST and T wave abnormality, consider lateral ischemia or digitalis effect  Abnormal ECG  No previous ECGs available    Referred By: [email or web address]   SELF           Confirmed By:     EXTRA TUBES [[id number]]     Order Status: Sent Lab Status: No result     Specimen: Blood, Venous     Narrative:      The following orders were created for panel order EXTRA TUBES.  Procedure                               Abnormality         Status                     ---------                               -----------         ------                     Lavender Top Hold[[id number]]                                                             Please view results for these tests on the individual orders.    EXTRA TUBES [[id number]] Collected: 01/06/22 0554    Order Status: Sent Lab Status: In process Updated: 01/06/22 0554    Specimen: Blood, Venous     Narrative:      The  following orders were created for panel order EXTRA TUBES.  Procedure                               Abnormality         Status                     ---------                               -----------         ------                     Lavender Top Hold[[id number]]                                In process                   Please view results for these tests on the individual orders.    EXTRA TUBES [[id number]] Collected: 01/03/22 1452    Order Status: Sent Lab Status: In process Updated: 01/03/22 1453    Specimen: Blood, Venous     Narrative:      The following orders were created for panel order EXTRA TUBES.  Procedure                               Abnormality         Status                     ---------                               -----------         ------                     Lavender Top Hold[[id number]]                                In process                   Please view results for these tests on the individual orders.    Lavender Top Hold [[id number]]     Order Status: Sent Lab Status: No result     Specimen: Blood, Venous          Medications:  Reconciled Home Medications:      Medication List      START taking these medications    furosemide 20 MG tablet  Commonly known as: LASIX  Take 1-2 tablets daily as needed for fluid retention.     hydrALAZINE 25 MG tablet  Commonly known as: APRESOLINE  Take 1 tablet (25 mg total) by mouth every 12 (twelve) hours.        CONTINUE taking these medications    amiodarone 200 MG Tab  Commonly known as: PACERONE  Take 1 tablet (200 mg total) by mouth once daily.     apixaban 5 mg Tab  Commonly known as: ELIQUIS  Take 1 tablet (5 mg total) by mouth 2 (two) times daily.     atorvastatin 40 MG tablet  Commonly known as: LIPITOR  Take 1 tablet (40 mg total) by mouth every evening.     clotrimazole 1 % cream  Commonly known as: LOTRIMIN  Apply to affected area 2 times daily     methocarbamoL 750 MG Tab  Commonly known as: ROBAXIN  methocarbamol 750 mg tablet     pantoprazole  40 MG tablet  Commonly known as: PROTONIX  Take 1 tablet (40 mg total) by mouth once daily.        STOP taking these medications    ascorbic acid (vitamin C) 500 MG tablet  Commonly known as: VITAMIN C     bumetanide 2 MG tablet  Commonly known as: BUMEX     carvediloL 3.125 MG tablet  Commonly known as: COREG     clopidogreL 75 mg tablet  Commonly known as: PLAVIX     docusate sodium 100 MG capsule  Commonly known as: COLACE     ENTRESTO 24-26 mg per tablet  Generic drug: sacubitriL-valsartan     guaiFENesin 600 mg 12 hr tablet  Commonly known as: MUCINEX     HYDROcodone-acetaminophen 5-325 mg per tablet  Commonly known as: NORCO     metoprolol succinate 25 MG 24 hr tablet  Commonly known as: TOPROL-XL     nitroGLYCERIN 0.4 MG/DOSE TL SPRY 400 mcg/spray spray  Commonly known as: NITROLINGUAL     nystatin ointment  Commonly known as: MYCOSTATIN     potassium chloride SA 20 MEQ tablet  Commonly known as: K-DUR,KLOR-CON     rosuvastatin 20 MG tablet  Commonly known as: CRESTOR     sucralfate 1 gram tablet  Commonly known as: CARAFATE     torsemide 20 MG Tab  Commonly known as: DEMADEX            Indwelling Lines/Drains at time of discharge:   Lines/Drains/Airways     None                 Time spent on the discharge of patient: 30 minutes         Chio Rodriguez DO  Department of Hospital Medicine  [address]

## 2022-01-08 NOTE — PLAN OF CARE
Problem: Adult Inpatient Plan of Care  Goal: Plan of Care Review  Outcome: Ongoing, Progressing  Flowsheets (Taken 1/7/2022 2234)  Plan of Care Reviewed With: patient  Goal: Absence of Hospital-Acquired Illness or Injury  Outcome: Ongoing, Progressing  Intervention: Prevent Skin Injury  Flowsheets (Taken 1/7/2022 2234)  Body Position: position changed independently  Skin Protection:   adhesive use limited   incontinence pads utilized   tubing/devices free from skin contact   transparent dressing maintained  Intervention: Prevent and Manage VTE (Venous Thromboembolism) Risk  Flowsheets (Taken 1/7/2022 2234)  Activity Management: Arm raise - L1  VTE Prevention/Management:   ROM (active) performed   fluids promoted  Range of Motion: active ROM (range of motion) encouraged  Goal: Optimal Comfort and Wellbeing  Outcome: Ongoing, Progressing  Intervention: Monitor Pain and Promote Comfort  Flowsheets (Taken 1/7/2022 2234)  Pain Management Interventions: pain management plan reviewed with patient/caregiver  Intervention: Provide Person-Centered Care  Flowsheets (Taken 1/7/2022 2234)  Trust Relationship/Rapport:   care explained   choices provided   emotional support provided   empathic listening provided   questions answered   questions encouraged   reassurance provided   thoughts/feelings acknowledged     Problem: Fluid and Electrolyte Imbalance (Acute Kidney Injury/Impairment)  Goal: Fluid and Electrolyte Balance  Outcome: Ongoing, Progressing  Intervention: Monitor and Manage Fluid and Electrolyte Balance  Flowsheets (Taken 1/7/2022 2234)  Fluid/Electrolyte Management: fluids provided     Problem: Oral Intake Inadequate (Acute Kidney Injury/Impairment)  Goal: Optimal Nutrition Intake  Outcome: Ongoing, Progressing  Intervention: Promote and Optimize Nutrition  Flowsheets (Taken 1/7/2022 2234)  Oral Nutrition Promotion:   physical activity promoted   rest periods promoted   safe use of adaptive equipment encouraged      Problem: Renal Function Impairment (Acute Kidney Injury/Impairment)  Goal: Effective Renal Function  Outcome: Ongoing, Progressing  Intervention: Monitor and Support Renal Function  Flowsheets (Taken 1/7/2022 2234)  Medication Review/Management: medications reviewed     Problem: Fall Injury Risk  Goal: Absence of Fall and Fall-Related Injury  Outcome: Ongoing, Progressing  Intervention: Identify and Manage Contributors  Flowsheets (Taken 1/7/2022 2234)  Self-Care Promotion:   independence encouraged   BADL personal objects within reach   BADL personal routines maintained   meal set-up provided   safe use of adaptive equipment encouraged  Medication Review/Management: medications reviewed

## 2022-01-10 PROBLEM — I50.9 CONGESTIVE HEART FAILURE: Status: ACTIVE | Noted: 2022-01-01

## 2022-01-10 PROBLEM — I48.92 ATRIAL FLUTTER: Status: ACTIVE | Noted: 2022-01-01

## 2022-01-10 PROBLEM — I25.10 ARTERIOSCLEROSIS OF CORONARY ARTERY: Status: ACTIVE | Noted: 2022-01-01

## 2022-01-10 PROBLEM — N18.4 CKD (CHRONIC KIDNEY DISEASE), STAGE IV: Status: ACTIVE | Noted: 2022-01-01

## 2022-01-10 NOTE — PROGRESS NOTES
JOYA Hill   [address] [place] [address]     PATIENT NAME: Luisito Stuart  : 1945  DATE: 1/10/22  MRN: [id number]      Billing Provider: JOYA Hill  Level of Service: TCM SERVICES (MODERATE COMPLEXITY)  Patient PCP Information     Provider PCP Type    JOYA Hill General          Reason for Visit / Chief Complaint: Follow-up (Bruises on arms and on his stomach area)       Update PCP  Update Chief Complaint         History of Present Illness / Problem Focused Workflow     Luisito Stuart presents to the clinic with Follow-up (Bruises on arms and on his stomach area)     HPI    Review of Systems     Review of Systems   Constitutional: Negative for chills, diaphoresis, fatigue and fever.   HENT: Negative for nasal congestion, dental problem, ear pain and postnasal drip.    Eyes: Negative for visual disturbance.   Respiratory: Negative for shortness of breath and wheezing.    Cardiovascular: Negative for chest pain and palpitations.   Gastrointestinal: Negative for abdominal distention and abdominal pain.   Endocrine: Negative for cold intolerance and heat intolerance.   Genitourinary: Negative for enuresis.   Musculoskeletal: Negative for neck stiffness.   Integumentary:  Negative for pallor and rash.   Neurological: Negative for dizziness, seizures, speech difficulty and weakness.   Psychiatric/Behavioral: Negative for agitation.        Medical / Social / Family History     Past Medical History:   Diagnosis Date    Atrial fibrillation     CHF (congestive heart failure)     COPD (chronic obstructive pulmonary disease)     Coronary artery disease     Disorder of kidney and ureter     Hypertension        Past Surgical History:   Procedure Laterality Date    APPENDECTOMY      CARDIAC DEFIBRILLATOR PLACEMENT      CORONARY ANGIOGRAPHY INCLUDING BYPASS GRAFTS WITH CATHETERIZATION OF LEFT HEART N/A 2022    Procedure: ANGIOGRAM, CORONARY, INCLUDING BYPASS  "GRAFT, WITH LEFT HEART CATHETERIZATION;  Surgeon: Gerda Castellanos MD;  Location: [facility] CATH LAB;  Service: Cardiology;  Laterality: N/A;    JOINT REPLACEMENT      KIDNEY TRANSPLANT      RIGHT HEART CATHETERIZATION Right 1/5/2022    Procedure: INSERTION, CATHETER, RIGHT HEART;  Surgeon: Gerda Castellanos MD;  Location: [facility] CATH LAB;  Service: Cardiology;  Laterality: Right;       Social History    reports that he has been smoking cigarettes. He has a 46.00 pack-year smoking history. He has never used smokeless tobacco. He reports previous alcohol use. He reports that he does not use drugs.    Family History  's family history includes Cancer in his mother; Heart disease in his father.    Medications and Allergies     Medications  No outpatient medications have been marked as taking for the 1/10/22 encounter (Office Visit) with JOYA Hill.       Allergies  Review of patient's allergies indicates:   Allergen Reactions    Indomethacin Itching and Other (See Comments)     Other reaction(s): ITCHING    Lipitor [atorvastatin] Other (See Comments)     Muscle cramps       Physical Examination   /84 (BP Location: Left arm, Patient Position: Sitting, BP Method: Medium (Automatic))   Pulse 93   Temp 97.7 °F (36.5 °C) (Temporal)   Resp 20   Ht 5' 9" (1.753 m)   Wt 83 kg (183 lb)   BMI 27.02 kg/m²   Physical Exam  Vitals and nursing note reviewed.   Constitutional:       Appearance: Normal appearance.   HENT:      Head: Normocephalic and atraumatic.      Right Ear: External ear normal.      Left Ear: External ear normal.      Nose: Nose normal.      Mouth/Throat:      Mouth: Mucous membranes are moist.      Pharynx: Oropharynx is clear.   Eyes:      Pupils: Pupils are equal, round, and reactive to light.   Cardiovascular:      Rate and Rhythm: Normal rate and regular rhythm.      Heart sounds: No murmur heard.  No gallop.    Pulmonary:      Effort: Pulmonary effort is normal.      Breath " sounds: No wheezing or rales.   Abdominal:      General: Bowel sounds are normal. There is no distension.      Palpations: Abdomen is soft.      Tenderness: There is no abdominal tenderness.   Musculoskeletal:      Cervical back: Normal range of motion and neck supple.      Right lower le+ Edema present.      Left lower le+ Edema present.   Skin:     General: Skin is warm and dry.   Neurological:      General: No focal deficit present.      Mental Status: He is alert and oriented to person, place, and time.   Psychiatric:         Mood and Affect: Mood normal.         Behavior: Behavior normal.          Assessment and Plan (including Health Maintenance)      Problem List  [employer]  Document Outside HM   :    Plan:         Health Maintenance Due   Topic Date Due    Hepatitis C Screening  Never done    COVID-19 Vaccine (1) Never done    TETANUS VACCINE  Never done    Colorectal Cancer Screening  Never done    LDCT Lung Screen  Never done    Shingles Vaccine (1 of 2) Never done    Abdominal Aortic Aneurysm Screening  Never done    Influenza Vaccine (1) Never done       Problem List Items Addressed This Visit        Pulmonary    Chronic obstructive pulmonary disease       Cardiac/Vascular    Basilar artery stenosis    Overview     Formatting of this note might be different from the original.  Multiple vessel stenosis on CTA of the brain in 2020         Atrial flutter    Arteriosclerosis of coronary artery    Congestive heart failure - Primary    Current Assessment & Plan     Recently hospitalized due to decompensated CHF  Was given lasix, meds adjusted, next card appt in on   Has CKD stage IV put improved with some diuresis                Renal/    CKD (chronic kidney disease), stage IV       Other    Lower extremity edema    Bilateral leg edema    Overview     Last Assessment & Plan:   Formatting of this note might be different from the original.  Pt sent by [facility] due to continue ELIOT.  This  was noted last visit and compression hose were discussed.  Pt states the left leg continues to get bigger and the diuretics are not helping.  No pain noted from pt. States the left foot at times feels cool.  Will check bilateral venous and arterial dopplers   If venous positive for CVI will send to see tyrell acosta for possible pumps.   Elevate feet  Discussed hydration. He is only drinking coffee and tea  Instructed him to drink water due to known ckd and to help with swelling  Decrease salt intake   With the extra lasix not helped to him to hold this and continue his demadex. He had labs done a couple of days ago for nephrology upcoming appt.               Health Maintenance Topics with due status: Not Due       Topic Last Completion Date    Lipid Panel 03/11/2020    High Dose Statin 01/08/2022       Future Appointments   Date Time Provider Department Center   1/17/2022  9:15 AM JOYA Hill [facility]   1/20/2022 10:00 AM Melvin Mckee MD [facility] CARD [facility]   2/2/2022  2:00 PM AWV NURSE, [facility] FAMILY MEDICINE [facility] Rain[facility]        Follow up in about 3 months (around 4/10/2022), or if symptoms worsen or fail to improve.        Signature:  JOYA Hill      [address] [place] [address]    Date of encounter: 1/10/22

## 2022-01-10 NOTE — TELEPHONE ENCOUNTER
1/10/22-@7465-spoke with patient this am. Reports he is having a lot of pain at puncture sites from IV's and cath site to groin area. States he is going to the clinic this afternoon to have it checked out. He denies any bleeding or drainage from area but states it is very bruised and painful. He does not have home health. States he has a rollator but the brakes do not work. Wants to see about getting another one. States he has had this one about 6 yrs. He denies any shortness of breath or chest pain. Reviewed all discharge medications and discussed. He voiced concern about some of his meds being discontinued. He is taking all new and current meds except the hydralazine. States he doesn't have that one yet. States he is continuing the coreg,plavix and entresto and torsemide even though they were discontinued. He states if they would leave his meds alone he would do a lot better. Instructed him to bring all meds to his appt so provider could review and discuss with him what he should be taking. Rigo. He also states he was taking Norco for back pain and that was taken off. States he was seeing Dr. Cobb in [facility]-last visit 7/2021 for the pain pills. States that is too far to drive now and wants to be referred to pain tx. Informed of his f/u appt Instructed bring all meds to follow up visit and to call office for questions/concerns. Also to seek medical attention for any new or worsening sx. Rigo. [email or web address]

## 2022-01-10 NOTE — PROGRESS NOTES
Transitional Care Note  Subjective:       Patient ID: Luisito Stuart is a 76 y.o. male.  Chief Complaint: Follow-up (Bruises on arms and on his stomach area)    Family and/or Caretaker present at visit?  No.  Diagnostic tests reviewed/disposition: I have reviewed all completed as well as pending diagnostic tests at the time of discharge.  Disease/illness education: chf  Home health/community services discussion/referrals: Patient does not have home health established from hospital visit.  They do not need home health.  If needed, we will set up home health for the patient.   Establishment or re-establishment of referral orders for community resources: No other necessary community resources.   Discussion with other health care providers: No discussion with other health care providers necessary.   HPI  Review of Systems    Objective:      Physical Exam    Assessment:       1. Congestive heart failure, unspecified HF chronicity, unspecified heart failure type    2. Arteriosclerosis of coronary artery    3. Atrial flutter, unspecified type    4. Basilar artery stenosis    5. Chronic obstructive pulmonary disease, unspecified COPD type    6. Lower extremity edema    7. Bilateral leg edema    8. CKD (chronic kidney disease), stage IV        Plan:

## 2022-01-20 PROBLEM — I25.810 CORONARY ARTERY DISEASE INVOLVING CORONARY BYPASS GRAFT OF NATIVE HEART WITHOUT ANGINA PECTORIS: Status: ACTIVE | Noted: 2022-01-01

## 2022-01-20 PROBLEM — I25.5 ISCHEMIC CARDIOMYOPATHY: Status: ACTIVE | Noted: 2022-01-01

## 2022-01-20 NOTE — ASSESSMENT & PLAN NOTE
Acute decompensated HF  Ischemic cardiomyopathy; LVEF < 20%; NYHA IV, Stage D  Volume overloaded - +2 leg swelling, JVD ++  Recent hospital admission; CI 1.7, creatinine elevated despited IV lasix; PO torsemide 40mg bid  GDMT - Restart entresto 24/26 bid, Reduce Coreg to 3.125mg bid. Will need initiation of SGLT2i in the future  AICD in place

## 2022-01-20 NOTE — PROGRESS NOTES
PCP: JOYA Hill    Referring Provider:     Subjective:   Luisito Stuart is a 76 y.o. male with hx of CABG (2004;[place] - s/p C with PCI of Lcx 3/6/2021 which also revealed patent FINNEGAN to LAD, patent saphenous vein graft to PDA and known occlusion of the vein graft to Lcx), Ischemic cardiomyopathy with EF 10%, AICD (last shock 9 months ago, reports ablation performed 6 months ago in [place]), afib,  CKD stage 4 followed by nephrology, CVA 2019, current smoker (60+ years), COPD, and chronic atrial fibrillation on Eliquis presents following hospital discharge.        Patient states he is having shortness of breath.  He brought in his medications which were reviewed.   He has shortness of breath that he states is decreased in the mornings.        Patient had a LH cath performed which showed several blockages that were not stented at the time of the cath due to patients volume status. Recommend potential repeat LHC with PCI on an outpatient basis, when he is in a euvolemic condition.     History of atrial fib ablation by  2019.  4-5 cardioversion prior to AFib ablation.  Has been maintained on Eliquis    Fhx: noncontributory   Shx: smoker,     EKG 1/1/22 - NSR  ECHO 11/2021: The left ventricle is moderately enlarged with concentric remodeling and severely decreased systolic function.  EF 20%.                               Grade III left ventricular diastolic dysfunction.                               Mild right ventricular enlargement with mildly reduced right ventricular systolic function.                               Moderate to severe left atrial enlargement.    Moderate right atrial enlargement.    Moderate-to-severe mitral regurgitation.   Severe tricuspid regurgitation.                              The estimated PA systolic pressure is 44 mmHg.    There is pulmonary hypertension.  LHC 1/5/22 ([facility]):  3 vessel coronary artery disease.  There was left main disease.                                Patent LIMA to LAD                                 Patent SVG  to PDA, proximal PDA has a stent which has 30% ISR, other stent is noted in the distal part of the graft which has 50% ISR.                                 SVG to circumflex is occluded.     Native RCA is completely occluded.                                 Distal left main appears hazy, both ostial circumflex and ostial  have at least intermediate 70% stenosis.  There is a patent long stent in the circumflex with mild ISR.                                We opted not to pursue high-risk PCI to distal left main and ostial circumflex given concerns for compromise of the LAD diagonal system.  This may need                                mechanical circulatory support during PCI. Consider PCI once he is more compensated.     Right heart catheterization which showed elevated filling pressures with low cardiac output.   Right atrial pressure was 19  Right ventricular pressure was 56/15  Pulmonary arterial pressure was 51/27 with a mean of 37.  Wedge pressure was 34.  Pulmonary arterial saturation was 45%.  Cardiac output is 3.35, cardiac index is 1.69.     [facility] 3/6/19:  CABG with a patent LIMA to the LAD and a patent saphenous vein graft to the PDA.  Known occlusion of the vein graft to the left circumflex.  Mid left circumflex with known stenosis left to medical therapy.  He underwent successful percutaneous intervention of the left mid left circumflex with a 2.75 x 16 mm drug-eluting stent      Lab Results   Component Value Date     (L) 01/08/2022    K 4.6 01/08/2022    CL 96 (L) 01/08/2022    CO2 29 01/08/2022    BUN 40 (H) 01/08/2022    CREATININE 2.94 (H) 01/08/2022    CALCIUM 8.5 01/08/2022    ANIONGAP 12 01/08/2022    ESTGFRAFRICA 52 06/05/2020    EGFRNONAA 22 (L) 01/08/2022       Lab Results   Component Value Date    CHOL 110 03/11/2020     Lab Results   Component Value Date    HDL 45 03/11/2020     Lab Results   Component Value Date     "LDLCALC 55 03/11/2020     Lab Results   Component Value Date    TRIG 50 03/11/2020     Lab Results   Component Value Date    CHOLHDL 2.4 03/11/2020       Lab Results   Component Value Date    WBC 7.52 01/07/2022    HGB 11.3 (L) 01/07/2022    HCT 37.2 (L) 01/07/2022    MCV 88.8 01/07/2022     01/07/2022           Review of Systems   Respiratory: Positive for shortness of breath. Negative for cough.    Cardiovascular: Positive for leg swelling. Negative for chest pain, palpitations, orthopnea, claudication and PND.         Objective:   /70   Resp 16   Ht 5' 9" (1.753 m)   Wt 88.9 kg (196 lb)   BMI 28.94 kg/m²     Physical Exam  Vitals and nursing note reviewed.   Cardiovascular:      Rate and Rhythm: Normal rate.      Pulses: Normal pulses.      Heart sounds: Normal heart sounds.   Pulmonary:      Breath sounds: Rales present.   Musculoskeletal:      Right lower leg: Edema present.      Left lower leg: Edema present.           Assessment:     1. Acute on chronic systolic congestive heart failure  Basic Metabolic Panel   2. Hypertension, unspecified type  EKG 12-lead   3. Ischemic cardiomyopathy     4. Longstanding persistent atrial fibrillation     5. Coronary artery disease involving coronary bypass graft of native heart without angina pectoris     6. CKD (chronic kidney disease) stage 4, GFR 15-29 ml/min           Plan:   Ischemic cardiomyopathy  Acute decompensated HF  Ischemic cardiomyopathy; LVEF < 20%; NYHA IV, Stage D  Volume overloaded - +2 leg swelling, JVD ++  Recent hospital admission; CI 1.7, creatinine elevated despited IV lasix; PO torsemide 40mg bid  GDMT - Restart entresto 24/26 bid, Reduce Coreg to 3.125mg bid. Will need initiation of SGLT2i in the future  AICD in place      A-fib  PMHx of Afib  - Amiodarone 200mg PO QD  - Eliquis 5mg PO BID    - on telemetry  - EKG on 1/7 showed NSR           Coronary artery disease involving coronary bypass graft of native heart without angina " pectoris  S/p Recent LHC with severe 3VCAD, Patent LIMA to LAD, Moderately disease SVG to PDA  - on Plavix and eliquis  - Will need statin re-initiation      CKD (chronic kidney disease) stage 4, GFR 15-29 ml/min  Check BMP today  Worsening   Patient is taking Advil; strongly discouraged and told to replace with tylenol

## 2022-01-20 NOTE — ASSESSMENT & PLAN NOTE
S/p Recent LHC with severe 3VCAD, Patent LIMA to LAD, Moderately disease SVG to PDA  - on Plavix and eliquis  - Will need statin re-initiation

## 2022-01-20 NOTE — PROGRESS NOTES
New clinic note    Luisito Stuart is a 76 y.o. male      Chief Complaint   Patient presents with    Wheezing    Shortness of Breath    Cough     Pt states when lies down can't breathe; when sits up his left leg swells.        Subjective:  Pt reports he was exposed to covid recently. He is concerned due to his multiple chronic medical issues. He has been having SOB and cough. He does have COPD and CHF.     COVID negative  Flu Negative    Pt is afebrile in office today. He does have significant swelling of lower extremities. Will increase lasix to 40mg bid. He has a follow up with cardiology in am and will follow up with his PCP NEEL Cooper NP next week.       Past Medical History:   Diagnosis Date    Atrial fibrillation     CHF (congestive heart failure)     COPD (chronic obstructive pulmonary disease)     Coronary artery disease     Disorder of kidney and ureter     Hypertension       Family History   Problem Relation Age of Onset    Cancer Mother     Heart disease Father       Past Surgical History:   Procedure Laterality Date    APPENDECTOMY      CARDIAC DEFIBRILLATOR PLACEMENT      CORONARY ANGIOGRAPHY INCLUDING BYPASS GRAFTS WITH CATHETERIZATION OF LEFT HEART N/A 1/5/2022    Procedure: ANGIOGRAM, CORONARY, INCLUDING BYPASS GRAFT, WITH LEFT HEART CATHETERIZATION;  Surgeon: Gerda Castellanos MD;  Location: [facility] CATH LAB;  Service: Cardiology;  Laterality: N/A;    JOINT REPLACEMENT      KIDNEY TRANSPLANT      RIGHT HEART CATHETERIZATION Right 1/5/2022    Procedure: INSERTION, CATHETER, RIGHT HEART;  Surgeon: Gerda Castellanos MD;  Location: [facility] CATH LAB;  Service: Cardiology;  Laterality: Right;      Social History     Socioeconomic History    Marital status:    Tobacco Use    Smoking status: Current Every Day Smoker     Packs/day: 1.00     Years: 46.00     Pack years: 46.00     Types: Cigarettes    Smokeless tobacco: Never Used   Substance and Sexual Activity    Alcohol  use: Not Currently    Drug use: Never    Sexual activity: Not Currently        Review of Systems   Constitutional: Positive for fatigue.   HENT: Positive for nasal congestion.    Respiratory: Positive for cough and shortness of breath.    Cardiovascular: Positive for leg swelling. Negative for chest pain.   Neurological: Negative for headaches.        Objective:  /78 (BP Location: Right arm, Patient Position: Sitting, BP Method: Medium (Automatic))   Pulse 104   Temp 97.3 °F (36.3 °C) (Oral)   Resp 20   Wt 88.6 kg (195 lb 6.4 oz)   SpO2 98%   BMI 28.86 kg/m²    Physical Exam  Constitutional:       General: He is not in acute distress.     Comments: Appears chronically ill   HENT:      Head: Normocephalic.   Eyes:      Pupils: Pupils are equal, round, and reactive to light.   Cardiovascular:      Rate and Rhythm: Normal rate and regular rhythm.      Heart sounds: Normal heart sounds.   Pulmonary:      Effort: Pulmonary effort is normal.      Breath sounds: Normal breath sounds. No wheezing or rhonchi.      Comments: Lungs are essentially clear no wheezing or rhonchi noted.  Musculoskeletal:         General: Swelling present.      Right lower leg: Edema present.      Left lower leg: Edema present.   Skin:     General: Skin is warm and dry.          Assessment/plan:  1. COVID-19 ruled out by laboratory testing    2. Cough    3. Wheezing    4. Shortness of breath    5. Cardiomyopathy, unspecified type         Problem List Items Addressed This Visit        Cardiac/Vascular    Cardiomyopathy    Relevant Medications    furosemide (LASIX) 40 MG tablet      Other Visit Diagnoses     COVID-19 ruled out by laboratory testing    -  Primary    Cough        Relevant Orders    POCT SARS-COV2 (COVID) with Flu Antigen (Completed)    Wheezing        Relevant Orders    POCT SARS-COV2 (COVID) with Flu Antigen (Completed)    Shortness of breath        Relevant Medications    furosemide (LASIX) 40 MG tablet    Other  Relevant Orders    POCT SARS-COV2 (COVID) with Flu Antigen (Completed)           Follow up in about 1 week (around 1/26/2022), or if symptoms worsen or fail to improve.

## 2022-01-20 NOTE — ASSESSMENT & PLAN NOTE
Check BMP today  Worsening   Patient is taking Advil; strongly discouraged and told to replace with tylenol

## 2022-01-26 NOTE — TELEPHONE ENCOUNTER
"Pt. Called to give update stated that he is doing "much better." Pt. Stated that edema in legs was much less and breathing better. Dr. Mckee made aware. Pt. Advised to f/u with Dr. Mckee 2-4-22 per Dr. Mckee. Pt. Advised to call if needs to be seen sooner. Pt. Voiced understanding.  "

## 2022-01-27 PROBLEM — Z13.6 ENCOUNTER FOR SCREENING FOR ABDOMINAL AORTIC ANEURYSM (AAA) IN PATIENT 50 YEARS OF AGE OR OLDER WITHOUT OTHER RISK FACTORS FOR AAA: Status: ACTIVE | Noted: 2022-01-01

## 2022-01-27 PROBLEM — Z12.11 COLON CANCER SCREENING: Status: ACTIVE | Noted: 2022-01-01

## 2022-01-27 PROBLEM — I50.9 CONGESTIVE HEART FAILURE: Status: ACTIVE | Noted: 2022-01-01

## 2022-01-27 NOTE — PROGRESS NOTES
JOYA Hill   [address] [place] [address]     PATIENT NAME: Luisito Stuart  : 1945  DATE: 22  MRN: [id number]      Billing Provider: JOYA Hill  Level of Service:   Patient PCP Information     Provider PCP Type    JOYA Hill General          Reason for Visit / Chief Complaint: Follow-up (1 week follow up)       Update PCP  Update Chief Complaint         History of Present Illness / Problem Focused Workflow     Luisito Stuart presents to the clinic with Follow-up (1 week follow up)     HPI    Review of Systems     Review of Systems   Respiratory: Positive for cough.    Cardiovascular: Positive for leg swelling.        Medical / Social / Family History     Past Medical History:   Diagnosis Date    Atrial fibrillation     CHF (congestive heart failure)     COPD (chronic obstructive pulmonary disease)     Coronary artery disease     Disorder of kidney and ureter     Hypertension        Past Surgical History:   Procedure Laterality Date    APPENDECTOMY      CARDIAC DEFIBRILLATOR PLACEMENT      CORONARY ANGIOGRAPHY INCLUDING BYPASS GRAFTS WITH CATHETERIZATION OF LEFT HEART N/A 2022    Procedure: ANGIOGRAM, CORONARY, INCLUDING BYPASS GRAFT, WITH LEFT HEART CATHETERIZATION;  Surgeon: Gerda Castellanos MD;  Location: [facility] CATH LAB;  Service: Cardiology;  Laterality: N/A;    JOINT REPLACEMENT      KIDNEY TRANSPLANT      RIGHT HEART CATHETERIZATION Right 2022    Procedure: INSERTION, CATHETER, RIGHT HEART;  Surgeon: Gerda Castellanos MD;  Location: [facility] CATH LAB;  Service: Cardiology;  Laterality: Right;       Social History    reports that he has been smoking cigarettes. He has a 46.00 pack-year smoking history. He has never used smokeless tobacco. He reports previous alcohol use. He reports that he does not use drugs.    Family History  's family history includes Cancer in his mother; Heart disease in his  "father.    Medications and Allergies     Medications  No outpatient medications have been marked as taking for the 22 encounter (Office Visit) with JOYA Hill.       Allergies  Review of patient's allergies indicates:   Allergen Reactions    Indomethacin Itching and Other (See Comments)     Other reaction(s): ITCHING    Lipitor [atorvastatin] Other (See Comments)     Muscle cramps       Physical Examination   /80 (BP Location: Left arm, Patient Position: Sitting, BP Method: Medium (Automatic))   Pulse 87   Temp 97.4 °F (36.3 °C) (Temporal)   Resp 18   Ht 5' 9" (1.753 m)   Wt 88.9 kg (196 lb)   BMI 28.94 kg/m²   Physical Exam  Vitals and nursing note reviewed.   Constitutional:       Appearance: Normal appearance.   HENT:      Head: Normocephalic and atraumatic.      Right Ear: External ear normal.      Left Ear: External ear normal.      Nose: Nose normal.      Mouth/Throat:      Mouth: Mucous membranes are moist.      Pharynx: Oropharynx is clear.   Eyes:      Pupils: Pupils are equal, round, and reactive to light.   Cardiovascular:      Rate and Rhythm: Normal rate and regular rhythm.      Pulses: Normal pulses.      Heart sounds: Normal heart sounds. No murmur heard.  No gallop.    Pulmonary:      Effort: Pulmonary effort is normal.      Breath sounds: Normal breath sounds. No wheezing or rales.   Abdominal:      General: Abdomen is flat. Bowel sounds are normal. There is no distension.      Palpations: Abdomen is soft.      Tenderness: There is no abdominal tenderness.   Musculoskeletal:      Cervical back: Normal range of motion and neck supple.      Right lower le+ Pitting Edema present.      Left lower le+ Pitting Edema present.   Skin:     General: Skin is warm and dry.   Neurological:      General: No focal deficit present.      Mental Status: He is alert and oriented to person, place, and time.   Psychiatric:         Mood and Affect: Mood normal.         Behavior: " Behavior normal.          Assessment and Plan (including Health Maintenance)      Problem List  Smart Sets  Document Outside HM   :    Plan:         Health Maintenance Due   Topic Date Due    Hepatitis C Screening  Never done    COVID-19 Vaccine (1) Never done    TETANUS VACCINE  Never done    Colorectal Cancer Screening  Never done    LDCT Lung Screen  Never done    Shingles Vaccine (1 of 2) Never done    Abdominal Aortic Aneurysm Screening  Never done    Influenza Vaccine (1) Never done       Problem List Items Addressed This Visit    None         Health Maintenance Topics with due status: Not Due       Topic Last Completion Date    Lipid Panel 03/11/2020    High Dose Statin 01/08/2022       Future Appointments   Date Time Provider Department Center   2/2/2022  2:00 PM AWV NURSE, [facility] FAMILY MEDICINE [facility] GIAN Calix Yanna   2/4/2022  9:45 AM Melvin Mckee MD [facility] CARD [facility]   2/14/2022  1:00 PM Jocelin Tucker MD [facility]   4/11/2022 10:00 AM JOYA Hill [facility] GIAN Raines        No follow-ups on file.        Signature:  JOYA Hill      [address] [place] [address]    Date of encounter: 1/27/22

## 2022-02-02 NOTE — PROGRESS NOTES
PCP: JOYA Hill    Referring Provider:     Subjective:   Luisito Stuart is a 76 y.o. male with hx of CABG (2004;[place] - s/p [facility] with PCI of Lcx 3/6/2021 which also revealed patent FINNEGAN to LAD, patent saphenous vein graft to PDA and known occlusion of the vein graft to Lcx), Ischemic cardiomyopathy with EF 10%, AICD (last shock 9 months ago, reports ablation performed 6 months ago in [place]), afib,  CKD stage 4 followed by nephrology, CVA 2019, current smoker (60+ years), COPD, and chronic atrial fibrillation on Eliquis presents following hospital discharge.       Patient had a [facility] cath performed which showed several blockages that were not stented at the time of the cath due to patients volume status. Recommend potential repeat [facility] with PCI on an outpatient basis, when he is in a euvolemic condition.  History of atrial fib ablation by  2019.  4-5 cardioversion prior to AFib ablation.  Has been maintained on Eliquis    2/4/22 - doing better. Still has some bilateral + 1 leg swelling but looks improved. States he has weakness of both his legs and they give out.     Fhx: noncontributory   Shx: smoker,     EKG 1/1/22 - NSR  ECHO 11/2021: The left ventricle is moderately enlarged with concentric remodeling and severely decreased systolic function.  EF 20%.                               Grade III left ventricular diastolic dysfunction.                               Mild right ventricular enlargement with mildly reduced right ventricular systolic function.                               Moderate to severe left atrial enlargement.    Moderate right atrial enlargement.    Moderate-to-severe mitral regurgitation.   Severe tricuspid regurgitation.                              The estimated PA systolic pressure is 44 mmHg.    There is pulmonary hypertension.  LHC 1/5/22 ([facility]):  3 vessel coronary artery disease.  There was left main disease.                               Patent LIMA to LAD                                  Patent SVG  to PDA, proximal PDA has a stent which has 30% ISR, other stent is noted in the distal part of the graft which has 50% ISR.                                 SVG to circumflex is occluded.     Native RCA is completely occluded.                                 Distal left main appears hazy, both ostial circumflex and ostial  have at least intermediate 70% stenosis.  There is a patent long stent in the circumflex with mild ISR.                                We opted not to pursue high-risk PCI to distal left main and ostial circumflex given concerns for compromise of the LAD diagonal system.  This may need                  mechanical circulatory support during PCI. Consider PCI once he is more compensated.          Right heart catheterization which showed elevated filling pressures with low cardiac output.          Right atrial pressure was 19         Right ventricular pressure was 56/15         Pulmonary arterial pressure was 51/27 with a mean of 37.         Wedge pressure was 34.         Pulmonary arterial saturation was 45%.         Cardiac output is 3.35, cardiac index is 1.69.     [facility] 3/6/19:  CABG with a patent LIMA to the LAD and a patent saphenous vein graft to the PDA.  Known occlusion of the vein graft to the left circumflex.  Mid left circumflex with known stenosis left to medical therapy.  He underwent successful percutaneous intervention of the left mid left circumflex with a 2.75 x 16 mm drug-eluting stent      Lab Results   Component Value Date     01/20/2022    K 4.3 01/20/2022     01/20/2022    CO2 27 01/20/2022    BUN 39 (H) 01/20/2022    CREATININE 2.20 (H) 01/20/2022    CALCIUM 9.6 01/20/2022    ANIONGAP 11 01/20/2022    ESTGFRAFRICA 52 06/05/2020    EGFRNONAA 31 (L) 01/20/2022       Lab Results   Component Value Date    CHOL 110 03/11/2020     Lab Results   Component Value Date    HDL 45 03/11/2020     Lab Results   Component Value Date    LDLCALC 55  "03/11/2020     Lab Results   Component Value Date    TRIG 50 03/11/2020     Lab Results   Component Value Date    CHOLHDL 2.4 03/11/2020       Lab Results   Component Value Date    WBC 7.52 01/07/2022    HGB 11.3 (L) 01/07/2022    HCT 37.2 (L) 01/07/2022    MCV 88.8 01/07/2022     01/07/2022           Review of Systems   Respiratory: Positive for shortness of breath. Negative for cough.    Cardiovascular: Positive for leg swelling. Negative for chest pain, palpitations, orthopnea, claudication and PND.         Objective:   /70   Pulse 83   Resp 16   Ht 5' 9" (1.753 m)   Wt 88 kg (194 lb)   BMI 28.65 kg/m²     Physical Exam  Vitals and nursing note reviewed.   Cardiovascular:      Rate and Rhythm: Normal rate.      Pulses: Normal pulses.      Heart sounds: Normal heart sounds.   Pulmonary:      Breath sounds: Rales present.   Musculoskeletal:      Right lower leg: Edema present.      Left lower leg: Edema present.           Assessment:     1. Weakness of both lower extremities  Basic Metabolic Panel    US Lower Extrem Arteries Bilat with KASSIDY (xpd)   2. Peripheral edema  EKG 12-lead   3. Ischemic cardiomyopathy     4. Longstanding persistent atrial fibrillation     5. Hx of CABG     6. CKD (chronic kidney disease) stage 4, GFR 15-29 ml/min           Plan:   Ischemic cardiomyopathy  Stage D, NYHA III-IV  Ischemic cardiomyopathy; LVEF < 20%; NYHA IV, Stage D  Volume overloaded - +1 leg swelling, JVD (improving)  Recent hospital admission; CI 1.7, creatinine elevated despited IV lasix; PO torsemide 40mg bid  GDMT - continue entresto 24/26 bid, Reduce Coreg to 3.125mg bid. Start farxiga 10mg qd  AICD in place      A-fib  PMHx of Afib  - Amiodarone 200mg PO QD  - Eliquis 5mg PO BID    - on telemetry  - EKG on 1/7 showed NSR           Hx of CABG  S/p Recent [facility] with severe 3VCAD, Patent LIMA to LAD, Moderately disease SVG to PDA  - on Plavix and eliquis  - Start pravastatin 20mg qd (has hx of muscle cramps " on lipitor)  - Once euvolemic; consider PCI of LM in LCx +/- support      Weakness of both lower extremities  US arterial doppler  Reports weakness and give out    CKD (chronic kidney disease) stage 4, GFR 15-29 ml/min  Check BMP today  Patient is taking Advil; strongly discouraged and told to replace with tylenol      I

## 2022-02-04 PROBLEM — R29.898 WEAKNESS OF BOTH LOWER EXTREMITIES: Status: ACTIVE | Noted: 2022-01-01

## 2022-02-04 NOTE — ASSESSMENT & PLAN NOTE
S/p Recent LHC with severe 3VCAD, Patent LIMA to LAD, Moderately disease SVG to PDA  - on Plavix and eliquis  - Start pravastatin 20mg qd (has hx of muscle cramps on lipitor)  - Once euvolemic; consider PCI of LM in LCx +/- support

## 2022-02-04 NOTE — ASSESSMENT & PLAN NOTE
Stage D, NYHA III-IV  Ischemic cardiomyopathy; LVEF < 20%; NYHA IV, Stage D  Volume overloaded - +1 leg swelling, JVD (improving)  Recent hospital admission; CI 1.7, creatinine elevated despited IV lasix; PO torsemide 40mg bid  GDMT - continue entresto 24/26 bid, Reduce Coreg to 3.125mg bid. Start farxiga 10mg qd  AICD in place

## 2022-02-09 NOTE — TELEPHONE ENCOUNTER
Pt. Called stated that he was taking pravastatin stated that yesterday he fell to the floor after taking it wanted to know if r/t pravastatin. Pt. Denies unconsciousness at time of fall. Pt. Advised to come for lab work tomorrow will check LFT'S, CK advised to continue medications as taking, refer to heart failure clinic in [place], [place]. Refer to cardiac rehab at [facility] per Dr. Mckee. Pt. Voiced understanding. Pt. Declines cardiac rehab stated will decide whether or not he wants referral to [place] will call back in a few days whether to schedule or not. Pt. Stated that he did have a bruise on buttock from fall. Advised pt. To go to ER if needed for evaluation. Pt. Voiced understanding.

## 2022-02-15 PROBLEM — R47.81 SLURRED SPEECH: Status: ACTIVE | Noted: 2022-01-01

## 2022-02-15 PROBLEM — N18.30 CKD (CHRONIC KIDNEY DISEASE), STAGE III: Status: ACTIVE | Noted: 2022-01-01

## 2022-02-15 PROBLEM — I50.9 CONGESTIVE HEART FAILURE: Status: RESOLVED | Noted: 2022-01-01 | Resolved: 2022-01-01

## 2022-02-15 PROBLEM — D12.2 ADENOMATOUS POLYP OF ASCENDING COLON: Status: RESOLVED | Noted: 2017-12-18 | Resolved: 2022-01-01

## 2022-02-15 PROBLEM — R60.0 BILATERAL LEG EDEMA: Status: RESOLVED | Noted: 2021-07-02 | Resolved: 2022-01-01

## 2022-02-15 PROBLEM — R47.01 EXPRESSIVE APHASIA: Status: ACTIVE | Noted: 2022-01-01

## 2022-02-15 PROBLEM — N17.9 ACUTE RENAL FAILURE: Status: RESOLVED | Noted: 2021-01-01 | Resolved: 2022-01-01

## 2022-02-15 PROBLEM — J44.9 COPD (CHRONIC OBSTRUCTIVE PULMONARY DISEASE): Status: RESOLVED | Noted: 2021-01-01 | Resolved: 2022-01-01

## 2022-02-15 PROBLEM — R47.81 SLURRED SPEECH: Status: RESOLVED | Noted: 2022-01-01 | Resolved: 2022-01-01

## 2022-02-15 PROBLEM — N18.4 CKD (CHRONIC KIDNEY DISEASE) STAGE 4, GFR 15-29 ML/MIN: Status: RESOLVED | Noted: 2022-01-01 | Resolved: 2022-01-01

## 2022-02-15 PROBLEM — Z12.11 COLON CANCER SCREENING: Status: RESOLVED | Noted: 2022-01-01 | Resolved: 2022-01-01

## 2022-02-15 PROBLEM — J44.1 COPD EXACERBATION: Status: RESOLVED | Noted: 2021-01-01 | Resolved: 2022-01-01

## 2022-02-15 PROBLEM — R40.4 TRANSIENT ALTERATION OF AWARENESS: Status: ACTIVE | Noted: 2022-01-01

## 2022-02-15 PROBLEM — K21.00 GASTROESOPHAGEAL REFLUX DISEASE WITH ESOPHAGITIS: Status: RESOLVED | Noted: 2017-12-18 | Resolved: 2022-01-01

## 2022-02-15 PROBLEM — K29.80 DUODENITIS: Status: RESOLVED | Noted: 2017-12-18 | Resolved: 2022-01-01

## 2022-02-15 NOTE — ASSESSMENT & PLAN NOTE
Evaluated by Neuro by telemed - concern for TIA -   Could also be the result of hypoperfusion from heart failure   - PLAN -- ST eval;  Treat CHF exacerbation;  MRI if no improvement;  Neurochecks;  Continue statin; unable to do CTA due to CKD; UDS; ammonia level   - Hx of Afib (reported hx of medication noncompliance) this puts him at higher risk for CVA

## 2022-02-15 NOTE — ASSESSMENT & PLAN NOTE
77 yo male with slurred speech and difficulty answering questions.  No other symtpoms like weakness.    May represent encephalopathy as difficult to localize.  Consider MRI if sx fail to improve and/or cause is not clear.    No acute intervention. No signs of LVO.  Takes eliquis - no tPA.

## 2022-02-15 NOTE — CONSULTS
[facility]  Cardiology  Consult Note    Patient Name: Luisito Stuart  MRN: [id number]  Admission Date: 2/15/2022  Hospital Length of Stay: 0 days  Code Status: Prior   Attending Provider: Mohamud Casarez MD   Consulting Provider: JOYA Murillo  Primary Care Physician: JOYA Hill  Principal Problem:Transient alteration of awareness    Patient information was obtained from patient, past medical records and ER records.     Inpatient consult to Cardiology  Consult performed by: JOYA Murillo  Consult ordered by: SUDHAKAR Whitt  Reason for consult: CHF exacerbation        Subjective:     Chief Complaint:  Slurred speech     HPI:   77 y/o male with PMH of CABG (2004; [facility] s/[facility] with PCI of Lcx 3/6/2021 which also revealed patent FINNEGAN to LAD, patent saphenous vein graft to PDA and known occlusion of the vein graft to Lcx), Ischemic cardiomyopathy with EF 10%, AICD (last shock 10 months ago, reports ablation performed 7 months ago in [place]), CKD stage 4 followed by nephrology, CVA 2019, current smoker (60+ years), COPD, hearing loss, and chronic atrial fibrillation on Eliquis presented to the ED with complaints of slurred speech.  Daughter told ER staff that he was in his usual state of health last night and was last seen normal at 10:00 p.m..  There was also mention of possible medication non-compliance. This morning when he woke up he was having slurred speech.  In the emergency department he was evaluated by Neurology via telemedicine.  He did not have any focal deficits but has expressive aphasia.  Neurology feels this may be a TIA.  CT head was negative.  Unable to do CTA due to CKD.  Patient was also vomiting on admission so CT abdomen was done and negative for any acute process.  He does have a AAA but appears to be stable.  Chest x-ray consistent with heart failure.  Critical Care was asked to admit for heart failure exacerbation and cardiology  consulted.     On exam patient is drowsy but will wake up and follow commands.  He has slurred speech but no focal deficits and is able to answer some questions appropriately. History obtained from medical records.  No family at bedside. Currently on IV dobutamine 2.5 and IV lasix 80mg Q 8.                   Past Medical History:   Diagnosis Date    Atrial fibrillation     CHF (congestive heart failure)     COPD (chronic obstructive pulmonary disease)     Coronary artery disease     Disorder of kidney and ureter     Hypertension        Past Surgical History:   Procedure Laterality Date    APPENDECTOMY      CARDIAC DEFIBRILLATOR PLACEMENT      CORONARY ANGIOGRAPHY INCLUDING BYPASS GRAFTS WITH CATHETERIZATION OF LEFT HEART N/A 1/5/2022    Procedure: ANGIOGRAM, CORONARY, INCLUDING BYPASS GRAFT, WITH LEFT HEART CATHETERIZATION;  Surgeon: Gerda Castellanos MD;  Location: [facility] CATH LAB;  Service: Cardiology;  Laterality: N/A;    JOINT REPLACEMENT      KIDNEY TRANSPLANT      RIGHT HEART CATHETERIZATION Right 1/5/2022    Procedure: INSERTION, CATHETER, RIGHT HEART;  Surgeon: Gerda Castellanos MD;  Location: [facility] CATH LAB;  Service: Cardiology;  Laterality: Right;       Review of patient's allergies indicates:   Allergen Reactions    Indomethacin Itching and Other (See Comments)     Other reaction(s): ITCHING    Lipitor [atorvastatin] Other (See Comments)     Muscle cramps       No current facility-administered medications on file prior to encounter.     Current Outpatient Medications on File Prior to Encounter   Medication Sig    amiodarone (PACERONE) 200 MG Tab Take 1 tablet (200 mg total) by mouth once daily.    apixaban (ELIQUIS) 5 mg Tab Take 1 tablet (5 mg total) by mouth 2 (two) times daily.    carvediloL (COREG) 3.125 MG tablet Take 1 tablet (3.125 mg total) by mouth 2 (two) times daily with meals. Pt. Takes 1/2 tab daily    clopidogreL (PLAVIX) 75 mg tablet Take 75 mg by mouth once daily.     dapagliflozin (FARXIGA) 10 mg tablet Take 1 tablet (10 mg total) by mouth once daily.    HYDROcodone-acetaminophen (NORCO) 5-325 mg per tablet Take 1 tablet by mouth every 6 (six) hours as needed for Pain.    methocarbamoL (ROBAXIN) 750 MG Tab Take 1 tablet (750 mg total) by mouth 4 (four) times daily.    sacubitriL-valsartan (ENTRESTO) 24-26 mg per tablet Take 1 tablet by mouth 2 (two) times daily.    torsemide (DEMADEX) 20 MG Tab Take 2 tablets (40 mg total) by mouth 2 (two) times daily before meals.    clotrimazole (LOTRIMIN) 1 % cream Apply to affected area 2 times daily    nitroGLYCERIN 0.4 MG/DOSE TL SPRY (NITROLINGUAL) 400 mcg/spray spray Place 1 spray under the tongue every 5 (five) minutes as needed for Chest pain.    pantoprazole (PROTONIX) 40 MG tablet Take 1 tablet (40 mg total) by mouth once daily.    pravastatin (PRAVACHOL) 20 MG tablet Take 1 tablet (20 mg total) by mouth once daily.     Family History     Problem Relation (Age of Onset)    Cancer Mother    Heart disease Father        Tobacco Use    Smoking status: Current Every Day Smoker     Packs/day: 1.00     Years: 46.00     Pack years: 46.00     Types: Cigarettes    Smokeless tobacco: Never Used   Substance and Sexual Activity    Alcohol use: Not Currently    Drug use: Never    Sexual activity: Not Currently     Review of Systems   Reason unable to perform ROS: unable to perform complete ROS due to drowsiness and difficulty answering questions.   Cardiovascular: Positive for dyspnea on exertion. Negative for chest pain.   Respiratory: Positive for cough and shortness of breath.      Objective:     Vital Signs (Most Recent):  Temp: 97.3 °F (36.3 °C) (02/15/22 1515)  Pulse: (!) 123 (02/15/22 1615)  Resp: 13 (02/15/22 1615)  BP: (!) 140/77 (02/15/22 1615)  SpO2: 96 % (02/15/22 1615) Vital Signs (24h Range):  Temp:  [97.3 °F (36.3 °C)-98.6 °F (37 °C)] 97.3 °F (36.3 °C)  Pulse:  [] 123  Resp:  [10-23] 13  SpO2:  [90 %-99 %] 96  %  BP: (126-177)/() 140/77     Weight: 86.2 kg (190 lb)  Body mass index is 28.06 kg/m².    SpO2: 96 %  O2 Device (Oxygen Therapy): nasal cannula      Intake/Output Summary (Last 24 hours) at 2/15/2022 1644  Last data filed at 2/15/2022 1217  Gross per 24 hour   Intake --   Output 750 ml   Net -750 ml       Lines/Drains/Airways     Peripheral Intravenous Line                 Peripheral IV - Single Lumen 02/15/22 0641 20 G Posterior;Right Hand <1 day         Peripheral IV - Single Lumen 02/15/22 18 G Anterior;Left Forearm <1 day                Physical Exam  Vitals reviewed.   Constitutional:       Appearance: He is ill-appearing.   HENT:      Mouth/Throat:      Mouth: Mucous membranes are moist.      Pharynx: Oropharynx is clear.   Eyes:      General: No scleral icterus.     Pupils: Pupils are equal, round, and reactive to light.   Cardiovascular:      Rate and Rhythm: Tachycardia present. Rhythm irregular.   Pulmonary:      Effort: No respiratory distress.      Breath sounds: Wheezing and rales present.   Abdominal:      General: Bowel sounds are normal.      Palpations: Abdomen is soft.      Tenderness: There is no abdominal tenderness.   Musculoskeletal:      Cervical back: Neck supple.      Right lower leg: Edema present.      Left lower leg: Edema present.   Skin:     General: Skin is warm and dry.      Coloration: Skin is pale. Skin is not jaundiced.   Neurological:      Mental Status: He is easily aroused. He is lethargic.      Comments: Oriented to person and place         Significant Labs:   ABG: No results for input(s): PH, PCO2, HCO3, POCSATURATED, BE in the last 48 hours., Blood Culture: No results for input(s): LABBLOO in the last 48 hours., BMP:   Recent Labs   Lab 02/15/22  0755   *      K 4.1      CO2 26   BUN 32*   CREATININE 2.20*   CALCIUM 9.3   , CMP   Recent Labs   Lab 02/15/22  0755      K 4.1      CO2 26   *   BUN 32*   CREATININE 2.20*   CALCIUM 9.3    PROT 6.8   ALBUMIN 3.8   BILITOT 1.9*   ALKPHOS 98   AST 21   ALT 12*   ANIONGAP 17*   EGFRNONAA 31*   , CBC   Recent Labs   Lab 02/15/22  0755   WBC 8.62   HGB 12.7*   HCT 41.3      , INR   Recent Labs   Lab 02/15/22  0755   INR 1.07   , Lipid Panel No results for input(s): CHOL, HDL, LDLCALC, TRIG, CHOLHDL in the last 48 hours. and Troponin No results for input(s): TROPONINI in the last 48 hours.    Significant Imaging: Echocardiogram:   Transthoracic echo (TTE) complete (Cupid Only):   Results for orders placed or performed during the hospital encounter of 11/10/21   Echo   Result Value Ref Range    Posterior Wall 1.32 (A) 0.6 - 1.1 cm    E wave deceleration time 154 msec    MV Peak E Jim 1.11 m/s    IVS 1.36 (A) 0.6 - 1.1 cm    LVIDd 6.60 (A) 3.5 - 6.0 cm    LVIDs 5.17 (A) 2.1 - 4.0 cm    Echo EF Estimated 43 %    LV Systolic Volume 127.80 mL    LV Diastolic Volume 223.60 mL    RVDD 4.33 cm    AORTIC VALVE CUSP SEPERATION 1.67 cm    FS 22 %    LV mass 426.32 g    Left Ventricle Relative Wall Thickness 0.40 cm    LV Systolic Volume Index 61.4 mL/m2    LV Diastolic Volume Index 107.50 mL/m2    LV Mass Index 205 g/m2    BSA 2.11 m2    IVC diameter 2.82 cm    RV mid diameter 3.75 cm    EF 20 %    Left Ventricular Outflow Tract Mean Gradient 0.70 mmHg    LA size 5.80 cm    TAPSE 1.91 cm    Right ventricular length in diastole (apical 4-chamber view) 7.06 cm    AV mean gradient 2 mmHg    AV valve area 2.09 cm2    AV index (prosthetic) 0.67     E/A ratio 3.47     LVOT diameter 2.00 cm    LVOT area 3.1 cm2    LVOT peak VTI 9.73 cm    Ao VTI 14.60 cm    LVOT stroke volume 30.55 cm3    TR Max Jim 2.69 m/s    MV Peak A Jim 0.32 m/s    Triscuspid Valve Regurgitation Peak Gradient 29 mmHg    Right Atrial Pressure (from IVC) 15 mmHg    TV rest pulmonary artery pressure 44 mmHg    Narrative    · The left ventricle is moderately enlarged with concentric remodeling and   severely decreased systolic function.  · The  estimated ejection fraction is 20%.  · Grade III left ventricular diastolic dysfunction.  · Mild right ventricular enlargement with mildly reduced right ventricular   systolic function.  · Moderate to severe left atrial enlargement.  · Moderate right atrial enlargement.  · Moderate-to-severe mitral regurgitation.  · Severe tricuspid regurgitation.  · Elevated central venous pressure (15 mmHg).  · The estimated PA systolic pressure is 44 mmHg.  · There is pulmonary hypertension.      , EKG: Reviewed, afib with rvr, rate 108 and X-Ray: CXR: X-Ray Chest 1 View (CXR):   Results for orders placed or performed during the hospital encounter of 02/15/22   X-Ray Chest 1 View    Narrative    EXAMINATION:  XR CHEST 1 VIEW    CLINICAL HISTORY:  .  Shortness of breath    COMPARISON:  February 2, 2022 chest x-ray    TECHNIQUE:  Chest x-ray AP portable erect    FINDINGS:  Left subclavian multiple lead transvenous pacemaker/defibrillator device is intact.    There is cardiomegaly and pulmonary vascular engorgement.  Mediastinal contours are similar.  Prior median sternotomy.    There is some hazy pulmonary edema throughout both lungs.  There is no gross pleural effusion.    Osseous structures are similar      Impression    Cardiomegaly and evidence of CHF with mild pulmonary edema      Electronically signed by: Ivan Guerrier  Date:    02/15/2022  Time:    08:06     Assessment and Plan:     CKD (chronic kidney disease), stage III  - Being followed by primary medical team    Ischemic cardiomyopathy  - Followed by cardiology as outpatient, Dr. Rylee BO-fib  - On Eliquis for primary stroke prevention    Deep vein thrombosis (DVT) of lower extremity  - Hx of DVT    Acute on chronic congestive heart failure  Latest ECHO performed and demonstrates- Results for orders placed during the hospital encounter of 11/10/21    Echo    Interpretation Summary  · The left ventricle is moderately enlarged with concentric remodeling and  severely decreased systolic function.  · The estimated ejection fraction is 20%.  · Grade III left ventricular diastolic dysfunction.  · Mild right ventricular enlargement with mildly reduced right ventricular systolic function.  · Moderate to severe left atrial enlargement.  · Moderate right atrial enlargement.  · Moderate-to-severe mitral regurgitation.  · Severe tricuspid regurgitation.  · Elevated central venous pressure (15 mmHg).  · The estimated PA systolic pressure is 44 mmHg.  · There is pulmonary hypertension.    - Patient seen and evaluated by Dr. Castellanos  - Increased dobutamine to 5 mcg/kg  - Continue IV lasix 80mg q 8 hours  - Strict I & O and daily weights  - Will continue monitoring      Chronic obstructive pulmonary disease  - Being followed by primary medical team        VTE Risk Mitigation (From admission, onward)         Ordered     apixaban tablet 5 mg  2 times daily         02/15/22 8084                Thank you for your consult. I will follow-up with patient. Please contact us if you have any additional questions.    JOYA Murillo  Cardiology   [facility]

## 2022-02-15 NOTE — HPI
75 y/o male with PMH of CABG (2004; [facility] s/p [facility] with PCI of Lcx 3/6/2021 which also revealed patent FINNEGAN to LAD, patent saphenous vein graft to PDA and known occlusion of the vein graft to Lcx), Ischemic cardiomyopathy with EF 10%, AICD (last shock 10 months ago, reports ablation performed 7 months ago in [place]), CKD stage 4 followed by nephrology, CVA 2019, current smoker (60+ years), COPD, hearing loss, and chronic atrial fibrillation on Eliquis presented to the ED with complaints of slurred speech.  Daughter told ER staff that he was in his usual state of health last night and was last seen normal at 10:00 p.m..  There was also mention of possible medication non-compliance. This morning when he woke up he was having slurred speech.  In the emergency department he was evaluated by Neurology via telemedicine.  He did not have any focal deficits but has expressive aphasia.  Neurology feels this may be a TIA.  CT head was negative.  Unable to do CTA due to CKD.  Patient was also vomiting on admission so CT abdomen was done and negative for any acute process.  He does have a AAA but appears to be stable.  Chest x-ray consistent with heart failure.  Critical Care was asked to admit for heart failure exacerbation and cardiology consulted.     On exam patient is drowsy but will wake up and follow commands.  He has slurred speech but no focal deficits and is able to answer some questions appropriately. History obtained from medical records.  No family at bedside. Currently on IV dobutamine 2.5 and IV lasix 80mg Q 8.

## 2022-02-15 NOTE — H&P
[facility] - Emergency Department  Pulmonology  H&P    Patient Name: Luisito Stuart  MRN: [id number]  Admission Date: 2/15/2022  Code Status: Prior  Primary Care Provider: JOYA Hill   Principal Problem: Transient alteration of awareness    Subjective:     HPI:  76-year-old male with a past medical history significant for ischemic cardiomyopathy, systolic heart failure with EF 20%, AFib on chronic anticoagulation, COPD, hypertension, hyperlipidemia, history DVT (4/2021) and CKD who presented to the emergency department with complaints of slurred speech.  Daughter told ER staff that he was in his usual state of health last night and was last seen normal at 10:00 p.m..  There was also mention of possible medication non-compliance. This morning when he woke up he was having slurred speech.  In the emergency department he was evaluated by Neurology via telemedicine.  He did not have any focal deficits but has expressive aphasia.  Neurology feels this may be a TIA.  CT head was negative.  Unable to do CTA due to CKD.  Patient was also vomiting on admission so CT abdomen was done and negative for any acute process.  He does have a AAA but appears to be stable.  Chest x-ray consistent with heart failure.  Critical Care was asked to admit for heart failure exacerbation.     On exam patient is drowsy but will wake up and follow commands.  He has slurred speech but no focal deficits.  He is unable to provide HPI or medical history due to his slurred speech.  History obtained from medical records.  No family at bedside.  Patient was treated with 60 mg IV Lasix and has put out 350 cc.     Past Medical History:   Diagnosis Date    Atrial fibrillation     CHF (congestive heart failure)     COPD (chronic obstructive pulmonary disease)     Coronary artery disease     Disorder of kidney and ureter     Hypertension        Past Surgical History:   Procedure Laterality Date    APPENDECTOMY      CARDIAC  DEFIBRILLATOR PLACEMENT      CORONARY ANGIOGRAPHY INCLUDING BYPASS GRAFTS WITH CATHETERIZATION OF LEFT HEART N/A 1/5/2022    Procedure: ANGIOGRAM, CORONARY, INCLUDING BYPASS GRAFT, WITH LEFT HEART CATHETERIZATION;  Surgeon: Gerda Castellanos MD;  Location: [facility] CATH LAB;  Service: Cardiology;  Laterality: N/A;    JOINT REPLACEMENT      KIDNEY TRANSPLANT      RIGHT HEART CATHETERIZATION Right 1/5/2022    Procedure: INSERTION, CATHETER, RIGHT HEART;  Surgeon: Gerda Castellanos MD;  Location: [facility] CATH LAB;  Service: Cardiology;  Laterality: Right;       Review of patient's allergies indicates:   Allergen Reactions    Indomethacin Itching and Other (See Comments)     Other reaction(s): ITCHING    Lipitor [atorvastatin] Other (See Comments)     Muscle cramps       Family History     Problem Relation (Age of Onset)    Cancer Mother    Heart disease Father        Tobacco Use    Smoking status: Current Every Day Smoker     Packs/day: 1.00     Years: 46.00     Pack years: 46.00     Types: Cigarettes    Smokeless tobacco: Never Used   Substance and Sexual Activity    Alcohol use: Not Currently    Drug use: Never    Sexual activity: Not Currently         Review of Systems   Unable to perform ROS: Other   Neurological: Positive for speech difficulty.     Objective:     Vital Signs (Most Recent):  Temp: 98.6 °F (37 °C) (02/15/22 0744)  Pulse: 103 (02/15/22 0930)  Resp: 14 (02/15/22 0930)  BP: (!) 143/90 (02/15/22 0930)  SpO2: 95 % (02/15/22 0930) Vital Signs (24h Range):  Temp:  [98.6 °F (37 °C)] 98.6 °F (37 °C)  Pulse:  [102-112] 103  Resp:  [13-16] 14  SpO2:  [90 %-97 %] 95 %  BP: (138-177)/() 143/90     Weight: 86.2 kg (190 lb)  Body mass index is 28.06 kg/m².      Intake/Output Summary (Last 24 hours) at 2/15/2022 1146  Last data filed at 2/15/2022 0958  Gross per 24 hour   Intake --   Output 300 ml   Net -300 ml       Physical Exam  Vitals reviewed.   HENT:      Nose: Nose normal.       Mouth/Throat:      Mouth: Mucous membranes are moist.   Eyes:      Extraocular Movements: Extraocular movements intact.      Pupils: Pupils are equal, round, and reactive to light.   Cardiovascular:      Rate and Rhythm: Normal rate and regular rhythm.      Pulses: Normal pulses.      Heart sounds: Normal heart sounds.   Pulmonary:      Effort: Pulmonary effort is normal. No respiratory distress.      Breath sounds: Normal breath sounds.   Abdominal:      General: Bowel sounds are normal.      Palpations: Abdomen is soft.      Tenderness: There is no abdominal tenderness.   Musculoskeletal:         General: Normal range of motion.      Cervical back: Normal range of motion and neck supple.      Right lower leg: Edema present.      Left lower leg: Edema present.      Comments: Left greater than right    Skin:     General: Skin is warm and dry.      Capillary Refill: Capillary refill takes less than 2 seconds.   Neurological:      General: No focal deficit present.      Cranial Nerves: Dysarthria present.      Motor: No weakness or pronator drift.      Comments: Drowsy          Vents:       Lines/Drains/Airways     Peripheral Intravenous Line                 Peripheral IV - Single Lumen 02/15/22 0641 20 G Posterior;Right Hand <1 day                Significant Labs:    CBC/Anemia Profile:  Recent Labs   Lab 02/15/22  0755   WBC 8.62   HGB 12.7*   HCT 41.3      MCV 88.2   RDW 17.0*        Chemistries:  Recent Labs   Lab 02/15/22  0755      K 4.1      CO2 26   BUN 32*   CREATININE 2.20*   CALCIUM 9.3   ALBUMIN 3.8   PROT 6.8   BILITOT 1.9*   ALKPHOS 98   ALT 12*   AST 21       All pertinent labs within the past 24 hours have been reviewed.    Significant Imaging:   I have reviewed all pertinent imaging results/findings within the past 24 hours.    Assessment/Plan:     * Transient alteration of awareness  Evaluated by Neuro by telemed - feels rosie this may be a TIA - recommends MRI if no improvement      CKD (chronic kidney disease), stage III  Cr 2.2 - this appears to be around baseline  - monitor I/Os     Expressive aphasia  Evaluated by Neuro by telemed - concern for TIA -   Could also be the result of hypoperfusion from heart failure   - PLAN -- ST eval;  Treat CHF exacerbation;  MRI if no improvement;  Neurochecks;  Continue statin; unable to do CTA due to CKD; UDS; ammonia level   - Hx of Afib (reported hx of medication noncompliance) this puts him at higher risk for CVA     Encounter for screening for abdominal aortic aneurysm (AAA) in patient 50 years of age or older without other risk factors for AAA  CT abdomin reviewed     A-fib  Resume home Eliquis     Chronic anticoagulation  On Eliquis for Afib -- will continue    Deep vein thrombosis (DVT) of lower extremity  Hx DVT 4/2021-- Venous dopplers negative today     Acute on chronic congestive heart failure  - per ER record there is concern for noncomplaince with medications- will discuss with family once they arrive   Treated with Lasix 60mg in ER; 350cc UOP so far   - continue diuresis; consider Dobutamine   - cardiology consult   - continue home medications by will need swallow eval first   - I/Os   - daily weight   - lactic acid 1.2 on VBG in ER   - trop 53 -- will trend       Echo    Interpretation Summary  · The left ventricle is moderately enlarged with concentric remodeling and severely decreased systolic function.  · The estimated ejection fraction is 20%.  · Grade III left ventricular diastolic dysfunction.  · Mild right ventricular enlargement with mildly reduced right ventricular systolic function.  · Moderate to severe left atrial enlargement.  · Moderate right atrial enlargement.  · Moderate-to-severe mitral regurgitation.  · Severe tricuspid regurgitation.  · Elevated central venous pressure (15 mmHg).  · The estimated PA systolic pressure is 44 mmHg.  · There is pulmonary hypertension.      Chronic obstructive pulmonary disease  Not  actively having an exacerbation     Lower extremity edema  Left greater than right - neg for DVT              SERGEY Whitt-ACNP  Pulmonology  [facility] - Emergency Department

## 2022-02-15 NOTE — SUBJECTIVE & OBJECTIVE
Past Medical History:   Diagnosis Date    Atrial fibrillation     CHF (congestive heart failure)     COPD (chronic obstructive pulmonary disease)     Coronary artery disease     Disorder of kidney and ureter     Hypertension        Past Surgical History:   Procedure Laterality Date    APPENDECTOMY      CARDIAC DEFIBRILLATOR PLACEMENT      CORONARY ANGIOGRAPHY INCLUDING BYPASS GRAFTS WITH CATHETERIZATION OF LEFT HEART N/A 1/5/2022    Procedure: ANGIOGRAM, CORONARY, INCLUDING BYPASS GRAFT, WITH LEFT HEART CATHETERIZATION;  Surgeon: Gerda Castellanos MD;  Location: [facility] CATH LAB;  Service: Cardiology;  Laterality: N/A;    JOINT REPLACEMENT      KIDNEY TRANSPLANT      RIGHT HEART CATHETERIZATION Right 1/5/2022    Procedure: INSERTION, CATHETER, RIGHT HEART;  Surgeon: Gerda Castellanos MD;  Location: [facility] CATH LAB;  Service: Cardiology;  Laterality: Right;       Review of patient's allergies indicates:   Allergen Reactions    Indomethacin Itching and Other (See Comments)     Other reaction(s): ITCHING    Lipitor [atorvastatin] Other (See Comments)     Muscle cramps       Family History     Problem Relation (Age of Onset)    Cancer Mother    Heart disease Father        Tobacco Use    Smoking status: Current Every Day Smoker     Packs/day: 1.00     Years: 46.00     Pack years: 46.00     Types: Cigarettes    Smokeless tobacco: Never Used   Substance and Sexual Activity    Alcohol use: Not Currently    Drug use: Never    Sexual activity: Not Currently         Review of Systems   Unable to perform ROS: Other   Neurological: Positive for speech difficulty.     Objective:     Vital Signs (Most Recent):  Temp: 98.6 °F (37 °C) (02/15/22 0744)  Pulse: 103 (02/15/22 0930)  Resp: 14 (02/15/22 0930)  BP: (!) 143/90 (02/15/22 0930)  SpO2: 95 % (02/15/22 0930) Vital Signs (24h Range):  Temp:  [98.6 °F (37 °C)] 98.6 °F (37 °C)  Pulse:  [102-112] 103  Resp:  [13-16] 14  SpO2:  [90 %-97 %] 95 %  BP:  (138-177)/() 143/90     Weight: 86.2 kg (190 lb)  Body mass index is 28.06 kg/m².      Intake/Output Summary (Last 24 hours) at 2/15/2022 1146  Last data filed at 2/15/2022 0958  Gross per 24 hour   Intake --   Output 300 ml   Net -300 ml       Physical Exam  Vitals reviewed.   HENT:      Nose: Nose normal.      Mouth/Throat:      Mouth: Mucous membranes are moist.   Eyes:      Extraocular Movements: Extraocular movements intact.      Pupils: Pupils are equal, round, and reactive to light.   Cardiovascular:      Rate and Rhythm: Normal rate and regular rhythm.      Pulses: Normal pulses.      Heart sounds: Normal heart sounds.   Pulmonary:      Effort: Pulmonary effort is normal. No respiratory distress.      Breath sounds: Normal breath sounds.   Abdominal:      General: Bowel sounds are normal.      Palpations: Abdomen is soft.      Tenderness: There is no abdominal tenderness.   Musculoskeletal:         General: Normal range of motion.      Cervical back: Normal range of motion and neck supple.      Right lower leg: Edema present.      Left lower leg: Edema present.      Comments: Left greater than right    Skin:     General: Skin is warm and dry.      Capillary Refill: Capillary refill takes less than 2 seconds.   Neurological:      General: No focal deficit present.      Cranial Nerves: Dysarthria present.      Motor: No weakness or pronator drift.      Comments: Drowsy          Vents:       Lines/Drains/Airways     Peripheral Intravenous Line                 Peripheral IV - Single Lumen 02/15/22 0641 20 G Posterior;Right Hand <1 day                Significant Labs:    CBC/Anemia Profile:  Recent Labs   Lab 02/15/22  0755   WBC 8.62   HGB 12.7*   HCT 41.3      MCV 88.2   RDW 17.0*        Chemistries:  Recent Labs   Lab 02/15/22  0755      K 4.1      CO2 26   BUN 32*   CREATININE 2.20*   CALCIUM 9.3   ALBUMIN 3.8   PROT 6.8   BILITOT 1.9*   ALKPHOS 98   ALT 12*   AST 21       All  pertinent labs within the past 24 hours have been reviewed.    Significant Imaging:   I have reviewed all pertinent imaging results/findings within the past 24 hours.

## 2022-02-15 NOTE — ED PROVIDER NOTES
Encounter Date: 2/15/2022       History     Chief Complaint   Patient presents with    Shortness of Breath     History per patient's daughter Renate due to patient's condition he himself is not able to give a adequate history.  Patient has a history congestive heart failure with some questionable history of compliance with his home medications with recent hospitalization with successful diuresis.  Patient was found by his daughter today to have altered mental status and shortness of breath.  This happened at 5:30 a.m. this morning.  He was last seen normal at 10:00 p.m..  Patient's daughter states that he was generally weak and was not speaking clearly as himself.  He is also short of breath and gagging as if he was having a hard time handling his secretions.  He is also nauseated.  Patient was normal yesterday and his latest 10:00 p.m. but family is uncertain of his condition throughout the night.  Usually his CHF exacerbations gradually come on over at least a few days but he was feeling well yesterday.  He is not localized chest pain.  There has been no focal lateralizing weakness.  Symptoms are severe.  No associated fever.  Occasional coughing.  No other associated symptoms or modifying factors        Review of patient's allergies indicates:   Allergen Reactions    Indomethacin Itching and Other (See Comments)     Other reaction(s): ITCHING    Lipitor [atorvastatin] Other (See Comments)     Muscle cramps     Past Medical History:   Diagnosis Date    Atrial fibrillation     CHF (congestive heart failure)     COPD (chronic obstructive pulmonary disease)     Coronary artery disease     Disorder of kidney and ureter     Hypertension      Past Surgical History:   Procedure Laterality Date    APPENDECTOMY      CARDIAC DEFIBRILLATOR PLACEMENT      CORONARY ANGIOGRAPHY INCLUDING BYPASS GRAFTS WITH CATHETERIZATION OF LEFT HEART N/A 1/5/2022    Procedure: ANGIOGRAM, CORONARY, INCLUDING BYPASS GRAFT, WITH  LEFT HEART CATHETERIZATION;  Surgeon: Gerda Castellanos MD;  Location: [facility] CATH LAB;  Service: Cardiology;  Laterality: N/A;    JOINT REPLACEMENT      KIDNEY TRANSPLANT      RIGHT HEART CATHETERIZATION Right 1/5/2022    Procedure: INSERTION, CATHETER, RIGHT HEART;  Surgeon: Gerda Castellanos MD;  Location: [facility] CATH LAB;  Service: Cardiology;  Laterality: Right;     Family History   Problem Relation Age of Onset    Cancer Mother     Heart disease Father      Social History     Tobacco Use    Smoking status: Current Every Day Smoker     Packs/day: 1.00     Years: 46.00     Pack years: 46.00     Types: Cigarettes    Smokeless tobacco: Never Used   Substance Use Topics    Alcohol use: Not Currently    Drug use: Never     Review of Systems   Constitutional: Negative for fever.   HENT: Positive for congestion. Negative for sore throat.    Respiratory: Positive for cough and shortness of breath.    Cardiovascular: Positive for leg swelling. Negative for chest pain.   Gastrointestinal: Positive for vomiting. Negative for abdominal pain and nausea.   Genitourinary: Negative for dysuria.   Musculoskeletal: Negative for back pain.   Skin: Negative for rash.   Neurological: Positive for speech difficulty, weakness, light-headedness and numbness.   Hematological: Does not bruise/bleed easily.       Physical Exam     Initial Vitals   BP Pulse Resp Temp SpO2   02/15/22 0735 02/15/22 0735 02/15/22 0735 02/15/22 0744 02/15/22 0735   (!) 177/114 108 13 98.6 °F (37 °C) (!) 90 %      MAP       --                Physical Exam    Constitutional: He appears well-developed and well-nourished.   HENT:   Head: Normocephalic and atraumatic.   Eyes: EOM are normal. Pupils are equal, round, and reactive to light.   Neck: Neck supple.   Normal range of motion.  Cardiovascular: Normal rate and regular rhythm.   Pulmonary/Chest: He has rales.   Abdominal: Abdomen is soft. He exhibits no distension. There is no abdominal  tenderness. There is no rebound and no guarding.   Musculoskeletal:         General: Normal range of motion.      Cervical back: Normal range of motion and neck supple.     Neurological: He is alert.   Oriented to person place.  Speech is very confused and patient is hard to understand.  Weakness bilateral lower extremities.  No weakness of the upper extremities.  Ataxia present on finger-nose-finger bilateral.  See NIH stroke scale score.         Medical Screening Exam   See Full Note    ED Course   Procedures  Labs Reviewed   COMPREHENSIVE METABOLIC PANEL - Abnormal; Notable for the following components:       Result Value    Anion Gap 17 (*)     Glucose 147 (*)     BUN 32 (*)     Creatinine 2.20 (*)     Bilirubin, Total 1.9 (*)     ALT 12 (*)     eGFR 31 (*)     All other components within normal limits   NT-PRO NATRIURETIC PEPTIDE - Abnormal; Notable for the following components:    ProBNP 11,395 (*)     All other components within normal limits   CBC WITH DIFFERENTIAL - Abnormal; Notable for the following components:    Hemoglobin 12.7 (*)     MCHC 30.8 (*)     RDW 17.0 (*)     MPV 12.8 (*)     Neutrophils % 80.5 (*)     Lymphocytes % 6.8 (*)     Monocytes % 8.7 (*)     Basophils % 1.5 (*)     Lymphocytes, Absolute 0.59 (*)     All other components within normal limits   MANUAL DIFFERENTIAL - Abnormal; Notable for the following components:    Segmented Neutrophils, Man % 86 (*)     Lymphocytes, Man % 6 (*)     Monocytes, Man % 7 (*)     Platelet Morphology Large & Giant Platelets (*)     All other components within normal limits   TROPONIN I - Normal   SARS-COV2 (COVID) W/ FLU ANTIGEN - Normal    Narrative:     Negative SARS-CoV results should not be used as the sole basis for treatment or patient management decisions; negative results should be considered in the context of a patient's recent exposures, history and the presene of clinical signs and symptoms consistent with COVID-19.  Negative results should be  treated as presumptive and confirmed by molecular assay, if necessary for patient management.   APTT - Normal   PROTIME-INR - Normal   CBC W/ AUTO DIFFERENTIAL    Narrative:     The following orders were created for panel order CBC auto differential.  Procedure                               Abnormality         Status                     ---------                               -----------         ------                     CBC with Differential[[id number]]        Abnormal            Final result               Manual Differential[[id number]]          Abnormal            Final result                 Please view results for these tests on the individual orders.   EXTRA TUBES    Narrative:     The following orders were created for panel order EXTRA TUBES.  Procedure                               Abnormality         Status                     ---------                               -----------         ------                     Light Blue Top Hold[[id number]]                              In process                   Please view results for these tests on the individual orders.   LIGHT BLUE TOP HOLD          Imaging Results          CT Abdomen Pelvis  Without Contrast (Final result)  Result time 02/15/22 10:09:17    Final result by Biju Gleason DO (02/15/22 10:09:17)                 Impression:      Nonspecific retroperitoneal stranding.    Infrarenal abdominal aortic aneurysm measures up to 4.1 cm, measured at 3.6 cm on ultrasound performed 02/10/2022, measurements are likely related to modality differences.    Patchy opacities within the lower lobes.    Enlarged heart.  Thin pericardial effusion.      Electronically signed by: Biju Gleason  Date:    02/15/2022  Time:    10:09             Narrative:    EXAMINATION:  CT ABDOMEN PELVIS WITHOUT CONTRAST    CLINICAL HISTORY:  vomiting;    COMPARISON:  None.    TECHNIQUE:  CT ABDOMEN PELVIS WITHOUT CONTRAST    FINDINGS:  Lower lobes: Patchy airspace opacities,  mild.    Cardiac: Enlarged.  Thin pericardial effusion.    Abdomen:    Hepatobiliary/gallbladder: Thin perihepatic ascites.  Simple cyst right kidney.    Spleen: Normal for noncontrast technique.    Pancreas: Normal for noncontrast technique.    Adrenal/Genitourinary system: Bilateral adrenal gland thickening.  Atrophy.    Bowel and Mesentery: There is no evidence for bowel obstruction.    Peritoneum: Normal.    Retroperitoneum: Nonspecific retroperitoneal stranding.    Vasculature: Infrarenal abdominal aortic aneurysm measures up to 4.1 cm, measured at 3.6 cm on ultrasound performed 02/10/2022, measurements are likely related to modality differences.    Reproductive: Normal.    Lymph nodes: No enlarged lymph nodes.    Abdominal wall: Diastasis    Osseous structures: Degenerative change.                               CT Head Without Contrast (Final result)  Result time 02/15/22 10:00:58    Final result by Zain Farmer II, MD (02/15/22 10:00:58)                 Impression:      No evidence of abnormality demonstrated.      Electronically signed by: Zain Farmer  Date:    02/15/2022  Time:    10:00             Narrative:    EXAMINATION:  CT HEAD WITHOUT CONTRAST    CLINICAL HISTORY:  altered mental status;    TECHNIQUE:  Axial CT imaging of the brain is performed without contrast with 3 mm increments.    CT dose reduction technique used - Dose Rite and tube current modulation.    COMPARISON:  None available    FINDINGS:  No evidence of hemorrhage, mass, mass effect, midline shift or acute infarct seen.  The brain parenchyma attenuation and differentiation appears within normal limits. The ventricles and cisterns are normal in caliber.  No cranial or skull base abnormality is identified.                               US Lower Extremity Veins Bilateral (Final result)  Result time 02/15/22 09:00:11    Final result by Ivan Guerrier MD (02/15/22 09:00:11)                 Impression:      Unremarkable duplex  imaging of the bilateral lower extremity. No evidence of DVT.      Electronically signed by: Ivan Guerrier  Date:    02/15/2022  Time:    09:00             Narrative:    EXAMINATION:  US LOWER EXTREMITY VEINS BILATERAL    CLINICAL HISTORY:  Localized swelling of the lower extremities    TECHNIQUE:  Duplex scan of the bilateral lower extremity veins using the B-mode/grayscale imaging and Doppler spectral analysis and color-flow    COMPARISON:  November 10, 2020 venous ultrasound    FINDINGS:  Major venous structures of the bilateral lower extremity demonstrate a normal course and caliber. There is no evidence of deep venous thrombosis. No abnormal intrinsic echogenic lesions are demonstrated in the scanned blood vessels. The veins of the bilateral lower extremity demonstrate good compressibility with normal color flow study and spectral analysis.                               X-Ray Chest 1 View (Final result)  Result time 02/15/22 08:06:32    Final result by Ivan Guerrier MD (02/15/22 08:06:32)                 Impression:      Cardiomegaly and evidence of CHF with mild pulmonary edema      Electronically signed by: Ivan Guerrier  Date:    02/15/2022  Time:    08:06             Narrative:    EXAMINATION:  XR CHEST 1 VIEW    CLINICAL HISTORY:  .  Shortness of breath    COMPARISON:  February 2, 2022 chest x-ray    TECHNIQUE:  Chest x-ray AP portable erect    FINDINGS:  Left subclavian multiple lead transvenous pacemaker/defibrillator device is intact.    There is cardiomegaly and pulmonary vascular engorgement.  Mediastinal contours are similar.  Prior median sternotomy.    There is some hazy pulmonary edema throughout both lungs.  There is no gross pleural effusion.    Osseous structures are similar                                 Medications   furosemide injection 60 mg (60 mg Intravenous Given 2/15/22 4542)   ondansetron injection 4 mg (4 mg Intravenous Given 2/15/22 8609)        Additional MDM:     [facility]  Stroke Scale:   Interval = baseline (upon arrival/admit)  Level of consciousness = 1 - drowsy  LOC questions = 1 - answers one correctly  LOC commands = 0 - performs both correctly  Best gaze = 0 - normal  Visual = 0 - no visual loss  Facial palsy = 0 - normal  Motor left arm =  0 - no drift  Motor right arm =  0 - no drift  Motor left leg = 1 - drift  Motor right leg =  1 - drift  Limb ataxia = 2 - present in two limbs  Sensory = 0 - normal  Best language = 1 - mild to moderate aphasia  Dysarthria = 2 - near to unintelligible  Extinction and inattention = 0 - no neglect  NIH Stroke Scale Total = 9         Attending Attestation:           Physician Attestation for Scribe:  Physician Attestation Statement for Scribe #1: I, Issa, reviewed documentation, as scribed by Caridad in my presence, and it is both accurate and complete.             ED Course as of 02/15/22 1030   Tue Feb 15, 2022   0922 NT-proBNP(!): 11,395 [PK]   1014 Dr. Sierra, Telestroke physician, at patient's bedside for stroke evaluation. No acute abnormalities seen on Head CT. [MH]   1028 No lateralizing neurologic findings.  Discussed with neurologist who agrees with not performing CTA.  No evidence of large vessel occlusion based old symptoms.  Discussed with intensivist.  MRI can be considered in the future if metabolic encephalopathy is not favored pending his response to treatment.  Being treated with Lasix. [PK]      ED Course User Index  [MH] Jose Mclean  [PK] Salty Parsons MD          Clinical Impression:   Final diagnoses:  [R06.02] Shortness of breath  [M79.89] Leg swelling  [I50.9] Acute on chronic congestive heart failure, unspecified heart failure type (Primary)  [G93.41] Metabolic encephalopathy          ED Disposition Condition    Admit               Salty Parsons MD  02/15/22 1030

## 2022-02-15 NOTE — HPI
76-year-old male with a past medical history significant for ischemic cardiomyopathy, systolic heart failure with EF 20%, AFib on chronic anticoagulation, COPD, hypertension, hyperlipidemia, history DVT (4/2021) and CKD who presented to the emergency department with complaints of slurred speech.  Daughter told ER staff that he was in his usual state of health last night and was last seen normal at 10:00 p.m..  This morning when he woke up he was having slurred speech.  In the emergency department he was evaluated by Neurology via telemedicine.  He did not have any focal deficits but is having some expressive aphasia.  Neurology feels this may be a TIA.  CT head was negative.  Unable to do CTA due to CKD.  Patient was also vomiting on admission so CT abdomen was done and negative for any acute process.  He does have a AAA but appears to be stable.  Chest x-ray consistent with heart failure.  Critical Care was asked to admit for heart failure exacerbation.     On exam patient is drowsy but will wake up and follow commands.  He has slurred speech but no focal deficits.  He is unable to provide HPI her medical history due to his slurred speech.  History obtained from medical records.  No family at bedside.  Patient was treated with 60 mg IV Lasix and has put out 350 cc.

## 2022-02-15 NOTE — SUBJECTIVE & OBJECTIVE
"  Woke up with symptoms?: yes    Recent bleeding noted: no  Does the patient take any Blood Thinners? yes  Medications: Anticoagulants:  apixaban/Eliquis      Past Medical History: hypertension, CHF and Afib    Past Surgical History: none    Family History: no relevant history    Social History: no smoking, no drinking, no drugs    Allergies: Indomethacin  Lipitor [Atorvastatin]     Review of Systems   Unable to perform ROS: Mental status change     Objective:   Vitals: Blood pressure (!) 143/90, pulse 103, temperature 98.6 °F (37 °C), temperature source Oral, resp. rate 14, height 5' 9" (1.753 m), weight 86.2 kg (190 lb), SpO2 95 %.     CT READ: Yes  No hemmorhage. No mass effect. No early infarct signs.     Physical Exam  Vitals reviewed.   Constitutional:       Appearance: He is well-developed and well-nourished. He is obese.   HENT:      Head: Normocephalic and atraumatic.      Right Ear: External ear normal.      Left Ear: External ear normal.   Eyes:      Extraocular Movements: EOM normal.      Conjunctiva/sclera: Conjunctivae normal.   Neck:      Trachea: No tracheal deviation.   Pulmonary:      Effort: Pulmonary effort is normal. No respiratory distress.   Abdominal:      General: There is no distension.   Musculoskeletal:         General: Normal range of motion.      Cervical back: Normal range of motion.   Skin:     General: Skin is dry.   Neurological:      Mental Status: He is alert.   Psychiatric:         Mood and Affect: Mood and affect normal.         Behavior: Behavior normal.         Thought Content: Thought content normal.         Judgment: Judgment normal.           "

## 2022-02-15 NOTE — CONSULTS
[facility]  Vascular Neurology  Comprehensive Stroke Center  TeleVascular Neurology Acute Consultation Note      Consults    Consulting Provider: GENET CUENCA  Current Providers  No providers found    Patient Location:  [facility] EMERGENCY DEPART* Emergency Department  Spoke hospital nurse at bedside with patient assisting consultant.     Patient information was obtained from patient.         Assessment/Plan:       Diagnoses:   * Transient alteration of awareness  75 yo male with slurred speech and difficulty answering questions.  No other symtpoms like weakness.    May represent encephalopathy as difficult to localize.  Consider MRI if sx fail to improve and/or cause is not clear.    No acute intervention. No signs of LVO.  Takes eliquis - no tPA.        STROKE DOCUMENTATION     Acute Stroke Times:   Acute Stroke Times   Last Known Normal Date: 02/14/22  Last Known Normal Time: 2200  Symptom Onset Date: 02/15/22  Symptom Onset Time: 0530  Stroke Team Called Time: 1013  Stroke Team Arrival Time: 1013  CT Interpretation Time: 1016    NIH Scale:  1a. Level of Consciousness: 1-->Not alert, but arousable by minor stimulation to obey, answer, or respond  1b. LOC Questions: 1-->Answers one question correctly  1c. LOC Commands: 0-->Performs both tasks correctly  2. Best Gaze: 0-->Normal  3. Visual: 0-->No visual loss  4. Facial Palsy: 0-->Normal symmetrical movements  5a. Motor Arm, Left: 0-->No drift, limb holds 90 (or 45) degrees for full 10 secs  5b. Motor Arm, Right: 0-->No drift, limb holds 90 (or 45) degrees for full 10 secs  6a. Motor Leg, Left: 0-->No drift, leg holds 30 degree position for full 5 secs  6b. Motor Leg, Right: 0-->No drift, leg holds 30 degree position for full 5 secs  7. Limb Ataxia: 0-->Absent  8. Sensory: 0-->Normal, no sensory loss  9. Best Language: 1-->Mild-to-moderate aphasia, some obvious loss of fluency or facility of comprehension, without  "significant limitation on ideas expressed or form of expression. Reduction of speech and/or comprehension, however, makes conversation. . . (see row details)  10. Dysarthria: 2-->Severe dysarthria, patients speech is so slurred as to be unintelligible in the absence of or out of proportion to any dysphasia, or is mute/anarthric  11. Extinction and Inattention (formerly Neglect): 0-->No abnormality  Total (NIH Stroke Scale): 5     Modified [place] Coma Scale:    ABCD2 Score:    [id number] Score:   HAS -BLED Score:   ICH Score:   Hunt & Gu Classification:       Blood pressure (!) 143/90, pulse 103, temperature 98.6 °F (37 °C), temperature source Oral, resp. rate 14, height 5' 9" (1.753 m), weight 86.2 kg (190 lb), SpO2 95 %.  Alteplase Eligible?: No  Alteplase Recommendation: Alteplase not recommended due to Full dose anticoagulation   Possible Interventional Revascularization Candidate? No; at this time symptoms not suggestive of large vessel occlusion    Disposition Recommendation: admit to inpatient    Subjective:     History of Present Illness:  77 yo male w face droop and dysarthria.  No clear triggers.  Woke up w sx.  No associated weakness.  Has not had in the past.  Has not been treated.          Woke up with symptoms?: yes    Recent bleeding noted: no  Does the patient take any Blood Thinners? yes  Medications: Anticoagulants:  apixaban/Eliquis      Past Medical History: hypertension, CHF and Afib    Past Surgical History: none    Family History: no relevant history    Social History: no smoking, no drinking, no drugs    Allergies: Indomethacin  Lipitor [Atorvastatin]     Review of Systems   Unable to perform ROS: Mental status change     Objective:   Vitals: Blood pressure (!) 143/90, pulse 103, temperature 98.6 °F (37 °C), temperature source Oral, resp. rate 14, height 5' 9" (1.753 m), weight 86.2 kg (190 lb), SpO2 95 %.     CT READ: Yes  No hemmorhage. No mass effect. No early infarct signs. "     Physical Exam  Vitals reviewed.   Constitutional:       Appearance: He is well-developed and well-nourished. He is obese.   HENT:      Head: Normocephalic and atraumatic.      Right Ear: External ear normal.      Left Ear: External ear normal.   Eyes:      Extraocular Movements: EOM normal.      Conjunctiva/sclera: Conjunctivae normal.   Neck:      Trachea: No tracheal deviation.   Pulmonary:      Effort: Pulmonary effort is normal. No respiratory distress.   Abdominal:      General: There is no distension.   Musculoskeletal:         General: Normal range of motion.      Cervical back: Normal range of motion.   Skin:     General: Skin is dry.   Neurological:      Mental Status: He is alert.   Psychiatric:         Mood and Affect: Mood and affect normal.         Behavior: Behavior normal.         Thought Content: Thought content normal.         Judgment: Judgment normal.               Recommended the emergency room physician to have a brief discussion with the patient and/or family if available regarding the  risks and benefits of treatment, and to briefly document the occurrence of that discussion in his clinical encounter note.     The attending portion of this evaluation, treatment, and documentation was performed per Adán Sierra MD via audiovisual.    Billing code:  (time dependent stroke, complex case, unstable patient, hemorrhages, any intervention, some mimics)    · This patient has a very critical neurological condition/illness, with very high morbidity and mortality.  · There is a very high probability for acute neurological change leading to clinical and possibly life-threatening deterioration requiring highest level of physician preparedness for urgent intervention.  · There is possibility that this condition will require treatment with high risk medications as quickly as possible.  · There is also a possibility that the patient may benefit from further, more advance complex therapies (e.g.  endovascular therapy) that will require prompt diagnosis and care.  · Care was coordinated with other physicians involved in the patient's care.  · Radiologic studies and laboratory data were reviewed and interpreted, and plan of care was re-assessed based on the results.  · Diagnosis, treatment options and prognosis may have been discussed with the patient and/or family members or caregiver.  · Further advanced medical management and further evaluation is warranted for his care.      In your opinion, this was a: Tier 1 Van Negative    Consult End Time: 10:22 AM     Adán Sierra MD  Comprehensive Stroke Center  Vascular Neurology   [facility]

## 2022-02-15 NOTE — HPI
77 yo male w face droop and dysarthria.  No clear triggers.  Woke up w sx.  No associated weakness.  Has not had in the past.  Has not been treated.

## 2022-02-15 NOTE — ASSESSMENT & PLAN NOTE
- per ER record there is concern for noncomplaince with medications- will discuss with family once they arrive   Treated with Lasix 60mg in ER; 350cc UOP so far   - continue diuresis; consider Dobutamine   - cardiology consult   - continue home medications by will need swallow eval first   - I/Os   - daily weight   - lactic acid 1.2 on VBG in ER   - trop 53 -- will trend       Echo    Interpretation Summary  · The left ventricle is moderately enlarged with concentric remodeling and severely decreased systolic function.  · The estimated ejection fraction is 20%.  · Grade III left ventricular diastolic dysfunction.  · Mild right ventricular enlargement with mildly reduced right ventricular systolic function.  · Moderate to severe left atrial enlargement.  · Moderate right atrial enlargement.  · Moderate-to-severe mitral regurgitation.  · Severe tricuspid regurgitation.  · Elevated central venous pressure (15 mmHg).  · The estimated PA systolic pressure is 44 mmHg.  · There is pulmonary hypertension.

## 2022-02-15 NOTE — SUBJECTIVE & OBJECTIVE
Past Medical History:   Diagnosis Date    Atrial fibrillation     CHF (congestive heart failure)     COPD (chronic obstructive pulmonary disease)     Coronary artery disease     Disorder of kidney and ureter     Hypertension        Past Surgical History:   Procedure Laterality Date    APPENDECTOMY      CARDIAC DEFIBRILLATOR PLACEMENT      CORONARY ANGIOGRAPHY INCLUDING BYPASS GRAFTS WITH CATHETERIZATION OF LEFT HEART N/A 1/5/2022    Procedure: ANGIOGRAM, CORONARY, INCLUDING BYPASS GRAFT, WITH LEFT HEART CATHETERIZATION;  Surgeon: Gerda Castellanos MD;  Location: [facility] CATH LAB;  Service: Cardiology;  Laterality: N/A;    JOINT REPLACEMENT      KIDNEY TRANSPLANT      RIGHT HEART CATHETERIZATION Right 1/5/2022    Procedure: INSERTION, CATHETER, RIGHT HEART;  Surgeon: Gerda Castellanos MD;  Location: [facility] CATH LAB;  Service: Cardiology;  Laterality: Right;       Review of patient's allergies indicates:   Allergen Reactions    Indomethacin Itching and Other (See Comments)     Other reaction(s): ITCHING    Lipitor [atorvastatin] Other (See Comments)     Muscle cramps       No current facility-administered medications on file prior to encounter.     Current Outpatient Medications on File Prior to Encounter   Medication Sig    amiodarone (PACERONE) 200 MG Tab Take 1 tablet (200 mg total) by mouth once daily.    apixaban (ELIQUIS) 5 mg Tab Take 1 tablet (5 mg total) by mouth 2 (two) times daily.    carvediloL (COREG) 3.125 MG tablet Take 1 tablet (3.125 mg total) by mouth 2 (two) times daily with meals. Pt. Takes 1/2 tab daily    clopidogreL (PLAVIX) 75 mg tablet Take 75 mg by mouth once daily.    dapagliflozin (FARXIGA) 10 mg tablet Take 1 tablet (10 mg total) by mouth once daily.    HYDROcodone-acetaminophen (NORCO) 5-325 mg per tablet Take 1 tablet by mouth every 6 (six) hours as needed for Pain.    methocarbamoL (ROBAXIN) 750 MG Tab Take 1 tablet (750 mg total) by mouth 4 (four) times daily.     sacubitriL-valsartan (ENTRESTO) 24-26 mg per tablet Take 1 tablet by mouth 2 (two) times daily.    torsemide (DEMADEX) 20 MG Tab Take 2 tablets (40 mg total) by mouth 2 (two) times daily before meals.    clotrimazole (LOTRIMIN) 1 % cream Apply to affected area 2 times daily    nitroGLYCERIN 0.4 MG/DOSE TL SPRY (NITROLINGUAL) 400 mcg/spray spray Place 1 spray under the tongue every 5 (five) minutes as needed for Chest pain.    pantoprazole (PROTONIX) 40 MG tablet Take 1 tablet (40 mg total) by mouth once daily.    pravastatin (PRAVACHOL) 20 MG tablet Take 1 tablet (20 mg total) by mouth once daily.     Family History     Problem Relation (Age of Onset)    Cancer Mother    Heart disease Father        Tobacco Use    Smoking status: Current Every Day Smoker     Packs/day: 1.00     Years: 46.00     Pack years: 46.00     Types: Cigarettes    Smokeless tobacco: Never Used   Substance and Sexual Activity    Alcohol use: Not Currently    Drug use: Never    Sexual activity: Not Currently     Review of Systems   Reason unable to perform ROS: unable to perform complete ROS due to drowsiness and difficulty answering questions.   Cardiovascular: Positive for dyspnea on exertion. Negative for chest pain.   Respiratory: Positive for cough and shortness of breath.      Objective:     Vital Signs (Most Recent):  Temp: 97.3 °F (36.3 °C) (02/15/22 1515)  Pulse: (!) 123 (02/15/22 1615)  Resp: 13 (02/15/22 1615)  BP: (!) 140/77 (02/15/22 1615)  SpO2: 96 % (02/15/22 1615) Vital Signs (24h Range):  Temp:  [97.3 °F (36.3 °C)-98.6 °F (37 °C)] 97.3 °F (36.3 °C)  Pulse:  [] 123  Resp:  [10-23] 13  SpO2:  [90 %-99 %] 96 %  BP: (126-177)/() 140/77     Weight: 86.2 kg (190 lb)  Body mass index is 28.06 kg/m².    SpO2: 96 %  O2 Device (Oxygen Therapy): nasal cannula      Intake/Output Summary (Last 24 hours) at 2/15/2022 1644  Last data filed at 2/15/2022 1217  Gross per 24 hour   Intake --   Output 750 ml   Net -750 ml        Lines/Drains/Airways     Peripheral Intravenous Line                 Peripheral IV - Single Lumen 02/15/22 0641 20 G Posterior;Right Hand <1 day         Peripheral IV - Single Lumen 02/15/22 18 G Anterior;Left Forearm <1 day                Physical Exam  Vitals reviewed.   Constitutional:       Appearance: He is ill-appearing.   HENT:      Mouth/Throat:      Mouth: Mucous membranes are moist.      Pharynx: Oropharynx is clear.   Eyes:      General: No scleral icterus.     Pupils: Pupils are equal, round, and reactive to light.   Cardiovascular:      Rate and Rhythm: Tachycardia present. Rhythm irregular.   Pulmonary:      Effort: No respiratory distress.      Breath sounds: Wheezing and rales present.   Abdominal:      General: Bowel sounds are normal.      Palpations: Abdomen is soft.      Tenderness: There is no abdominal tenderness.   Musculoskeletal:      Cervical back: Neck supple.      Right lower leg: Edema present.      Left lower leg: Edema present.   Skin:     General: Skin is warm and dry.      Coloration: Skin is pale. Skin is not jaundiced.   Neurological:      Mental Status: He is easily aroused. He is lethargic.      Comments: Oriented to person and place         Significant Labs:   ABG: No results for input(s): PH, PCO2, HCO3, POCSATURATED, BE in the last 48 hours., Blood Culture: No results for input(s): LABBLOO in the last 48 hours., BMP:   Recent Labs   Lab 02/15/22  0755   *      K 4.1      CO2 26   BUN 32*   CREATININE 2.20*   CALCIUM 9.3   , CMP   Recent Labs   Lab 02/15/22  0755      K 4.1      CO2 26   *   BUN 32*   CREATININE 2.20*   CALCIUM 9.3   PROT 6.8   ALBUMIN 3.8   BILITOT 1.9*   ALKPHOS 98   AST 21   ALT 12*   ANIONGAP 17*   EGFRNONAA 31*   , CBC   Recent Labs   Lab 02/15/22  0755   WBC 8.62   HGB 12.7*   HCT 41.3      , INR   Recent Labs   Lab 02/15/22  0755   INR 1.07   , Lipid Panel No results for input(s): CHOL, HDL, LDLCALC,  TRIG, CHOLHDL in the last 48 hours. and Troponin No results for input(s): TROPONINI in the last 48 hours.    Significant Imaging: Echocardiogram:   Transthoracic echo (TTE) complete (Cupid Only):   Results for orders placed or performed during the hospital encounter of 11/10/21   Echo   Result Value Ref Range    Posterior Wall 1.32 (A) 0.6 - 1.1 cm    E wave deceleration time 154 msec    MV Peak E Jim 1.11 m/s    IVS 1.36 (A) 0.6 - 1.1 cm    LVIDd 6.60 (A) 3.5 - 6.0 cm    LVIDs 5.17 (A) 2.1 - 4.0 cm    Echo EF Estimated 43 %    LV Systolic Volume 127.80 mL    LV Diastolic Volume 223.60 mL    RVDD 4.33 cm    AORTIC VALVE CUSP SEPERATION 1.67 cm    FS 22 %    LV mass 426.32 g    Left Ventricle Relative Wall Thickness 0.40 cm    LV Systolic Volume Index 61.4 mL/m2    LV Diastolic Volume Index 107.50 mL/m2    LV Mass Index 205 g/m2    BSA 2.11 m2    IVC diameter 2.82 cm    RV mid diameter 3.75 cm    EF 20 %    Left Ventricular Outflow Tract Mean Gradient 0.70 mmHg    LA size 5.80 cm    TAPSE 1.91 cm    Right ventricular length in diastole (apical 4-chamber view) 7.06 cm    AV mean gradient 2 mmHg    AV valve area 2.09 cm2    AV index (prosthetic) 0.67     E/A ratio 3.47     LVOT diameter 2.00 cm    LVOT area 3.1 cm2    LVOT peak VTI 9.73 cm    Ao VTI 14.60 cm    LVOT stroke volume 30.55 cm3    TR Max Jim 2.69 m/s    MV Peak A Jim 0.32 m/s    Triscuspid Valve Regurgitation Peak Gradient 29 mmHg    Right Atrial Pressure (from IVC) 15 mmHg    TV rest pulmonary artery pressure 44 mmHg    Narrative    · The left ventricle is moderately enlarged with concentric remodeling and   severely decreased systolic function.  · The estimated ejection fraction is 20%.  · Grade III left ventricular diastolic dysfunction.  · Mild right ventricular enlargement with mildly reduced right ventricular   systolic function.  · Moderate to severe left atrial enlargement.  · Moderate right atrial enlargement.  · Moderate-to-severe mitral  regurgitation.  · Severe tricuspid regurgitation.  · Elevated central venous pressure (15 mmHg).  · The estimated PA systolic pressure is 44 mmHg.  · There is pulmonary hypertension.      , EKG: Reviewed, afib with rvr, rate 108 and X-Ray: CXR: X-Ray Chest 1 View (CXR):   Results for orders placed or performed during the hospital encounter of 02/15/22   X-Ray Chest 1 View    Narrative    EXAMINATION:  XR CHEST 1 VIEW    CLINICAL HISTORY:  .  Shortness of breath    COMPARISON:  February 2, 2022 chest x-ray    TECHNIQUE:  Chest x-ray AP portable erect    FINDINGS:  Left subclavian multiple lead transvenous pacemaker/defibrillator device is intact.    There is cardiomegaly and pulmonary vascular engorgement.  Mediastinal contours are similar.  Prior median sternotomy.    There is some hazy pulmonary edema throughout both lungs.  There is no gross pleural effusion.    Osseous structures are similar      Impression    Cardiomegaly and evidence of CHF with mild pulmonary edema      Electronically signed by: Ivan Guerrier  Date:    02/15/2022  Time:    08:06

## 2022-02-15 NOTE — ASSESSMENT & PLAN NOTE
Latest ECHO performed and demonstrates- Results for orders placed during the hospital encounter of 11/10/21    Echo    Interpretation Summary  · The left ventricle is moderately enlarged with concentric remodeling and severely decreased systolic function.  · The estimated ejection fraction is 20%.  · Grade III left ventricular diastolic dysfunction.  · Mild right ventricular enlargement with mildly reduced right ventricular systolic function.  · Moderate to severe left atrial enlargement.  · Moderate right atrial enlargement.  · Moderate-to-severe mitral regurgitation.  · Severe tricuspid regurgitation.  · Elevated central venous pressure (15 mmHg).  · The estimated PA systolic pressure is 44 mmHg.  · There is pulmonary hypertension.    - Patient seen and evaluated by Dr. Castellanos  - Increased dobutamine to 5 mcg/kg  - Continue IV lasix 80mg q 8 hours  - Strict I & O and daily weights  - Will continue monitoring

## 2022-02-16 NOTE — PT/OT/SLP EVAL
Speech Language Pathology Evaluation  Cognitive/Bedside Swallow    Patient Name:  Luisito Stuart   MRN:  [id number]  Admitting Diagnosis: Transient alteration of awareness    Recommendations:                  General Recommendations:  Follow-up not indicated  Diet recommendations:  Mechanical soft,Pureed meat (Pt requested pureed meat secondary to not being able to chew meat well.), Thin   Aspiration Precautions: 1 bite/sip at a time, Alternating bites/sips, HOB to 90 degrees, Remain upright 30 minutes post meal, Small bites/sips and Standard aspiration precautions   General Precautions: Standard,    Communication strategies:  none    History:     Past Medical History:   Diagnosis Date    Atrial fibrillation     CHF (congestive heart failure)     COPD (chronic obstructive pulmonary disease)     Coronary artery disease     Disorder of kidney and ureter     Hypertension        Past Surgical History:   Procedure Laterality Date    APPENDECTOMY      CARDIAC DEFIBRILLATOR PLACEMENT      CORONARY ANGIOGRAPHY INCLUDING BYPASS GRAFTS WITH CATHETERIZATION OF LEFT HEART N/A 1/5/2022    Procedure: ANGIOGRAM, CORONARY, INCLUDING BYPASS GRAFT, WITH LEFT HEART CATHETERIZATION;  Surgeon: Gerda Castellanos MD;  Location: [facility] CATH LAB;  Service: Cardiology;  Laterality: N/A;    JOINT REPLACEMENT      KIDNEY TRANSPLANT      RIGHT HEART CATHETERIZATION Right 1/5/2022    Procedure: INSERTION, CATHETER, RIGHT HEART;  Surgeon: Gerda Castellanos MD;  Location: [facility] CATH LAB;  Service: Cardiology;  Laterality: Right;       Social History: Patient lives with his daughter.    Prior Intubation HX:  unknown    Modified Barium Swallow: n/a    Chest X-Rays: see chart    Prior diet: Patient reports he eats what he wants at home.    Occupation/hobbies/homemaking: not stated.    Subjective     Patient lying in bed easily aroused. Patient able to answer questions prior to BEDSIDE SWALLOW EVALUATION, and patient agreeable to  evaluation.    Patient goals: to go home     Pain/Comfort:  · Pain Rating 1: 0/10 (No pain reported during speech eval.)    Respiratory Status: Nasal cannula, flow see chart L/min    Objective:     Cognitive Status:    Arousal/Alertness Appropriate response to stimuli  Orientation Oriented x4      Receptive Language:   Comprehension:      WFL    Pragmatics:    WFL    Expressive Language:  Verbal:    Pt appropriate with all expressive answers given.        Motor Speech:  Pt is edentulous and does not wear dentures, so pt's speech is somewhat hard to understand at times.     Voice:   WFL    Visual-Spatial:  WFL    Reading:   Not tested     Written Expression:   not tested    Oral Musculature Evaluation  · Oral Musculature: general weakness  · Dentition: edentulous,other (see comments) (Pt reports that he hasn't worn dentures in a long time but cannot chew up meats.)  · Secretion Management: adequate,other (see comments) (Pt has COPD and was spitting up thick secretions prior to BSE.)  · Mucosal Quality: adequate  · Oral Labial Strength and Mobility: WFL  · Lingual Strength and Mobility: WFL    Bedside Swallow Eval:   Consistencies Assessed:  · Thin liquids No difficulties noted with small sips of water via spoon or straw.   · Puree No difficulties noted with small bites of pudding.      Oral Phase:   · WFL    Pharyngeal Phase:   · no overt clinical signs/symptoms of aspiration  · no overt clinical signs/symptoms of pharyngeal dysphagia    Compensatory Strategies  · None    Treatment: Rec mechanical soft diet with pureed meats (patient requested) and thin liquids as tolerated. May advance diet as tolerated. Skilled SPEECH THERAPY not indicated.     Assessment:     Luisito Stuart is a 76 y.o. male with an SLP diagnosis of TIA.  He presents with no swallowing difficulties during BEDSIDE SWALLOW EVALUATION and baseline speech/cognition.    Goals:   Multidisciplinary Problems     SLP Goals     Not on file                 Plan:     · Patient to be seen:      · Plan of Care expires:     · Plan of Care reviewed with:      · SLP Follow-Up:  No       Discharge recommendations:      Barriers to Discharge:  None    Time Tracking:     SLP Treatment Date:      Speech Start Time:  0850  Speech Stop Time:  0908     Speech Total Time (min):  18 min    Billable Minutes: Eval 8  and Eval Swallow and Oral Function 10    02/16/2022

## 2022-02-16 NOTE — PT/OT/SLP EVAL
Occupational Therapy   Evaluation    Name: Luisito Stuart  MRN: [id number]  Admitting Diagnosis:  Transient alteration of awareness  Recent Surgery: * No surgery found *      Recommendations:     Discharge Recommendations: nursing facility, skilled,rehabilitation facility  Discharge Equipment Recommendations:  walker, rolling  Barriers to discharge:  Decreased caregiver support    Assessment:     Luisito Stuart is a 76 y.o. male with a medical diagnosis of Transient alteration of awareness.  He presents with weakness, decreased endurance, decreased functional mobility, and decline in ADLs. Performance deficits affecting function: weakness,impaired endurance,impaired self care skills,impaired functional mobilty,gait instability,impaired balance,decreased coordination. Pt with poor coordination of RUE noted as well as slurred speech. Pt with blood pressure of 94/58 upon arrival; however BP remained stable with position change to sitting EOB. Pt would benefit form discharge to swing bed in order to regain maximum independence with all ADL tasks and functional mobility      Rehab Prognosis: Good; patient would benefit from acute skilled OT services to address these deficits and reach maximum level of function.       Plan:     Patient to be seen 5 x/week to address the above listed problems via self-care/home management,therapeutic activities,therapeutic exercises  · Plan of Care Expires: 03/16/22  · Plan of Care Reviewed with: patient    Subjective     Chief Complaint: CVA  Patient/Family Comments/goals: pt agreeable to participate in OT/PT eval; pt with complaints of nausea    Occupational Profile:  Living Environment: pt lives with daughter in one story home with 5 steps to enter with handrail  Previous level of function: independent with all ADL tasks, ADL t/f, and functional mobility   Roles and Routines: perform self care  Equipment Used at Home:  cane, straight,oxygen  Assistance upon Discharge: swing  bed    Pain/Comfort:  · Pain Rating 1: 0/10    Patients cultural, spiritual, Quaker conflicts given the current situation: no    Objective:     Communicated with: Ronda RN prior to session.  Patient found supine with blood pressure cuff,lilly catheter,oxygen,peripheral IV,pulse ox (continuous),telemetry,SCD upon OT entry to room.    General Precautions: Standard, fall   Orthopedic Precautions:N/A   Braces: N/A  Respiratory Status: Nasal cannula, flow 2 L/min    Occupational Performance:    Bed Mobility:    · Patient completed Supine to Sit with minimum assistance  · Patient completed Sit to Supine with minimum assistance    Functional Mobility/Transfers:  · Patient completed Sit <> Stand Transfer with minimum assistance  with  hand-held assist   · Functional Mobility: not performed    Activities of Daily Living:  · Upper Body Dressing: moderate assistance to davi gown  · Lower Body Dressing: maximal assistance to davi socks    Cognitive/Visual Perceptual:  Cognitive/Psychosocial Skills:     -       Oriented to: Person   -       Follows Commands/attention:Follows two-step commands  -       Mood/Affect/Coping skills/emotional control: Cooperative    Physical Exam:  Balance: -       poor standing balance with sway noted  Skin integrity: Bruising of L thing and buttock  Edema:  Moderate LLE  Upper Extremity Range of Motion:     -       Right Upper Extremity: WFL  -       Left Upper Extremity: WFL  Upper Extremity Strength:    -       Right Upper Extremity: 4/5  -       Left Upper Extremity: 4/5  Gross motor coordination:   hand-eye coordination poor RUE    AMPAC 6 Click ADL:  AMPAC Total Score:      Treatment & Education:  · Pt educated on OT role/POC.   · Importance of OOB activity with staff assistance.  · Importance of sitting up in the chair throughout the day as tolerated, especially for meals   · Safety during functional t/f and mobility with use of RW and nurse assist  · Importance of assisting with  self-care activities   · All questions/concerns answered within OT scope of practice    Education:    Patient left supine with all lines intact, call button in reach and PETR Bond notified    GOALS:   Multidisciplinary Problems     Occupational Therapy Goals        Problem: Occupational Therapy Goal    Goal Priority Disciplines Outcome Interventions   Occupational Therapy Goal     OT, PT/OT Ongoing, Progressing    Description: STG:  Pt will perform grooming with setup  Pt will bathe with Carole with setup at EOB  Pt will perform UE dressing with Talisha  Pt will perform LE dressing with Carole  Pt will sit EOB x 10 min with SBA  Pt will transfer bed/chair/bsc with CGA with RW  Pt will perform standing task x 4 min with CGA with RW   Pt will tolerate 15 minutes of tx without fatigue      LT.Restore to max I with self care and mobility.                       History:     Past Medical History:   Diagnosis Date    Atrial fibrillation     CHF (congestive heart failure)     COPD (chronic obstructive pulmonary disease)     Coronary artery disease     Disorder of kidney and ureter     Hypertension        Past Surgical History:   Procedure Laterality Date    APPENDECTOMY      CARDIAC DEFIBRILLATOR PLACEMENT      CORONARY ANGIOGRAPHY INCLUDING BYPASS GRAFTS WITH CATHETERIZATION OF LEFT HEART N/A 2022    Procedure: ANGIOGRAM, CORONARY, INCLUDING BYPASS GRAFT, WITH LEFT HEART CATHETERIZATION;  Surgeon: Gerda Castellanos MD;  Location: [facility] CATH LAB;  Service: Cardiology;  Laterality: N/A;    JOINT REPLACEMENT      KIDNEY TRANSPLANT      RIGHT HEART CATHETERIZATION Right 2022    Procedure: INSERTION, CATHETER, RIGHT HEART;  Surgeon: Gerda Castellanos MD;  Location: [facility] CATH LAB;  Service: Cardiology;  Laterality: Right;       Time Tracking:     OT Date of Treatment: 22  OT Start Time: 1319  OT Stop Time: 1342  OT Total Time (min): 23 min    Billable Minutes:Evaluation OT mod complexity  eval    2/16/2022

## 2022-02-16 NOTE — PROGRESS NOTES
[facility]  Pulmonology  Progress Note    Patient Name: Luisito Stuart  MRN: [id number]  Admission Date: 2/15/2022  Hospital Length of Stay: 1 days  Code Status: Prior  Attending Provider: Mohamud Casarez MD  Primary Care Provider: JOYA Hill   Principal Problem: Transient alteration of awareness    Subjective:     Interval History: Patient continues to have dysarthria today. Diuresed overnight. Remains on dobutamine. Carotid doppler relatively unremarkable.     Objective:     Vital Signs (Most Recent):  Temp: 98.8 °F (37.1 °C) (02/16/22 0701)  Pulse: (!) 113 (02/16/22 0715)  Resp: 12 (02/16/22 0715)  BP: (!) 92/44 (02/16/22 0700)  SpO2: 97 % (02/16/22 0715) Vital Signs (24h Range):  Temp:  [97.3 °F (36.3 °C)-99.5 °F (37.5 °C)] 98.8 °F (37.1 °C)  Pulse:  [] 113  Resp:  [10-27] 12  SpO2:  [70 %-99 %] 97 %  BP: ()/(43-99) 92/44     Weight: 84.8 kg (186 lb 15.2 oz)  Body mass index is 27.61 kg/m².      Intake/Output Summary (Last 24 hours) at 2/16/2022 0809  Last data filed at 2/16/2022 0600  Gross per 24 hour   Intake 94.62 ml   Output 1850 ml   Net -1755.38 ml       Physical Exam  Vitals reviewed.   Constitutional:       General: He is not in acute distress.     Appearance: He is obese.   HENT:      Head: Normocephalic and atraumatic.      Right Ear: External ear normal.      Left Ear: External ear normal.      Mouth/Throat:      Mouth: Mucous membranes are moist.      Pharynx: Oropharynx is clear.   Eyes:      Conjunctiva/sclera: Conjunctivae normal.      Pupils: Pupils are equal, round, and reactive to light.   Neck:      Comments: Elevated JVP  Cardiovascular:      Rate and Rhythm: Tachycardia present. Rhythm irregular.      Heart sounds: Normal heart sounds.   Pulmonary:      Effort: Pulmonary effort is normal. No respiratory distress.      Breath sounds: Wheezing present. No rhonchi or rales.   Abdominal:      General: Abdomen is flat. Bowel sounds are normal.       Palpations: Abdomen is soft.      Tenderness: There is no abdominal tenderness.   Musculoskeletal:      Cervical back: Neck supple.      Right lower leg: No edema.      Left lower leg: No edema.   Lymphadenopathy:      Cervical: No cervical adenopathy.   Skin:     General: Skin is warm and dry.   Neurological:      General: No focal deficit present.      Mental Status: He is alert.      Cranial Nerves: Cranial nerve deficit present.         Vents:  Oxygen Concentration (%): 32 (02/15/22 1507)    Lines/Drains/Airways     Drain                 Urethral Catheter 02/16/22 0120 Temperature probe 16 Fr. <1 day          Peripheral Intravenous Line                 Peripheral IV - Single Lumen 02/15/22 0641 20 G Posterior;Right Hand 1 day         Peripheral IV - Single Lumen 02/15/22 18 G Anterior;Left Forearm 1 day                Significant Labs:    CBC/Anemia Profile:  Recent Labs   Lab 02/15/22  0755 02/16/22  0521   WBC 8.62 9.40   HGB 12.7* 11.5*   HCT 41.3 36.5*    125*   MCV 88.2 84.7   RDW 17.0* 16.6*        Chemistries:  Recent Labs   Lab 02/15/22  0755      K 4.1      CO2 26   BUN 32*   CREATININE 2.20*   CALCIUM 9.3   ALBUMIN 3.8   PROT 6.8   BILITOT 1.9*   ALKPHOS 98   ALT 12*   AST 21       All pertinent labs within the past 24 hours have been reviewed.    Significant Imaging:  I have reviewed all pertinent imaging results/findings within the past 24 hours.    Assessment/Plan:     * Transient alteration of awareness  - Evaluated by Neuro by telemed - feels lke this may be a TIA   - MRI ordered today  - on statin, ASA  - check lipid panel   - afib with noncompliance with anticoagulation. Active smoker    CKD (chronic kidney disease), stage III  - Cr 2.2 - this appears to be around baseline  - monitor I/Os   - MRI will be obtained without contrast    Expressive aphasia  - Evaluated by Neuro by telemed  - MRI  - Hx of Afib (reported hx of medication noncompliance) this puts him at higher risk  for CVA   - speech eval    A-fib  - Resume home Eliquis   - continue amiodarone    Acute on chronic congestive heart failure  - continue diuresis and Dobutamine   - cardiology consult   - continue home medications by will need swallow eval first   - I/Os   - daily weight     Echo    Interpretation Summary  · The left ventricle is moderately enlarged with concentric remodeling and severely decreased systolic function.  · The estimated ejection fraction is 20%.  · Grade III left ventricular diastolic dysfunction.  · Mild right ventricular enlargement with mildly reduced right ventricular systolic function.  · Moderate to severe left atrial enlargement.  · Moderate right atrial enlargement.  · Moderate-to-severe mitral regurgitation.  · Severe tricuspid regurgitation.  · Elevated central venous pressure (15 mmHg).  · The estimated PA systolic pressure is 44 mmHg.  · There is pulmonary hypertension.      Chronic obstructive pulmonary disease  - duoneb q6h  - not in active exacerbation         Ongoing neurological symptoms, concern for CVA, evaluated by neurology via telemed from ER. No tPA. Obtain MRI if possible with pacemaker/ICD, without contrast due to CKD. Therapy evaluation. Continue diuresis.        Mohamud Casarez MD  Pulmonology  [facility]

## 2022-02-16 NOTE — PLAN OF CARE
Problem: Adult Inpatient Plan of Care  Goal: Optimal Comfort and Wellbeing  Outcome: Ongoing, Progressing  Intervention: Monitor Pain and Promote Comfort  Flowsheets (Taken 2/16/2022 0812)  Pain Management Interventions:   care clustered   breathing exercises utilized   pillow support provided     Problem: Gas Exchange Impaired  Goal: Optimal Gas Exchange  Outcome: Ongoing, Progressing  Intervention: Optimize Oxygenation and Ventilation  2/16/2022 0812 by Ronda Coronado RN  Flowsheets (Taken 2/16/2022 0812)  Airway/Ventilation Management: calming measures promoted  Head of Bed (HOB) Positioning: HOB at 30-45 degrees  2/15/2022 1840 by Ronda Coronado RN  Flowsheets (Taken 2/15/2022 1840)  Head of Bed (HOB) Positioning: HOB at 30-45 degrees     Problem: Skin Injury Risk Increased  Goal: Skin Health and Integrity  Outcome: Ongoing, Progressing  Intervention: Optimize Skin Protection  Flowsheets (Taken 2/16/2022 0812)  Pressure Reduction Techniques: frequent weight shift encouraged  Pressure Reduction Devices: foam padding utilized  Head of Bed (HOB) Positioning: HOB at 30-45 degrees

## 2022-02-16 NOTE — ASSESSMENT & PLAN NOTE
- continue diuresis and Dobutamine   - cardiology consult   - continue home medications by will need swallow eval first   - I/Os   - daily weight     Echo    Interpretation Summary  · The left ventricle is moderately enlarged with concentric remodeling and severely decreased systolic function.  · The estimated ejection fraction is 20%.  · Grade III left ventricular diastolic dysfunction.  · Mild right ventricular enlargement with mildly reduced right ventricular systolic function.  · Moderate to severe left atrial enlargement.  · Moderate right atrial enlargement.  · Moderate-to-severe mitral regurgitation.  · Severe tricuspid regurgitation.  · Elevated central venous pressure (15 mmHg).  · The estimated PA systolic pressure is 44 mmHg.  · There is pulmonary hypertension.

## 2022-02-16 NOTE — ASSESSMENT & PLAN NOTE
- Evaluated by Neuro by telemed - feels lke this may be a TIA   - MRI ordered today  - on statin, ASA  - check lipid panel   - afib with noncompliance with anticoagulation. Active smoker

## 2022-02-16 NOTE — PT/OT/SLP EVAL
Physical Therapy Evaluation    Patient Name:  Luisito Stuart   MRN:  [id number]    Recommendations:     Discharge Recommendations:  nursing facility, skilled   Discharge Equipment Recommendations: walker, rolling   Barriers to discharge: Inaccessible home and Decreased caregiver support    Assessment:     Luisito Stuart is a 76 y.o. male admitted with a medical diagnosis of Transient alteration of awareness.  He presents with the following impairments/functional limitations:  impaired functional mobilty,impaired endurance,impaired balance,gait instability,decreased coordination,impaired cardiopulmonary response to activity Pt presents with slurred speech and right UE coordination deficits. He is slightly hypotensive with BP in 90s/50s. He does complain of dizziness with change in position and demonstrates significant sway while standing. He would benefit from use of rolling walker. His functional level has is significantly decreased from previous admission 3 months ago. Will possibly benefit from swingbed.    Rehab Prognosis: Fair; patient would benefit from acute skilled PT services to address these deficits and reach maximum level of function.    Recent Surgery: * No surgery found *      Plan:     During this hospitalization, patient to be seen 5 x/week to address the identified rehab impairments via gait training,therapeutic activities,therapeutic exercises and progress toward the following goals:    · Plan of Care Expires:  03/16/22    Subjective     Chief Complaint: CVA  Patient/Family Comments/goals: Pt hard of hearing. Agreeable to PT  Pain/Comfort:  · Pain Rating 1: 0/10  · Pain Rating Post-Intervention 1: 0/10    Patients cultural, spiritual, Restoration conflicts given the current situation: no    Living Environment:  Pt lives at home with his daughter and has 5 steps to enter home.  Prior to admission, patients level of function was ambulatory.  Equipment used at home: cane, straight,oxygen.  DME  owned (not currently used): none.  Upon discharge, patient will have assistance from family vs facility staff.    Objective:     Communicated with SHY Coronado RN prior to session.  Patient found supine with peripheral IV,lilly catheter,telemetry,oxygen,pulse ox (continuous),blood pressure cuff  upon PT entry to room.    General Precautions: Standard, fall   Orthopedic Precautions:N/A   Braces: N/A  Respiratory Status: Nasal cannula, flow 4 L/min    Exams:  · Cognitive Exam:  Patient is oriented to Person  · Fine Motor Coordination:    · -       Impaired  Right hand, finger to nose impaired  · Gross Motor Coordination:  WFL  · Sensation:    · -       Intact  · Skin Integrity/Edema:      · -       Skin integrity: Bruising of left hip and buttock  · -       Edema: Moderate LLE  · RLE ROM: WFL  · RLE Strength: WFL  · LLE ROM: WFL  · LLE Strength: WFL    Functional Mobility:  · Bed Mobility:     · Scooting: contact guard assistance  · Supine to Sit: contact guard assistance  · Sit to Supine: contact guard assistance  · Transfers:     · Sit to Stand:  minimum assistance with hand-held assist  · Balance: poor, signficant sway    Therapeutic Activities and Exercises:  ·  Pt educated on PT role/POC.   · Importance of OOB activity with staff assistance.  · Importance of sitting up in the chair throughout the day as tolerated, especially for meals   · Safety during functional t/f and mobility with use of rolling walker   · Multiple self-care tasks/functional mobility completed- assistance level noted above   · All questions/concerns answered within PT scope of practice      AM-PAC 6 CLICK MOBILITY  Total Score:15     Patient left supine with all lines intact and call button in reach.    GOALS:   Multidisciplinary Problems     Physical Therapy Goals        Problem: Physical Therapy Goal    Goal Priority Disciplines Outcome Goal Variances Interventions   Physical Therapy Goal     PT, PT/OT Ongoing, Progressing     Description:  Short term goals:  1. Sit to stand transfer with Contact Guard Assistance  2. Bed to chair transfer with Contact Guard Assistance using Rolling Walker  3. Gait  x 50 feet with Contact Guard Assistance using Rolling Walker.   4. Ascend/descend 5 stair with no Handrails Contact Guard Assistance using Rolling Walker.     Long term goals:  1. Sit to stand transfer with Modified [place]  2. Bed to chair transfer with Modified [place] using Rolling Walker  3. Gait  x 150 feet with Modified [place] using Rolling Walker.   4. Ascend/descend 5 stair with no Handrails Modified [place] using Rolling Walker.                      History:     Past Medical History:   Diagnosis Date    Atrial fibrillation     CHF (congestive heart failure)     COPD (chronic obstructive pulmonary disease)     Coronary artery disease     Disorder of kidney and ureter     Hypertension        Past Surgical History:   Procedure Laterality Date    APPENDECTOMY      CARDIAC DEFIBRILLATOR PLACEMENT      CORONARY ANGIOGRAPHY INCLUDING BYPASS GRAFTS WITH CATHETERIZATION OF LEFT HEART N/A 1/5/2022    Procedure: ANGIOGRAM, CORONARY, INCLUDING BYPASS GRAFT, WITH LEFT HEART CATHETERIZATION;  Surgeon: Gerda Castellanos MD;  Location: [facility] CATH LAB;  Service: Cardiology;  Laterality: N/A;    JOINT REPLACEMENT      KIDNEY TRANSPLANT      RIGHT HEART CATHETERIZATION Right 1/5/2022    Procedure: INSERTION, CATHETER, RIGHT HEART;  Surgeon: Gerda Castellanos MD;  Location: [facility] CATH LAB;  Service: Cardiology;  Laterality: Right;       Time Tracking:     PT Received On: 02/16/22  PT Start Time: 1329     PT Stop Time: 1348  PT Total Time (min): 19 min     Billable Minutes: Evaluation moderate complexity      02/16/2022

## 2022-02-16 NOTE — ASSESSMENT & PLAN NOTE
Latest ECHO performed and demonstrates- Results for orders placed during the hospital encounter of 11/10/21    Echo    Interpretation Summary  · The left ventricle is moderately enlarged with concentric remodeling and severely decreased systolic function.  · The estimated ejection fraction is 20%.  · Grade III left ventricular diastolic dysfunction.  · Mild right ventricular enlargement with mildly reduced right ventricular systolic function.  · Moderate to severe left atrial enlargement.  · Moderate right atrial enlargement.  · Moderate-to-severe mitral regurgitation.  · Severe tricuspid regurgitation.  · Elevated central venous pressure (15 mmHg).  · The estimated PA systolic pressure is 44 mmHg.  · There is pulmonary hypertension.    - Patient seen and evaluated by Dr. Castellanos  - Increased dobutamine to 5 mcg/kg  - Continue IV lasix 80mg q 8 hours  - Strict I & O and daily weights  - Will continue monitoring    2/16/2022:  - Patient seen and evaluated by Dr. Castellanos  - Output, net - 1.7L, 3 lb weight lose  - Creatinine 2.5, was 2.2 yesterday  - Continue dobutamine at 5 and IV lasix 80mg q 8 hours  - BMP in am

## 2022-02-16 NOTE — ASSESSMENT & PLAN NOTE
- Evaluated by Neuro by telemed  - MRI  - Hx of Afib (reported hx of medication noncompliance) this puts him at higher risk for CVA   - speech eval

## 2022-02-16 NOTE — PLAN OF CARE
Ss attempted to contact pt's brittani schafer for initial assesment. Pt unable to answer questions at time of visit. Ss following.

## 2022-02-16 NOTE — PLAN OF CARE
Problem: Gas Exchange Impaired  Goal: Optimal Gas Exchange  Intervention: Optimize Oxygenation and Ventilation  Flowsheets (Taken 2/15/2022 1840)  Head of Bed (HOB) Positioning: HOB at 30-45 degrees     Problem: Gas Exchange Impaired  Goal: Optimal Gas Exchange  Intervention: Optimize Oxygenation and Ventilation  Flowsheets (Taken 2/15/2022 1840)  Head of Bed (HOB) Positioning: HOB at 30-45 degrees     Problem: Gas Exchange Impaired  Goal: Optimal Gas Exchange  Intervention: Optimize Oxygenation and Ventilation  Flowsheets (Taken 2/15/2022 1840)  Head of Bed (HOB) Positioning: HOB at 30-45 degrees

## 2022-02-16 NOTE — PLAN OF CARE
Problem: Occupational Therapy Goal  Goal: Occupational Therapy Goal  Description: STG:  Pt will perform grooming with setup  Pt will bathe with Carole with setup at EOB  Pt will perform UE dressing with Talisha  Pt will perform LE dressing with Carole  Pt will sit EOB x 10 min with SBA  Pt will transfer bed/chair/bsc with CGA with RW  Pt will perform standing task x 4 min with CGA with RW   Pt will tolerate 15 minutes of tx without fatigue      LT.Restore to max I with self care and mobility.      Outcome: Ongoing, Progressing

## 2022-02-16 NOTE — PLAN OF CARE
Problem: Physical Therapy Goal  Goal: Physical Therapy Goal  Description: Short term goals:  1. Sit to stand transfer with Contact Guard Assistance  2. Bed to chair transfer with Contact Guard Assistance using Rolling Walker  3. Gait  x 50 feet with Contact Guard Assistance using Rolling Walker.   4. Ascend/descend 5 stair with no Handrails Contact Guard Assistance using Rolling Walker.     Long term goals:  1. Sit to stand transfer with Modified [place]  2. Bed to chair transfer with Modified [place] using Rolling Walker  3. Gait  x 150 feet with Modified [place] using Rolling Walker.   4. Ascend/descend 5 stair with no Handrails Modified [place] using Rolling Walker.     Outcome: Ongoing, Progressing      Refill passed per [facility] protocol.      Requested Prescriptions   Pending Prescriptions Disp Refills    Melia Gant Does not apply [address] [Pharmacy Med Name: [place] [facility] PLUS LANCETS EXTRA FINE 33G PLUS 33G MISC] 100 each 5     Sig: USE TO TEST BLOOD SUGAR THREE TIMES DAILY        Diabetic Supplies Protocol Passed - 12/16/2021 12:27 PM        Passed - Appointment in past 12 or next 3 months                Recent Outpatient Visits              2 weeks ago History of COPD    [facility], [address] Elfrieda Scrivener, MD    Office Visit    4 weeks ago History of COPD    [facility], [address] Elfrieda Scrivener, MD    [employer] E/M    1 month ago Moderate depressive episode [facility])    [facility], [address], [facility] Mike Macias MD    Telemedicine    1 month ago Type 2 diabetes mellitus with hyperglycemia, without long-term current use of insulin [facility])    [facility] Endocrinology King Spencer MD    Office Visit    1 month ago Moderate depressive episode [facility])    [facility], [address], Diana Saul MD    Office Visit             Future Appointments         Provider Department Appt Notes    In 6 days King Spencer, [address] [facility] Endocrinology

## 2022-02-16 NOTE — PROGRESS NOTES
[facility] Department  Cardiology  Progress Note    Patient Name: Luisito Stuart  MRN: [id number]  Admission Date: 2/15/2022  Hospital Length of Stay: 1 days  Code Status: Prior   Attending Physician: Mohamud Casarez MD   Primary Care Physician: JOYA Hill  Expected Discharge Date:   Principal Problem:Transient alteration of awareness    Subjective:     Hospital Course:   No notes on file    Interval History: Patient seen and evaluated by Dr. Castellanos.    Review of Systems   Reason unable to perform ROS: unable to perform complete ROS due to drowsiness and difficulty answering questions.   HENT: Positive for hearing loss.    Cardiovascular: Positive for dyspnea on exertion. Negative for chest pain.   Respiratory: Positive for cough and shortness of breath.      Objective:     Vital Signs (Most Recent):  Temp: 98.7 °F (37.1 °C) (02/16/22 1101)  Pulse: 88 (02/16/22 1400)  Resp: 14 (02/16/22 1400)  BP: (!) 85/49 (02/16/22 1400)  SpO2: 95 % (02/16/22 1400) Vital Signs (24h Range):  Temp:  [97.3 °F (36.3 °C)-99.5 °F (37.5 °C)] 98.7 °F (37.1 °C)  Pulse:  [] 88  Resp:  [8-27] 14  SpO2:  [70 %-99 %] 95 %  BP: ()/(43-91) 85/49     Weight: 84.8 kg (186 lb 15.2 oz)  Body mass index is 27.61 kg/m².     SpO2: 95 %  O2 Device (Oxygen Therapy): nasal cannula      Intake/Output Summary (Last 24 hours) at 2/16/2022 1423  Last data filed at 2/16/2022 1200  Gross per 24 hour   Intake 121.47 ml   Output 1700 ml   Net -1578.53 ml       Lines/Drains/Airways     Drain                 Urethral Catheter 02/16/22 0120 Temperature probe 16 Fr. <1 day          Peripheral Intravenous Line                 Peripheral IV - Single Lumen 02/15/22 0641 20 G Posterior;Right Hand 1 day         Peripheral IV - Single Lumen 02/15/22 18 G Anterior;Left Forearm 1 day                Physical Exam  Vitals reviewed.   Constitutional:       Appearance: He is ill-appearing.   Cardiovascular:      Rate and Rhythm: Tachycardia present.  Rhythm irregular.   Pulmonary:      Effort: No respiratory distress.      Breath sounds: Wheezing and rales present.   Abdominal:      General: Bowel sounds are normal.      Palpations: Abdomen is soft.   Musculoskeletal:      Cervical back: Neck supple.      Right lower leg: Edema present.      Left lower leg: Edema present.   Skin:     General: Skin is warm and dry.      Coloration: Skin is pale. Skin is not jaundiced.   Neurological:      Mental Status: He is easily aroused. He is lethargic.      Comments: Oriented to person and place         Significant Labs:   ABG: No results for input(s): PH, PCO2, HCO3, POCSATURATED, BE in the last 48 hours., Blood Culture: No results for input(s): LABBLOO in the last 48 hours., BMP:   Recent Labs   Lab 02/15/22  0755 02/16/22  0941   * 96    142   K 4.1 4.1    101   CO2 26 31   BUN 32* 35*   CREATININE 2.20* 2.52*   CALCIUM 9.3 9.2   , CMP   Recent Labs   Lab 02/15/22  0755 02/16/22  0941    142   K 4.1 4.1    101   CO2 26 31   * 96   BUN 32* 35*   CREATININE 2.20* 2.52*   CALCIUM 9.3 9.2   PROT 6.8 6.1*   ALBUMIN 3.8 3.4*   BILITOT 1.9* 2.0*   ALKPHOS 98 84   AST 21 17   ALT 12* 10*   ANIONGAP 17* 14   EGFRNONAA 31* 27*   , CBC   Recent Labs   Lab 02/15/22  0755 02/15/22  0755 02/16/22  0521   WBC 8.62  --  9.40   HGB 12.7*  --  11.5*   HCT 41.3   < > 36.5*     --  125*    < > = values in this interval not displayed.   , INR   Recent Labs   Lab 02/15/22  0755   INR 1.07   , Lipid Panel No results for input(s): CHOL, HDL, LDLCALC, TRIG, CHOLHDL in the last 48 hours. and Troponin No results for input(s): TROPONINI in the last 48 hours.    Significant Imaging: Echocardiogram:   Transthoracic echo (TTE) complete (Cupid Only):   Results for orders placed or performed during the hospital encounter of 11/10/21   Echo   Result Value Ref Range    Posterior Wall 1.32 (A) 0.6 - 1.1 cm    E wave deceleration time 154 msec    MV Peak E Jim  1.11 m/s    IVS 1.36 (A) 0.6 - 1.1 cm    LVIDd 6.60 (A) 3.5 - 6.0 cm    LVIDs 5.17 (A) 2.1 - 4.0 cm    Echo EF Estimated 43 %    LV Systolic Volume 127.80 mL    LV Diastolic Volume 223.60 mL    RVDD 4.33 cm    AORTIC VALVE CUSP SEPERATION 1.67 cm    FS 22 %    LV mass 426.32 g    Left Ventricle Relative Wall Thickness 0.40 cm    LV Systolic Volume Index 61.4 mL/m2    LV Diastolic Volume Index 107.50 mL/m2    LV Mass Index 205 g/m2    BSA 2.11 m2    IVC diameter 2.82 cm    RV mid diameter 3.75 cm    EF 20 %    Left Ventricular Outflow Tract Mean Gradient 0.70 mmHg    LA size 5.80 cm    TAPSE 1.91 cm    Right ventricular length in diastole (apical 4-chamber view) 7.06 cm    AV mean gradient 2 mmHg    AV valve area 2.09 cm2    AV index (prosthetic) 0.67     E/A ratio 3.47     LVOT diameter 2.00 cm    LVOT area 3.1 cm2    LVOT peak VTI 9.73 cm    Ao VTI 14.60 cm    LVOT stroke volume 30.55 cm3    TR Max Jim 2.69 m/s    MV Peak A Jim 0.32 m/s    Triscuspid Valve Regurgitation Peak Gradient 29 mmHg    Right Atrial Pressure (from IVC) 15 mmHg    TV rest pulmonary artery pressure 44 mmHg    Narrative    · The left ventricle is moderately enlarged with concentric remodeling and   severely decreased systolic function.  · The estimated ejection fraction is 20%.  · Grade III left ventricular diastolic dysfunction.  · Mild right ventricular enlargement with mildly reduced right ventricular   systolic function.  · Moderate to severe left atrial enlargement.  · Moderate right atrial enlargement.  · Moderate-to-severe mitral regurgitation.  · Severe tricuspid regurgitation.  · Elevated central venous pressure (15 mmHg).  · The estimated PA systolic pressure is 44 mmHg.  · There is pulmonary hypertension.       and X-Ray: CXR: X-Ray Chest 1 View (CXR):   Results for orders placed or performed during the hospital encounter of 02/15/22   X-Ray Chest 1 View    Narrative    EXAMINATION:  XR CHEST 1 VIEW    CLINICAL HISTORY:  .   Shortness of breath    COMPARISON:  February 2, 2022 chest x-ray    TECHNIQUE:  Chest x-ray AP portable erect    FINDINGS:  Left subclavian multiple lead transvenous pacemaker/defibrillator device is intact.    There is cardiomegaly and pulmonary vascular engorgement.  Mediastinal contours are similar.  Prior median sternotomy.    There is some hazy pulmonary edema throughout both lungs.  There is no gross pleural effusion.    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  Daughter told ER staff that he was in his usual state of health last night and was last seen normal at 10:00 p.m..  There was also mention of possible medication non-compliance. This morning when he woke up he was having slurred speech.  In the emergency department he was evaluated by Neurology via telemedicine.  He did not have any focal deficits but has expressive aphasia.  Neurology feels this may be a TIA.  CT head was negative.  Unable to do CTA due to CKD.  Patient was also vomiting on admission so CT abdomen was done and negative for any acute process.  He does have a AAA but appears to be stable.  Chest x-ray consistent with heart failure.  Critical Care was asked to admit for heart failure exacerbation and cardiology consulted.    CKD (chronic  kidney disease), stage III  - Being followed by primary medical team    Expressive aphasia  - Being followed by primary medical team  - MRI pending    Ischemic cardiomyopathy  - Followed by cardiology as outpatient, Dr. Rylee BECKERfib  - On Eliquis for primary stroke prevention    Deep vein thrombosis (DVT) of lower extremity  - Hx of DVT    Acute on chronic congestive heart failure  Latest ECHO performed and demonstrates- Results for orders placed during the hospital encounter of 11/10/21    Echo    Interpretation Summary  · The left ventricle is moderately enlarged with concentric remodeling and severely decreased systolic function.  · The estimated ejection fraction is 20%.  · Grade III left ventricular diastolic dysfunction.  · Mild right ventricular enlargement with mildly reduced right ventricular systolic function.  · Moderate to severe left atrial enlargement.  · Moderate right atrial enlargement.  · Moderate-to-severe mitral regurgitation.  · Severe tricuspid regurgitation.  · Elevated central venous pressure (15 mmHg).  · The estimated PA systolic pressure is 44 mmHg.  · There is pulmonary hypertension.    - Patient seen and evaluated by Dr. Castellanos  - Increased dobutamine to 5 mcg/kg  - Continue IV lasix 80mg q 8 hours  - Strict I & O and daily weights  - Will continue monitoring    2/16/2022:  - Patient seen and evaluated by Dr. Castellanos  - Output, net - 1.7L, 3 lb weight lose  - Creatinine 2.5, was 2.2 yesterday  - Continue dobutamine at 5 and IV lasix 80mg q 8 hours  - BMP in am      Chronic obstructive pulmonary disease  - Being followed by primary medical team        VTE Risk Mitigation (From admission, onward)         Ordered     apixaban tablet 5 mg  2 times daily         02/15/22 1146                JOYA Murillo  Cardiology  [facility]   Likely 2/2 malignancy, corrected calcium 11.4   - Cont IVF   - Will monitor Ca while on Prednisone   - Endocrine following   - Monitor fluid status Likely 2/2 malignancy, corrected calcium 11.3 today  - Cont IVF   - Will monitor Ca while on Prednisone   - Endocrine following   - Monitor fluid status

## 2022-02-16 NOTE — PLAN OF CARE
[facility] ICU  Initial Discharge Assessment       Primary Care Provider: JOYA Hill    Admission Diagnosis: Transient alteration of awareness [R40.4]  Shortness of breath [R06.02]  Metabolic encephalopathy [G93.41]  Leg swelling [M79.89]  Acute on chronic congestive heart failure, unspecified heart failure type [I50.9]  CVA (cerebral vascular accident) [I63.9]    Admission Date: 2/15/2022  Expected Discharge Date:     Discharge Barriers Identified: None    Payor: HU[employer] MANAGED MEDICARE / Plan: [employer] MEDICARE PPO / Product Type: Medicare Advantage /     Extended Emergency Contact Information  Primary Emergency Contact: Renate Dinero  Mobile Phone: [phone]  Relation: Daughter  Preferred language: English   needed? No    Discharge Plan A: Home with family  Discharge Plan B: Skilled Nursing Facility      [address] [address]  Phone: [phone] Fax: [phone]    [facility] [address] [place] [address] [address] [facility] [address]  Phone: [phone] Fax: [phone]    [facility] [address] [address]  Phone: [phone] Fax: [phone]      Initial Assessment (most recent)     Adult Discharge Assessment - 02/16/22 1506        Discharge Assessment    Assessment Type Discharge Planning Assessment     Source of Information patient     Communicated MORGAN with patient/caregiver Date not available/Unable to determine     Lives With child(tanja), adult     Do you expect to return to your current living situation? Yes     Do you have help at home or someone to help you manage your care at home? Yes     Who are your caregiver(s) and their phone number(s)? kimberly [email or web address]     Prior to hospitilization cognitive status: Unable to Assess     Current cognitive status: Unable to  Assess     Walking or Climbing Stairs Difficulty none     Dressing/Bathing Difficulty none     Equipment Currently Used at Home oxygen     Patient currently being followed by outpatient case management? No     Do you currently have service(s) that help you manage your care at home? No     Is the pt/caregiver preference to resume services with current agency No     Who is going to help you get home at discharge? dgtr     Discharge Plan A Home with family     Discharge Plan B Skilled Nursing Facility     DME Needed Upon Discharge  none     Discharge Plan discussed with: Patient     Discharge Barriers Identified None                    Ss spoke with pt's brittani schafer and pt lives at home with dgtr and plans to return at d/c if able. Per dgtr she needs to see how pt progresses before making final d/c decision home vs snf. Ss following.

## 2022-02-16 NOTE — PLAN OF CARE
Problem: Adult Inpatient Plan of Care  Goal: Plan of Care Review  Outcome: Ongoing, Progressing  Goal: Patient-Specific Goal (Individualized)  Outcome: Ongoing, Progressing  Goal: Absence of Hospital-Acquired Illness or Injury  Outcome: Ongoing, Progressing  Goal: Optimal Comfort and Wellbeing  Outcome: Ongoing, Progressing  Goal: Readiness for Transition of Care  Outcome: Ongoing, Progressing     Problem: Gas Exchange Impaired  Goal: Optimal Gas Exchange  Outcome: Ongoing, Progressing     Problem: Skin Injury Risk Increased  Goal: Skin Health and Integrity  Outcome: Ongoing, Progressing     Problem: Fall Injury Risk  Goal: Absence of Fall and Fall-Related Injury  Outcome: Ongoing, Progressing

## 2022-02-16 NOTE — ASSESSMENT & PLAN NOTE
- Cr 2.2 - this appears to be around baseline  - monitor I/Os   - MRI will be obtained without contrast

## 2022-02-16 NOTE — SUBJECTIVE & OBJECTIVE
Interval History: Patient continues to have dysarthria today. Diuresed overnight. Remains on dobutamine. Carotid doppler relatively unremarkable.     Objective:     Vital Signs (Most Recent):  Temp: 98.8 °F (37.1 °C) (02/16/22 0701)  Pulse: (!) 113 (02/16/22 0715)  Resp: 12 (02/16/22 0715)  BP: (!) 92/44 (02/16/22 0700)  SpO2: 97 % (02/16/22 0715) Vital Signs (24h Range):  Temp:  [97.3 °F (36.3 °C)-99.5 °F (37.5 °C)] 98.8 °F (37.1 °C)  Pulse:  [] 113  Resp:  [10-27] 12  SpO2:  [70 %-99 %] 97 %  BP: ()/(43-99) 92/44     Weight: 84.8 kg (186 lb 15.2 oz)  Body mass index is 27.61 kg/m².      Intake/Output Summary (Last 24 hours) at 2/16/2022 0809  Last data filed at 2/16/2022 0600  Gross per 24 hour   Intake 94.62 ml   Output 1850 ml   Net -1755.38 ml       Physical Exam  Vitals reviewed.   Constitutional:       General: He is not in acute distress.     Appearance: He is obese.   HENT:      Head: Normocephalic and atraumatic.      Right Ear: External ear normal.      Left Ear: External ear normal.      Mouth/Throat:      Mouth: Mucous membranes are moist.      Pharynx: Oropharynx is clear.   Eyes:      Conjunctiva/sclera: Conjunctivae normal.      Pupils: Pupils are equal, round, and reactive to light.   Neck:      Comments: Elevated JVP  Cardiovascular:      Rate and Rhythm: Tachycardia present. Rhythm irregular.      Heart sounds: Normal heart sounds.   Pulmonary:      Effort: Pulmonary effort is normal. No respiratory distress.      Breath sounds: Wheezing present. No rhonchi or rales.   Abdominal:      General: Abdomen is flat. Bowel sounds are normal.      Palpations: Abdomen is soft.      Tenderness: There is no abdominal tenderness.   Musculoskeletal:      Cervical back: Neck supple.      Right lower leg: No edema.      Left lower leg: No edema.   Lymphadenopathy:      Cervical: No cervical adenopathy.   Skin:     General: Skin is warm and dry.   Neurological:      General: No focal deficit present.       Mental Status: He is alert.      Cranial Nerves: Cranial nerve deficit present.         Vents:  Oxygen Concentration (%): 32 (02/15/22 1507)    Lines/Drains/Airways     Drain                 Urethral Catheter 02/16/22 0120 Temperature probe 16 Fr. <1 day          Peripheral Intravenous Line                 Peripheral IV - Single Lumen 02/15/22 0641 20 G Posterior;Right Hand 1 day         Peripheral IV - Single Lumen 02/15/22 18 G Anterior;Left Forearm 1 day                Significant Labs:    CBC/Anemia Profile:  Recent Labs   Lab 02/15/22  0755 02/16/22  0521   WBC 8.62 9.40   HGB 12.7* 11.5*   HCT 41.3 36.5*    125*   MCV 88.2 84.7   RDW 17.0* 16.6*        Chemistries:  Recent Labs   Lab 02/15/22  0755      K 4.1      CO2 26   BUN 32*   CREATININE 2.20*   CALCIUM 9.3   ALBUMIN 3.8   PROT 6.8   BILITOT 1.9*   ALKPHOS 98   ALT 12*   AST 21       All pertinent labs within the past 24 hours have been reviewed.    Significant Imaging:  I have reviewed all pertinent imaging results/findings within the past 24 hours.

## 2022-02-16 NOTE — SUBJECTIVE & OBJECTIVE
Interval History: Patient seen and evaluated by Dr. Castellanos.    Review of Systems   Reason unable to perform ROS: unable to perform complete ROS due to drowsiness and difficulty answering questions.   HENT: Positive for hearing loss.    Cardiovascular: Positive for dyspnea on exertion. Negative for chest pain.   Respiratory: Positive for cough and shortness of breath.      Objective:     Vital Signs (Most Recent):  Temp: 98.7 °F (37.1 °C) (02/16/22 1101)  Pulse: 88 (02/16/22 1400)  Resp: 14 (02/16/22 1400)  BP: (!) 85/49 (02/16/22 1400)  SpO2: 95 % (02/16/22 1400) Vital Signs (24h Range):  Temp:  [97.3 °F (36.3 °C)-99.5 °F (37.5 °C)] 98.7 °F (37.1 °C)  Pulse:  [] 88  Resp:  [8-27] 14  SpO2:  [70 %-99 %] 95 %  BP: ()/(43-91) 85/49     Weight: 84.8 kg (186 lb 15.2 oz)  Body mass index is 27.61 kg/m².     SpO2: 95 %  O2 Device (Oxygen Therapy): nasal cannula      Intake/Output Summary (Last 24 hours) at 2/16/2022 1423  Last data filed at 2/16/2022 1200  Gross per 24 hour   Intake 121.47 ml   Output 1700 ml   Net -1578.53 ml       Lines/Drains/Airways     Drain                 Urethral Catheter 02/16/22 0120 Temperature probe 16 Fr. <1 day          Peripheral Intravenous Line                 Peripheral IV - Single Lumen 02/15/22 0641 20 G Posterior;Right Hand 1 day         Peripheral IV - Single Lumen 02/15/22 18 G Anterior;Left Forearm 1 day                Physical Exam  Vitals reviewed.   Constitutional:       Appearance: He is ill-appearing.   Cardiovascular:      Rate and Rhythm: Tachycardia present. Rhythm irregular.   Pulmonary:      Effort: No respiratory distress.      Breath sounds: Wheezing and rales present.   Abdominal:      General: Bowel sounds are normal.      Palpations: Abdomen is soft.   Musculoskeletal:      Cervical back: Neck supple.      Right lower leg: Edema present.      Left lower leg: Edema present.   Skin:     General: Skin is warm and dry.      Coloration: Skin is pale. Skin is not  jaundiced.   Neurological:      Mental Status: He is easily aroused. He is lethargic.      Comments: Oriented to person and place         Significant Labs:   ABG: No results for input(s): PH, PCO2, HCO3, POCSATURATED, BE in the last 48 hours., Blood Culture: No results for input(s): LABBLOO in the last 48 hours., BMP:   Recent Labs   Lab 02/15/22  0755 02/16/22  0941   * 96    142   K 4.1 4.1    101   CO2 26 31   BUN 32* 35*   CREATININE 2.20* 2.52*   CALCIUM 9.3 9.2   , CMP   Recent Labs   Lab 02/15/22  0755 02/16/22  0941    142   K 4.1 4.1    101   CO2 26 31   * 96   BUN 32* 35*   CREATININE 2.20* 2.52*   CALCIUM 9.3 9.2   PROT 6.8 6.1*   ALBUMIN 3.8 3.4*   BILITOT 1.9* 2.0*   ALKPHOS 98 84   AST 21 17   ALT 12* 10*   ANIONGAP 17* 14   EGFRNONAA 31* 27*   , CBC   Recent Labs   Lab 02/15/22  0755 02/15/22  0755 02/16/22  0521   WBC 8.62  --  9.40   HGB 12.7*  --  11.5*   HCT 41.3   < > 36.5*     --  125*    < > = values in this interval not displayed.   , INR   Recent Labs   Lab 02/15/22  0755   INR 1.07   , Lipid Panel No results for input(s): CHOL, HDL, LDLCALC, TRIG, CHOLHDL in the last 48 hours. and Troponin No results for input(s): TROPONINI in the last 48 hours.    Significant Imaging: Echocardiogram:   Transthoracic echo (TTE) complete (Cupid Only):   Results for orders placed or performed during the hospital encounter of 11/10/21   Echo   Result Value Ref Range    Posterior Wall 1.32 (A) 0.6 - 1.1 cm    E wave deceleration time 154 msec    MV Peak E Jim 1.11 m/s    IVS 1.36 (A) 0.6 - 1.1 cm    LVIDd 6.60 (A) 3.5 - 6.0 cm    LVIDs 5.17 (A) 2.1 - 4.0 cm    Echo EF Estimated 43 %    LV Systolic Volume 127.80 mL    LV Diastolic Volume 223.60 mL    RVDD 4.33 cm    AORTIC VALVE CUSP SEPERATION 1.67 cm    FS 22 %    LV mass 426.32 g    Left Ventricle Relative Wall Thickness 0.40 cm    LV Systolic Volume Index 61.4 mL/m2    LV Diastolic Volume Index 107.50 mL/m2    LV  Mass Index 205 g/m2    BSA 2.11 m2    IVC diameter 2.82 cm    RV mid diameter 3.75 cm    EF 20 %    Left Ventricular Outflow Tract Mean Gradient 0.70 mmHg    LA size 5.80 cm    TAPSE 1.91 cm    Right ventricular length in diastole (apical 4-chamber view) 7.06 cm    AV mean gradient 2 mmHg    AV valve area 2.09 cm2    AV index (prosthetic) 0.67     E/A ratio 3.47     LVOT diameter 2.00 cm    LVOT area 3.1 cm2    LVOT peak VTI 9.73 cm    Ao VTI 14.60 cm    LVOT stroke volume 30.55 cm3    TR Max Jim 2.69 m/s    MV Peak A Jim 0.32 m/s    Triscuspid Valve Regurgitation Peak Gradient 29 mmHg    Right Atrial Pressure (from IVC) 15 mmHg    TV rest pulmonary artery pressure 44 mmHg    Narrative    · The left ventricle is moderately enlarged with concentric remodeling and   severely decreased systolic function.  · The estimated ejection fraction is 20%.  · Grade III left ventricular diastolic dysfunction.  · Mild right ventricular enlargement with mildly reduced right ventricular   systolic function.  · Moderate to severe left atrial enlargement.  · Moderate right atrial enlargement.  · Moderate-to-severe mitral regurgitation.  · Severe tricuspid regurgitation.  · Elevated central venous pressure (15 mmHg).  · The estimated PA systolic pressure is 44 mmHg.  · There is pulmonary hypertension.       and X-Ray: CXR: X-Ray Chest 1 View (CXR):   Results for orders placed or performed during the hospital encounter of 02/15/22   X-Ray Chest 1 View    Narrative    EXAMINATION:  XR CHEST 1 VIEW    CLINICAL HISTORY:  .  Shortness of breath    COMPARISON:  February 2, 2022 chest x-ray    TECHNIQUE:  Chest x-ray AP portable erect    FINDINGS:  Left subclavian multiple lead transvenous pacemaker/defibrillator device is intact.    There is cardiomegaly and pulmonary vascular engorgement.  Mediastinal contours are similar.  Prior median sternotomy.    There is some hazy pulmonary edema throughout both lungs.  There is no gross pleural  effusion.    Osseous structures are similar      Impression    Cardiomegaly and evidence of CHF with mild pulmonary edema      Electronically signed by: Ivan Guerrier  Date:    02/15/2022  Time:    08:06

## 2022-02-17 PROBLEM — R60.0 LOWER EXTREMITY EDEMA: Status: RESOLVED | Noted: 2021-09-14 | Resolved: 2022-01-01

## 2022-02-17 NOTE — NURSING
Pt is pleasant for the most part but he does refuse care at times he refused respiratory treatments twice this morning and he had continued to take his o2 off and refuses at time to put it back on. He is not aggressive he just does what he wants to do will continue to monitor

## 2022-02-17 NOTE — PLAN OF CARE
Problem: Adult Inpatient Plan of Care  Goal: Absence of Hospital-Acquired Illness or Injury  Outcome: Ongoing, Progressing     Problem: Gas Exchange Impaired  Goal: Optimal Gas Exchange  Outcome: Ongoing, Progressing

## 2022-02-17 NOTE — SUBJECTIVE & OBJECTIVE
Interval History:  Transferred out of ICU. My first visit with patient.  He is awake, alert, sitting at bedside eating breakfast. Denies CP or SOB    Review of Systems   Respiratory: Negative for shortness of breath.    Cardiovascular: Negative for chest pain.   Gastrointestinal: Negative for abdominal pain, nausea and vomiting.     Objective:     Vital Signs (Most Recent):  Temp: 97 °F (36.1 °C) (02/17/22 1100)  Pulse: 99 (02/17/22 1100)  Resp: 20 (02/17/22 1100)  BP: (!) 108/58 (02/17/22 1100)  SpO2: 98 % (02/17/22 1100) Vital Signs (24h Range):  Temp:  [32 °F (0 °C)-98.1 °F (36.7 °C)] 97 °F (36.1 °C)  Pulse:  [] 99  Resp:  [11-25] 20  SpO2:  [86 %-98 %] 98 %  BP: ()/(39-89) 108/58     Weight: 84.8 kg (186 lb 15.2 oz)  Body mass index is 27.61 kg/m².    Intake/Output Summary (Last 24 hours) at 2/17/2022 1313  Last data filed at 2/17/2022 1200  Gross per 24 hour   Intake --   Output 2200 ml   Net -2200 ml      Physical Exam  Vitals reviewed.   Constitutional:       General: He is not in acute distress.     Appearance: Normal appearance. He is not toxic-appearing.   HENT:      Head: Normocephalic.   Eyes:      General: No scleral icterus.  Cardiovascular:      Rate and Rhythm: Normal rate. Rhythm irregular.      Heart sounds: Normal heart sounds.   Pulmonary:      Effort: Pulmonary effort is normal.      Breath sounds: Normal breath sounds. No wheezing or rhonchi.   Abdominal:      General: Abdomen is flat. There is no distension.      Palpations: Abdomen is soft.      Tenderness: There is no abdominal tenderness.   Musculoskeletal:      Right lower leg: No edema.      Left lower leg: No edema.   Neurological:      Mental Status: He is alert.         Significant Labs:   All pertinent labs within the past 24 hours have been reviewed.  BMP:   Recent Labs   Lab 02/17/22  0428   GLU 84      K 3.7      CO2 30   BUN 38*   CREATININE 2.58*   CALCIUM 9.3     CBC:   Recent Labs   Lab 02/16/22  8149  02/17/22  0428   WBC 9.40 8.47   HGB 11.5* 11.5*   HCT 36.5* 35.5*   * 157       Significant Imaging: I have reviewed all pertinent imaging results/findings within the past 24 hours.

## 2022-02-17 NOTE — PROGRESS NOTES
[address]  Cardiology  Progress Note    Patient Name: Luisito Stuart  MRN: [id number]  Admission Date: 2/15/2022  Hospital Length of Stay: 2 days  Code Status: Prior   Attending Physician: Leonardo Parker Jr., MD   Primary Care Physician: JOYA Hill  Expected Discharge Date:   Principal Problem:Transient alteration of awareness    Subjective:     Hospital Course:   No notes on file    Interval History: Patient looks much better today. Will continue IV dobutamine 2mcg/kg and IV diuresis. Creatinine stable at 2.5.    Review of Systems   Constitutional: Negative for chills and fever.   HENT: Positive for hearing loss.    Cardiovascular: Positive for dyspnea on exertion. Negative for chest pain and orthopnea.   Respiratory: Positive for cough. Negative for shortness of breath.      Objective:     Vital Signs (Most Recent):  Temp: 97 °F (36.1 °C) (02/17/22 1100)  Pulse: 99 (02/17/22 1100)  Resp: 20 (02/17/22 1100)  BP: (!) 108/58 (02/17/22 1100)  SpO2: 98 % (02/17/22 1100) Vital Signs (24h Range):  Temp:  [32 °F (0 °C)-98.1 °F (36.7 °C)] 97 °F (36.1 °C)  Pulse:  [] 99  Resp:  [12-24] 20  SpO2:  [86 %-98 %] 98 %  BP: ()/(46-89) 108/58     Weight: 84.8 kg (186 lb 15.2 oz)  Body mass index is 27.61 kg/m².     SpO2: 98 %  O2 Device (Oxygen Therapy): nasal cannula      Intake/Output Summary (Last 24 hours) at 2/17/2022 1440  Last data filed at 2/17/2022 1200  Gross per 24 hour   Intake --   Output 2200 ml   Net -2200 ml       Lines/Drains/Airways     Peripheral Intravenous Line                 Peripheral IV - Single Lumen 02/15/22 18 G Anterior;Left Forearm 2 days                Physical Exam  Vitals reviewed.   Constitutional:       General: He is not in acute distress.  Cardiovascular:      Rate and Rhythm: Tachycardia present. Rhythm irregular.   Pulmonary:      Effort: Pulmonary effort is normal. No respiratory distress.      Breath sounds: Wheezing present. No  rales.   Abdominal:      General: Bowel sounds are normal.      Palpations: Abdomen is soft.   Musculoskeletal:      Cervical back: Neck supple.      Right lower leg: Edema present.      Left lower leg: Edema present.      Comments: improving   Skin:     General: Skin is warm and dry.      Coloration: Skin is pale. Skin is not jaundiced.   Neurological:      Mental Status: He is alert and easily aroused.      Comments: Oriented to person and place         Significant Labs:   ABG: No results for input(s): PH, PCO2, HCO3, POCSATURATED, BE in the last 48 hours., Blood Culture: No results for input(s): LABBLOO in the last 48 hours., BMP:   Recent Labs   Lab 02/16/22  0941 02/17/22 0428   GLU 96 84    141   K 4.1 3.7    100   CO2 31 30   BUN 35* 38*   CREATININE 2.52* 2.58*   CALCIUM 9.2 9.3   , CMP   Recent Labs   Lab 02/16/22  0941 02/17/22 0428    141   K 4.1 3.7    100   CO2 31 30   GLU 96 84   BUN 35* 38*   CREATININE 2.52* 2.58*   CALCIUM 9.2 9.3   PROT 6.1* 6.2*   ALBUMIN 3.4* 3.5   BILITOT 2.0* 1.9*   ALKPHOS 84 81   AST 17 19   ALT 10* 11*   ANIONGAP 14 15   EGFRNONAA 27* 26*   , CBC   Recent Labs   Lab 02/16/22  0521 02/16/22  0521 02/17/22 0428   WBC 9.40  --  8.47   HGB 11.5*  --  11.5*   HCT 36.5*   < > 35.5*   *  --  157    < > = values in this interval not displayed.   , INR No results for input(s): INR, PROTIME in the last 48 hours., Lipid Panel   Recent Labs   Lab 02/17/22 0428   CHOL 133   HDL 45   LDLCALC 73   TRIG 73   CHOLHDL 3.0    and Troponin No results for input(s): TROPONINI in the last 48 hours.    Significant Imaging: Echocardiogram:   Transthoracic echo (TTE) complete (Cupid Only):   Results for orders placed or performed during the hospital encounter of 11/10/21   Echo   Result Value Ref Range    Posterior Wall 1.32 (A) 0.6 - 1.1 cm    E wave deceleration time 154 msec    MV Peak E Jim 1.11 m/s    IVS 1.36 (A) 0.6 - 1.1 cm    LVIDd 6.60 (A) 3.5 - 6.0 cm     LVIDs 5.17 (A) 2.1 - 4.0 cm    Echo EF Estimated 43 %    LV Systolic Volume 127.80 mL    LV Diastolic Volume 223.60 mL    RVDD 4.33 cm    AORTIC VALVE CUSP SEPERATION 1.67 cm    FS 22 %    LV mass 426.32 g    Left Ventricle Relative Wall Thickness 0.40 cm    LV Systolic Volume Index 61.4 mL/m2    LV Diastolic Volume Index 107.50 mL/m2    LV Mass Index 205 g/m2    BSA 2.11 m2    IVC diameter 2.82 cm    RV mid diameter 3.75 cm    EF 20 %    Left Ventricular Outflow Tract Mean Gradient 0.70 mmHg    LA size 5.80 cm    TAPSE 1.91 cm    Right ventricular length in diastole (apical 4-chamber view) 7.06 cm    AV mean gradient 2 mmHg    AV valve area 2.09 cm2    AV index (prosthetic) 0.67     E/A ratio 3.47     LVOT diameter 2.00 cm    LVOT area 3.1 cm2    LVOT peak VTI 9.73 cm    Ao VTI 14.60 cm    LVOT stroke volume 30.55 cm3    TR Max Jim 2.69 m/s    MV Peak A Jim 0.32 m/s    Triscuspid Valve Regurgitation Peak Gradient 29 mmHg    Right Atrial Pressure (from IVC) 15 mmHg    TV rest pulmonary artery pressure 44 mmHg    Narrative    · The left ventricle is moderately enlarged with concentric remodeling and   severely decreased systolic function.  · The estimated ejection fraction is 20%.  · Grade III left ventricular diastolic dysfunction.  · Mild right ventricular enlargement with mildly reduced right ventricular   systolic function.  · Moderate to severe left atrial enlargement.  · Moderate right atrial enlargement.  · Moderate-to-severe mitral regurgitation.  · Severe tricuspid regurgitation.  · Elevated central venous pressure (15 mmHg).  · The estimated PA systolic pressure is 44 mmHg.  · There is pulmonary hypertension.       and X-Ray: CXR: X-Ray Chest 1 View (CXR):   Results for orders placed or performed during the hospital encounter of 02/15/22   X-Ray Chest 1 View    Narrative    EXAMINATION:  XR CHEST 1 VIEW    CLINICAL HISTORY:  .  Shortness of breath    COMPARISON:  February 2, 2022 chest  x-ray    TECHNIQUE:  Chest x-ray AP portable erect    FINDINGS:  Left subclavian multiple lead transvenous pacemaker/defibrillator device is intact.    There is cardiomegaly and pulmonary vascular engorgement.  Mediastinal contours are similar.  Prior median sternotomy.    There is some hazy pulmonary edema throughout both lungs.  There is no gross pleural effusion.    Osseous structures are similar      Impression    Cardiomegaly and evidence of CHF with mild pulmonary edema      Electronically signed by: Ivan Guerrier  Date:    02/15/2022  Time:    08:06     Assessment and Plan:     Brief HPI: 75 y/o male with PMH of CABG (2004; 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  Daughter told ER staff that he was in his usual state of health last night and was last seen normal at 10:00 p.m..  There was also mention of possible medication non-compliance. This morning when he woke up he was having slurred speech.  In the emergency department he was evaluated by Neurology via telemedicine.  He did not have any focal deficits but has expressive aphasia.  Neurology feels this may be a TIA.  CT head was negative.  Unable to do CTA due to CKD.  Patient was also vomiting on admission so CT abdomen was done and negative for any acute process.  He does have a AAA but appears to be stable.  Chest x-ray consistent with heart failure.  Critical Care was asked to admit for heart failure exacerbation and cardiology consulted.    CKD (chronic kidney disease), stage III  - Being followed by primary  medical team    Expressive aphasia  - Being followed by primary medical team  - MRI pending    Ischemic cardiomyopathy  - Followed by cardiology as outpatient, Dr. Rylee BECKERfib  - On Eliquis for primary stroke prevention    Deep vein thrombosis (DVT) of lower extremity  - Hx of DVT    Acute on chronic congestive heart failure  Latest ECHO performed and demonstrates- Results for orders placed during the hospital encounter of 11/10/21    Echo    Interpretation Summary  · The left ventricle is moderately enlarged with concentric remodeling and severely decreased systolic function.  · The estimated ejection fraction is 20%.  · Grade III left ventricular diastolic dysfunction.  · Mild right ventricular enlargement with mildly reduced right ventricular systolic function.  · Moderate to severe left atrial enlargement.  · Moderate right atrial enlargement.  · Moderate-to-severe mitral regurgitation.  · Severe tricuspid regurgitation.  · Elevated central venous pressure (15 mmHg).  · The estimated PA systolic pressure is 44 mmHg.  · There is pulmonary hypertension.    - Patient seen and evaluated by Dr. Castellanos  - Increased dobutamine to 5 mcg/kg  - Continue IV lasix 80mg q 8 hours  - Strict I & O and daily weights  - Will continue monitoring    2/16/2022:  - Patient seen and evaluated by Dr. Castellanos  - Output, net - 1.7L, 3 lb weight lose  - Creatinine 2.5, was 2.2 yesterday  - Continue dobutamine at 5 and IV lasix 80mg q 8 hours  - BMP in am    2/17/2022:  - Patient seen and evaluated by Dr. Castellanos  - Improving, continue IV dobutamine at 2.5 mcg/kg and IV diuresis, may consider discontinuing dobutamine tomorrow.  - Net - 2.2 L  - Creatinine stable at 2.5  - BMP in am  - Recommend referral to [facility] in [place] at discharge.      Chronic obstructive pulmonary disease  - Being followed by primary medical team        VTE Risk Mitigation (From admission, onward)         Ordered     apixaban tablet 5  mg  2 times daily         02/15/22 1146                JOYA Murillo  Cardiology  [facility] - [facility]

## 2022-02-17 NOTE — SUBJECTIVE & OBJECTIVE
Interval History: Patient looks much better today. Will continue IV dobutamine 2mcg/kg and IV diuresis. Creatinine stable at 2.5.    Review of Systems   Constitutional: Negative for chills and fever.   HENT: Positive for hearing loss.    Cardiovascular: Positive for dyspnea on exertion. Negative for chest pain and orthopnea.   Respiratory: Positive for cough. Negative for shortness of breath.      Objective:     Vital Signs (Most Recent):  Temp: 97 °F (36.1 °C) (02/17/22 1100)  Pulse: 99 (02/17/22 1100)  Resp: 20 (02/17/22 1100)  BP: (!) 108/58 (02/17/22 1100)  SpO2: 98 % (02/17/22 1100) Vital Signs (24h Range):  Temp:  [32 °F (0 °C)-98.1 °F (36.7 °C)] 97 °F (36.1 °C)  Pulse:  [] 99  Resp:  [12-24] 20  SpO2:  [86 %-98 %] 98 %  BP: ()/(46-89) 108/58     Weight: 84.8 kg (186 lb 15.2 oz)  Body mass index is 27.61 kg/m².     SpO2: 98 %  O2 Device (Oxygen Therapy): nasal cannula      Intake/Output Summary (Last 24 hours) at 2/17/2022 1440  Last data filed at 2/17/2022 1200  Gross per 24 hour   Intake --   Output 2200 ml   Net -2200 ml       Lines/Drains/Airways     Peripheral Intravenous Line                 Peripheral IV - Single Lumen 02/15/22 18 G Anterior;Left Forearm 2 days                Physical Exam  Vitals reviewed.   Constitutional:       General: He is not in acute distress.  Cardiovascular:      Rate and Rhythm: Tachycardia present. Rhythm irregular.   Pulmonary:      Effort: Pulmonary effort is normal. No respiratory distress.      Breath sounds: Wheezing present. No rales.   Abdominal:      General: Bowel sounds are normal.      Palpations: Abdomen is soft.   Musculoskeletal:      Cervical back: Neck supple.      Right lower leg: Edema present.      Left lower leg: Edema present.      Comments: improving   Skin:     General: Skin is warm and dry.      Coloration: Skin is pale. Skin is not jaundiced.   Neurological:      Mental Status: He is alert and easily aroused.      Comments: Oriented to  person and place         Significant Labs:   ABG: No results for input(s): PH, PCO2, HCO3, POCSATURATED, BE in the last 48 hours., Blood Culture: No results for input(s): LABBLOO in the last 48 hours., BMP:   Recent Labs   Lab 02/16/22  0941 02/17/22 0428   GLU 96 84    141   K 4.1 3.7    100   CO2 31 30   BUN 35* 38*   CREATININE 2.52* 2.58*   CALCIUM 9.2 9.3   , CMP   Recent Labs   Lab 02/16/22  0941 02/17/22 0428    141   K 4.1 3.7    100   CO2 31 30   GLU 96 84   BUN 35* 38*   CREATININE 2.52* 2.58*   CALCIUM 9.2 9.3   PROT 6.1* 6.2*   ALBUMIN 3.4* 3.5   BILITOT 2.0* 1.9*   ALKPHOS 84 81   AST 17 19   ALT 10* 11*   ANIONGAP 14 15   EGFRNONAA 27* 26*   , CBC   Recent Labs   Lab 02/16/22  0521 02/16/22  0521 02/17/22 0428   WBC 9.40  --  8.47   HGB 11.5*  --  11.5*   HCT 36.5*   < > 35.5*   *  --  157    < > = values in this interval not displayed.   , INR No results for input(s): INR, PROTIME in the last 48 hours., Lipid Panel   Recent Labs   Lab 02/17/22 0428   CHOL 133   HDL 45   LDLCALC 73   TRIG 73   CHOLHDL 3.0    and Troponin No results for input(s): TROPONINI in the last 48 hours.    Significant Imaging: Echocardiogram:   Transthoracic echo (TTE) complete (Cupid Only):   Results for orders placed or performed during the hospital encounter of 11/10/21   Echo   Result Value Ref Range    Posterior Wall 1.32 (A) 0.6 - 1.1 cm    E wave deceleration time 154 msec    MV Peak E Jim 1.11 m/s    IVS 1.36 (A) 0.6 - 1.1 cm    LVIDd 6.60 (A) 3.5 - 6.0 cm    LVIDs 5.17 (A) 2.1 - 4.0 cm    Echo EF Estimated 43 %    LV Systolic Volume 127.80 mL    LV Diastolic Volume 223.60 mL    RVDD 4.33 cm    AORTIC VALVE CUSP SEPERATION 1.67 cm    FS 22 %    LV mass 426.32 g    Left Ventricle Relative Wall Thickness 0.40 cm    LV Systolic Volume Index 61.4 mL/m2    LV Diastolic Volume Index 107.50 mL/m2    LV Mass Index 205 g/m2    BSA 2.11 m2    IVC diameter 2.82 cm    RV mid diameter 3.75 cm    EF  20 %    Left Ventricular Outflow Tract Mean Gradient 0.70 mmHg    LA size 5.80 cm    TAPSE 1.91 cm    Right ventricular length in diastole (apical 4-chamber view) 7.06 cm    AV mean gradient 2 mmHg    AV valve area 2.09 cm2    AV index (prosthetic) 0.67     E/A ratio 3.47     LVOT diameter 2.00 cm    LVOT area 3.1 cm2    LVOT peak VTI 9.73 cm    Ao VTI 14.60 cm    LVOT stroke volume 30.55 cm3    TR Max Jim 2.69 m/s    MV Peak A Jim 0.32 m/s    Triscuspid Valve Regurgitation Peak Gradient 29 mmHg    Right Atrial Pressure (from IVC) 15 mmHg    TV rest pulmonary artery pressure 44 mmHg    Narrative    · The left ventricle is moderately enlarged with concentric remodeling and   severely decreased systolic function.  · The estimated ejection fraction is 20%.  · Grade III left ventricular diastolic dysfunction.  · Mild right ventricular enlargement with mildly reduced right ventricular   systolic function.  · Moderate to severe left atrial enlargement.  · Moderate right atrial enlargement.  · Moderate-to-severe mitral regurgitation.  · Severe tricuspid regurgitation.  · Elevated central venous pressure (15 mmHg).  · The estimated PA systolic pressure is 44 mmHg.  · There is pulmonary hypertension.       and X-Ray: CXR: X-Ray Chest 1 View (CXR):   Results for orders placed or performed during the hospital encounter of 02/15/22   X-Ray Chest 1 View    Narrative    EXAMINATION:  XR CHEST 1 VIEW    CLINICAL HISTORY:  .  Shortness of breath    COMPARISON:  February 2, 2022 chest x-ray    TECHNIQUE:  Chest x-ray AP portable erect    FINDINGS:  Left subclavian multiple lead transvenous pacemaker/defibrillator device is intact.    There is cardiomegaly and pulmonary vascular engorgement.  Mediastinal contours are similar.  Prior median sternotomy.    There is some hazy pulmonary edema throughout both lungs.  There is no gross pleural effusion.    Osseous structures are similar      Impression    Cardiomegaly and evidence of CHF  with mild pulmonary edema      Electronically signed by: Ivan Guerrier  Date:    02/15/2022  Time:    08:06

## 2022-02-17 NOTE — ASSESSMENT & PLAN NOTE
Telestroke consult done at admission.  Likely TIA, perhaps related to a. Fib and questionable compliance with anticoagulation.  Seems to have resolved. Continue to monitor on telemetry while other issues addressed.  PT/OT.

## 2022-02-17 NOTE — NURSING
Pt has an order for MRI while in the ICU , Ronda HUMPHREYS stated that MRI dept said they would not do the MRI due to pt having the ICD and that it will have to be done out pt. She also stated that Dr Casarez was aware that it might not get done while pt was in the hospital. She stated that she would clarify that with him again.

## 2022-02-17 NOTE — PROGRESS NOTES
[address] Medicine  Progress Note    Patient Name: Luisito Stuart  MRN: [id number]  Patient Class: IP- Inpatient   Admission Date: 2/15/2022  Length of Stay: 2 days  Attending Physician: Leonardo Parker Jr., MD  Primary Care Provider: JOYA Hill        Subjective:     Principal Problem:Transient alteration of awareness        HPI:  No notes on file    Overview/Hospital Course:  No notes on file    Interval History:  Transferred out of ICU. My first visit with patient.  He is awake, alert, sitting at bedside eating breakfast. Denies CP or SOB    Review of Systems   Respiratory: Negative for shortness of breath.    Cardiovascular: Negative for chest pain.   Gastrointestinal: Negative for abdominal pain, nausea and vomiting.     Objective:     Vital Signs (Most Recent):  Temp: 97 °F (36.1 °C) (02/17/22 1100)  Pulse: 99 (02/17/22 1100)  Resp: 20 (02/17/22 1100)  BP: (!) 108/58 (02/17/22 1100)  SpO2: 98 % (02/17/22 1100) Vital Signs (24h Range):  Temp:  [32 °F (0 °C)-98.1 °F (36.7 °C)] 97 °F (36.1 °C)  Pulse:  [] 99  Resp:  [11-25] 20  SpO2:  [86 %-98 %] 98 %  BP: ()/(39-89) 108/58     Weight: 84.8 kg (186 lb 15.2 oz)  Body mass index is 27.61 kg/m².    Intake/Output Summary (Last 24 hours) at 2/17/2022 1313  Last data filed at 2/17/2022 1200  Gross per 24 hour   Intake --   Output 2200 ml   Net -2200 ml      Physical Exam  Vitals reviewed.   Constitutional:       General: He is not in acute distress.     Appearance: Normal appearance. He is not toxic-appearing.   HENT:      Head: Normocephalic.   Eyes:      General: No scleral icterus.  Cardiovascular:      Rate and Rhythm: Normal rate. Rhythm irregular.      Heart sounds: Normal heart sounds.   Pulmonary:      Effort: Pulmonary effort is normal.      Breath sounds: Normal breath sounds. No wheezing or rhonchi.   Abdominal:      General: Abdomen is flat. There is no distension.      Palpations: Abdomen  is soft.      Tenderness: There is no abdominal tenderness.   Musculoskeletal:      Right lower leg: No edema.      Left lower leg: No edema.   Neurological:      Mental Status: He is alert.         Significant Labs:   All pertinent labs within the past 24 hours have been reviewed.  BMP:   Recent Labs   Lab 02/17/22 0428   GLU 84      K 3.7      CO2 30   BUN 38*   CREATININE 2.58*   CALCIUM 9.3     CBC:   Recent Labs   Lab 02/16/22  0521 02/17/22 0428   WBC 9.40 8.47   HGB 11.5* 11.5*   HCT 36.5* 35.5*   * 157       Significant Imaging: I have reviewed all pertinent imaging results/findings within the past 24 hours.      Assessment/Plan:      * Transient alteration of awareness    Telestroke consult done at admission.  Likely TIA, perhaps related to a. Fib and questionable compliance with anticoagulation.  Seems to have resolved. Continue to monitor on telemetry while other issues addressed.  PT/OT.    Cardiomyopathy  Discussed with cardiology team. Was on dobutamine in ICU. Was stopped on transfer. Considering resuming and diuresing patient a bit more.    Entresto  Lasix        A-fib  Amiodarone  Apixaban (Also on DAPT)      Acute on chronic congestive heart failure    See above.  Dobutamine as per cardiology. Lasix 80.     Chronic obstructive pulmonary disease  Stable, monitor        VTE Risk Mitigation (From admission, onward)         Ordered     apixaban tablet 5 mg  2 times daily         02/15/22 1146                Discharge Planning   MORGAN:      Code Status: Prior   Is the patient medically ready for discharge?:     Reason for patient still in hospital (select all that apply): Treatment  Discharge Plan A: Home with family                  Leonardo Parker Jr, MD  Department of Hospital Medicine   [address]

## 2022-02-17 NOTE — PT/OT/SLP PROGRESS
Occupational Therapy   Treatment    Name: Luisito Stuart  MRN: [id number]  Admitting Diagnosis:  Transient alteration of awareness       Recommendations:     Discharge Recommendations: nursing facility, skilled,rehabilitation facility  Discharge Equipment Recommendations:  walker, rolling  Barriers to discharge:  Decreased caregiver support    Assessment:     Luisito Stuart is a 76 y.o. male with a medical diagnosis of Transient alteration of awareness.  He presents with weakness, decreased functional mobility, and decline in ADLs. Performance deficits affecting function are weakness,impaired endurance,impaired self care skills,impaired functional mobilty,gait instability,impaired balance,decreased coordination. Pt tolerated tx poorly. Pt with complaints of nausea with sitting EOB. Pt also with complaints of dizziness with LOB sitting EOB; pt returned to supine in bed and RN notified.     Rehab Prognosis:  Fair; patient would benefit from acute skilled OT services to address these deficits and reach maximum level of function.       Plan:     Patient to be seen 5 x/week to address the above listed problems via self-care/home management,therapeutic activities,therapeutic exercises  · Plan of Care Expires: 03/16/22  · Plan of Care Reviewed with: patient    Subjective     Pain/Comfort:  · Pain Rating 1: 0/10    Objective:     Communicated with: ZORAIDA Patterson prior to session.  Patient found supine with oxygen,peripheral IV,telemetry upon OT entry to room.    General Precautions: Standard, fall   Orthopedic Precautions:N/A   Braces:    Respiratory Status: Nasal cannula, flow 3 L/min     Occupational Performance:     Bed Mobility:    · Patient completed Supine to Sit with minimum assistance  · Patient completed Sit to Supine with contact guard assistance     Functional Mobility/Transfers:  · Not performed    Activities of Daily Living:  · Not performed      [facility] 6 Click ADL:      Treatment & Education:  Pt completed  x 15 reps bicep curls 1#db; pt with complaints of dizziness and decline further therex. Pt sat EOB x 8 minutes in order to improve sitting tolerance/sitting balance.     Patient left supine with all lines intact, call button in reach and ZORAIDA Patterson notifiedEducation:      GOALS:   Multidisciplinary Problems     Occupational Therapy Goals        Problem: Occupational Therapy Goal    Goal Priority Disciplines Outcome Interventions   Occupational Therapy Goal     OT, PT/OT Ongoing, Progressing    Description: STG:  Pt will perform grooming with setup  Pt will bathe with Carole with setup at EOB  Pt will perform UE dressing with Talisha  Pt will perform LE dressing with Carole  Pt will sit EOB x 10 min with SBA  Pt will transfer bed/chair/bsc with CGA with RW  Pt will perform standing task x 4 min with CGA with RW   Pt will tolerate 15 minutes of tx without fatigue      LT.Restore to max I with self care and mobility.                       Time Tracking:     OT Date of Treatment: 22  OT Start Time: 1030  OT Stop Time: 1040  OT Total Time (min): 10 min    Billable Minutes:Therapeutic Activity 10 minutes    OT/RONALD: OT          2022

## 2022-02-17 NOTE — ASSESSMENT & PLAN NOTE
Discussed with cardiology team. Was on dobutamine in ICU. Was stopped on transfer. Considering resuming and diuresing patient a bit more.    Entresto  Lasix

## 2022-02-17 NOTE — PT/OT/SLP PROGRESS
Physical Therapy Treatment    Patient Name:  Luisito Stuart   MRN:  [id number]    Recommendations:     Discharge Recommendations:  nursing facility, skilled   Discharge Equipment Recommendations: walker, rolling   Barriers to discharge: Inaccessible home and Decreased caregiver support    Assessment:     Luisito Stuart is a 76 y.o. male admitted with a medical diagnosis of Transient alteration of awareness.  He presents with the following impairments/functional limitations:  impaired functional mobilty,impaired endurance,impaired balance,gait instability,decreased coordination,impaired cardiopulmonary response to activity Pt complains of dizziness when sitting at edge of bed. Pt somewhat hypotensive with BP 86/50 so out of bed activity not attempted. He has some coordination deficits with RUE so holding on to a walker may be difficulty. Will continue to attempt mobility as able.    Rehab Prognosis: Fair; patient would benefit from acute skilled PT services to address these deficits and reach maximum level of function.    Recent Surgery: * No surgery found *      Plan:     During this hospitalization, patient to be seen 5 x/week to address the identified rehab impairments via gait training,therapeutic activities,therapeutic exercises and progress toward the following goals:    · Plan of Care Expires:  03/16/22    Subjective     Chief Complaint: dizziness  Patient/Family Comments/goals: Pt agreeable toPT  Pain/Comfort:  · Pain Rating 1: 0/10  · Pain Rating Post-Intervention 1: 0/10      Objective:     Communicated with EVARISTO Dixon LPN prior to session.  Patient found supine with blood pressure cuff,peripheral IV,telemetry upon PT entry to room.     General Precautions: Standard, fall   Orthopedic Precautions:N/A   Braces: N/A  Respiratory Status: Room air     Functional Mobility:  · Bed Mobility:     · Scooting: contact guard assistance  · Supine to Sit: contact guard assistance  · Sit to Supine: contact guard  assistance  · Balance: good      AM-PAC 6 CLICK MOBILITY  Turning over in bed (including adjusting bedclothes, sheets and blankets)?: 3  Sitting down on and standing up from a chair with arms (e.g., wheelchair, bedside commode, etc.): 3  Moving from lying on back to sitting on the side of the bed?: 3  Moving to and from a bed to a chair (including a wheelchair)?: 2  Need to walk in hospital room?: 1  Climbing 3-5 steps with a railing?: 1  Basic Mobility Total Score: 13       Therapeutic Activities and Exercises:   Pt performed bilateral LE: ankle pumps, heel slides, straight leg raises and Long arc quads x 20 each  Cross body reaching activity in sitting at edge of bed       Patient left supine with all lines intact and call button in reach..    GOALS:   Multidisciplinary Problems     Physical Therapy Goals        Problem: Physical Therapy Goal    Goal Priority Disciplines Outcome Goal Variances Interventions   Physical Therapy Goal     PT, PT/OT Ongoing, Progressing     Description: Short term goals:  1. Sit to stand transfer with Contact Guard Assistance  2. Bed to chair transfer with Contact Guard Assistance using Rolling Walker  3. Gait  x 50 feet with Contact Guard Assistance using Rolling Walker.   4. Ascend/descend 5 stair with no Handrails Contact Guard Assistance using Rolling Walker.     Long term goals:  1. Sit to stand transfer with Modified [place]  2. Bed to chair transfer with Modified [place] using Rolling Walker  3. Gait  x 150 feet with Modified [place] using Rolling Walker.   4. Ascend/descend 5 stair with no Handrails Modified [place] using Rolling Walker.                      Time Tracking:     PT Received On: 02/17/22  PT Start Time: 1440     PT Stop Time: 1500  PT Total Time (min): 20 min     Billable Minutes: Therapeutic Exercise 15    Treatment Type: Treatment  PT/PTA: PT     PTA Visit Number: 0     02/17/2022

## 2022-02-17 NOTE — PLAN OF CARE
Problem: Adult Inpatient Plan of Care  Goal: Plan of Care Review  Outcome: Ongoing, Progressing  Goal: Patient-Specific Goal (Individualized)  Outcome: Ongoing, Progressing  Goal: Absence of Hospital-Acquired Illness or Injury  Outcome: Ongoing, Progressing  Goal: Optimal Comfort and Wellbeing  Outcome: Ongoing, Progressing  Goal: Readiness for Transition of Care  Outcome: Ongoing, Progressing     Problem: Gas Exchange Impaired  Goal: Optimal Gas Exchange  Outcome: Ongoing, Progressing     Problem: Skin Injury Risk Increased  Goal: Skin Health and Integrity  Outcome: Ongoing, Progressing     Problem: Fall Injury Risk  Goal: Absence of Fall and Fall-Related Injury  Outcome: Ongoing, Progressing     Problem: Infection  Goal: Absence of Infection Signs and Symptoms  Outcome: Ongoing, Progressing

## 2022-02-18 PROBLEM — I71.40 ABDOMINAL AORTIC ANEURYSM (AAA) WITHOUT RUPTURE: Status: ACTIVE | Noted: 2022-01-01
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
## 2022-02-18 NOTE — ASSESSMENT & PLAN NOTE
Discussed with cardiology team. Received dobutamine yesterday. Off now.  BP a bit low, BP meds/lasix being held.

## 2022-02-18 NOTE — PROGRESS NOTES
[facility], [place] - [address] [place], [place] [address]etry  Cardiology  Progress Note    Patient Name: Luisito Stuart  MRN: [id number]  Admission Date: 2/15/2022  Hospital Length of Stay: 3 days  Code Status: Prior   Attending Physician: Leonardo Parker Jr., MD   Primary Care Physician: JOYA Hill  Expected Discharge Date:   Principal Problem:Transient alteration of awareness    Subjective:     Hospital Course:   No notes on file    Interval History: Patient seen and evaluated by Dr. Castellanos. Creatinine bumped to 3.0 today from 2.5. BP 90s/50s. Dobutamine discontinued yesterday afternoon. Holding IV diuresis today.    Review of Systems   Constitutional: Negative for chills and fever.   HENT: Positive for hearing loss.    Cardiovascular: Positive for dyspnea on exertion. Negative for chest pain and orthopnea.   Respiratory: Positive for cough. Negative for shortness of breath.      Objective:     Vital Signs (Most Recent):  Temp: 97.8 °F (36.6 °C) (02/18/22 1000)  Pulse: 62 (02/18/22 1202)  Resp: 20 (02/18/22 1202)  BP: (!) 91/50 (02/18/22 1005)  SpO2: (!) 89 % (02/18/22 1202) Vital Signs (24h Range):  Temp:  [97.5 °F (36.4 °C)-98.2 °F (36.8 °C)] 97.8 °F (36.6 °C)  Pulse:  [62-89] 62  Resp:  [18-20] 20  SpO2:  [89 %-96 %] 89 %  BP: ()/(43-58) 91/50     Weight: 84.8 kg (186 lb 15.2 oz)  Body mass index is 27.61 kg/m².     SpO2: (!) 89 %  O2 Device (Oxygen Therapy): nasal cannula      Intake/Output Summary (Last 24 hours) at 2/18/2022 1521  Last data filed at 2/18/2022 1000  Gross per 24 hour   Intake --   Output 300 ml   Net -300 ml       Lines/Drains/Airways     Peripheral Intravenous Line                 Peripheral IV - Single Lumen 02/15/22 18 G Anterior;Left Forearm 3 days                Physical Exam  Vitals reviewed.   Constitutional:       General: He is not in acute distress.  Cardiovascular:      Rate and Rhythm: Normal rate. Rhythm irregular.   Pulmonary:      Effort: Pulmonary effort is normal. No  respiratory distress.      Breath sounds: Wheezing present. No rales.   Abdominal:      General: Bowel sounds are normal.      Palpations: Abdomen is soft.   Musculoskeletal:      Cervical back: Neck supple.      Right lower leg: Edema present.      Left lower leg: Edema present.      Comments: improving   Skin:     General: Skin is warm and dry.      Coloration: Skin is pale. Skin is not jaundiced.   Neurological:      Mental Status: He is alert and easily aroused.      Comments: Oriented to person and place         Significant Labs:   ABG: No results for input(s): PH, PCO2, HCO3, POCSATURATED, BE in the last 48 hours., Blood Culture: No results for input(s): LABBLOO in the last 48 hours., BMP:   Recent Labs   Lab 02/17/22 0428 02/18/22  0433   GLU 84 90    141   K 3.7 3.4*    99   CO2 30 32   BUN 38* 40*   CREATININE 2.58* 3.03*   CALCIUM 9.3 9.0   , CMP   Recent Labs   Lab 02/17/22 0428 02/18/22  0433    141   K 3.7 3.4*    99   CO2 30 32   GLU 84 90   BUN 38* 40*   CREATININE 2.58* 3.03*   CALCIUM 9.3 9.0   PROT 6.2* 6.1*   ALBUMIN 3.5 3.2*   BILITOT 1.9* 1.7*   ALKPHOS 81 71   AST 19 16   ALT 11* 5*   ANIONGAP 15 13   EGFRNONAA 26* 22*   , CBC   Recent Labs   Lab 02/17/22 0428 02/17/22 0428 02/18/22 0433   WBC 8.47  --  8.06   HGB 11.5*  --  11.3*   HCT 35.5*   < > 35.1*     --  162    < > = values in this interval not displayed.   , INR No results for input(s): INR, PROTIME in the last 48 hours., Lipid Panel   Recent Labs   Lab 02/17/22 0428   CHOL 133   HDL 45   LDLCALC 73   TRIG 73   CHOLHDL 3.0    and Troponin No results for input(s): TROPONINI in the last 48 hours.    Significant Imaging: Echocardiogram:   Transthoracic echo (TTE) complete (Cupid Only):   Results for orders placed or performed during the hospital encounter of 11/10/21   Echo   Result Value Ref Range    Posterior Wall 1.32 (A) 0.6 - 1.1 cm    E wave deceleration time 154 msec    MV Peak E Jim 1.11 m/s     IVS 1.36 (A) 0.6 - 1.1 cm    LVIDd 6.60 (A) 3.5 - 6.0 cm    LVIDs 5.17 (A) 2.1 - 4.0 cm    Echo EF Estimated 43 %    LV Systolic Volume 127.80 mL    LV Diastolic Volume 223.60 mL    RVDD 4.33 cm    AORTIC VALVE CUSP SEPERATION 1.67 cm    FS 22 %    LV mass 426.32 g    Left Ventricle Relative Wall Thickness 0.40 cm    LV Systolic Volume Index 61.4 mL/m2    LV Diastolic Volume Index 107.50 mL/m2    LV Mass Index 205 g/m2    BSA 2.11 m2    IVC diameter 2.82 cm    RV mid diameter 3.75 cm    EF 20 %    Left Ventricular Outflow Tract Mean Gradient 0.70 mmHg    LA size 5.80 cm    TAPSE 1.91 cm    Right ventricular length in diastole (apical 4-chamber view) 7.06 cm    AV mean gradient 2 mmHg    AV valve area 2.09 cm2    AV index (prosthetic) 0.67     E/A ratio 3.47     LVOT diameter 2.00 cm    LVOT area 3.1 cm2    LVOT peak VTI 9.73 cm    Ao VTI 14.60 cm    LVOT stroke volume 30.55 cm3    TR Max Jim 2.69 m/s    MV Peak A Jim 0.32 m/s    Triscuspid Valve Regurgitation Peak Gradient 29 mmHg    Right Atrial Pressure (from IVC) 15 mmHg    TV rest pulmonary artery pressure 44 mmHg    Narrative    · The left ventricle is moderately enlarged with concentric remodeling and   severely decreased systolic function.  · The estimated ejection fraction is 20%.  · Grade III left ventricular diastolic dysfunction.  · Mild right ventricular enlargement with mildly reduced right ventricular   systolic function.  · Moderate to severe left atrial enlargement.  · Moderate right atrial enlargement.  · Moderate-to-severe mitral regurgitation.  · Severe tricuspid regurgitation.  · Elevated central venous pressure (15 mmHg).  · The estimated PA systolic pressure is 44 mmHg.  · There is pulmonary hypertension.       and X-Ray: CXR: X-Ray Chest 1 View (CXR):   Results for orders placed or performed during the hospital encounter of 02/15/22   X-Ray Chest 1 View    Narrative    EXAMINATION:  XR CHEST 1 VIEW    CLINICAL HISTORY:  .  Shortness of  breath    COMPARISON:  February 2, 2022 chest x-ray    TECHNIQUE:  Chest x-ray AP portable erect    FINDINGS:  Left subclavian multiple lead transvenous pacemaker/defibrillator device is intact.    There is cardiomegaly and pulmonary vascular engorgement.  Mediastinal contours are similar.  Prior median sternotomy.    There is some hazy pulmonary edema throughout both lungs.  There is no gross pleural effusion.    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  Daughter told ER staff that he was in his usual state of health last night and was last seen normal at 10:00 p.m..  There was also mention of possible medication non-compliance. This morning when he woke up he was having slurred speech.  In the emergency department he was evaluated by Neurology via telemedicine.  He did not have any focal deficits but has expressive aphasia.  Neurology feels this may be a TIA.  CT head was negative.  Unable to do CTA due to CKD.  Patient was also vomiting on admission so CT abdomen was done and negative for any acute process.  He does have a AAA but appears to be stable.  Chest x-ray consistent with heart failure.  Critical Care was asked to admit for heart failure exacerbation and cardiology consulted.    CKD (chronic kidney  disease), stage III  - Being followed by primary medical team    Expressive aphasia  - Being followed by primary medical team  - MRI pending    Ischemic cardiomyopathy  - Followed by cardiology as outpatient, Dr. Rylee BECKERfib  - On Eliquis for primary stroke prevention    Deep vein thrombosis (DVT) of lower extremity  - Hx of DVT    Acute on chronic congestive heart failure  Latest ECHO performed and demonstrates- Results for orders placed during the hospital encounter of 11/10/21    Echo    Interpretation Summary  · The left ventricle is moderately enlarged with concentric remodeling and severely decreased systolic function.  · The estimated ejection fraction is 20%.  · Grade III left ventricular diastolic dysfunction.  · Mild right ventricular enlargement with mildly reduced right ventricular systolic function.  · Moderate to severe left atrial enlargement.  · Moderate right atrial enlargement.  · Moderate-to-severe mitral regurgitation.  · Severe tricuspid regurgitation.  · Elevated central venous pressure (15 mmHg).  · The estimated PA systolic pressure is 44 mmHg.  · There is pulmonary hypertension.    - Patient seen and evaluated by Dr. Castellanos  - Increased dobutamine to 5 mcg/kg  - Continue IV lasix 80mg q 8 hours  - Strict I & O and daily weights  - Will continue monitoring    2/16/2022:  - Patient seen and evaluated by Dr. Castellanos  - Output, net - 1.7L, 3 lb weight lose  - Creatinine 2.5, was 2.2 yesterday  - Continue dobutamine at 5 and IV lasix 80mg q 8 hours  - BMP in am    2/17/2022:  - Patient seen and evaluated by Dr. Castellanos  - Improving, continue IV dobutamine at 2.5 mcg/kg and IV diuresis, may consider discontinuing dobutamine tomorrow.  - Net - 2.2 L  - Creatinine stable at 2.5  - BMP in am  - Recommend referral to [facility] in [place] at discharge.    2/18/2022:  - Patient seen and evaluated by Dr. Castellanos  - Creatinine bumped to 3.0 today from 2.5 yesterday; BP 90s/60s  -  Dobutamine discontinued yesterday, IV lasix on hold today  - Discontinued Entresto  - Give 250cc bolus  - Continue monitoring  - Recommend referral to [facility] in [place] at discharge.      Chronic obstructive pulmonary disease  - Being followed by primary medical team        VTE Risk Mitigation (From admission, onward)         Ordered     apixaban tablet 5 mg  2 times daily         02/15/22 1146                JOYA Murillo  Cardiology  [facility] - [address] [place] [address]

## 2022-02-18 NOTE — SUBJECTIVE & OBJECTIVE
Interval History: Patient seen and evaluated by Dr. Castellanos. Creatinine bumped to 3.0 today from 2.5. BP 90s/50s. Dobutamine discontinued yesterday afternoon. Holding IV diuresis today.    Review of Systems   Constitutional: Negative for chills and fever.   HENT: Positive for hearing loss.    Cardiovascular: Positive for dyspnea on exertion. Negative for chest pain and orthopnea.   Respiratory: Positive for cough. Negative for shortness of breath.      Objective:     Vital Signs (Most Recent):  Temp: 97.8 °F (36.6 °C) (02/18/22 1000)  Pulse: 62 (02/18/22 1202)  Resp: 20 (02/18/22 1202)  BP: (!) 91/50 (02/18/22 1005)  SpO2: (!) 89 % (02/18/22 1202) Vital Signs (24h Range):  Temp:  [97.5 °F (36.4 °C)-98.2 °F (36.8 °C)] 97.8 °F (36.6 °C)  Pulse:  [62-89] 62  Resp:  [18-20] 20  SpO2:  [89 %-96 %] 89 %  BP: ()/(43-58) 91/50     Weight: 84.8 kg (186 lb 15.2 oz)  Body mass index is 27.61 kg/m².     SpO2: (!) 89 %  O2 Device (Oxygen Therapy): nasal cannula      Intake/Output Summary (Last 24 hours) at 2/18/2022 1521  Last data filed at 2/18/2022 1000  Gross per 24 hour   Intake --   Output 300 ml   Net -300 ml       Lines/Drains/Airways     Peripheral Intravenous Line                 Peripheral IV - Single Lumen 02/15/22 18 G Anterior;Left Forearm 3 days                Physical Exam  Vitals reviewed.   Constitutional:       General: He is not in acute distress.  Cardiovascular:      Rate and Rhythm: Normal rate. Rhythm irregular.   Pulmonary:      Effort: Pulmonary effort is normal. No respiratory distress.      Breath sounds: Wheezing present. No rales.   Abdominal:      General: Bowel sounds are normal.      Palpations: Abdomen is soft.   Musculoskeletal:      Cervical back: Neck supple.      Right lower leg: Edema present.      Left lower leg: Edema present.      Comments: improving   Skin:     General: Skin is warm and dry.      Coloration: Skin is pale. Skin is not jaundiced.   Neurological:      Mental Status: He  is alert and easily aroused.      Comments: Oriented to person and place         Significant Labs:   ABG: No results for input(s): PH, PCO2, HCO3, POCSATURATED, BE in the last 48 hours., Blood Culture: No results for input(s): LABBLOO in the last 48 hours., BMP:   Recent Labs   Lab 02/17/22 0428 02/18/22  0433   GLU 84 90    141   K 3.7 3.4*    99   CO2 30 32   BUN 38* 40*   CREATININE 2.58* 3.03*   CALCIUM 9.3 9.0   , CMP   Recent Labs   Lab 02/17/22 0428 02/18/22  0433    141   K 3.7 3.4*    99   CO2 30 32   GLU 84 90   BUN 38* 40*   CREATININE 2.58* 3.03*   CALCIUM 9.3 9.0   PROT 6.2* 6.1*   ALBUMIN 3.5 3.2*   BILITOT 1.9* 1.7*   ALKPHOS 81 71   AST 19 16   ALT 11* 5*   ANIONGAP 15 13   EGFRNONAA 26* 22*   , CBC   Recent Labs   Lab 02/17/22 0428 02/17/22 0428 02/18/22 0433   WBC 8.47  --  8.06   HGB 11.5*  --  11.3*   HCT 35.5*   < > 35.1*     --  162    < > = values in this interval not displayed.   , INR No results for input(s): INR, PROTIME in the last 48 hours., Lipid Panel   Recent Labs   Lab 02/17/22 0428   CHOL 133   HDL 45   LDLCALC 73   TRIG 73   CHOLHDL 3.0    and Troponin No results for input(s): TROPONINI in the last 48 hours.    Significant Imaging: Echocardiogram:   Transthoracic echo (TTE) complete (Cupid Only):   Results for orders placed or performed during the hospital encounter of 11/10/21   Echo   Result Value Ref Range    Posterior Wall 1.32 (A) 0.6 - 1.1 cm    E wave deceleration time 154 msec    MV Peak E Jim 1.11 m/s    IVS 1.36 (A) 0.6 - 1.1 cm    LVIDd 6.60 (A) 3.5 - 6.0 cm    LVIDs 5.17 (A) 2.1 - 4.0 cm    Echo EF Estimated 43 %    LV Systolic Volume 127.80 mL    LV Diastolic Volume 223.60 mL    RVDD 4.33 cm    AORTIC VALVE CUSP SEPERATION 1.67 cm    FS 22 %    LV mass 426.32 g    Left Ventricle Relative Wall Thickness 0.40 cm    LV Systolic Volume Index 61.4 mL/m2    LV Diastolic Volume Index 107.50 mL/m2    LV Mass Index 205 g/m2    BSA 2.11 m2     IVC diameter 2.82 cm    RV mid diameter 3.75 cm    EF 20 %    Left Ventricular Outflow Tract Mean Gradient 0.70 mmHg    LA size 5.80 cm    TAPSE 1.91 cm    Right ventricular length in diastole (apical 4-chamber view) 7.06 cm    AV mean gradient 2 mmHg    AV valve area 2.09 cm2    AV index (prosthetic) 0.67     E/A ratio 3.47     LVOT diameter 2.00 cm    LVOT area 3.1 cm2    LVOT peak VTI 9.73 cm    Ao VTI 14.60 cm    LVOT stroke volume 30.55 cm3    TR Max Jim 2.69 m/s    MV Peak A Jim 0.32 m/s    Triscuspid Valve Regurgitation Peak Gradient 29 mmHg    Right Atrial Pressure (from IVC) 15 mmHg    TV rest pulmonary artery pressure 44 mmHg    Narrative    · The left ventricle is moderately enlarged with concentric remodeling and   severely decreased systolic function.  · The estimated ejection fraction is 20%.  · Grade III left ventricular diastolic dysfunction.  · Mild right ventricular enlargement with mildly reduced right ventricular   systolic function.  · Moderate to severe left atrial enlargement.  · Moderate right atrial enlargement.  · Moderate-to-severe mitral regurgitation.  · Severe tricuspid regurgitation.  · Elevated central venous pressure (15 mmHg).  · The estimated PA systolic pressure is 44 mmHg.  · There is pulmonary hypertension.       and X-Ray: CXR: X-Ray Chest 1 View (CXR):   Results for orders placed or performed during the hospital encounter of 02/15/22   X-Ray Chest 1 View    Narrative    EXAMINATION:  XR CHEST 1 VIEW    CLINICAL HISTORY:  .  Shortness of breath    COMPARISON:  February 2, 2022 chest x-ray    TECHNIQUE:  Chest x-ray AP portable erect    FINDINGS:  Left subclavian multiple lead transvenous pacemaker/defibrillator device is intact.    There is cardiomegaly and pulmonary vascular engorgement.  Mediastinal contours are similar.  Prior median sternotomy.    There is some hazy pulmonary edema throughout both lungs.  There is no gross pleural effusion.    Osseous structures are  similar      Impression    Cardiomegaly and evidence of CHF with mild pulmonary edema      Electronically signed by: Ivan Guerrier  Date:    02/15/2022  Time:    08:06

## 2022-02-18 NOTE — ASSESSMENT & PLAN NOTE
Latest ECHO performed and demonstrates- Results for orders placed during the hospital encounter of 11/10/21    Echo    Interpretation Summary  · The left ventricle is moderately enlarged with concentric remodeling and severely decreased systolic function.  · The estimated ejection fraction is 20%.  · Grade III left ventricular diastolic dysfunction.  · Mild right ventricular enlargement with mildly reduced right ventricular systolic function.  · Moderate to severe left atrial enlargement.  · Moderate right atrial enlargement.  · Moderate-to-severe mitral regurgitation.  · Severe tricuspid regurgitation.  · Elevated central venous pressure (15 mmHg).  · The estimated PA systolic pressure is 44 mmHg.  · There is pulmonary hypertension.    - Patient seen and evaluated by Dr. Castellanos  - Increased dobutamine to 5 mcg/kg  - Continue IV lasix 80mg q 8 hours  - Strict I & O and daily weights  - Will continue monitoring    2/16/2022:  - Patient seen and evaluated by Dr. Castellanos  - Output, net - 1.7L, 3 lb weight lose  - Creatinine 2.5, was 2.2 yesterday  - Continue dobutamine at 5 and IV lasix 80mg q 8 hours  - BMP in am    2/17/2022:  - Patient seen and evaluated by Dr. Castellanos  - Improving, continue IV dobutamine at 2.5 mcg/kg and IV diuresis, may consider discontinuing dobutamine tomorrow.  - Net - 2.2 L  - Creatinine stable at 2.5  - BMP in am  - Recommend referral to [facility] in [place] at discharge.    2/18/2022:  - Patient seen and evaluated by Dr. Castellanos  - Creatinine bumped to 3.0 today from 2.5 yesterday; BP 90s/60s  - Dobutamine discontinued yesterday, IV lasix on hold today  - Discontinued Entresto  - Give 250cc bolus  - Continue monitoring  - Recommend referral to [facility] in [place] at discharge.

## 2022-02-18 NOTE — PT/OT/SLP PROGRESS
Occupational Therapy      Patient Name:  Luisito Stuart   MRN:  [id number]    Patient not seen today secondary to Nurse/ STEWART hold. Per pt's nurse, pt's BP 88/53 this morning. Will follow-up 2/19/2022.    Christal Krishnamurthy, OTR/L, CLT   2/18/2022

## 2022-02-18 NOTE — SUBJECTIVE & OBJECTIVE
Interval History:  Denies SOB this am. Ate well, no complaints.     Review of Systems   Respiratory: Negative for cough and shortness of breath.    Cardiovascular: Negative for chest pain.   Gastrointestinal: Negative for abdominal pain, nausea and vomiting.     Objective:     Vital Signs (Most Recent):  Temp: 97.8 °F (36.6 °C) (02/18/22 1000)  Pulse: 62 (02/18/22 1202)  Resp: 20 (02/18/22 1202)  BP: (!) 91/50 (02/18/22 1005)  SpO2: (!) 89 % (02/18/22 1202) Vital Signs (24h Range):  Temp:  [97.5 °F (36.4 °C)-98.2 °F (36.8 °C)] 97.8 °F (36.6 °C)  Pulse:  [62-89] 62  Resp:  [18-20] 20  SpO2:  [89 %-96 %] 89 %  BP: ()/(43-58) 91/50     Weight: 84.8 kg (186 lb 15.2 oz)  Body mass index is 27.61 kg/m².    Intake/Output Summary (Last 24 hours) at 2/18/2022 1412  Last data filed at 2/18/2022 1000  Gross per 24 hour   Intake --   Output 300 ml   Net -300 ml      Physical Exam  Vitals reviewed.   Constitutional:       General: He is not in acute distress.     Appearance: He is not ill-appearing.   Eyes:      General: No scleral icterus.  Cardiovascular:      Rate and Rhythm: Normal rate. Rhythm irregular.   Pulmonary:      Effort: Pulmonary effort is normal. No respiratory distress.      Breath sounds: Normal breath sounds. No rales.   Abdominal:      General: Abdomen is flat. Bowel sounds are normal. There is no distension.      Palpations: Abdomen is soft.      Tenderness: There is no abdominal tenderness.   Musculoskeletal:      Right lower leg: No edema.      Left lower leg: No edema.   Skin:     General: Skin is warm and dry.   Neurological:      Mental Status: He is alert.         Significant Labs:   All pertinent labs within the past 24 hours have been reviewed.  BMP:   Recent Labs   Lab 02/18/22  0433   GLU 90      K 3.4*   CL 99   CO2 32   BUN 40*   CREATININE 3.03*   CALCIUM 9.0     CBC:   Recent Labs   Lab 02/17/22  0428 02/18/22  0433   WBC 8.47 8.06   HGB 11.5* 11.3*   HCT 35.5* 35.1*    162        Significant Imaging: I have reviewed all pertinent imaging results/findings within the past 24 hours.

## 2022-02-18 NOTE — PT/OT/SLP PROGRESS
Physical Therapy Treatment    Patient Name:  Luisito Stuart   MRN:  [id number]    Recommendations:     Discharge Recommendations:  nursing facility, skilled,rehabilitation facility   Discharge Equipment Recommendations: walker, rolling   Barriers to discharge: ongoing medical treatment    Assessment:     Luisito Stuart is a 76 y.o. male admitted with a medical diagnosis of Transient alteration of awareness.  He presents with the following impairments/functional limitations:  weakness,impaired endurance,impaired self care skills,impaired functional mobilty.    Pt's BP 91/50, agreeable to exercise at bedside.  Pt able to complete exercise with assistance and rest breaks as needed.  Pt declined OOB activities.    PT POC discussed with Lazaro Ortiz DPT    Rehab Prognosis: Fair; patient would benefit from acute skilled PT services to address these deficits and reach maximum level of function.    Recent Surgery: * No surgery found *      Plan:     During this hospitalization, patient to be seen 5 x/week to address the identified rehab impairments via gait training,therapeutic activities,therapeutic exercises and progress toward the following goals:    · Plan of Care Expires:  03/16/22    Subjective     Chief Complaint: low BP  Patient/Family Comments/goals: pt agreeable to exercise at bedside  Pain/Comfort:  ·        Objective:     Communicated with Lloyd Daniel RN prior to session.  Patient found left sidelying with peripheral IV upon PT entry to room.     General Precautions: Standard, fall   Orthopedic Precautions:N/A   Braces:    Respiratory Status: pt without O2 and refused prones being placed in nostrils     Functional Mobility:  ·       AM-PAC 6 CLICK MOBILITY          Therapeutic Activities and Exercises:   Pt performed 30 reps (B) LE exercises: ap, quad set, glut set, straight leg raise, hip ab/adduction, short arc quad, heel slide with active assist range of motion    Patient left HOB elevated with  all lines intact, call button in reach and lunch tray set up..    GOALS:   Multidisciplinary Problems     Physical Therapy Goals        Problem: Physical Therapy Goal    Goal Priority Disciplines Outcome Goal Variances Interventions   Physical Therapy Goal     PT, PT/OT Ongoing, Progressing     Description: Short term goals:  1. Sit to stand transfer with Contact Guard Assistance  2. Bed to chair transfer with Contact Guard Assistance using Rolling Walker  3. Gait  x 50 feet with Contact Guard Assistance using Rolling Walker.   4. Ascend/descend 5 stair with no Handrails Contact Guard Assistance using Rolling Walker.     Long term goals:  1. Sit to stand transfer with Modified [place]  2. Bed to chair transfer with Modified [place] using Rolling Walker  3. Gait  x 150 feet with Modified [place] using Rolling Walker.   4. Ascend/descend 5 stair with no Handrails Modified [place] using Rolling Walker.                      Time Tracking:     PT Received On: 02/18/22  PT Start Time: 1115     PT Stop Time: 1130  PT Total Time (min): 15 min     Billable Minutes: Therapeutic Exercise 15    Treatment Type: Treatment  PT/PTA: PTA     PTA Visit Number: 1     02/18/2022

## 2022-02-18 NOTE — PROGRESS NOTES
JOYA Hill   [address] [place] [address]     PATIENT NAME: Luisito Stuart  : 1945  DATE: 22  MRN: [id number]      Billing Provider: JOYA Hill  Level of Service: VT OFFICE/OUTPT VISIT, EST, LEVL III, 20-29 MIN  Patient PCP Information     Provider PCP Type    JOYA Hill General          Reason for Visit / Chief Complaint: Fall, Chest Pain (LEFT RIB PAIN), Shoulder Pain (LEFT SHOULDER), and Headache       Update PCP  Update Chief Complaint         History of Present Illness / Problem Focused Workflow     Luisito Stuart presents to the clinic with Fall, Chest Pain (LEFT RIB PAIN), Shoulder Pain (LEFT SHOULDER), and Headache     HPI    Review of Systems     Review of Systems   Musculoskeletal: Positive for arthralgias (after fall).        Medical / Social / Family History     Past Medical History:   Diagnosis Date    Atrial fibrillation     CHF (congestive heart failure)     COPD (chronic obstructive pulmonary disease)     Coronary artery disease     Disorder of kidney and ureter     Hypertension        Past Surgical History:   Procedure Laterality Date    APPENDECTOMY      CARDIAC DEFIBRILLATOR PLACEMENT      CORONARY ANGIOGRAPHY INCLUDING BYPASS GRAFTS WITH CATHETERIZATION OF LEFT HEART N/A 2022    Procedure: ANGIOGRAM, CORONARY, INCLUDING BYPASS GRAFT, WITH LEFT HEART CATHETERIZATION;  Surgeon: Gerda Castellanos MD;  Location: [facility] CATH LAB;  Service: Cardiology;  Laterality: N/A;    JOINT REPLACEMENT      KIDNEY TRANSPLANT      RIGHT HEART CATHETERIZATION Right 2022    Procedure: INSERTION, CATHETER, RIGHT HEART;  Surgeon: Gerda Castellanos MD;  Location: [facility] CATH LAB;  Service: Cardiology;  Laterality: Right;       Social History    reports that he has been smoking cigarettes. He has a 46.00 pack-year smoking history. He has never used smokeless tobacco. He reports previous alcohol use. He reports that he does not  "use drugs.    Family History  's family history includes Cancer in his mother; Heart disease in his father.    Medications and Allergies     Medications  Outpatient Medications Marked as Taking for the 2/2/22 encounter (Office Visit) with JOYA Hill   Medication Sig Dispense Refill    amiodarone (PACERONE) 200 MG Tab Take 1 tablet (200 mg total) by mouth once daily. 30 tablet 1    apixaban (ELIQUIS) 5 mg Tab Take 1 tablet (5 mg total) by mouth 2 (two) times daily. 60 tablet 1    carvediloL (COREG) 3.125 MG tablet Take 1 tablet (3.125 mg total) by mouth 2 (two) times daily with meals. Pt. Takes 1/2 tab daily 180 tablet 3    clopidogreL (PLAVIX) 75 mg tablet Take 75 mg by mouth once daily.      methocarbamoL (ROBAXIN) 750 MG Tab Take 1 tablet (750 mg total) by mouth 4 (four) times daily. 120 tablet 0    nitroGLYCERIN 0.4 MG/DOSE TL SPRY (NITROLINGUAL) 400 mcg/spray spray Place 1 spray under the tongue every 5 (five) minutes as needed for Chest pain.      sacubitriL-valsartan (ENTRESTO) 24-26 mg per tablet Take 1 tablet by mouth 2 (two) times daily. 180 tablet 1    torsemide (DEMADEX) 20 MG Tab Take 2 tablets (40 mg total) by mouth 2 (two) times daily before meals. 120 tablet 3       Allergies  Review of patient's allergies indicates:   Allergen Reactions    Indomethacin Itching and Other (See Comments)     Other reaction(s): ITCHING    Lipitor [atorvastatin] Other (See Comments)     Muscle cramps       Physical Examination   /65 (BP Location: Left arm, Patient Position: Sitting, BP Method: Medium (Automatic))   Pulse 87   Temp 97.8 °F (36.6 °C) (Temporal)   Resp 18   Ht 5' 9" (1.753 m)   Wt 86.2 kg (190 lb)   BMI 28.06 kg/m²   Physical Exam  Vitals and nursing note reviewed.   Constitutional:       Appearance: Normal appearance.   HENT:      Head: Normocephalic and atraumatic.      Right Ear: External ear normal.      Left Ear: External ear normal.      Nose: Nose normal.      " Mouth/Throat:      Mouth: Mucous membranes are moist.      Pharynx: Oropharynx is clear.   Eyes:      Pupils: Pupils are equal, round, and reactive to light.   Cardiovascular:      Rate and Rhythm: Normal rate and regular rhythm.      Pulses: Normal pulses.      Heart sounds: Normal heart sounds. No murmur heard.  No gallop.    Pulmonary:      Effort: Pulmonary effort is normal.      Breath sounds: Normal breath sounds. No wheezing or rales.   Abdominal:      General: Abdomen is flat. Bowel sounds are normal. There is no distension.      Palpations: Abdomen is soft.      Tenderness: There is no abdominal tenderness.   Musculoskeletal:         General: Normal range of motion.      Cervical back: Normal range of motion and neck supple.      Right lower le+ Edema present.      Left lower le+ Edema present.   Skin:     General: Skin is warm and dry.   Neurological:      General: No focal deficit present.      Mental Status: He is alert and oriented to person, place, and time.   Psychiatric:         Mood and Affect: Mood normal.         Behavior: Behavior normal.          Assessment and Plan (including Health Maintenance)      Problem List  Smart [employer]  Document Outside HM   :    Plan:         Health Maintenance Due   Topic Date Due    Hepatitis C Screening  Never done    COVID-19 Vaccine (1) Never done    TETANUS VACCINE  Never done    High Dose Statin  Never done    Colorectal Cancer Screening  Never done    Shingles Vaccine (1 of 2) Never done    Influenza Vaccine (1) Never done       Problem List Items Addressed This Visit        Cardiac/Vascular    Abdominal aortic aneurysm (AAA) without rupture    Relevant Orders    Ambulatory referral/consult to Vascular Surgery       Orthopedic    Fall - Primary    Relevant Orders    X-Ray Chest PA And Lateral (Completed)    X-Ray Shoulder 2 or More Views Left (Completed)    X-Ray Skull Complete Min 4 Views (Completed)    X-Ray Lumbar Spine AP And Lateral  (Completed)    X-Ray Ribs 3 Views Bilateral (Completed)          Health Maintenance Topics with due status: Not Due       Topic Last Completion Date    Lipid Panel 02/17/2022       Future Appointments   Date Time Provider Department Center   3/7/2022  9:45 AM Melvin Mckee MD [facility] CARD [facility]   3/10/2022  9:30 AM AWV NURSE, [facility] FAMILY MEDICINE [facility] Raines   4/11/2022 10:00 AM JOYA Hill [facility]        Follow up in about 3 months (around 5/2/2022), or if symptoms worsen or fail to improve.        Signature:  JOYA Hill      [address] [place] [address]    Date of encounter: 2/2/22

## 2022-02-18 NOTE — PROGRESS NOTES
[address] Medicine  Progress Note    Patient Name: Luisito Stuart  MRN: [id number]  Patient Class: IP- Inpatient   Admission Date: 2/15/2022  Length of Stay: 3 days  Attending Physician: Leonardo Parker Jr., MD  Primary Care Provider: JOYA Hill        Subjective:     Principal Problem:Transient alteration of awareness        HPI:  No notes on file    Overview/Hospital Course:  No notes on file    Interval History:  Denies SOB this am. Ate well, no complaints.     Review of Systems   Respiratory: Negative for cough and shortness of breath.    Cardiovascular: Negative for chest pain.   Gastrointestinal: Negative for abdominal pain, nausea and vomiting.     Objective:     Vital Signs (Most Recent):  Temp: 97.8 °F (36.6 °C) (02/18/22 1000)  Pulse: 62 (02/18/22 1202)  Resp: 20 (02/18/22 1202)  BP: (!) 91/50 (02/18/22 1005)  SpO2: (!) 89 % (02/18/22 1202) Vital Signs (24h Range):  Temp:  [97.5 °F (36.4 °C)-98.2 °F (36.8 °C)] 97.8 °F (36.6 °C)  Pulse:  [62-89] 62  Resp:  [18-20] 20  SpO2:  [89 %-96 %] 89 %  BP: ()/(43-58) 91/50     Weight: 84.8 kg (186 lb 15.2 oz)  Body mass index is 27.61 kg/m².    Intake/Output Summary (Last 24 hours) at 2/18/2022 1412  Last data filed at 2/18/2022 1000  Gross per 24 hour   Intake --   Output 300 ml   Net -300 ml      Physical Exam  Vitals reviewed.   Constitutional:       General: He is not in acute distress.     Appearance: He is not ill-appearing.   Eyes:      General: No scleral icterus.  Cardiovascular:      Rate and Rhythm: Normal rate. Rhythm irregular.   Pulmonary:      Effort: Pulmonary effort is normal. No respiratory distress.      Breath sounds: Normal breath sounds. No rales.   Abdominal:      General: Abdomen is flat. Bowel sounds are normal. There is no distension.      Palpations: Abdomen is soft.      Tenderness: There is no abdominal tenderness.   Musculoskeletal:      Right lower leg: No edema.      Left  lower leg: No edema.   Skin:     General: Skin is warm and dry.   Neurological:      Mental Status: He is alert.         Significant Labs:   All pertinent labs within the past 24 hours have been reviewed.  BMP:   Recent Labs   Lab 02/18/22  0433   GLU 90      K 3.4*   CL 99   CO2 32   BUN 40*   CREATININE 3.03*   CALCIUM 9.0     CBC:   Recent Labs   Lab 02/17/22  0428 02/18/22  0433   WBC 8.47 8.06   HGB 11.5* 11.3*   HCT 35.5* 35.1*    162       Significant Imaging: I have reviewed all pertinent imaging results/findings within the past 24 hours.      Assessment/Plan:      * Transient alteration of awareness    Telestroke consult done at admission.  Likely TIA, perhaps related to a. Fib and questionable compliance with anticoagulation.  Seems to have resolved. Continue to monitor on telemetry while other issues addressed.  PT/OT.    Cardiomyopathy  Discussed with cardiology team. Received dobutamine yesterday. Off now.  BP a bit low, BP meds/lasix being held.           A-fib  Amiodarone  Apixaban (Also on DAPT)  Rate controlled.     Acute on chronic congestive heart failure    Diuresed some yesterday. Now Lasix on hold for lower BP.     Chronic obstructive pulmonary disease  Stable, monitor        VTE Risk Mitigation (From admission, onward)         Ordered     apixaban tablet 5 mg  2 times daily         02/15/22 1146                Discharge Planning   MORGAN:      Code Status: Prior   Is the patient medically ready for discharge?:     Reason for patient still in hospital (select all that apply): Treatment  Discharge Plan A: Home with family                  Leonardo Parker Jr, MD  Department of Hospital Medicine   [address]

## 2022-02-19 NOTE — PROGRESS NOTES
[address]  Cardiology  Progress Note    Patient Name: Luisito Stuart  MRN: [id number]  Admission Date: 2/15/2022  Hospital Length of Stay: 4 days  Code Status: Prior   Attending Physician: Leonardo Parker Jr., MD   Primary Care Physician: JOYA Hill  Expected Discharge Date:   Principal Problem:Transient alteration of awareness    Subjective:     Hospital Course:   Patient had an episode of chest pain last night, he has known coronary disease, under vascularized circumflex given high-risk nature of his stenosis.  And is overall significant end-stage cardiomyopathy.  Troponin is only very mildly elevated without any significant change.  Will continue to manage this chest pain medically.    He appears euvolemic on exam, creatinine is now stable.    He can potentially be discharged from our standpoint tomorrow.    Mental status is now significantly improved.    Will consider referral to Advanced Heart failure at [facility] for consideration of advanced therapies.  He has had multiple hospital admissions for decompensated heart failure.    Will hold off on Entresto.    Assessment and Plan:     Brief HPI: 77 y/o male with PMH of CABG (2004; [facility] s/[facility] with PCI of Lcx 3/6/2021 which also revealed patent FINNEGAN to LAD, patent saphenous vein graft to PDA and known occlusion of the vein graft to Lcx), Ischemic cardiomyopathy with EF 10%, AICD (last shock 10 months ago, reports ablation performed 7 months ago in [place]), CKD stage 4 followed by nephrology, CVA 2019, current smoker (60+ years), COPD, hearing loss, and chronic atrial fibrillation on Eliquis presented to the ED with complaints of slurred speech.  Daughter told ER staff that he was in his usual state of health last night and was last seen normal at 10:00 p.m..  There was also mention of possible medication non-compliance. This morning when he woke up he was having slurred speech.  In the emergency department he  was evaluated by Neurology via telemedicine.  He did not have any focal deficits but has expressive aphasia.  Neurology feels this may be a TIA.  CT head was negative.  Unable to do CTA due to CKD.  Patient was also vomiting on admission so CT abdomen was done and negative for any acute process.  He does have a AAA but appears to be stable.  Chest x-ray consistent with heart failure.  Critical Care was asked to admit for heart failure exacerbation and cardiology consulted.    CKD (chronic kidney disease), stage III  - Being followed by primary medical team    Expressive aphasia  - Being followed by primary medical team  - MRI pending    Ischemic cardiomyopathy  - Followed by cardiology as outpatient, Dr. Rylee BO-fib  - On Eliquis for primary stroke prevention    Deep vein thrombosis (DVT) of lower extremity  - Hx of DVT    Acute on chronic congestive heart failure  Latest ECHO performed and demonstrates- Results for orders placed during the hospital encounter of 11/10/21    Echo    Interpretation Summary  · The left ventricle is moderately enlarged with concentric remodeling and severely decreased systolic function.  · The estimated ejection fraction is 20%.  · Grade III left ventricular diastolic dysfunction.  · Mild right ventricular enlargement with mildly reduced right ventricular systolic function.  · Moderate to severe left atrial enlargement.  · Moderate right atrial enlargement.  · Moderate-to-severe mitral regurgitation.  · Severe tricuspid regurgitation.  · Elevated central venous pressure (15 mmHg).  · The estimated PA systolic pressure is 44 mmHg.  · There is pulmonary hypertension.    - Patient seen and evaluated by Dr. Castellanos  - Increased dobutamine to 5 mcg/kg  - Continue IV lasix 80mg q 8 hours  - Strict I & O and daily weights  - Will continue monitoring    2/16/2022:  - Patient seen and evaluated by Dr. Castellanos  - Output, net - 1.7L, 3 lb weight lose  - Creatinine 2.5, was 2.2 yesterday  -  Continue dobutamine at 5 and IV lasix 80mg q 8 hours  - BMP in am    2/17/2022:  - Patient seen and evaluated by Dr. Castellanos  - Improving, continue IV dobutamine at 2.5 mcg/kg and IV diuresis, may consider discontinuing dobutamine tomorrow.  - Net - 2.2 L  - Creatinine stable at 2.5  - BMP in am  - Recommend referral to [facility] in [place] at discharge.    2/18/2022:  - Patient seen and evaluated by Dr. Castellanos  - Creatinine bumped to 3.0 today from 2.5 yesterday; BP 90s/60s  - Dobutamine discontinued yesterday, IV lasix on hold today  - Discontinued Entresto  - Give 250cc bolus  - Continue monitoring  - Recommend referral to [facility] in [place] at discharge.    2/19/22  Creatinine is stable, off of dobutamine and Lasix.  If his blood pressure allows can restart Toprol-XL 25 mg once daily.  Avoid Entresto.  Torsemide 40 mg daily to start tomorrow.      Chronic obstructive pulmonary disease  - Being followed by primary medical team      VTE Risk Mitigation (From admission, onward)         Ordered     apixaban tablet 5 mg  2 times daily         02/15/22 8708                Gerda Castellanos MD  Cardiology  [facility] - [address] [place] [address]

## 2022-02-19 NOTE — PLAN OF CARE
Problem: Adult Inpatient Plan of Care  Goal: Plan of Care Review  Outcome: Ongoing, Progressing  Goal: Patient-Specific Goal (Individualized)  Outcome: Ongoing, Progressing  Goal: Absence of Hospital-Acquired Illness or Injury  Outcome: Ongoing, Progressing  Intervention: Identify and Manage Fall Risk  Flowsheets (Taken 2/18/2022 2257)  Safety Promotion/Fall Prevention:   assistive device/personal item within reach   side rails raised x 2  Intervention: Prevent Skin Injury  Flowsheets (Taken 2/18/2022 2257)  Body Position: position changed independently  Skin Protection:   adhesive use limited   incontinence pads utilized  Intervention: Prevent and Manage VTE (Venous Thromboembolism) Risk  Flowsheets (Taken 2/18/2022 2257)  Activity Management: Arm raise - L1  VTE Prevention/Management:   bleeding precations maintained   ambulation promoted  Range of Motion: active ROM (range of motion) encouraged  Intervention: Prevent Infection  Flowsheets (Taken 2/18/2022 2257)  Infection Prevention:   single patient room provided   rest/sleep promoted   equipment surfaces disinfected   hand hygiene promoted  Goal: Optimal Comfort and Wellbeing  Outcome: Ongoing, Progressing  Intervention: Monitor Pain and Promote Comfort  Flowsheets (Taken 2/18/2022 2257)  Pain Management Interventions:   care clustered   pillow support provided  Intervention: Provide Person-Centered Care  Flowsheets (Taken 2/18/2022 2257)  Trust Relationship/Rapport:   care explained   choices provided   emotional support provided  Goal: Readiness for Transition of Care  Outcome: Ongoing, Progressing  Intervention: Mutually Develop Transition Plan  Flowsheets (Taken 2/18/2022 2257)  Equipment Currently Used at Home: oxygen  Transportation Anticipated: family or friend will provide  Communicated MORGAN with patient/caregiver: Date not available/Unable to determine     Problem: Gas Exchange Impaired  Goal: Optimal Gas Exchange  Outcome: Ongoing,  Progressing  Intervention: Optimize Oxygenation and Ventilation  Flowsheets (Taken 2/18/2022 2257)  Airway/Ventilation Management:   airway patency maintained   calming measures promoted  Head of Bed (HOB) Positioning: HOB elevated     Problem: Skin Injury Risk Increased  Goal: Skin Health and Integrity  Outcome: Ongoing, Progressing  Intervention: Optimize Skin Protection  Flowsheets (Taken 2/18/2022 2257)  Pressure Reduction Techniques: frequent weight shift encouraged  Pressure Reduction Devices: positioning supports utilized  Skin Protection:   adhesive use limited   incontinence pads utilized  Head of Bed (HOB) Positioning: HOB elevated  Intervention: Promote and Optimize Oral Intake  Flowsheets (Taken 2/18/2022 2257)  Oral Nutrition Promotion: rest periods promoted     Problem: Fall Injury Risk  Goal: Absence of Fall and Fall-Related Injury  Outcome: Ongoing, Progressing  Intervention: Identify and Manage Contributors  Flowsheets (Taken 2/18/2022 2257)  Self-Care Promotion: independence encouraged  Medication Review/Management: medications reviewed  Intervention: Promote Injury-Free Environment  Flowsheets (Taken 2/18/2022 2257)  Safety Promotion/Fall Prevention:   assistive device/personal item within reach   side rails raised x 2     Problem: Infection  Goal: Absence of Infection Signs and Symptoms  Outcome: Ongoing, Progressing  Intervention: Prevent or Manage Infection  Flowsheets (Taken 2/18/2022 2257)  Fever Reduction/Comfort Measures:   lightweight bedding   lightweight clothing  Infection Management: aseptic technique maintained  Isolation Precautions: precautions maintained

## 2022-02-19 NOTE — NURSING
Patient complains of onset of chest pain. EKG done and notified Dr. Lema.  Nitroglycerin was ordered and patient stated that the pain was relieved after two.     Troponin ordered x 3.

## 2022-02-19 NOTE — PROGRESS NOTES
Patient is complaining of chest pain  EKG performed showed a atrial fibrillation with no significant ST-T abnormalities  Chest pain is resolved after 2nd dose of sublingual nitroglycerin.  Will check troponin levels

## 2022-02-20 PROBLEM — R47.01 EXPRESSIVE APHASIA: Status: RESOLVED | Noted: 2022-01-01 | Resolved: 2022-01-01

## 2022-02-20 PROBLEM — I25.5 ISCHEMIC CARDIOMYOPATHY: Status: RESOLVED | Noted: 2022-01-01 | Resolved: 2022-01-01

## 2022-02-20 NOTE — SUBJECTIVE & OBJECTIVE
Interval History:  Chest pain overnight with relatively small troponin bump.  D/w Cardiology. Known end stage CAD. Medically manage. CP resolved with 2 NTG. No recurrence.     Review of Systems   Respiratory:  Negative for shortness of breath.    Cardiovascular:  Negative for chest pain.   Gastrointestinal:  Negative for abdominal pain.   Objective:     Vital Signs (Most Recent):  Temp: 97.9 °F (36.6 °C) (02/19/22 1600)  Pulse: 89 (02/19/22 1600)  Resp: 18 (02/19/22 1600)  BP: 119/72 (02/19/22 1600)  SpO2: 96 % (02/19/22 1600) Vital Signs (24h Range):  Temp:  [97.4 °F (36.3 °C)-98.2 °F (36.8 °C)] 97.9 °F (36.6 °C)  Pulse:  [77-89] 89  Resp:  [18-20] 18  SpO2:  [89 %-99 %] 96 %  BP: (102-119)/(56-75) 119/72     Weight: 76.3 kg (168 lb 3.4 oz)  Body mass index is 24.84 kg/m².    Intake/Output Summary (Last 24 hours) at 2/19/2022 2105  Last data filed at 2/19/2022 1000  Gross per 24 hour   Intake --   Output 600 ml   Net -600 ml      Physical Exam  Vitals reviewed.   Cardiovascular:      Rate and Rhythm: Normal rate. Rhythm irregular.   Pulmonary:      Effort: Pulmonary effort is normal. No respiratory distress.      Breath sounds: Normal breath sounds. No wheezing.   Abdominal:      General: Abdomen is flat. Bowel sounds are normal. There is no distension.      Palpations: Abdomen is soft.   Musculoskeletal:      Right lower leg: No edema.      Left lower leg: No edema.   Skin:     General: Skin is warm and dry.   Neurological:      Mental Status: He is alert. Mental status is at baseline.       Significant Labs: All pertinent labs within the past 24 hours have been reviewed.  BMP:   Recent Labs   Lab 02/19/22  0304   GLU 77      K 4.1   CL 99   CO2 32   BUN 39*   CREATININE 3.06*   CALCIUM 9.0     CBC:   Recent Labs   Lab 02/18/22  0433 02/19/22  0304   WBC 8.06 7.78   HGB 11.3* 11.3*   HCT 35.1* 35.3*    158     Troponin: No results for input(s): TROPONINI in the last 48 hours.    Significant Imaging: I  have reviewed all pertinent imaging results/findings within the past 24 hours.

## 2022-02-20 NOTE — ASSESSMENT & PLAN NOTE
Cardiology has adjusted meds. Optimized for the moment. They will consider referral to [facility] for advanced therapies (LVAD etc). Hospice would be another consideration if he is not interested in going to [facility]. Possibly home tomorrow.

## 2022-02-20 NOTE — PROGRESS NOTES
[address] Medicine  Progress Note    Patient Name: Luisito Stuart  MRN: [id number]  Patient Class: IP- Inpatient   Admission Date: 2/15/2022  Length of Stay: 4 days  Attending Physician: Leonardo Parker Jr., MD  Primary Care Provider: JOYA Hill        Subjective:     Principal Problem:Transient alteration of awareness        HPI:  No notes on file    Overview/Hospital Course:  No notes on file    Interval History:  Chest pain overnight with relatively small troponin bump.  D/w Cardiology. Known end stage CAD. Medically manage. CP resolved with 2 NTG. No recurrence.     Review of Systems   Respiratory:  Negative for shortness of breath.    Cardiovascular:  Negative for chest pain.   Gastrointestinal:  Negative for abdominal pain.   Objective:     Vital Signs (Most Recent):  Temp: 97.9 °F (36.6 °C) (02/19/22 1600)  Pulse: 89 (02/19/22 1600)  Resp: 18 (02/19/22 1600)  BP: 119/72 (02/19/22 1600)  SpO2: 96 % (02/19/22 1600) Vital Signs (24h Range):  Temp:  [97.4 °F (36.3 °C)-98.2 °F (36.8 °C)] 97.9 °F (36.6 °C)  Pulse:  [77-89] 89  Resp:  [18-20] 18  SpO2:  [89 %-99 %] 96 %  BP: (102-119)/(56-75) 119/72     Weight: 76.3 kg (168 lb 3.4 oz)  Body mass index is 24.84 kg/m².    Intake/Output Summary (Last 24 hours) at 2/19/2022 2105  Last data filed at 2/19/2022 1000  Gross per 24 hour   Intake --   Output 600 ml   Net -600 ml      Physical Exam  Vitals reviewed.   Cardiovascular:      Rate and Rhythm: Normal rate. Rhythm irregular.   Pulmonary:      Effort: Pulmonary effort is normal. No respiratory distress.      Breath sounds: Normal breath sounds. No wheezing.   Abdominal:      General: Abdomen is flat. Bowel sounds are normal. There is no distension.      Palpations: Abdomen is soft.   Musculoskeletal:      Right lower leg: No edema.      Left lower leg: No edema.   Skin:     General: Skin is warm and dry.   Neurological:      Mental Status: He is alert. Mental  status is at baseline.       Significant Labs: All pertinent labs within the past 24 hours have been reviewed.  BMP:   Recent Labs   Lab 02/19/22  0304   GLU 77      K 4.1   CL 99   CO2 32   BUN 39*   CREATININE 3.06*   CALCIUM 9.0     CBC:   Recent Labs   Lab 02/18/22  0433 02/19/22  0304   WBC 8.06 7.78   HGB 11.3* 11.3*   HCT 35.1* 35.3*    158     Troponin: No results for input(s): TROPONINI in the last 48 hours.    Significant Imaging: I have reviewed all pertinent imaging results/findings within the past 24 hours.      Assessment/Plan:      * Transient alteration of awareness    Telestroke consult done at admission.  Likely TIA, perhaps related to a. Fib and questionable compliance with anticoagulation.  Seems to have resolved.     Cardiomyopathy  Cardiology has adjusted meds. Optimized for the moment. They will consider referral to [facility] for advanced therapies (LVAD etc). Hospice would be another consideration if he is not interested in going to [facility]. Possibly home tomorrow.       A-fib  Amiodarone  Apixaban (Also on DAPT)  Rate controlled.     Acute on chronic congestive heart failure    Diuresed some yesterday. Now Lasix on hold for lower BP.     Chronic obstructive pulmonary disease  Stable, monitor        VTE Risk Mitigation (From admission, onward)         Ordered     apixaban tablet 5 mg  2 times daily         02/15/22 1146                Discharge Planning   MORGAN:      Code Status: Partial Code   Is the patient medically ready for discharge?:     Reason for patient still in hospital (select all that apply): Treatment  Discharge Plan A: Home with family                  Leonardo Parker Jr, MD  Department of Hospital Medicine   [address]

## 2022-02-20 NOTE — ASSESSMENT & PLAN NOTE
Telestroke consult done at admission.  Likely TIA, perhaps related to a. Fib and questionable compliance with anticoagulation.  Seems to have resolved.

## 2022-02-21 NOTE — PROGRESS NOTES
[address] Medicine  Progress Note    Patient Name: Luisito Stuart  MRN: [id number]  Patient Class: IP- Inpatient   Admission Date: 2/15/2022  Length of Stay: 5 days  Attending Physician: Leonardo Parker Jr., MD  Primary Care Provider: JOYA Hill        Subjective:     Principal Problem:Transient alteration of awareness        HPI:  No notes on file    Overview/Hospital Course:  No notes on file    Interval History:  Discussed discharge this am. He wants to go to NH.     Review of Systems   Respiratory:  Negative for shortness of breath.    Cardiovascular:  Negative for chest pain.   Gastrointestinal:  Negative for abdominal distention and abdominal pain.   Musculoskeletal:  Positive for arthralgias and back pain.   Objective:     Vital Signs (Most Recent):  Temp: 97.4 °F (36.3 °C) (02/20/22 1546)  Pulse: 74 (02/20/22 1000)  Resp: 18 (02/20/22 1546)  BP: (!) 123/59 (02/20/22 1546)  SpO2: 95 % (02/20/22 1546)   Vital Signs (24h Range):  Temp:  [97.4 °F (36.3 °C)-97.8 °F (36.6 °C)] 97.4 °F (36.3 °C)  Pulse:  [74-86] 74  Resp:  [16-18] 18  SpO2:  [95 %-97 %] 95 %  BP: ()/(56-89) 123/59     Weight: 76.3 kg (168 lb 3.4 oz)  Body mass index is 24.84 kg/m².    Intake/Output Summary (Last 24 hours) at 2/20/2022 1941  Last data filed at 2/20/2022 1500  Gross per 24 hour   Intake 120 ml   Output 975 ml   Net -855 ml      Physical Exam  Vitals reviewed.   Constitutional:       Appearance: He is not ill-appearing or toxic-appearing.   HENT:      Head: Normocephalic.   Cardiovascular:      Rate and Rhythm: Normal rate. Rhythm irregular.      Heart sounds: Normal heart sounds.   Pulmonary:      Effort: Pulmonary effort is normal.      Breath sounds: Normal breath sounds. No wheezing or rales.   Abdominal:      General: Abdomen is flat. There is no distension.      Palpations: Abdomen is soft.      Tenderness: There is no abdominal tenderness.   Musculoskeletal:       Right lower leg: No edema.      Left lower leg: No edema.   Skin:     General: Skin is warm.   Neurological:      Mental Status: He is alert. Mental status is at baseline.       Significant Labs: All pertinent labs within the past 24 hours have been reviewed.  BMP:   Recent Labs   Lab 02/20/22  0448   GLU 78      K 4.8   CL 98   CO2 32   BUN 39*   CREATININE 2.72*   CALCIUM 9.2     CBC:   Recent Labs   Lab 02/19/22  0304 02/20/22  0448   WBC 7.78 7.98   HGB 11.3* 12.0*   HCT 35.3* 37.7*    164     Troponin: No results for input(s): TROPONINI in the last 48 hours.    Significant Imaging: I have reviewed all pertinent imaging results/findings within the past 24 hours.      Assessment/Plan:      * Transient alteration of awareness    Telestroke consult done at admission.  Likely TIA, perhaps related to a. Fib and questionable compliance with anticoagulation.  Seems to have resolved.     Cardiomyopathy  Cardiology has adjusted meds. Optimized for the moment. They will consider referral to [facility] for advanced therapies (LVAD etc). Hospice would be another consideration if he is not interested in going to [facility]. Consult Social Work for nursing home placement.     Acute on chronic congestive heart failure    Creatinine improved a bit. We may have gotten him too dry Friday. Holding lasix, possible resume tomorrow. He has end stage disease that is near optimized.     A-fib  Amiodarone  Apixaban (Also on DAPT)  Rate controlled.     Chronic obstructive pulmonary disease  Stable, monitor        VTE Risk Mitigation (From admission, onward)         Ordered     apixaban tablet 5 mg  2 times daily         02/15/22 1146                Discharge Planning   MORGAN:      Code Status: Partial Code   Is the patient medically ready for discharge?:     Reason for patient still in hospital (select all that apply): Treatment  Discharge Plan A: Home with family                  Leonardo Parker Jr, MD  Department of Hospital Medicine    [facility] - [facility]

## 2022-02-21 NOTE — ASSESSMENT & PLAN NOTE
Cardiology has adjusted meds. Optimized for the moment. They will consider referral to [facility] for advanced therapies (LVAD etc). Hospice would be another consideration if he is not interested in going to [facility]. Consult Social Work for nursing home placement.

## 2022-02-21 NOTE — SUBJECTIVE & OBJECTIVE
Interval History:  Discussed discharge this am. He wants to go to NH.     Review of Systems   Respiratory:  Negative for shortness of breath.    Cardiovascular:  Negative for chest pain.   Gastrointestinal:  Negative for abdominal distention and abdominal pain.   Musculoskeletal:  Positive for arthralgias and back pain.   Objective:     Vital Signs (Most Recent):  Temp: 97.4 °F (36.3 °C) (02/20/22 1546)  Pulse: 74 (02/20/22 1000)  Resp: 18 (02/20/22 1546)  BP: (!) 123/59 (02/20/22 1546)  SpO2: 95 % (02/20/22 1546)   Vital Signs (24h Range):  Temp:  [97.4 °F (36.3 °C)-97.8 °F (36.6 °C)] 97.4 °F (36.3 °C)  Pulse:  [74-86] 74  Resp:  [16-18] 18  SpO2:  [95 %-97 %] 95 %  BP: ()/(56-89) 123/59     Weight: 76.3 kg (168 lb 3.4 oz)  Body mass index is 24.84 kg/m².    Intake/Output Summary (Last 24 hours) at 2/20/2022 1941  Last data filed at 2/20/2022 1500  Gross per 24 hour   Intake 120 ml   Output 975 ml   Net -855 ml      Physical Exam  Vitals reviewed.   Constitutional:       Appearance: He is not ill-appearing or toxic-appearing.   HENT:      Head: Normocephalic.   Cardiovascular:      Rate and Rhythm: Normal rate. Rhythm irregular.      Heart sounds: Normal heart sounds.   Pulmonary:      Effort: Pulmonary effort is normal.      Breath sounds: Normal breath sounds. No wheezing or rales.   Abdominal:      General: Abdomen is flat. There is no distension.      Palpations: Abdomen is soft.      Tenderness: There is no abdominal tenderness.   Musculoskeletal:      Right lower leg: No edema.      Left lower leg: No edema.   Skin:     General: Skin is warm.   Neurological:      Mental Status: He is alert. Mental status is at baseline.       Significant Labs: All pertinent labs within the past 24 hours have been reviewed.  BMP:   Recent Labs   Lab 02/20/22  0448   GLU 78      K 4.8   CL 98   CO2 32   BUN 39*   CREATININE 2.72*   CALCIUM 9.2     CBC:   Recent Labs   Lab 02/19/22  0304 02/20/22  0448   WBC 7.78  7.98   HGB 11.3* 12.0*   HCT 35.3* 37.7*    164     Troponin: No results for input(s): TROPONINI in the last 48 hours.    Significant Imaging: I have reviewed all pertinent imaging results/findings within the past 24 hours.

## 2022-02-21 NOTE — ASSESSMENT & PLAN NOTE
Creatinine improved a bit. We may have gotten him too dry Friday. Holding lasix, possible resume tomorrow. He has end stage disease that is near optimized.

## 2022-02-21 NOTE — ASSESSMENT & PLAN NOTE
Telestroke consult done at admission.  Likely TIA, perhaps related to a. Fib and questionable compliance with anticoagulation.  Seems to have resolved.   2/21: Pt awake and seems oriented today

## 2022-02-21 NOTE — PROGRESS NOTES
[address] Medicine  Progress Note    Patient Name: Luisito Stuart  MRN: [id number]  Patient Class: IP- Inpatient   Admission Date: 2/15/2022  Length of Stay: 6 days  Attending Physician: Stephen Malcolm MD  Primary Care Provider: JOYA Hill        Subjective:     Principal Problem:Transient alteration of awareness        HPI:  No notes on file    Overview/Hospital Course:  No notes on file    Interval History: Naeo. PT ASLEEP EASILY AWAKES. DENIES SOB OR CP.    Review of Systems   Constitutional:  Positive for activity change.   Respiratory:  Negative for shortness of breath.    Cardiovascular:  Negative for chest pain.   Gastrointestinal:  Negative for abdominal distention and abdominal pain.   Musculoskeletal:  Positive for arthralgias and back pain.   All other systems reviewed and are negative.  Objective:     Vital Signs (Most Recent):  Temp: 97.3 °F (36.3 °C) (02/21/22 1120)  Pulse: 83 (02/21/22 1120)  Resp: 18 (02/21/22 1120)  BP: 123/81 (02/21/22 1120)  SpO2: (!) 94 % (02/21/22 1120)   Vital Signs (24h Range):  Temp:  [97.3 °F (36.3 °C)-98 °F (36.7 °C)] 97.3 °F (36.3 °C)  Pulse:  [70-96] 83  Resp:  [14-18] 18  SpO2:  [93 %-95 %] 94 %  BP: (104-123)/(59-81) 123/81     Weight: 76.3 kg (168 lb 3.4 oz)  Body mass index is 24.84 kg/m².    Intake/Output Summary (Last 24 hours) at 2/21/2022 1506  Last data filed at 2/21/2022 1015  Gross per 24 hour   Intake --   Output 600 ml   Net -600 ml      Physical Exam  Vitals reviewed.   Constitutional:       Appearance: He is not ill-appearing or toxic-appearing.   HENT:      Head: Normocephalic.      Nose: Nose normal.      Mouth/Throat:      Mouth: Mucous membranes are moist.   Eyes:      Pupils: Pupils are equal, round, and reactive to light.   Cardiovascular:      Rate and Rhythm: Normal rate. Rhythm irregular.      Heart sounds: Normal heart sounds.   Pulmonary:      Effort: Pulmonary effort is normal.       Breath sounds: Normal breath sounds. No wheezing or rales.   Abdominal:      General: Abdomen is flat. There is no distension.      Palpations: Abdomen is soft.      Tenderness: There is no abdominal tenderness.   Musculoskeletal:      Right lower leg: No edema.      Left lower leg: No edema.   Skin:     General: Skin is warm.      Capillary Refill: Capillary refill takes less than 2 seconds.   Neurological:      Mental Status: He is alert. Mental status is at baseline.       Significant Labs: All pertinent labs within the past 24 hours have been reviewed.    Significant Imaging: I have reviewed all pertinent imaging results/findings within the past 24 hours.      Assessment/Plan:      * Transient alteration of awareness    Telestroke consult done at admission.  Likely TIA, perhaps related to a. Fib and questionable compliance with anticoagulation.  Seems to have resolved.   2/21: Pt awake and seems oriented today    CKD (chronic kidney disease), stage III        A-fib  Amiodarone  Apixaban (Also on DAPT)  Rate controlled.     Cardiomyopathy  Cardiology has adjusted meds. Optimized for the moment. They will consider referral to [facility] for advanced therapies (LVAD etc). Hospice would be another consideration if he is not interested in going to [facility]. Consult Social Work for nursing home placement.     Acute on chronic congestive heart failure    Creatinine improved a bit. We may have gotten him too dry Friday. Holding lasix, possible resume tomorrow. He has end stage disease that is near optimized.     Chronic obstructive pulmonary disease  Stable, monitor        VTE Risk Mitigation (From admission, onward)         Ordered     apixaban tablet 5 mg  2 times daily         02/15/22 1146                Discharge Planning   MORGAN:      Code Status: Partial Code   Is the patient medically ready for discharge?:     Reason for patient still in hospital (select all that apply): Treatment  Discharge Plan A: Home with family                   Yvan Zambrano, ANGEP  Department of Hospital Medicine   [facility] - [address] [place] [address]

## 2022-02-21 NOTE — PLAN OF CARE
Rec'd call from pt's daughter Renate asking about pt's plan of care. Informed her that  rec'd consult for nursing home placement. Renate states she is aware of this and would like referral sent to [facility], choice obtained. Referral faxed. Following.

## 2022-02-21 NOTE — PT/OT/SLP PROGRESS
Physical Therapy Treatment    Patient Name:  Luisito Stuart   MRN:  [id number]    Recommendations:     Discharge Recommendations:  nursing facility, skilled, rehabilitation facility   Discharge Equipment Recommendations: walker, rolling   Barriers to discharge: Inaccessible home and Decreased caregiver support    Assessment:     Luisito Stuart is a 76 y.o. male admitted with a medical diagnosis of Transient alteration of awareness.  He presents with the following impairments/functional limitations:  impaired functional mobilty, impaired endurance, impaired balance, gait instability, decreased coordination, impaired cardiopulmonary response to activity Pt still very unsteady in standing due to complaints of dizziness. He was able to ambulate short distance with rolling walker but required minimal assistance for safety. He is beginning to show weakness in RLE. Will continue to increase activity as able    Rehab Prognosis: Fair; patient would benefit from acute skilled PT services to address these deficits and reach maximum level of function.    Recent Surgery: * No surgery found *      Plan:     During this hospitalization, patient to be seen 5 x/week to address the identified rehab impairments via gait training, therapeutic activities, therapeutic exercises and progress toward the following goals:    · Plan of Care Expires:  03/16/22    Subjective     Chief Complaint: CVA  Patient/Family Comments/goals: Pt states he wants to go to the nursing home  Pain/Comfort:  · Pain Rating 1: 0/10  · Pain Rating Post-Intervention 1: 0/10      Objective:     Communicated with JAIRO Castro RN prior to session.  Patient found supine with peripheral IV, telemetry upon PT entry to room.     General Precautions: Standard, fall   Orthopedic Precautions:N/A   Braces: N/A  Respiratory Status: Room air     Functional Mobility:  · Bed Mobility:     · Scooting: contact guard assistance  · Supine to Sit: contact guard assistance and  increased time/effort  · Transfers:     · Sit to Stand:  minimum assistance with rolling walker  · Bed to Chair: minimum assistance with  rolling walker  using  Step Transfer  · Gait: 15 ft, Sahra with RW, short step length, right knee hyperextension, slow scott, axial flexion  · Balance: fair      AM-PAC 6 CLICK MOBILITY  Turning over in bed (including adjusting bedclothes, sheets and blankets)?: 4  Sitting down on and standing up from a chair with arms (e.g., wheelchair, bedside commode, etc.): 3  Moving from lying on back to sitting on the side of the bed?: 3  Moving to and from a bed to a chair (including a wheelchair)?: 3  Need to walk in hospital room?: 3  Climbing 3-5 steps with a railing?: 1  Basic Mobility Total Score: 17       Therapeutic Activities and Exercises:   Pt performed bilateral LE: ankle pumps, Long arc quads and Marching x 30 each  Pt required increase time to perform bed mobility and standing tasks      Patient left up in chair with all lines intact and call button in reach..    GOALS:   Multidisciplinary Problems     Physical Therapy Goals        Problem: Physical Therapy Goal    Goal Priority Disciplines Outcome Goal Variances Interventions   Physical Therapy Goal     PT, PT/OT Ongoing, Progressing     Description: Short term goals:  1. Sit to stand transfer with Contact Guard Assistance  2. Bed to chair transfer with Contact Guard Assistance using Rolling Walker  3. Gait  x 50 feet with Contact Guard Assistance using Rolling Walker.   4. Ascend/descend 5 stair with no Handrails Contact Guard Assistance using Rolling Walker.     Long term goals:  1. Sit to stand transfer with Modified [place]  2. Bed to chair transfer with Modified [place] using Rolling Walker  3. Gait  x 150 feet with Modified [place] using Rolling Walker.   4. Ascend/descend 5 stair with no Handrails Modified [place] using Rolling Walker.                      Time Tracking:     PT Received On:  02/21/22  PT Start Time: 0936     PT Stop Time: 1000  PT Total Time (min): 24 min     Billable Minutes: Therapeutic Activity 10 and Therapeutic Exercise 14    Treatment Type: Treatment  PT/PTA: PT     PTA Visit Number: 0     02/21/2022

## 2022-02-21 NOTE — SUBJECTIVE & OBJECTIVE
Interval History: Naeo. PT ASLEEP EASILY AWAKES. DENIES SOB OR CP.    Review of Systems   Constitutional:  Positive for activity change.   Respiratory:  Negative for shortness of breath.    Cardiovascular:  Negative for chest pain.   Gastrointestinal:  Negative for abdominal distention and abdominal pain.   Musculoskeletal:  Positive for arthralgias and back pain.   All other systems reviewed and are negative.  Objective:     Vital Signs (Most Recent):  Temp: 97.3 °F (36.3 °C) (02/21/22 1120)  Pulse: 83 (02/21/22 1120)  Resp: 18 (02/21/22 1120)  BP: 123/81 (02/21/22 1120)  SpO2: (!) 94 % (02/21/22 1120)   Vital Signs (24h Range):  Temp:  [97.3 °F (36.3 °C)-98 °F (36.7 °C)] 97.3 °F (36.3 °C)  Pulse:  [70-96] 83  Resp:  [14-18] 18  SpO2:  [93 %-95 %] 94 %  BP: (104-123)/(59-81) 123/81     Weight: 76.3 kg (168 lb 3.4 oz)  Body mass index is 24.84 kg/m².    Intake/Output Summary (Last 24 hours) at 2/21/2022 1506  Last data filed at 2/21/2022 1015  Gross per 24 hour   Intake --   Output 600 ml   Net -600 ml      Physical Exam  Vitals reviewed.   Constitutional:       Appearance: He is not ill-appearing or toxic-appearing.   HENT:      Head: Normocephalic.      Nose: Nose normal.      Mouth/Throat:      Mouth: Mucous membranes are moist.   Eyes:      Pupils: Pupils are equal, round, and reactive to light.   Cardiovascular:      Rate and Rhythm: Normal rate. Rhythm irregular.      Heart sounds: Normal heart sounds.   Pulmonary:      Effort: Pulmonary effort is normal.      Breath sounds: Normal breath sounds. No wheezing or rales.   Abdominal:      General: Abdomen is flat. There is no distension.      Palpations: Abdomen is soft.      Tenderness: There is no abdominal tenderness.   Musculoskeletal:      Right lower leg: No edema.      Left lower leg: No edema.   Skin:     General: Skin is warm.      Capillary Refill: Capillary refill takes less than 2 seconds.   Neurological:      Mental Status: He is alert. Mental status  is at baseline.       Significant Labs: All pertinent labs within the past 24 hours have been reviewed.    Significant Imaging: I have reviewed all pertinent imaging results/findings within the past 24 hours.

## 2022-02-21 NOTE — PLAN OF CARE
Problem: Adult Inpatient Plan of Care  Goal: Plan of Care Review  Outcome: Ongoing, Not Progressing  Goal: Patient-Specific Goal (Individualized)  Outcome: Ongoing, Not Progressing  Goal: Absence of Hospital-Acquired Illness or Injury  Outcome: Ongoing, Not Progressing  Goal: Optimal Comfort and Wellbeing  Outcome: Ongoing, Not Progressing  Goal: Readiness for Transition of Care  Outcome: Ongoing, Not Progressing     Problem: Gas Exchange Impaired  Goal: Optimal Gas Exchange  Outcome: Ongoing, Not Progressing     Problem: Skin Injury Risk Increased  Goal: Skin Health and Integrity  Outcome: Ongoing, Not Progressing     Problem: Fall Injury Risk  Goal: Absence of Fall and Fall-Related Injury  Outcome: Ongoing, Not Progressing     Problem: Infection  Goal: Absence of Infection Signs and Symptoms  Outcome: Ongoing, Not Progressing

## 2022-02-21 NOTE — PT/OT/SLP PROGRESS
Occupational Therapy   Treatment    Name: Luisito Stuart  MRN: [id number]  Admitting Diagnosis:  Transient alteration of awareness       Recommendations:     Discharge Recommendations: nursing facility, skilled, rehabilitation facility  Discharge Equipment Recommendations:  walker, rolling  Barriers to discharge:  Decreased caregiver support    Assessment:     Luisito Stuart is a 76 y.o. male with a medical diagnosis of Transient alteration of awareness.  He presents with weakness, decreased functional mobility, and decline in ADLs. Performance deficits affecting function are weakness, impaired endurance, impaired self care skills, impaired functional mobilty, gait instability, impaired balance, decreased coordination. Pt requesting to return to bed upon arrival for OT tx. Pt performed. Pt agreeable to ADL training however declined further tx.     Rehab Prognosis:  Fair; patient would benefit from acute skilled OT services to address these deficits and reach maximum level of function.       Plan:     Patient to be seen 5 x/week to address the above listed problems via self-care/home management, therapeutic activities, therapeutic exercises  · Plan of Care Expires: 03/16/22  · Plan of Care Reviewed with: patient    Subjective     Pain/Comfort:  · Pain Rating 1: 6/10  · Location 1: back    Objective:     Communicated with: PETR Crouch prior to session.  Patient found up in chair with oxygen, peripheral IV, telemetry upon OT entry to room.    General Precautions: Standard, fall   Orthopedic Precautions:N/A   Braces:    Respiratory Status: Room air     Occupational Performance:     Bed Mobility:    · Patient completed Sit to Supine with stand by assistance     Functional Mobility/Transfers:  · Patient completed Sit <> Stand Transfer with contact guard assistance  with  rolling walker   · Patient completed Bed <> Chair Transfer using Step Transfer technique with contact guard assistance with rolling  walker  · Functional Mobility: not performed    Activities of Daily Living:  · Grooming: modified independence to wash face with setup from bed  · Upper Body Dressing: moderate assistance to davi gown      [facility] 6 Click ADL:      Treatment & Education:  Pt completed ADL training with dressing/grooming with setup from bed. Pt declined performing bathing and UE exercise.     Patient left supine with all lines intact, call button in reach, bed alarm on and PETR Crouch notifiedEducation:      GOALS:   Multidisciplinary Problems     Occupational Therapy Goals        Problem: Occupational Therapy Goal    Goal Priority Disciplines Outcome Interventions   Occupational Therapy Goal     OT, PT/OT Ongoing, Progressing    Description: STG:  Pt will perform grooming with setup  Pt will bathe with Carole with setup at EOB  Pt will perform UE dressing with Talisha  Pt will perform LE dressing with Carole  Pt will sit EOB x 10 min with SBA  Pt will transfer bed/chair/bsc with CGA with RW  Pt will perform standing task x 4 min with CGA with RW   Pt will tolerate 15 minutes of tx without fatigue      LT.Restore to max I with self care and mobility.                       Time Tracking:     OT Date of Treatment: 22  OT Start Time: 1037  OT Stop Time: 1053  OT Total Time (min): 16 min    Billable Minutes:Self Care/Home Management 16 minutes    OT/RONALD: OT          2022

## 2022-02-22 NOTE — DISCHARGE SUMMARY
[address] Medicine  Discharge Summary      Patient Name: Luisito Stuart  MRN: [id number]  Patient Class: IP- Inpatient  Admission Date: 2/15/2022  Hospital Length of Stay: 7 days  Discharge Date and Time: 02/22/2022  Attending Physician: Stephen Malcolm MD   Discharging Provider: Stephen Malcolm MD  Primary Care Provider: JOYA Hill      HPI:   No notes on file    * No surgery found *      Hospital Course:   02/22 Patient voices no complaints. Await NH placement. Amiodurone. PT  02/22 [facility] has been [facility]. Will continue PT       Goals of Care Treatment Preferences:  Code Status: Partial Code      Consults:   Consults (From admission, onward)        Status Ordering Provider     Inpatient consult to Social Work  Once        Provider:  (Not yet assigned)    SUE Ervin JR     Inpatient consult to Cardiology  Once        Provider:  Gerda Castellanos MD    Completed YONNY RUSSELL     Consult to Telemedicine - Acute Stroke  Once        Provider:  Adán Sierra MD    Acknowledged GENET CUENCA          No new Assessment & Plan notes have been filed under this hospital service since the last note was generated.  Service: Hospital Medicine    Final Active Diagnoses:    Diagnosis Date Noted POA    PRINCIPAL PROBLEM:  Transient alteration of awareness [R40.4] 02/15/2022 Yes    CKD (chronic kidney disease), stage III [N18.30] 02/15/2022 Yes    A-fib [I48.91] 01/03/2022 Yes    Cardiomyopathy [I42.9] 12/01/2021 Yes    Acute on chronic congestive heart failure [I50.9] 04/10/2019 Yes      Problems Resolved During this Admission:    Diagnosis Date Noted Date Resolved POA    Slurred speech [R47.81] 02/15/2022 02/15/2022 Yes    Expressive aphasia [R47.01] 02/15/2022 02/20/2022 Yes    Ischemic cardiomyopathy [I25.5] 01/10/2022 02/20/2022 Yes    Lower extremity edema [R60.0] 09/14/2021 02/17/2022 Yes     Gastroesophageal reflux disease with esophagitis [K21.00] 12/18/2017 02/15/2022 Yes       Discharged Condition: good    Disposition:     Follow Up:    Patient Instructions:   No discharge procedures on file.    Significant Diagnostic Studies: none    Pending Diagnostic Studies:     Procedure Component Value Units Date/Time    COVID-19 Routine Screening [[id number]] Collected: 02/22/22 1214    Order Status: Sent Lab Status: In process Updated: 02/22/22 1214    Specimen: Nasopharyngeal     EXTRA TUBES [[id number]] Collected: 02/16/22 1012    Order Status: Sent Lab Status: In process Updated: 02/16/22 1013    Specimen: Blood, Venous     Narrative:      The following orders were created for panel order EXTRA TUBES.  Procedure                               Abnormality         Status                     ---------                               -----------         ------                     Lavender Top Hold[[id number]]                                In process                   Please view results for these tests on the individual orders.    EXTRA TUBES [[id number]] Collected: 02/15/22 0755    Order Status: Sent Lab Status: In process Updated: 02/15/22 0758    Specimen: Blood, Venous     Narrative:      The following orders were created for panel order EXTRA TUBES.  Procedure                               Abnormality         Status                     ---------                               -----------         ------                     Light Blue Top Hold[[id number]]                              In process                   Please view results for these tests on the individual orders.    MRI Brain Without Contrast [[id number]]     Order Status: Sent Lab Status: No result     Urinalysis, Reflex to Urine Culture Urine, Clean Catch [[id number]] Collected: 02/16/22 0128    Order Status: Sent Lab Status: No result     Specimen: Urine, Catheterized          Medications:  Reconciled Home Medications:      Medication List      START  taking these medications    HYDROcodone-acetaminophen  mg per tablet  Commonly known as: NORCO  Take 1 tablet by mouth every 6 (six) hours as needed for Pain.  Replaces: HYDROcodone-acetaminophen 5-325 mg per tablet        CONTINUE taking these medications    amiodarone 200 MG Tab  Commonly known as: PACERONE  Take 1 tablet (200 mg total) by mouth once daily.     apixaban 5 mg Tab  Commonly known as: ELIQUIS  Take 1 tablet (5 mg total) by mouth 2 (two) times daily.     carvediloL 3.125 MG tablet  Commonly known as: COREG  Take 1 tablet (3.125 mg total) by mouth 2 (two) times daily with meals. Pt. Takes 1/2 tab daily     clopidogreL 75 mg tablet  Commonly known as: PLAVIX  Take 75 mg by mouth once daily.     clotrimazole 1 % cream  Commonly known as: LOTRIMIN  Apply to affected area 2 times daily     ENTRESTO 24-26 mg per tablet  Generic drug: sacubitriL-valsartan  Take 1 tablet by mouth 2 (two) times daily.     methocarbamoL 750 MG Tab  Commonly known as: ROBAXIN  Take 1 tablet (750 mg total) by mouth 4 (four) times daily.     nitroGLYCERIN 0.4 MG/DOSE TL SPRY 400 mcg/spray spray  Commonly known as: NITROLINGUAL  Place 1 spray under the tongue every 5 (five) minutes as needed for Chest pain.     pantoprazole 40 MG tablet  Commonly known as: PROTONIX  Take 1 tablet (40 mg total) by mouth once daily.     pravastatin 20 MG tablet  Commonly known as: PRAVACHOL  Take 1 tablet (20 mg total) by mouth once daily.     torsemide 20 MG Tab  Commonly known as: DEMADEX  Take 2 tablets (40 mg total) by mouth 2 (two) times daily before meals.        STOP taking these medications    FARXIGA 10 mg tablet  Generic drug: dapagliflozin     HYDROcodone-acetaminophen 5-325 mg per tablet  Commonly known as: NORCO  Replaced by: HYDROcodone-acetaminophen  mg per tablet            Indwelling Lines/Drains at time of discharge:   Lines/Drains/Airways     None                 Time spent on the discharge of patient: 35 minutes          Stephen Malcolm MD  Department of Hospital Medicine  [facility]

## 2022-02-22 NOTE — HOSPITAL COURSE
02/22 Patient voices no complaints. Await NH placement. Amiodurone. PT  02/22 [facility] has been [facility]. Will continue PT

## 2022-02-22 NOTE — PLAN OF CARE
Pt approved for [facility] today. Needs rapid COVID test. MD informed. Packet made.

## 2022-02-22 NOTE — SUBJECTIVE & OBJECTIVE
Interval History: Patient  voices no complaints.    Review of Systems   Constitutional:  Negative for fatigue and fever.   Respiratory:  Negative for chest tightness and shortness of breath.    Cardiovascular:  Negative for chest pain.   Objective:     Vital Signs (Most Recent):  Temp: 97.2 °F (36.2 °C) (02/22/22 0748)  Pulse: 83 (02/22/22 0748)  Resp: 16 (02/22/22 0748)  BP: (!) 98/52 (02/22/22 0748)  SpO2: (!) 94 % (02/22/22 0748)   Vital Signs (24h Range):  Temp:  [97.2 °F (36.2 °C)-98.1 °F (36.7 °C)] 97.2 °F (36.2 °C)  Pulse:  [81-96] 83  Resp:  [16-18] 16  SpO2:  [94 %-100 %] 94 %  BP: ()/(52-81) 98/52     Weight: 76.3 kg (168 lb 3.4 oz)  Body mass index is 24.84 kg/m².    Intake/Output Summary (Last 24 hours) at 2/22/2022 0939  Last data filed at 2/22/2022 0455  Gross per 24 hour   Intake --   Output 800 ml   Net -800 ml      Physical Exam  Vitals and nursing note reviewed.   Constitutional:       Appearance: He is normal weight.   Cardiovascular:      Rate and Rhythm: Regular rhythm.      Heart sounds: Normal heart sounds.   Pulmonary:      Breath sounds: Normal breath sounds.   Abdominal:      Palpations: Abdomen is soft.       Significant Labs: All pertinent labs within the past 24 hours have been reviewed.  BMP: No results for input(s): GLU, NA, K, CL, CO2, BUN, CREATININE, CALCIUM, MG in the last 48 hours.  CBC: No results for input(s): WBC, HGB, HCT, PLT in the last 48 hours.    Significant Imaging:  none

## 2022-02-22 NOTE — PT/OT/SLP PROGRESS
Occupational Therapy      Patient Name:  Luisito Stuart   MRN:  [id number]    Patient not seen today secondary to Patient unwilling to participate. Will follow-up 2/23/22.    2/22/2022

## 2022-02-22 NOTE — PLAN OF CARE
Pt lying in bed with eyes closed, makes no compaints. Applied multiple bandages to left and right upper extremities. Pt has been caught picking at skin to upper extremities causing skin to shear.

## 2022-02-23 NOTE — PLAN OF CARE
[facility] - [facility] Telemetry  Discharge Final Note    Primary Care Provider: JOYA Hill    Expected Discharge Date: 2/22/2022    Final Discharge Note (most recent)     Final Note - 02/23/22 0831        Final Note    Assessment Type Final Discharge Note     Anticipated Discharge Disposition [facility]        Post-Acute Status    Post-Acute Authorization Placement     Post-Acute Placement Status Set-up Complete/Auth obtained     Patient choice form signed by patient/caregiver List with quality metrics by geographic area provided;List from CMS Compare;List from System Post-Acute Care     Discharge Delays None known at this time                 Important Message from Medicare  Important Message from Medicare regarding Discharge Appeal Rights: Given to patient/caregiver, Explained to patient/caregiver, Signed/date by patient/caregiver     Date IMM was signed: 02/16/22  Time IMM was signed: 1506    pt d/c to [facility]. D/c summary faxed. No further needs.

## 2022-02-23 NOTE — NURSING
Pt left via metro enroute to [facility] with all pt belongings in bag, all dc instructions given to , report called by pervious shift, NADN, tele monitor turned in

## 2022-02-24 NOTE — TELEPHONE ENCOUNTER
Discharge Call Back Questionnaire   How are you feeling? Any questions or concerns?   When did you get your medications? (if prescribed new medications)   When is your follow-up appointment? Do you have a ride to get to your appointment?   Do you have enough of your daily medications until your next doctor appointment?   Are you weighing yourself at home? Any changes in weight?   Have you experienced any of the following symptoms- more shortness of breath than usual, difficulty breathing when lying down, a dry hacking cough, new or worsening confusion, having difficulty thinking clearly?  DID NOT CALL, PATIENT DISCHARGED TO NURSING HOME

## 2022-03-15 NOTE — TELEPHONE ENCOUNTER
----- Message from Hanna Culp sent at 3/15/2022  8:51 AM CDT -----  Renate Childers  daughter called wanted to know about him needing some stuff because he is about to to come of [facility] and come home. She said please give her a call so she will no what to do next about this stuff she is needing call back number [phone]

## 2022-03-15 NOTE — TELEPHONE ENCOUNTER
Spoke with pt's daughter. Pt is being discharged today from [facility]. Dr Ocampo is ordering his supplies such as hospital bed. His daughter will look at his meds and let us know if he needs refills. He has appt 4-11. May not have to have refills before then. Daughter will let us know

## 2022-05-02 PROBLEM — Z13.6 ENCOUNTER FOR SCREENING FOR ABDOMINAL AORTIC ANEURYSM (AAA) IN PATIENT 50 YEARS OF AGE OR OLDER WITHOUT OTHER RISK FACTORS FOR AAA: Status: RESOLVED | Noted: 2022-01-01 | Resolved: 2022-01-01

## 2022-09-13 PROBLEM — Z86.718 HISTORY OF DVT (DEEP VEIN THROMBOSIS): Status: ACTIVE | Noted: 2021-05-12

## 2022-09-13 PROBLEM — K76.1 HEPATIC CONGESTION: Status: ACTIVE | Noted: 2022-01-01

## 2022-09-13 PROBLEM — I50.9 CONGESTIVE HEART FAILURE: Status: RESOLVED | Noted: 2021-01-01 | Resolved: 2022-01-01

## 2022-09-13 PROBLEM — Z94.0 H/O KIDNEY TRANSPLANT: Status: ACTIVE | Noted: 2022-01-01

## 2022-09-13 PROBLEM — Z94.0 KIDNEY TRANSPLANTED: Status: ACTIVE | Noted: 2022-01-01

## 2022-09-13 PROBLEM — I63.9 CVA (CEREBRAL VASCULAR ACCIDENT): Status: ACTIVE | Noted: 2022-01-01

## 2022-09-13 PROBLEM — R57.0 CARDIOGENIC SHOCK: Status: ACTIVE | Noted: 2022-01-01

## 2022-09-13 PROBLEM — D68.9 COAGULOPATHY: Status: ACTIVE | Noted: 2022-01-01

## 2022-09-13 PROBLEM — Z94.0 KIDNEY TRANSPLANTED: Status: RESOLVED | Noted: 2022-01-01 | Resolved: 2022-01-01

## 2022-09-13 NOTE — MEDICAL/APP STUDENT
[facility] - Emergency Department    History & Physical      Patient Name: Luisito Stuart  MRN: [id number]  Admission Date: 9/13/2022  Attending Physician: Kate Hernandez MD   Primary Care Provider: JOYA Hill         Patient information was obtained from patient, past medical records, and ER records.     Subjective:     Principal Problem:pedal edema    Chief Complaint:   Chief Complaint   Patient presents with    Shortness of Breath    Leg Swelling        HPI: 78 yo male with a history of a-fib, CHF, COPD, CAD, HTN and CKD presents from [facility] for lower extremity edema and SOB. The patient is alert and oriented, but difficult of hearing. He states he has been feeling more weak for the last 2 days with associated SOB, dizziness, feet swelling and a dry cough. At the rehab facility he became hypotensive at 80/50, staff were concerned and arranged transport to the ED. He denies chest pain, fever, chills, nausea, vomiting, diarrhea, dysuria.     Past Medical History:   Diagnosis Date    Atrial fibrillation     CHF (congestive heart failure)     COPD (chronic obstructive pulmonary disease)     Coronary artery disease     Disorder of kidney and ureter     Hypertension        Past Surgical History:   Procedure Laterality Date    APPENDECTOMY      CARDIAC DEFIBRILLATOR PLACEMENT      CORONARY ANGIOGRAPHY INCLUDING BYPASS GRAFTS WITH CATHETERIZATION OF LEFT HEART N/A 1/5/2022    Procedure: ANGIOGRAM, CORONARY, INCLUDING BYPASS GRAFT, WITH LEFT HEART CATHETERIZATION;  Surgeon: Gerda Castellanos MD;  Location: [facility] CATH LAB;  Service: Cardiology;  Laterality: N/A;    JOINT REPLACEMENT      KIDNEY TRANSPLANT      RIGHT HEART CATHETERIZATION Right 1/5/2022    Procedure: INSERTION, CATHETER, RIGHT HEART;  Surgeon: Gerda Castellanos MD;  Location: [facility] CATH LAB;  Service: Cardiology;  Laterality: Right;       Review of patient's allergies indicates:   Allergen Reactions     Indomethacin Itching and Other (See Comments)     Other reaction(s): ITCHING    Lipitor [atorvastatin] Other (See Comments)     Muscle cramps       No current facility-administered medications on file prior to encounter.     Current Outpatient Medications on File Prior to Encounter   Medication Sig    apixaban (ELIQUIS) 5 mg Tab Take 1 tablet (5 mg total) by mouth 2 (two) times daily.    cyclobenzaprine (FLEXERIL) 10 MG tablet Take 10 mg by mouth every evening.    HYDROcodone-acetaminophen (NORCO) 5-325 mg per tablet Take 1 tablet by mouth every 4 (four) hours as needed for Pain.    levothyroxine (SYNTHROID) 75 MCG tablet Take 75 mcg by mouth before breakfast.    nitroGLYCERIN 0.4 MG/DOSE TL SPRY (NITROLINGUAL) 400 mcg/spray spray Place 1 spray under the tongue every 5 (five) minutes as needed for Chest pain.    ondansetron (ZOFRAN) 8 MG tablet Take by mouth every 6 (six) hours as needed for Nausea.    sacubitriL-valsartan (ENTRESTO) 24-26 mg per tablet Take 1 tablet by mouth 2 (two) times daily.    torsemide (DEMADEX) 20 MG Tab Take 2 tablets (40 mg total) by mouth 2 (two) times daily before meals.    amiodarone (PACERONE) 200 MG Tab Take 1 tablet (200 mg total) by mouth once daily.    carvediloL (COREG) 3.125 MG tablet Take 1 tablet (3.125 mg total) by mouth 2 (two) times daily with meals. Pt. Takes 1/2 tab daily    clopidogreL (PLAVIX) 75 mg tablet Take 75 mg by mouth once daily.    clotrimazole (LOTRIMIN) 1 % cream Apply to affected area 2 times daily    HYDROcodone-acetaminophen (NORCO)  mg per tablet Take 1 tablet by mouth every 6 (six) hours as needed for Pain.    methocarbamoL (ROBAXIN) 750 MG Tab Take 1 tablet (750 mg total) by mouth 4 (four) times daily.    pantoprazole (PROTONIX) 40 MG tablet Take 1 tablet (40 mg total) by mouth once daily.    pravastatin (PRAVACHOL) 20 MG tablet Take 1 tablet (20 mg total) by mouth once daily.     Family History       Problem Relation (Age of Onset)    Cancer  Mother    Heart disease Father          Tobacco Use    Smoking status: Every Day     Packs/day: 1.00     Years: 46.00     Pack years: 46.00     Types: Cigarettes    Smokeless tobacco: Never   Substance and Sexual Activity    Alcohol use: Not Currently    Drug use: Never    Sexual activity: Not Currently     Review of Systems   Constitutional:  Negative for activity change, chills and fever.   HENT:  Negative for congestion and sore throat.    Respiratory:  Negative for cough and shortness of breath.    Cardiovascular:  Positive for leg swelling. Negative for chest pain.   Gastrointestinal:  Negative for abdominal distention, diarrhea, nausea and vomiting.   Endocrine: Negative for cold intolerance and heat intolerance.   Genitourinary:  Negative for dysuria, frequency, hematuria and urgency.   Musculoskeletal:  Negative for arthralgias and back pain.   Skin:  Negative for color change and rash.   Neurological:  Positive for dizziness and weakness. Negative for syncope and headaches.   Hematological:  Does not bruise/bleed easily.   Psychiatric/Behavioral:  Negative for agitation. The patient is not nervous/anxious.    Objective:     Vital Signs (Most Recent):  Temp: 97.7 °F (36.5 °C) (09/13/22 1354)  Pulse: 92 (09/13/22 1544)  Resp: 11 (09/13/22 1544)  BP: (!) 89/66 (09/13/22 1544)  SpO2: 100 % (09/13/22 1544)   Vital Signs (24h Range):  Temp:  [97.7 °F (36.5 °C)] 97.7 °F (36.5 °C)  Pulse:  [] 92  Resp:  [11-17] 11  SpO2:  [91 %-100 %] 100 %  BP: ()/(45-71) 89/66     Weight: 86.2 kg (190 lb)  Body mass index is 28.06 kg/m².    Physical Exam  HENT:      Head: Normocephalic and atraumatic.      Nose: Nose normal.      Mouth/Throat:      Pharynx: Oropharynx is clear.   Eyes:      Extraocular Movements: Extraocular movements intact.      Pupils: Pupils are equal, round, and reactive to light.   Cardiovascular:      Rate and Rhythm: Normal rate. Rhythm irregular.      Pulses: Normal pulses.   Pulmonary:       Effort: Pulmonary effort is normal.      Breath sounds: No wheezing, rhonchi or rales.   Abdominal:      General: There is no distension.      Palpations: Abdomen is soft.      Tenderness: There is no abdominal tenderness.   Musculoskeletal:         General: Tenderness present. Normal range of motion.      Right lower leg: 3+ Edema present.      Left lower le+ Edema present.   Skin:     General: Skin is warm and dry.   Neurological:      Mental Status: He is alert and oriented to person, place, and time.   Psychiatric:         Mood and Affect: Mood normal.         Thought Content: Thought content normal.         CRANIAL NERVES     CN III, IV, VI   Pupils are equal, round, and reactive to light.    Significant Labs: All pertinent labs within the past 24 hours have been reviewed.    Significant Imaging: I have reviewed all pertinent imaging results/findings within the past 24 hours.    Assessment/Plan:     Congestive Heart Failure  Start Dobutamine  Insert lilly catheter  COPD    Hypotension  Hold torsemide   Atrial Fibrillation  Continue home medications  Chronic Kidney Disease    Nicotine Dependence      Active Diagnoses:    Diagnosis Date Noted POA    Congestive heart failure [I50.9] 12/15/2021 Yes      Problems Resolved During this Admission:     VTE Risk Mitigation (From admission, onward)      None              [facility]  Department of Hospital Medicine   [facility] - Emergency Department

## 2022-09-13 NOTE — ED PROVIDER NOTES
Encounter Date: 9/13/2022       History     Chief Complaint   Patient presents with    Shortness of Breath    Leg Swelling     78 Y/O MALE SENT FROM [facility] FOR SWELLING AND SHORTNESS OF BREATH.  HE WAS TAKE OFF OF HIS DIURETIC RECENTLY.  PATIENT IS COMPLAINING OF BILATERAL LOWER EXTREMITY PAIN AS WELL AS CHEST PAIN.  HE HAS DIFFICULTY HEARING.  HE NOTES NO OTHER REMITTING OR EXACERBATING FACTORS.      Review of patient's allergies indicates:   Allergen Reactions    Indomethacin Itching and Other (See Comments)     Other reaction(s): ITCHING    Lipitor [atorvastatin] Other (See Comments)     Muscle cramps     Past Medical History:   Diagnosis Date    Atrial fibrillation     CHF (congestive heart failure)     COPD (chronic obstructive pulmonary disease)     Coronary artery disease     Disorder of kidney and ureter     Hypertension      Past Surgical History:   Procedure Laterality Date    APPENDECTOMY      CARDIAC DEFIBRILLATOR PLACEMENT      CORONARY ANGIOGRAPHY INCLUDING BYPASS GRAFTS WITH CATHETERIZATION OF LEFT HEART N/A 1/5/2022    Procedure: ANGIOGRAM, CORONARY, INCLUDING BYPASS GRAFT, WITH LEFT HEART CATHETERIZATION;  Surgeon: Gerda Castellanos MD;  Location: [facility] CATH LAB;  Service: Cardiology;  Laterality: N/A;    JOINT REPLACEMENT      KIDNEY TRANSPLANT      RIGHT HEART CATHETERIZATION Right 1/5/2022    Procedure: INSERTION, CATHETER, RIGHT HEART;  Surgeon: Gerda Castellanos MD;  Location: [facility] CATH LAB;  Service: Cardiology;  Laterality: Right;     Family History   Problem Relation Age of Onset    Cancer Mother     Heart disease Father      Social History     Tobacco Use    Smoking status: Every Day     Packs/day: 1.00     Years: 46.00     Pack years: 46.00     Types: Cigarettes    Smokeless tobacco: Never   Substance Use Topics    Alcohol use: Not Currently    Drug use: Never     Review of Systems   All other systems reviewed and are negative.    Physical Exam     Initial Vitals  [09/13/22 1354]   BP Pulse Resp Temp SpO2   104/63 98 15 97.7 °F (36.5 °C) (!) 94 %      MAP       --         Physical Exam    Nursing note and vitals reviewed.  Constitutional: He appears well-developed and well-nourished.   HENT:   Head: Normocephalic and atraumatic.   Nose: Nose normal.   Mouth/Throat: Oropharynx is clear and moist.   Eyes: Conjunctivae and EOM are normal. Pupils are equal, round, and reactive to light.   Neck: Neck supple.   Normal range of motion.  Cardiovascular:  Normal rate, regular rhythm, normal heart sounds and intact distal pulses.           Pulmonary/Chest: Breath sounds normal.   Abdominal: Abdomen is soft. Bowel sounds are normal.   Musculoskeletal:         General: Tenderness and edema present. Normal range of motion.      Cervical back: Normal range of motion and neck supple.     Neurological: He is alert and oriented to person, place, and time. He has normal strength. GCS score is 15. GCS eye subscore is 4. GCS verbal subscore is 5. GCS motor subscore is 6.   Skin: Skin is warm and dry. Capillary refill takes less than 2 seconds.   Psychiatric: He has a normal mood and affect. Thought content normal.       Medical Screening Exam   See Full Note    ED Course   Procedures  Labs Reviewed   COMPREHENSIVE METABOLIC PANEL - Abnormal; Notable for the following components:       Result Value    BUN 43 (*)     Creatinine 4.59 (*)     Total Protein 6.2 (*)     Bilirubin, Total 1.4 (*)     Alk Phos 170 (*)      (*)      (*)     eGFR 13 (*)     All other components within normal limits   NT-PRO NATRIURETIC PEPTIDE - Abnormal; Notable for the following components:    ProBNP 80,490 (*)     All other components within normal limits   APTT - Abnormal; Notable for the following components:    PTT 40.9 (*)     All other components within normal limits   PROTIME-INR - Abnormal; Notable for the following components:    PT 25.6 (*)     All other components within normal limits   TROPONIN  I - Abnormal; Notable for the following components:    Troponin I High Sensitivity 1,247.6 (*)     All other components within normal limits   URINALYSIS, REFLEX TO URINE CULTURE - Abnormal; Notable for the following components:    Protein, UA 10 (*)     All other components within normal limits   CBC WITH DIFFERENTIAL - Abnormal; Notable for the following components:    RBC 3.41 (*)     Hemoglobin 10.2 (*)     Hematocrit 33.5 (*)     MCV 98.2 (*)     MCHC 30.4 (*)     RDW 16.3 (*)     MPV 12.8 (*)     Neutrophils % 73.2 (*)     Lymphocytes % 10.1 (*)     Monocytes % 10.8 (*)     Eosinophils % 4.3 (*)     Basophils % 1.1 (*)     Immature Granulocytes % 0.5 (*)     nRBC, Auto 0.6 (*)     Lymphocytes, Absolute 0.86 (*)     Monocytes, Absolute 0.92 (*)     nRBC, Absolute 0.05 (*)     All other components within normal limits   LACTIC ACID, PLASMA - Normal   MAGNESIUM - Normal   CBC W/ AUTO DIFFERENTIAL    Narrative:     The following orders were created for panel order CBC auto differential.  Procedure                               Abnormality         Status                     ---------                               -----------         ------                     CBC with Differential[[id number]]        Abnormal            Final result                 Please view results for these tests on the individual orders.        ECG Results              EKG 12-lead (In process)  Result time 09/13/22 16:11:46      In process by Interface, Lab In [facility] (09/13/22 16:11:46)                   Narrative:    Test Reason : R06.00,    Vent. Rate : 105 BPM     Atrial Rate : 000 BPM     P-R Int : 112 ms          QRS Dur : 122 ms      QT Int : 354 ms       P-R-T Axes : 032 095 180 degrees     QTc Int : 433 ms    Sinus tachycardia  with PAC(s)  Rightward axis  Possible anterior infarct - age undetermined  Inferior/lateral ST-T abnormality  may be due to myocardial ischemia  Abnormal ECG      Referred By: [email or web address]   SELF           Confirmed  By:                                   Imaging Results              X-Ray Chest AP Portable (Final result)  Result time 09/13/22 14:17:23      Final result by Neel Churchill DO (09/13/22 14:17:23)                   Impression:      Continued cardiomegaly without stevenson pulmonary edema or focal consolidating pneumonia.    Point of Service: [facility]      Electronically signed by: Neel Churchill  Date:    09/13/2022  Time:    14:17               Narrative:    EXAMINATION:  XR CHEST AP PORTABLE    CLINICAL HISTORY:  Dyspnea, unspecified    COMPARISON:  Chest x-ray February 15, 2022    TECHNIQUE:  Frontal view/views of the chest.    FINDINGS:  Continued cardiomegaly status post sternotomy.  Cardiac conduction device again demonstrated.  Chronic change of the lungs without focal consolidation, pleural effusion, or pneumothorax.  Visualized osseous and surrounding soft tissue structures appear grossly unchanged.                                       Medications - No data to display                    Clinical Impression:   Final diagnoses:  [R06.00] Dyspnea  [I50.9] Congestive heart failure, unspecified HF chronicity, unspecified heart failure type (Primary)      ED Disposition Condition    Admit Stable                Haim Bundy MD  09/13/22 7835

## 2022-09-13 NOTE — ED TRIAGE NOTES
Presents to ED via EMS from [facility] for c/o SOB and bilateral leg swelling. Patient was removed from diuretic due to hypotension. Patient was placed back on it last night after developing leg swelling and jaundice. Patient reports intermittent chest pain.

## 2022-09-13 NOTE — ED NOTES
REPORT FROM [employer] REPORTS HE HAS BEEN MORE SHORT OF BREATH AND COMPLAINING OF PALPITATIONS. RECENTLY TAKEN OFF ORIGINAL MED FOR CHF WHICH WAS TORSEMIDE DUE TO BASELINE BP BEING 80/50.

## 2022-09-14 PROBLEM — R74.01 TRANSAMINITIS: Status: ACTIVE | Noted: 2022-01-01

## 2022-09-14 PROBLEM — N18.9 ACUTE RENAL FAILURE SUPERIMPOSED ON CHRONIC KIDNEY DISEASE: Status: ACTIVE | Noted: 2021-01-01

## 2022-09-14 PROBLEM — R79.89 TROPONIN LEVEL ELEVATED: Status: ACTIVE | Noted: 2022-01-01

## 2022-09-14 NOTE — PROGRESS NOTES
[facility]  Wound Care    Patient Name:  Luisito Stuart   MRN:  [id number]  Date: 9/14/2022  Diagnosis: Cardiogenic shock    History:     Past Medical History:   Diagnosis Date    Atrial fibrillation     CHF (congestive heart failure)     COPD (chronic obstructive pulmonary disease)     Coronary artery disease     Disorder of kidney and ureter     Hypertension        Social History     Socioeconomic History    Marital status:    Tobacco Use    Smoking status: Every Day     Packs/day: 1.00     Years: 46.00     Pack years: 46.00     Types: Cigarettes    Smokeless tobacco: Never   Substance and Sexual Activity    Alcohol use: Not Currently    Drug use: Never    Sexual activity: Not Currently       Precautions:     Allergies as of 09/13/2022 - Reviewed 09/13/2022   Allergen Reaction Noted    Indomethacin Itching and Other (See Comments) 07/27/2012    Lipitor [atorvastatin] Other (See Comments) 01/10/2022       WOC Assessment Details/Treatment        09/14/22 1132   WOCN Assessment   WOCN Total Time (mins) 75   Visit Date 09/14/22   Visit Time 1015   Consult Type New   WOCN Speciality Wound   Wound moisture   Number of Wounds 2   Intervention assessed;applied;chart review;team conference;orders   Teaching on-going;complication        Altered Skin Integrity 09/14/22 0015 Right anterior Hip #1 Abscess   Date First Assessed/Time First Assessed: 09/14/22 0015   Altered Skin Integrity Present on Admission: yes  Side: Right  Orientation: anterior  Location: Hip  Wound Number: #1  Primary Wound Type: Abscess   Dressing Appearance Dry;Intact;Clean   Drainage Amount None   Appearance Pink;Intact;Dry;Epithelialization   Periwound Area Intact;Dry   Wound Edges Approximated        Altered Skin Integrity 09/14/22 0015 midline Perineum Incontinence associated dermatitis   Date First Assessed/Time First Assessed: 09/14/22 0015   Altered Skin Integrity Present on Admission: yes  Orientation: midline   Location: Perineum  Primary Wound Type: Incontinence associated dermatitis   Dressing Appearance Open to air   Drainage Amount Scant   Drainage Characteristics/Odor Serous   Appearance Red;Moist   Tissue loss description Partial thickness   Red (%), Wound Tissue Color 100 %   Periwound Area Denuded   Care Cleansed with:;Other (see comments);Applied:;Skin Barrier  (pH foam)        Altered Skin Integrity 09/14/22 0015 Right anterior Groin #3 Intertrigo   Date First Assessed/Time First Assessed: 09/14/22 0015   Altered Skin Integrity Present on Admission: yes  Side: Right  Orientation: anterior  Location: Groin  Wound Number: #3  Primary Wound Type: Intertrigo   Dressing Appearance Open to air   Drainage Amount Small   Drainage Characteristics/Odor Serous   Appearance Red;Moist   Periwound Area Denuded   Care Cleansed with:;Other (see comments);Applied:;Skin Barrier  (pH foam)   Dressing Applied;Other (comment)  (InterDry)   Periwound Care Absorptive dressing applied;Skin barrier film applied   Dressing Change Due 09/14/22        Altered Skin Integrity 09/14/22 0015 Left anterior Groin Intertrigo   Date First Assessed/Time First Assessed: 09/14/22 0015   Altered Skin Integrity Present on Admission: yes  Side: Left  Orientation: anterior  Location: Groin  Primary Wound Type: Intertrigo   Dressing Appearance Open to air   Drainage Amount Scant   Drainage Characteristics/Odor Serous   Appearance Red;Moist   Tissue loss description Partial thickness   Periwound Area Denuded   Care Cleansed with:;Other (see comments);Applied:;Skin Barrier  (pH foam)   Dressing Applied;Other (comment)  (InterDry)   Periwound Care Absorptive dressing applied;Skin barrier film applied        Altered Skin Integrity 09/14/22 0015 Right anterior Leg Skin Tear   Date First Assessed/Time First Assessed: 09/14/22 0015   Altered Skin Integrity Present on Admission: yes  Side: Right  Orientation: anterior  Location: Leg  Primary Wound Type: Skin Tear    Dressing Appearance Open to air   Drainage Amount None   Appearance Red;Intact;Epithelialization;Fibrin   Periwound Area Intact;Dry   Wound Edges Approximated       WOC Team consulted for: 'legs'    Narrative: After chart review, more ASI photos seen and skin assessed. See above assessment findings.    Active Wounds: ITD to bilateral groins & very mild IAD to perineum    Goals per TIME Model: Moisture management     Barriers to Wound Healing: multiple co-morbidities excessive wound moisture and macerated tissue advanced age fragile skin    Recommendations made to primary team for:   Bilateral groins: DAILY & PRN  1. Cleanse area gently with pH balanced foam.  2. Pat area dry.   3. Spray no sting barrier spray and allow to air dry.  4. Apply InterDry - allow 2 inches exposed to air at all times  *InterDry to be changed Q5D or PRN when saturated    IAD: BID & PRN after incontinence  Cleanse with pH balanced foam.  Pat area dry gently.  [place] no sting barrier spray and allow to air dry  Apply A&D ointment.    Orders placed.    Thank you for the consult.     We will continue to follow. See additional note under Notes TAB for tentative f/u plan/dates.    09/14/2022

## 2022-09-14 NOTE — CONSULTS
[facility] ICU  Cardiology  Consult Note    Patient Name: Luisito Stuart  MRN: [id number]  Admission Date: 9/13/2022  Hospital Length of Stay: 1 days  Code Status: Full Code   Attending Provider: Keenan Gregory DO   Consulting Provider: Belkis Easley DO  Primary Care Physician: JOYA Hill  Principal Problem:Cardiogenic shock    Patient information was obtained from patient, past medical records and ER records.     Inpatient consult to Cardiology  Consult performed by: Belkis Easley DO  Consult ordered by: Kate Hernandez MD  Reason for consult: Cardiogenic Shock      Subjective:     Chief Complaint:  LE Edema     HPI:   78 yo M with significant cardiac history, well known to Cardiology who presented from [facility] with c/o hypotension and LE edema. Labs significant for proBNP of 80K, Troponin of 1200, BUN/Cr of 43/4.56, hyperkalemia of 6, transaminitis with AST/ALT of 181/273. EKG exhibits Afib with interventricular conduction delay. CXR with cardiomegaly. Original recommendation of dobutamine gtt not initiated due to hypotension. Patient was transferred to ICU and started on Dopamine and Norepi gtt with concern of Cardiogenic Shock.     Upon examination, patient afebrile, resting comfortably supine. He exhibits lethargy, but is oriented x3. Patient warm to touch with irregular heart rate and bilateral rales noted upon ausculation. He exhibits 3+ pitting edema of bilateral lower extremities. There is concern of underlying infectious process, but thus far workup is negative. Blood cultures pending.         Past Medical History:   Diagnosis Date    Atrial fibrillation     CHF (congestive heart failure)     COPD (chronic obstructive pulmonary disease)     Coronary artery disease     Disorder of kidney and ureter     Hypertension        Past Surgical History:   Procedure Laterality Date    APPENDECTOMY      CARDIAC DEFIBRILLATOR PLACEMENT      CORONARY  ANGIOGRAPHY INCLUDING BYPASS GRAFTS WITH CATHETERIZATION OF LEFT HEART N/A 1/5/2022    Procedure: ANGIOGRAM, CORONARY, INCLUDING BYPASS GRAFT, WITH LEFT HEART CATHETERIZATION;  Surgeon: Gerda Castellanos MD;  Location: [facility] CATH LAB;  Service: Cardiology;  Laterality: N/A;    JOINT REPLACEMENT      KIDNEY TRANSPLANT      RIGHT HEART CATHETERIZATION Right 1/5/2022    Procedure: INSERTION, CATHETER, RIGHT HEART;  Surgeon: Gerda Castellanos MD;  Location: [facility] CATH LAB;  Service: Cardiology;  Laterality: Right;       Review of patient's allergies indicates:   Allergen Reactions    Indomethacin Itching and Other (See Comments)     Other reaction(s): ITCHING    Lipitor [atorvastatin] Other (See Comments)     Muscle cramps       No current facility-administered medications on file prior to encounter.     Current Outpatient Medications on File Prior to Encounter   Medication Sig    apixaban (ELIQUIS) 5 mg Tab Take 1 tablet (5 mg total) by mouth 2 (two) times daily.    cyclobenzaprine (FLEXERIL) 10 MG tablet Take 10 mg by mouth every evening.    HYDROcodone-acetaminophen (NORCO) 5-325 mg per tablet Take 1 tablet by mouth every 4 (four) hours as needed for Pain.    levothyroxine (SYNTHROID) 75 MCG tablet Take 75 mcg by mouth before breakfast.    nitroGLYCERIN 0.4 MG/DOSE TL SPRY (NITROLINGUAL) 400 mcg/spray spray Place 1 spray under the tongue every 5 (five) minutes as needed for Chest pain.    ondansetron (ZOFRAN) 8 MG tablet Take by mouth every 6 (six) hours as needed for Nausea.    sacubitriL-valsartan (ENTRESTO) 24-26 mg per tablet Take 1 tablet by mouth 2 (two) times daily.    torsemide (DEMADEX) 20 MG Tab Take 2 tablets (40 mg total) by mouth 2 (two) times daily before meals.    amiodarone (PACERONE) 200 MG Tab Take 1 tablet (200 mg total) by mouth once daily.    carvediloL (COREG) 3.125 MG tablet Take 1 tablet (3.125 mg total) by mouth 2 (two) times daily with meals. Pt. Takes 1/2 tab daily    clopidogreL  (PLAVIX) 75 mg tablet Take 75 mg by mouth once daily.    clotrimazole (LOTRIMIN) 1 % cream Apply to affected area 2 times daily    HYDROcodone-acetaminophen (NORCO)  mg per tablet Take 1 tablet by mouth every 6 (six) hours as needed for Pain.    methocarbamoL (ROBAXIN) 750 MG Tab Take 1 tablet (750 mg total) by mouth 4 (four) times daily.    pantoprazole (PROTONIX) 40 MG tablet Take 1 tablet (40 mg total) by mouth once daily.    pravastatin (PRAVACHOL) 20 MG tablet Take 1 tablet (20 mg total) by mouth once daily.     Family History       Problem Relation (Age of Onset)    Cancer Mother    Heart disease Father          Tobacco Use    Smoking status: Every Day     Packs/day: 1.00     Years: 46.00     Pack years: 46.00     Types: Cigarettes    Smokeless tobacco: Never   Substance and Sexual Activity    Alcohol use: Not Currently    Drug use: Never    Sexual activity: Not Currently     Review of Systems   Constitutional: Negative for fever.   Eyes:  Negative for visual disturbance.   Cardiovascular:  Positive for leg swelling. Negative for chest pain, cyanosis, orthopnea, palpitations and syncope.   Respiratory:  Negative for cough, shortness of breath and wheezing.    Skin:  Negative for color change.   Objective:     Vital Signs (Most Recent):  Temp: 98.5 °F (36.9 °C) (09/14/22 1536)  Pulse: 102 (09/14/22 1530)  Resp: 14 (09/14/22 1530)  BP: (!) 110/46 (09/14/22 1530)  SpO2: (!) 93 % (09/14/22 1530)   Vital Signs (24h Range):  Temp:  [97.8 °F (36.6 °C)-98.5 °F (36.9 °C)] 98.5 °F (36.9 °C)  Pulse:  [] 102  Resp:  [10-24] 14  SpO2:  [75 %-100 %] 93 %  BP: ()/(22-99) 110/46     Weight: 91.2 kg (201 lb 1 oz)  Body mass index is 29.69 kg/m².    SpO2: (!) 93 %  O2 Device (Oxygen Therapy): room air      Intake/Output Summary (Last 24 hours) at 9/14/2022 1633  Last data filed at 9/14/2022 1506  Gross per 24 hour   Intake 327.39 ml   Output 1100 ml   Net -772.61 ml       Lines/Drains/Airways       Drain   Duration                  Urethral Catheter 09/13/22 2330 <1 day              Peripheral Intravenous Line  Duration                  Peripheral IV - Single Lumen 09/13/22 1425 18 G Anterior;Distal;Left Upper Arm 1 day         Peripheral IV - Single Lumen 09/14/22 0447 20 G Anterior;Right Forearm <1 day                    Physical Exam  Constitutional:       Appearance: He is ill-appearing.   HENT:      Head: Normocephalic.      Right Ear: External ear normal.      Left Ear: External ear normal.      Nose: Nose normal. No congestion or rhinorrhea.      Mouth/Throat:      Pharynx: Oropharynx is clear. No oropharyngeal exudate or posterior oropharyngeal erythema.   Cardiovascular:      Rate and Rhythm: Rhythm irregular.      Heart sounds: No murmur heard.  Pulmonary:      Effort: Pulmonary effort is normal.      Breath sounds: Rales present. No wheezing or rhonchi.   Abdominal:      General: Abdomen is flat.      Palpations: Abdomen is soft.   Musculoskeletal:      Right lower leg: Edema present.      Left lower leg: Edema present.   Skin:     General: Skin is warm and dry.   Neurological:      Mental Status: He is oriented to person, place, and time. He is lethargic.   Psychiatric:         Thought Content: Thought content normal.         Judgment: Judgment normal.     Significant Labs: All pertinent lab results from the last 24 hours have been reviewed.    Significant Imaging: Echocardiogram: Transthoracic echo (TTE) complete (Cupid Only):   Results for orders placed or performed during the hospital encounter of 09/13/22   Echo   Result Value Ref Range    BSA 2.11 m2    Right Atrial Pressure (from IVC) 15 mmHg    EF 15 %    Left Ventricular Outflow Tract Mean Gradient 1.00 mmHg    AORTIC VALVE CUSP SEPERATION 1.73 cm    LVIDd 6.06 (A) 3.5 - 6.0 cm    IVS 1.30 (A) 0.6 - 1.1 cm    Posterior Wall 1.29 (A) 0.6 - 1.1 cm    Ao root annulus 2.70 cm    LVIDs 5.50 (A) 2.1 - 4.0 cm    FS 9 28 - 44 %    IVC ostium 2.0 cm    LV  mass 353.93 g    LA size 4.30 cm    RVDD 5.40 cm    TAPSE 1.60 cm    Left Ventricle Relative Wall Thickness 0.43 cm    AV mean gradient 1 mmHg    AV valve area 2.27 cm2    AV Velocity Ratio 0.86     AV index (prosthetic) 1.00     MV mean gradient 22 mmHg    MV valve area p 1/2 method 2.78 cm2    MV valve area by continuity eq 0.24 cm2    E wave deceleration time 163.00 msec    LVOT diameter 1.70 cm    LVOT area 2.3 cm2    LVOT peak jim 0.6 m/s    LVOT peak VTI 6.00 cm    Ao peak jim 0.7 m/s    Ao VTI 6.00 cm    LVOT stroke volume 13.61 cm3    AV peak gradient 2 mmHg    MV peak gradient 36 mmHg    TV rest pulmonary artery pressure 38 mmHg    MV Peak E Jim 2.18 m/s    TR Max Jim 2.40 m/s    MV VTI 56.5 cm    MV stenosis pressure 1/2 time 79.00 ms    LV Systolic Volume 148.00 mL    LV Systolic Volume Index 71.5 mL/m2    LV Diastolic Volume 184.80 mL    LV Diastolic Volume Index 89.28 mL/m2    LV Mass Index 171 g/m2    Echo EF Estimated 20 %    RA Major Axis 6.00 cm    Triscuspid Valve Regurgitation Peak Gradient 23 mmHg    Narrative    · Atrial fibrillation observed.  · The left ventricle is moderately enlarged with mild concentric   hypertrophy and severely decreased systolic function.  · The estimated ejection fraction is 15%.  · There is severe left ventricular global hypokinesis.  · There is abnormal septal wall motion consistent with right ventricular   pacemaker.  · Severe right ventricular enlargement with moderately to severely reduced   right ventricular systolic function.  · Severe right atrial enlargement.  · Severe left atrial enlargement.  · Moderate-to-severe mitral regurgitation.  · Severe tricuspid regurgitation.  · Elevated central venous pressure (15 mmHg).  · The estimated PA systolic pressure is 38 mmHg.  · Compared to prior ECHO from 11/2021; No significant change        Assessment and Plan:     Acute on chronic congestive heart failure  INTERMACS 3 with patient clinically stable but dependent on  inotropic support. Vitals and labs indicate poor perfusion and end organ damage. Afterload reduction deferred at his time due to hypotension and need for Dopamine and Norepinephrine infusions. ECHO results below. Will plan for RHC on 9/15/22 if mentation not improved.     ECHO with atrial fibrillation observed. LV moderately enlarged with mild concentric hypertrophy and severely decreased systolic function. EF of 15% with severe LV global hypokinesis. Abnormal septal wall motion consistent with RV pacemaker. Severe RV enlargement with moderately to severely reduced right ventricular systolic function. Severe RA and LA enlargement. Moderate-to-severe MR. Severe TR. Elevated CVP (15 mmHg). The estimated PA systolic pressure is 38 mmHg. Compared to prior ECHO from 11/2021; No significant change.      Acute renal failure superimposed on chronic kidney disease  Cr of 4.56 with baseline Cr of 2.2. Likely 2/2 to hypoperfusion/congestion associated with poor cardiac output. Continue to monitor.    Troponin level elevated  Troponin elevated with trend of (1,247), (1,614), (1,295). EKG with no ST elevation. Likely 2/2 to poor cardiac output.    Transaminitis  Patient appears lethargic with elevated AST/ALT of 181/273 and Alk Phos of 170. Ammonia resulted wnl at 31. Possible congestive hepatopathy due to reduced cardiac output. Continue to monitor.      A-fib  EKG with Afib and interventricular conduction delay. Holding home Eliquis. Anticoagulate with Lovenox.   - Digoxin 500mg IV x1  - Amiodarone infusion  Continue monitoring via telemetry       VTE Risk Mitigation (From admission, onward)           Ordered     enoxaparin injection 90 mg  Daily         09/14/22 3938                    Thank you for your consult. I will follow-up with patient. Please contact us if you have any additional questions.    Belkis Easley, DO  Cardiology   [facility]

## 2022-09-14 NOTE — ASSESSMENT & PLAN NOTE
Latest ECHO performed and demonstrates- Results for orders placed during the hospital encounter of 09/13/22    Echo    Interpretation Summary  · Atrial fibrillation observed.  · The left ventricle is moderately enlarged with mild concentric hypertrophy and severely decreased systolic function.  · The estimated ejection fraction is 15%.  · There is severe left ventricular global hypokinesis.  · There is abnormal septal wall motion consistent with right ventricular pacemaker.  · Severe right ventricular enlargement with moderately to severely reduced right ventricular systolic function.  · Severe right atrial enlargement.  · Severe left atrial enlargement.  · Moderate-to-severe mitral regurgitation.  · Severe tricuspid regurgitation.  · Elevated central venous pressure (15 mmHg).  · The estimated PA systolic pressure is 38 mmHg.

## 2022-09-14 NOTE — PROGRESS NOTES
09/14/22 1148   Wound Care Follow Up   Wound Care Follow-up? Yes   Wound Care- Next Visit Date 09/21/22   Follow Up Plan reassess ITD & IAD

## 2022-09-14 NOTE — ASSESSMENT & PLAN NOTE
Patient appears lethargic with elevated AST/ALT of 181/273 and Alk Phos of 170. Ammonia resulted wnl at 31. Possible congestive hepatopathy due to reduced cardiac output. Continue to monitor.

## 2022-09-14 NOTE — H&P
[facility] - [facility] Tele[facility] Hospital Medicine  History & Physical    Patient Name: Luisito Stuart  MRN: [id number]  Patient Class: IP- Inpatient  Admission Date: 9/13/2022  Attending Physician: Kate Hernandez MD   Primary Care Provider: JOYA Hill         Patient information was obtained from patient, past medical records and ER records.     Subjective:     Principal Problem:Cardiogenic shock    Chief Complaint:   Chief Complaint   Patient presents with    Shortness of Breath    Hypotension        HPI: Mr. Stuart is a 78yo man history of CABG (2004), RHC and C 1/5/22 with triple-vessel disease and elevated pressure with details below, Ischemic cardiomyopathy with EF 20% (11/10/21), AICD, CKD stage 4, CVA 2019 and TIA 2022, tobacco use (60+ years), COPD, hearing loss, and chronic atrial fibrillation on Eliquis who presents with hypotension, lower extremity edema and pain, inability to tolerate oral diuresis at nursing home.  Patient has some dementia and does not recall the details of why he came to the hospital.  His complaint currently is that he has left lower extremity pain and swelling.  Patient had multiple organs affected in the emergency department included liver disease, SHELDON on CKD, hypotension.  Initial plan was for patient to be admitted to the floor but he became hypotensive prior to administration of IV Lasix.  Case discussed with Cardiology and plan to place patient on dopamine and IV Lasix.  Initial plan to start patient on dobutamine was abandoned after patient became hypotensive.  Plan to transfer patient to the ICU for further care        [facility] (1/5/22)  The Dist LM to Ost LAD lesion was 60% stenosed.  The Ost Cx lesion was 70% stenosed.  The Ost RCA to Prox RCA lesion was 100% stenosed.  The Mid LAD lesion was 70% stenosed.  The Dist Graft to Insertion lesion was 50% stenosed.  The estimated blood loss was none.  There was three vessel coronary  artery disease. There was left main disease.  Patient is admitted with decompensated heart failure on dobutamine with concern for low output with history of severe ischemic cardiomyopathy.  Point with very mildly elevated at 80.  He did complain chest pains.  Proceeded perform right heart catheterization and left heart catheterization the right femoral artery approach.  Right heart catheterization which showed elevated filling pressures with low cardiac output.  Right atrial pressure was 19,  Right ventricular pressure was 56/15  Pulmonary arterial pressure was 51/27 with a mean of 37.  Wedge pressure was 34.  Pulmonary arterial saturation was 45%.  Cardiac output is 3.35, cardiac index is 1.69.  These numbers were obtained off dobutamine that was prior to the catheterization when he was noted to be tachycardic having chest pains.    Right common femoral artery access was obtained for angiography under ultrasound guidance.  Patent LIMA to LAD,  Patent SVG to PDA, proximal PDA has a stent which has 30% ISR, other stent is noted in the distal part of the graft which has 50% ISR.  SVG to circumflex is occluded.  Native RCA is completely occluded.  Distal left main appears hazy, both ostial circumflex and ostial 80 have at least intermediate 70% stenosis.  There is a patent long stent in the circumflex with mild ISR.    Patient appears to be in decompensated heart failure, mild troponin elevation is likely from demand ischemia.  We opted not to pursue high-risk PCI to distal left main and ostial circumflex given concerns for compromise of the LAD diagonal system.  This may need mechanical circulatory support during PCI.  At this time would recommend diuresis and dobutamine and then consider PCI once he is more compensated.        Past Medical History:   Diagnosis Date    Atrial fibrillation     CHF (congestive heart failure)     COPD (chronic obstructive pulmonary disease)     Coronary artery disease     Disorder of  kidney and ureter     Hypertension        Past Surgical History:   Procedure Laterality Date    APPENDECTOMY      CARDIAC DEFIBRILLATOR PLACEMENT      CORONARY ANGIOGRAPHY INCLUDING BYPASS GRAFTS WITH CATHETERIZATION OF LEFT HEART N/A 1/5/2022    Procedure: ANGIOGRAM, CORONARY, INCLUDING BYPASS GRAFT, WITH LEFT HEART CATHETERIZATION;  Surgeon: Gerda Castellanos MD;  Location: [facility] CATH LAB;  Service: Cardiology;  Laterality: N/A;    JOINT REPLACEMENT      KIDNEY TRANSPLANT      RIGHT HEART CATHETERIZATION Right 1/5/2022    Procedure: INSERTION, CATHETER, RIGHT HEART;  Surgeon: Gerda Castellanos MD;  Location: [facility] CATH LAB;  Service: Cardiology;  Laterality: Right;       Review of patient's allergies indicates:   Allergen Reactions    Indomethacin Itching and Other (See Comments)     Other reaction(s): ITCHING    Lipitor [atorvastatin] Other (See Comments)     Muscle cramps       No current facility-administered medications on file prior to encounter.     Current Outpatient Medications on File Prior to Encounter   Medication Sig    apixaban (ELIQUIS) 5 mg Tab Take 1 tablet (5 mg total) by mouth 2 (two) times daily.    cyclobenzaprine (FLEXERIL) 10 MG tablet Take 10 mg by mouth every evening.    HYDROcodone-acetaminophen (NORCO) 5-325 mg per tablet Take 1 tablet by mouth every 4 (four) hours as needed for Pain.    levothyroxine (SYNTHROID) 75 MCG tablet Take 75 mcg by mouth before breakfast.    nitroGLYCERIN 0.4 MG/DOSE TL SPRY (NITROLINGUAL) 400 mcg/spray spray Place 1 spray under the tongue every 5 (five) minutes as needed for Chest pain.    ondansetron (ZOFRAN) 8 MG tablet Take by mouth every 6 (six) hours as needed for Nausea.    sacubitriL-valsartan (ENTRESTO) 24-26 mg per tablet Take 1 tablet by mouth 2 (two) times daily.    torsemide (DEMADEX) 20 MG Tab Take 2 tablets (40 mg total) by mouth 2 (two) times daily before meals.    amiodarone (PACERONE) 200 MG Tab Take 1 tablet (200 mg total) by mouth  once daily.    carvediloL (COREG) 3.125 MG tablet Take 1 tablet (3.125 mg total) by mouth 2 (two) times daily with meals. Pt. Takes 1/2 tab daily    clopidogreL (PLAVIX) 75 mg tablet Take 75 mg by mouth once daily.    clotrimazole (LOTRIMIN) 1 % cream Apply to affected area 2 times daily    HYDROcodone-acetaminophen (NORCO)  mg per tablet Take 1 tablet by mouth every 6 (six) hours as needed for Pain.    methocarbamoL (ROBAXIN) 750 MG Tab Take 1 tablet (750 mg total) by mouth 4 (four) times daily.    pantoprazole (PROTONIX) 40 MG tablet Take 1 tablet (40 mg total) by mouth once daily.    pravastatin (PRAVACHOL) 20 MG tablet Take 1 tablet (20 mg total) by mouth once daily.     Family History       Problem Relation (Age of Onset)    Cancer Mother    Heart disease Father          Tobacco Use    Smoking status: Every Day     Packs/day: 1.00     Years: 46.00     Pack years: 46.00     Types: Cigarettes    Smokeless tobacco: Never   Substance and Sexual Activity    Alcohol use: Not Currently    Drug use: Never    Sexual activity: Not Currently     Review of Systems   Constitutional:  Negative for activity change, chills, fatigue and fever.   HENT:  Negative for congestion and sore throat.    Respiratory:  Positive for shortness of breath. Negative for chest tightness.    Gastrointestinal:  Negative for abdominal distention, abdominal pain and diarrhea.   Endocrine: Negative for cold intolerance and heat intolerance.   Genitourinary:  Negative for dysuria.   Musculoskeletal:  Positive for joint swelling. Negative for arthralgias and back pain.        Left lower extremity pain and swelling.    Skin:  Negative for color change and rash.   Neurological:  Positive for weakness. Negative for dizziness, tremors and headaches.   Psychiatric/Behavioral:  Negative for agitation. The patient is not nervous/anxious.    Objective:     Vital Signs (Most Recent):  Temp: 97.7 °F (36.5 °C) (09/13/22 1354)  Pulse: (!) 113 (09/13/22  1904)  Resp: 11 (09/13/22 1544)  BP: 117/60 (09/13/22 1904)  SpO2: (!) 75 % (09/13/22 1904)   Vital Signs (24h Range):  Temp:  [97.7 °F (36.5 °C)] 97.7 °F (36.5 °C)  Pulse:  [] 113  Resp:  [11-17] 11  SpO2:  [75 %-100 %] 75 %  BP: ()/(45-71) 117/60     Weight: 86.2 kg (190 lb)  Body mass index is 28.06 kg/m².    Physical Exam  Vitals and nursing note reviewed.   Constitutional:       Appearance: He is ill-appearing.   HENT:      Head: Normocephalic.      Right Ear: Tympanic membrane normal.      Nose: Nose normal.   Cardiovascular:      Rate and Rhythm: Tachycardia present. Rhythm irregular.      Pulses: Normal pulses.      Heart sounds: Normal heart sounds.   Pulmonary:      Effort: Pulmonary effort is normal.      Breath sounds: Rhonchi and rales present.   Abdominal:      General: Abdomen is flat. Bowel sounds are normal. There is distension.      Palpations: Abdomen is soft.   Musculoskeletal:         General: Swelling and tenderness present.      Cervical back: Normal range of motion.      Right lower leg: Edema present.      Left lower leg: Edema present.      Comments: L>R LE edema    Skin:     General: Skin is warm and dry.   Neurological:      General: No focal deficit present.      Mental Status: He is alert. He is disoriented.      Motor: Weakness present.   Psychiatric:         Mood and Affect: Mood normal.           Significant Labs: All pertinent labs within the past 24 hours have been reviewed.    Significant Imaging: I have reviewed all pertinent imaging results/findings within the past 24 hours.    Assessment/Plan:     * Cardiogenic shock  Hypotension to 80/40 and unable to tolerate IV Lasix.    Dopamine and 40 mg IV Lasix b.i.d. ordered  Cardiology consulted appreciate recommendations.    Results for orders placed during the hospital encounter of 11/10/21    Echo    Interpretation Summary  · The left ventricle is moderately enlarged with concentric remodeling and severely decreased  systolic function.  · The estimated ejection fraction is 20%.  · Grade III left ventricular diastolic dysfunction.  · Mild right ventricular enlargement with mildly reduced right ventricular systolic function.  · Moderate to severe left atrial enlargement.  · Moderate right atrial enlargement.  · Moderate-to-severe mitral regurgitation.  · Severe tricuspid regurgitation.  · Elevated central venous pressure (15 mmHg).  · The estimated PA systolic pressure is 44 mmHg.  · There is pulmonary hypertension.        Coronary artery disease involving coronary bypass graft of native heart without angina pectoris  Troponin elevated at 1200.  Patient is altered so unclear if he has chest pain or not.    Appreciate cardiology recommendations.      Left heart catheterization from 01/05/2022:  Right common femoral artery access was obtained for angiography under ultrasound guidance.  Patent LIMA to LAD,  Patent SVG to PDA, proximal PDA has a stent which has 30% ISR, other stent is noted in the distal part of the graft which has 50% ISR.  SVG to circumflex is occluded.  Native RCA is completely occluded.  Distal left main appears hazy, both ostial circumflex and ostial 80 have at least intermediate 70% stenosis.  There is a patent long stent in the circumflex with mild ISR.      Chronic obstructive pulmonary disease  Greater than 40 pack per year smoking history  Given hypotension and AFib with RVR and severe heart failure will defer management of COPD at this time.  Follow-up ABG and may modify treatment plans accordingly.      Coagulopathy  INR 2.5 in setting of hepatic congestion related heart failure  Monitor for risk of bleeding.  Hold Eliquis given liver disease as well as SHELDON on CKD.      Hepatic congestion  Thought to be cardiogenic in nature.    Will check right upper quadrant ultrasound.    Will check hepatitis panel.  There is coagulopathy present likely related to hepatic congestion      CVA (cerebral vascular  accident)  CVA in 2019 and TIA in 2022.  Continue to evaluate for neurologic deficits.    Follow-up with Neurology.        Abdominal aortic aneurysm (AAA) without rupture  Aortic aneurysm 4.1 cm when last checked in February 2022.      CKD (chronic kidney disease), stage III        A-fib  Status post ablation in the past in [place].    Rate mildly elevated at 110.  Will not initiate amiodarone at this time.  Patient is hypotensive continue to monitor carefully.    Patient with Long standing persistent (>12 months) atrial fibrillation which is controlled currently with NA . Patient is currently in atrial fibrillation.[id number] Score: 3. HASBLED Score: Unable to calculate. Anticoagulation not indicated due to coagulopathy INR 2.5. .    Status post ablation in the past in [place].        Obstructive sleep apnea  Add BiPAP as needed for any respiratory issues.    Follow up on ABG.      Hyperlipidemia  Allergy to statins will hold for now.    Patient with abnormal liver enzymes.    Hx of CABG  2004  See details of catheterization report.      History of cardiac defibrillator placement  No reported recent shocks.    Continue management per Cardiology.      Acute renal failure  Worsened renal function.    Baseline creatinine is 2.5-3 and creatinine is currently 4.59.    May related to diuretic use versus low cardiac output.    Will monitor over coming days and consider Nephrology consult.      History of DVT (deep vein thrombosis)  4/21/21-LLE.  4/23/21-Venous Doppler-no DVT    Prophylactic heparin given.     Lower extremity Dopplers given pain and swelling of left lower extremity.        VTE Risk Mitigation (From admission, onward)           Ordered     heparin (porcine) injection 5,000 Units  Every 8 hours         09/13/22 2031                       Kate Hernandez MD  Department of Hospital Medicine   [facility] - [facility] Medical Telemetry

## 2022-09-14 NOTE — SUBJECTIVE & OBJECTIVE
Past Medical History:   Diagnosis Date    Atrial fibrillation     CHF (congestive heart failure)     COPD (chronic obstructive pulmonary disease)     Coronary artery disease     Disorder of kidney and ureter     Hypertension        Past Surgical History:   Procedure Laterality Date    APPENDECTOMY      CARDIAC DEFIBRILLATOR PLACEMENT      CORONARY ANGIOGRAPHY INCLUDING BYPASS GRAFTS WITH CATHETERIZATION OF LEFT HEART N/A 1/5/2022    Procedure: ANGIOGRAM, CORONARY, INCLUDING BYPASS GRAFT, WITH LEFT HEART CATHETERIZATION;  Surgeon: Gerda Castellanos MD;  Location: [facility] CATH LAB;  Service: Cardiology;  Laterality: N/A;    JOINT REPLACEMENT      KIDNEY TRANSPLANT      RIGHT HEART CATHETERIZATION Right 1/5/2022    Procedure: INSERTION, CATHETER, RIGHT HEART;  Surgeon: Gerda Castellanos MD;  Location: [facility] CATH LAB;  Service: Cardiology;  Laterality: Right;       Review of patient's allergies indicates:   Allergen Reactions    Indomethacin Itching and Other (See Comments)     Other reaction(s): ITCHING    Lipitor [atorvastatin] Other (See Comments)     Muscle cramps     Current Facility-Administered Medications   Medication Frequency    acetaminophen tablet 1,000 mg Q8H PRN    acetaminophen tablet 650 mg Q8H PRN    acetaminophen tablet 650 mg Q4H PRN    aluminum-magnesium hydroxide-simethicone 200-200-20 mg/5 mL suspension 30 mL QID PRN    amiodarone (CORDARONE) 450 mg in dextrose 5 % 250 mL infusion Continuous    amiodarone (CORDARONE) 450 mg in dextrose 5 % 250 mL infusion Continuous    aspirin EC tablet 81 mg Daily    clopidogreL tablet 75 mg Daily    dextrose 50% injection 12.5 g PRN    dextrose 50% injection 25 g PRN    DOPamine 800 mg in dextrose 5 % 250 mL infusion (premix) (NON-TITRATING) Continuous    enoxaparin injection 90 mg Daily    glucagon (human recombinant) injection 1 mg PRN    HYDROcodone-acetaminophen 5-325 mg per tablet 1 tablet Q6H PRN    levothyroxine tablet 75 mcg Before breakfast     magnesium oxide tablet 800 mg PRN    magnesium oxide tablet 800 mg PRN    melatonin tablet 6 mg Nightly PRN    mupirocin 2 % ointment BID    naloxone 0.4 mg/mL injection 0.02 mg PRN    NORepinephrine 4 mg in dextrose 5 % 250 mL infusion Continuous    ondansetron disintegrating tablet 8 mg Q8H PRN    polyethylene glycol packet 17 g Daily PRN    potassium bicarbonate disintegrating tablet 35 mEq PRN    potassium bicarbonate disintegrating tablet 50 mEq PRN    potassium bicarbonate disintegrating tablet 60 mEq PRN    potassium, sodium phosphates 280-160-250 mg packet 2 packet PRN    potassium, sodium phosphates 280-160-250 mg packet 2 packet PRN    potassium, sodium phosphates 280-160-250 mg packet 2 packet PRN    sodium chloride 0.9% flush 10 mL PRN     Family History       Problem Relation (Age of Onset)    Cancer Mother    Heart disease Father          Tobacco Use    Smoking status: Every Day     Packs/day: 1.00     Years: 46.00     Pack years: 46.00     Types: Cigarettes    Smokeless tobacco: Never   Substance and Sexual Activity    Alcohol use: Not Currently    Drug use: Never    Sexual activity: Not Currently     Review of Systems   All other systems reviewed and are negative.  Objective:     Vital Signs (Most Recent):  Temp: 98.5 °F (36.9 °C) (09/14/22 1536)  Pulse: 102 (09/14/22 1530)  Resp: 14 (09/14/22 1530)  BP: (!) 110/46 (09/14/22 1530)  SpO2: (!) 93 % (09/14/22 1530)  O2 Device (Oxygen Therapy): room air (09/13/22 1354)   Vital Signs (24h Range):  Temp:  [97.8 °F (36.6 °C)-98.5 °F (36.9 °C)] 98.5 °F (36.9 °C)  Pulse:  [] 102  Resp:  [10-24] 14  SpO2:  [75 %-100 %] 93 %  BP: ()/(22-99) 110/46     Weight: 91.2 kg (201 lb 1 oz) (09/14/22 1000)  Body mass index is 29.69 kg/m².  Body surface area is 2.11 meters squared.    I/O last 3 completed shifts:  In: 87.4 [I.V.:87.4]  Out: 300 [Urine:300]    Physical Exam  Vitals reviewed.   HENT:      Head: Normocephalic and atraumatic.      Ears:       Comments: Hard of hearing     Mouth/Throat:      Mouth: Mucous membranes are moist.   Eyes:      Pupils: Pupils are equal, round, and reactive to light.   Cardiovascular:      Rate and Rhythm: Rhythm irregular.   Pulmonary:      Effort: Pulmonary effort is normal.   Abdominal:      Palpations: Abdomen is soft.   Skin:     General: Skin is warm.   Neurological:      Mental Status: He is alert.      Motor: Weakness present.   Psychiatric:         Mood and Affect: Mood normal.       Significant Labs:  BMP:   Recent Labs   Lab 09/14/22 0437 09/14/22  1300   *  --    *  --    K 6.0* 4.4     --    CO2 23  --    BUN 43*  --    CREATININE 4.56*  --    CALCIUM 8.8  --    MG 2.1  --      CBC:   Recent Labs   Lab 09/14/22 0437   WBC 10.61   RBC 3.36*   HGB 10.2*   HCT 31.5*   *   MCV 93.8   MCH 30.4   MCHC 32.4       Significant Imaging:  Labs: Reviewed

## 2022-09-14 NOTE — ASSESSMENT & PLAN NOTE
Status post ablation in the past in [place].    Rate mildly elevated at 110.  Will not initiate amiodarone at this time.  Patient is hypotensive continue to monitor carefully.    Patient with Long standing persistent (>12 months) atrial fibrillation which is controlled currently with NA . Patient is currently in atrial fibrillation.[id number] Score: 3. HASBLED Score: Unable to calculate. Anticoagulation not indicated due to coagulopathy INR 2.5. .    Status post ablation in the past in [place].

## 2022-09-14 NOTE — ED NOTES
Pt transferred via stretcher with assist x2 personnel to Room # 555, with PETR Barry receiving pt care and no updates with report due to pt stability.

## 2022-09-14 NOTE — ASSESSMENT & PLAN NOTE
Hypotension to 80/40 and unable to tolerate IV Lasix.    Dopamine and 40 mg IV Lasix b.i.d. ordered  Cardiology consulted appreciate recommendations.    Results for orders placed during the hospital encounter of 11/10/21    Echo    Interpretation Summary  · The left ventricle is moderately enlarged with concentric remodeling and severely decreased systolic function.  · The estimated ejection fraction is 20%.  · Grade III left ventricular diastolic dysfunction.  · Mild right ventricular enlargement with mildly reduced right ventricular systolic function.  · Moderate to severe left atrial enlargement.  · Moderate right atrial enlargement.  · Moderate-to-severe mitral regurgitation.  · Severe tricuspid regurgitation.  · Elevated central venous pressure (15 mmHg).  · The estimated PA systolic pressure is 44 mmHg.  · There is pulmonary hypertension.

## 2022-09-14 NOTE — ASSESSMENT & PLAN NOTE
Troponin elevated with trend of (1,247), (1,614), (1,295). EKG with no ST elevation. Likely 2/2 to poor cardiac output.

## 2022-09-14 NOTE — HPI
This patient with multiple medical problems that include CAD, CKD, a kidney transplant, atrial fibrillation, CHF with EF of 15% presented with shortness of breath, fatigue and low blood pressure.  He has been admitted to the ICU on dopamine infusion.  Nephrology has been consulted for renal issues.  The patient's serum creatinine is noted to be 4.5.  His lactic acid is 1.4.  The patient is hard of hearing and has a history of dementia.

## 2022-09-14 NOTE — ASSESSMENT & PLAN NOTE
EF 20% on past echo  Felt to have component of cardiogenic shock  Patient on entresto outpatient which has been held  Also on carvedilol outpatient  Currently on dopamine and levophed for hypotension  Cardiology consulted

## 2022-09-14 NOTE — ASSESSMENT & PLAN NOTE
Acute on chronic renal failure  Creatinine is 4.56. It was around 3 seven months ago  This is likely due to decreased effective circulating volume  Secondary to Cardiomyopathy with EF around 20%  Nephrology consulted by admitting team  We will try to avoid nephrotoxic medications  Renally dose meds  Follow Bun/Crea and urine output

## 2022-09-14 NOTE — ASSESSMENT & PLAN NOTE
Greater than 40 pack per year smoking history  Given hypotension and AFib with RVR and severe heart failure will defer management of COPD at this time.  Follow-up ABG and may modify treatment plans accordingly.

## 2022-09-14 NOTE — ASSESSMENT & PLAN NOTE
Thought to be cardiogenic in nature.    Will check right upper quadrant ultrasound.    Will check hepatitis panel.  There is coagulopathy present likely related to hepatic congestion

## 2022-09-14 NOTE — ASSESSMENT & PLAN NOTE
EKG with Afib and interventricular conduction delay. Holding home Eliquis. Anticoagulate with Lovenox.   - Digoxin 500mg IV x1  - Amiodarone infusion  Continue monitoring via telemetry

## 2022-09-14 NOTE — HOSPITAL COURSE
The patient continued to have issues with hypotension. He was started on dopamine and norepinephrine and transferred to ICU. He is resting comfortably this am in no distress. He is in a fib during my exam with HR in 130's. No evidence of DVT on dopplers. Last echo with EF around 20%. This was in November of 2021. He remains on pressors this am. He is awake and alert.  9/15- The patient will be switched over to dobutamine this am. Urine output is adequate. Echo with EF around 15%.  9/16- Resting comfortably this am. On dobutamine and levophed. UOP good. Spoke with cardiology and it is felt that his bp runs low.  9/17- Dobutamine and levophed have been off since midnight. Patient looks much better and is requesting to go home. Still with lilly.   9/18-He looks great today. He is sitting up in the chair. He denies any SOB. Floor orders have been placed

## 2022-09-14 NOTE — ASSESSMENT & PLAN NOTE
Greater than 40 pack per year smoking history  Home meds include no maintenance medications  Currently no acute issues

## 2022-09-14 NOTE — ED NOTES
Report Rec'd from PETR Parekh. Will transfer pt within the next 10 minutes when this nurse is able to due to other pt care needs met.

## 2022-09-14 NOTE — ASSESSMENT & PLAN NOTE
Troponin elevated at 1200.  Patient is altered so unclear if he has chest pain or not.    Appreciate cardiology recommendations.      Left heart catheterization from 01/05/2022:  Right common femoral artery access was obtained for angiography under ultrasound guidance.  Patent LIMA to LAD,  Patent SVG to PDA, proximal PDA has a stent which has 30% ISR, other stent is noted in the distal part of the graft which has 50% ISR.  SVG to circumflex is occluded.  Native RCA is completely occluded.  Distal left main appears hazy, both ostial circumflex and ostial 80 have at least intermediate 70% stenosis.  There is a patent long stent in the circumflex with mild ISR.

## 2022-09-14 NOTE — SUBJECTIVE & OBJECTIVE
Past Medical History:   Diagnosis Date    Atrial fibrillation     CHF (congestive heart failure)     COPD (chronic obstructive pulmonary disease)     Coronary artery disease     Disorder of kidney and ureter     Hypertension        Past Surgical History:   Procedure Laterality Date    APPENDECTOMY      CARDIAC DEFIBRILLATOR PLACEMENT      CORONARY ANGIOGRAPHY INCLUDING BYPASS GRAFTS WITH CATHETERIZATION OF LEFT HEART N/A 1/5/2022    Procedure: ANGIOGRAM, CORONARY, INCLUDING BYPASS GRAFT, WITH LEFT HEART CATHETERIZATION;  Surgeon: Gerda Castellanos MD;  Location: [facility] CATH LAB;  Service: Cardiology;  Laterality: N/A;    JOINT REPLACEMENT      KIDNEY TRANSPLANT      RIGHT HEART CATHETERIZATION Right 1/5/2022    Procedure: INSERTION, CATHETER, RIGHT HEART;  Surgeon: Gerda Castellanos MD;  Location: [facility] CATH LAB;  Service: Cardiology;  Laterality: Right;       Review of patient's allergies indicates:   Allergen Reactions    Indomethacin Itching and Other (See Comments)     Other reaction(s): ITCHING    Lipitor [atorvastatin] Other (See Comments)     Muscle cramps       No current facility-administered medications on file prior to encounter.     Current Outpatient Medications on File Prior to Encounter   Medication Sig    apixaban (ELIQUIS) 5 mg Tab Take 1 tablet (5 mg total) by mouth 2 (two) times daily.    cyclobenzaprine (FLEXERIL) 10 MG tablet Take 10 mg by mouth every evening.    HYDROcodone-acetaminophen (NORCO) 5-325 mg per tablet Take 1 tablet by mouth every 4 (four) hours as needed for Pain.    levothyroxine (SYNTHROID) 75 MCG tablet Take 75 mcg by mouth before breakfast.    nitroGLYCERIN 0.4 MG/DOSE TL SPRY (NITROLINGUAL) 400 mcg/spray spray Place 1 spray under the tongue every 5 (five) minutes as needed for Chest pain.    ondansetron (ZOFRAN) 8 MG tablet Take by mouth every 6 (six) hours as needed for Nausea.    sacubitriL-valsartan (ENTRESTO) 24-26 mg per tablet Take 1 tablet  by mouth 2 (two) times daily.    torsemide (DEMADEX) 20 MG Tab Take 2 tablets (40 mg total) by mouth 2 (two) times daily before meals.    amiodarone (PACERONE) 200 MG Tab Take 1 tablet (200 mg total) by mouth once daily.    carvediloL (COREG) 3.125 MG tablet Take 1 tablet (3.125 mg total) by mouth 2 (two) times daily with meals. Pt. Takes 1/2 tab daily    clopidogreL (PLAVIX) 75 mg tablet Take 75 mg by mouth once daily.    clotrimazole (LOTRIMIN) 1 % cream Apply to affected area 2 times daily    HYDROcodone-acetaminophen (NORCO)  mg per tablet Take 1 tablet by mouth every 6 (six) hours as needed for Pain.    methocarbamoL (ROBAXIN) 750 MG Tab Take 1 tablet (750 mg total) by mouth 4 (four) times daily.    pantoprazole (PROTONIX) 40 MG tablet Take 1 tablet (40 mg total) by mouth once daily.    pravastatin (PRAVACHOL) 20 MG tablet Take 1 tablet (20 mg total) by mouth once daily.     Family History       Problem Relation (Age of Onset)    Cancer Mother    Heart disease Father          Tobacco Use    Smoking status: Every Day     Packs/day: 1.00     Years: 46.00     Pack years: 46.00     Types: Cigarettes    Smokeless tobacco: Never   Substance and Sexual Activity    Alcohol use: Not Currently    Drug use: Never    Sexual activity: Not Currently     Review of Systems   Constitutional: Negative for fever.   Eyes:  Negative for visual disturbance.   Cardiovascular:  Positive for leg swelling. Negative for chest pain, cyanosis, orthopnea, palpitations and syncope.   Respiratory:  Negative for cough, shortness of breath and wheezing.    Skin:  Negative for color change.   Objective:     Vital Signs (Most Recent):  Temp: 98.5 °F (36.9 °C) (09/14/22 1536)  Pulse: 102 (09/14/22 1530)  Resp: 14 (09/14/22 1530)  BP: (!) 110/46 (09/14/22 1530)  SpO2: (!) 93 % (09/14/22 1530)   Vital Signs (24h Range):  Temp:  [97.8 °F (36.6 °C)-98.5 °F (36.9 °C)] 98.5 °F (36.9 °C)  Pulse:  [] 102  Resp:  [10-24] 14  SpO2:   [75 %-100 %] 93 %  BP: ()/(22-99) 110/46     Weight: 91.2 kg (201 lb 1 oz)  Body mass index is 29.69 kg/m².    SpO2: (!) 93 %  O2 Device (Oxygen Therapy): room air      Intake/Output Summary (Last 24 hours) at 9/14/2022 1633  Last data filed at 9/14/2022 1506  Gross per 24 hour   Intake 327.39 ml   Output 1100 ml   Net -772.61 ml       Lines/Drains/Airways       Drain  Duration                  Urethral Catheter 09/13/22 2330 <1 day              Peripheral Intravenous Line  Duration                  Peripheral IV - Single Lumen 09/13/22 1425 18 G Anterior;Distal;Left Upper Arm 1 day         Peripheral IV - Single Lumen 09/14/22 0447 20 G Anterior;Right Forearm <1 day                    Physical Exam  Constitutional:       Appearance: He is ill-appearing.   HENT:      Head: Normocephalic.      Right Ear: External ear normal.      Left Ear: External ear normal.      Nose: Nose normal. No congestion or rhinorrhea.      Mouth/Throat:      Pharynx: Oropharynx is clear. No oropharyngeal exudate or posterior oropharyngeal erythema.   Cardiovascular:      Rate and Rhythm: Rhythm irregular.      Heart sounds: No murmur heard.  Pulmonary:      Effort: Pulmonary effort is normal.      Breath sounds: Rales present. No wheezing or rhonchi.   Abdominal:      General: Abdomen is flat.      Palpations: Abdomen is soft.   Musculoskeletal:      Right lower leg: Edema present.      Left lower leg: Edema present.   Skin:     General: Skin is warm and dry.   Neurological:      Mental Status: He is oriented to person, place, and time. He is lethargic.   Psychiatric:         Thought Content: Thought content normal.         Judgment: Judgment normal.     Significant Labs: All pertinent lab results from the last 24 hours have been reviewed.    Significant Imaging: Echocardiogram: Transthoracic echo (TTE) complete (Cupid Only):   Results for orders placed or performed during the hospital encounter of 09/13/22   Echo   Result Value Ref  Range    BSA 2.11 m2    Right Atrial Pressure (from IVC) 15 mmHg    EF 15 %    Left Ventricular Outflow Tract Mean Gradient 1.00 mmHg    AORTIC VALVE CUSP SEPERATION 1.73 cm    LVIDd 6.06 (A) 3.5 - 6.0 cm    IVS 1.30 (A) 0.6 - 1.1 cm    Posterior Wall 1.29 (A) 0.6 - 1.1 cm    Ao root annulus 2.70 cm    LVIDs 5.50 (A) 2.1 - 4.0 cm    FS 9 28 - 44 %    IVC ostium 2.0 cm    LV mass 353.93 g    LA size 4.30 cm    RVDD 5.40 cm    TAPSE 1.60 cm    Left Ventricle Relative Wall Thickness 0.43 cm    AV mean gradient 1 mmHg    AV valve area 2.27 cm2    AV Velocity Ratio 0.86     AV index (prosthetic) 1.00     MV mean gradient 22 mmHg    MV valve area p 1/2 method 2.78 cm2    MV valve area by continuity eq 0.24 cm2    E wave deceleration time 163.00 msec    LVOT diameter 1.70 cm    LVOT area 2.3 cm2    LVOT peak jim 0.6 m/s    LVOT peak VTI 6.00 cm    Ao peak jim 0.7 m/s    Ao VTI 6.00 cm    LVOT stroke volume 13.61 cm3    AV peak gradient 2 mmHg    MV peak gradient 36 mmHg    TV rest pulmonary artery pressure 38 mmHg    MV Peak E Jim 2.18 m/s    TR Max Jim 2.40 m/s    MV VTI 56.5 cm    MV stenosis pressure 1/2 time 79.00 ms    LV Systolic Volume 148.00 mL    LV Systolic Volume Index 71.5 mL/m2    LV Diastolic Volume 184.80 mL    LV Diastolic Volume Index 89.28 mL/m2    LV Mass Index 171 g/m2    Echo EF Estimated 20 %    RA Major Axis 6.00 cm    Triscuspid Valve Regurgitation Peak Gradient 23 mmHg    Narrative    · Atrial fibrillation observed.  · The left ventricle is moderately enlarged with mild concentric   hypertrophy and severely decreased systolic function.  · The estimated ejection fraction is 15%.  · There is severe left ventricular global hypokinesis.  · There is abnormal septal wall motion consistent with right ventricular   pacemaker.  · Severe right ventricular enlargement with moderately to severely reduced   right ventricular systolic function.  · Severe right atrial enlargement.  · Severe left atrial  enlargement.  · Moderate-to-severe mitral regurgitation.  · Severe tricuspid regurgitation.  · Elevated central venous pressure (15 mmHg).  · The estimated PA systolic pressure is 38 mmHg.  · Compared to prior ECHO from 11/2021; No significant change

## 2022-09-14 NOTE — CARE UPDATE
History of kidney transplant in medical record.  Details of this are unclear.        Given worsening renal function, will consult nephrology for tomorrow morning.

## 2022-09-14 NOTE — ASSESSMENT & PLAN NOTE
He is on apixiban at home  No evidence of DVT on doppler  He is currently on full dose lovenox (for a fib)

## 2022-09-14 NOTE — ASSESSMENT & PLAN NOTE
He has elevated Troponin  He is on aspirin, plavix, full dose lovenox  Cardiology consulted  Left heart catheterization from 01/05/2022:  Right common femoral artery access was obtained for angiography under ultrasound guidance.  Patent LIMA to LAD,  Patent SVG to PDA, proximal PDA has a stent which has 30% ISR, other stent is noted in the distal part of the graft which has 50% ISR.  SVG to circumflex is occluded.  Native RCA is completely occluded.  Distal left main appears hazy, both ostial circumflex and ostial 80 have at least intermediate 70% stenosis.  There is a patent long stent in the circumflex with mild ISR.

## 2022-09-14 NOTE — ASSESSMENT & PLAN NOTE
INTERMACS 3 with patient clinically stable but dependent on inotropic support. Vitals and labs indicate poor perfusion and end organ damage. Afterload reduction deferred at his time due to hypotension and need for Dobutamine and Norepinephrine infusions. Dobutamine increased since Afib is now controlled. ECHO results below. Will hold off on RHC as patient is improving.     ECHO with atrial fibrillation observed. LV moderately enlarged with mild concentric hypertrophy and severely decreased systolic function. EF of 15% with severe LV global hypokinesis. Abnormal septal wall motion consistent with RV pacemaker. Severe RV enlargement with moderately to severely reduced right ventricular systolic function. Severe RA and LA enlargement. Moderate-to-severe MR. Severe TR. Elevated CVP (15 mmHg). The estimated PA systolic pressure is 38 mmHg. Compared to prior ECHO from 11/2021; No significant change.

## 2022-09-14 NOTE — ASSESSMENT & PLAN NOTE
Worsened renal function.    Baseline creatinine is 2.5-3 and creatinine is currently 4.59.    May related to diuretic use versus low cardiac output.    Will monitor over coming days and consider Nephrology consult.

## 2022-09-14 NOTE — ASSESSMENT & PLAN NOTE
INR 2.5 in setting of hepatic congestion related heart failure  Monitor for risk of bleeding.  Hold Eliquis given liver disease as well as SHELDON on CKD.

## 2022-09-14 NOTE — HPI
78 yo M with significant cardiac history, well known to Cardiology who presented from [facility] with c/o hypotension and LE edema. Labs significant for proBNP of 80K, Troponin of 1200, BUN/Cr of 43/4.56, hyperkalemia of 6, transaminitis with AST/ALT of 181/273. EKG exhibits Afib with interventricular conduction delay. CXR with cardiomegaly. Original recommendation of dobutamine gtt not initiated due to hypotension. Patient was transferred to ICU and started on Dopamine and Norepi gtt with concern of Cardiogenic Shock.     (9/14/22) Upon examination, patient afebrile, resting comfortably supine. He exhibits lethargy, but is oriented x3. Patient warm to touch with irregular heart rate and bilateral rales noted upon ausculation. He exhibits 3+ pitting edema of bilateral lower extremities. There is concern of underlying infectious process, but thus far workup is negative. Blood cultures pending.

## 2022-09-14 NOTE — ASSESSMENT & PLAN NOTE
History of kidney transplant mention medical record.    Nephrology consulted given this as we must be careful if this were the case.

## 2022-09-14 NOTE — HPI
Mr. Stuart is a 78yo man history of CABG (2004), RHC and LHC 1/5/22 with triple-vessel disease and elevated pressure with details below, Ischemic cardiomyopathy with EF 20% (11/10/21), AICD, CKD stage 4, CVA 2019 and TIA 2022, tobacco use (60+ years), COPD, hearing loss, and chronic atrial fibrillation on Eliquis who presents with hypotension, lower extremity edema and pain, inability to tolerate oral diuresis at nursing home.  Patient has some dementia and does not recall the details of why he came to the hospital.  His complaint currently is that he has left lower extremity pain and swelling.  Patient had multiple organs affected in the emergency department included liver disease, SHELDON on CKD, hypotension.  Initial plan was for patient to be admitted to the floor but he became hypotensive prior to administration of IV Lasix.  Case discussed with Cardiology and plan to place patient on dopamine and IV Lasix.  Initial plan to start patient on dobutamine was abandoned after patient became hypotensive.  Plan to transfer patient to the ICU for further care

## 2022-09-14 NOTE — ASSESSMENT & PLAN NOTE
With RVR  He is on amiodarone  Also on full dose lovenox  He is on levothyroxine  I do not see TSH since 3/2020  I will check TSh and free T4  On full dose lovenox for anticoagulation  Was on eliquis for home medicine

## 2022-09-14 NOTE — ASSESSMENT & PLAN NOTE
4/21/21-LLE.  4/23/21-Venous Doppler-no DVT    Prophylactic heparin given.     Lower extremity Dopplers given pain and swelling of left lower extremity.

## 2022-09-14 NOTE — CONSULTS
" [facility]  Adult Nutrition  Consult Note    SUMMARY     Recommendations    Recommendation/Intervention: Recommend addition of Renal restriction given pt with CKD3. Recommend addition of Edwardo BID to aid in wound healing.  Goals: consume % of meals, tolerate PO intake, weight maintenance, wound healing  Nutrition Goal Status: new    Assessment and Plan  Consult received and appreciated. Current weight is over IBW range and BMI considered overweight per geriatric guidelines, needs adjusted. Pt is currently receiving a Cardiac diet and reports good appetite. Recommend addition of Renal restriction given pt with CKD3 to better meet nutritional needs.     Noted, pt with wounds. Recommend addition of Edwardo BID to aid in wound healing.     Pt denies recent unintentional weight loss, n/v/diarrhea, swallowing difficulties. Pt with no teeth. Recommend mechanical soft diet consistency or pureed. Pt reports an allergy to eggs, will notify kitchen.     Last BM 9/13 per flowsheets. Labs/meds reviewed. Elevated BUN, Cr and low GFR as expected given pt with CKD3. Will continue to monitor. RD following.     Contributing Nutrition Diagnosis  Increased nutrient needs (protein)    Related to (etiology):   Increased demand     Signs and Symptoms (as evidenced by):   wound    Interventions/Recommendations (treatment strategy):  Recommend Edwardo BID to aid in wound healing    Nutrition Diagnosis Status:   New     Reason for Assessment    Reason For Assessment: consult  Diagnosis: other (see comments) (cardiogenic shock)  Relevant Medical History: a-fib, COPD, CAD, HTN, CHF, CKD3, history of kidney transplant per MD note    Nutrition Risk Screen    Nutrition Risk Screen: no indicators present    Nutrition/Diet History    Food Allergies: egg    Anthropometrics    Temp: 98.3 °F (36.8 °C)  Height Method: Stated  Height: 5' 9" (175.3 cm)  Height (inches): 69 in  Weight Method: Bed Scale  Weight: 91.2 kg (201 lb 1 " oz)  Weight (lb): 201.06 lb  Ideal Body Weight (IBW), Male: 160 lb  % Ideal Body Weight, Male (lb): 125.66 %  BMI (Calculated): 29.7       Lab/Procedures/Meds   Latest Reference Range & Units 09/14/22 04:37   Sodium 136 - 145 mmol/L 135 (L)   Potassium 3.5 - 5.1 mmol/L 6.0 (H)   Chloride 98 - 107 mmol/L 106   CO2 21 - 32 mmol/L 23   Anion Gap 7 - 16 mmol/L 12   BUN 7 - 18 mg/dL 43 (H)   Creatinine 0.70 - 1.30 mg/dL 4.56 (H)   BUN/CREAT RATIO 6 - 20  9   eGFR >=60 mL/min/1.73m² 13 (L)   Glucose 74 - 106 mg/dL 133 (H)   Calcium 8.5 - 10.1 mg/dL 8.8   Magnesium 1.7 - 2.3 mg/dL 2.1   (L): Data is abnormally low  (H): Data is abnormally high    Note: Low Na. Elevated glucose, no PMH of DM. Elevated BUN, Cr and low GFR as expect given pt with CKD3. Elevated K, replete to WNL as needed. Recommend addition of renal diet restriction which should limit high potassium foods.     Pertinent Labs Reviewed: reviewed  Pertinent Medications Reviewed: reviewed  Pertinent Medications Comments: furosemide, heparin, levothyroxine, dopamine, norepinephrine    Estimated/Assessed Needs    Weight Used For Calorie Calculations: 77 kg (169 lb 12.1 oz)  Energy Calorie Requirements (kcal): 9111-4166 kcals/day (25-30 kcals/kg Adj BW)     Protein Requirements: 62-77 g/day (0.8-1.0 g/gk) (increased protein needs but pt also with CKD3 so modified protein recommendations)  Weight Used For Protein Calculations: 77 kg (169 lb 12.1 oz)  Fluid Requirements (mL): 4048-8040 mL/day (1 mL/kcal) or per MD     RDA Method (mL): 1925         Nutrition Prescription Ordered    Current Diet Order: Cardiac  Nutrition Order Comments: Recommend addition of Renal restriction given pt with CKD3.    Evaluation of Received Nutrient/Fluid Intake       % Intake of Estimated Energy Needs/ % meals: pt reports good appetite and eating well but RD was not able to determine exact amount that pt was eating. Pt did reports consuming 3 meals/snack per day prior to admission.      Nutrition Risk    Level of Risk/Frequency of Follow-up: moderate       Monitor and Evaluation      1. Monitor PO intake/tolerance  2. Monitor weight changes  3. Monitor labs/meds  4. Monitor wound healing  5. Monitor POC       Nutrition Follow-Up    RD Follow-up?: Yes

## 2022-09-14 NOTE — ASSESSMENT & PLAN NOTE
CVA in 2019 and TIA in 2022.  Continue to evaluate for neurologic deficits.    Follow-up with Neurology.

## 2022-09-14 NOTE — CONSULTS
[facility] ICU  Nephrology  Consult Note    Patient Name: Luisito Stuart  MRN: [id number]  Admission Date: 9/13/2022  Hospital Length of Stay: 1 days  Attending Provider: Keenan Gregory DO   Primary Care Physician: JOYA Hill  Principal Problem:Cardiogenic shock    Consults  Subjective:     HPI: This patient with multiple medical problems that include CAD, CKD, a kidney transplant, atrial fibrillation, CHF with EF of 15% presented with shortness of breath, fatigue and low blood pressure.  He has been admitted to the ICU on dopamine infusion.  Nephrology has been consulted for renal issues.  The patient's serum creatinine is noted to be 4.5.  His lactic acid is 1.4.  The patient is hard of hearing and has a history of dementia.      Past Medical History:   Diagnosis Date    Atrial fibrillation     CHF (congestive heart failure)     COPD (chronic obstructive pulmonary disease)     Coronary artery disease     Disorder of kidney and ureter     Hypertension        Past Surgical History:   Procedure Laterality Date    APPENDECTOMY      CARDIAC DEFIBRILLATOR PLACEMENT      CORONARY ANGIOGRAPHY INCLUDING BYPASS GRAFTS WITH CATHETERIZATION OF LEFT HEART N/A 1/5/2022    Procedure: ANGIOGRAM, CORONARY, INCLUDING BYPASS GRAFT, WITH LEFT HEART CATHETERIZATION;  Surgeon: Gerda Castellanos MD;  Location: [facility] CATH LAB;  Service: Cardiology;  Laterality: N/A;    JOINT REPLACEMENT      KIDNEY TRANSPLANT      RIGHT HEART CATHETERIZATION Right 1/5/2022    Procedure: INSERTION, CATHETER, RIGHT HEART;  Surgeon: Gerda Castellanos MD;  Location: [facility] CATH LAB;  Service: Cardiology;  Laterality: Right;       Review of patient's allergies indicates:   Allergen Reactions    Indomethacin Itching and Other (See Comments)     Other reaction(s): ITCHING    Lipitor [atorvastatin] Other (See Comments)     Muscle cramps     Current Facility-Administered Medications   Medication Frequency     acetaminophen tablet 1,000 mg Q8H PRN    acetaminophen tablet 650 mg Q8H PRN    acetaminophen tablet 650 mg Q4H PRN    aluminum-magnesium hydroxide-simethicone 200-200-20 mg/5 mL suspension 30 mL QID PRN    amiodarone (CORDARONE) 450 mg in dextrose 5 % 250 mL infusion Continuous    amiodarone (CORDARONE) 450 mg in dextrose 5 % 250 mL infusion Continuous    aspirin EC tablet 81 mg Daily    clopidogreL tablet 75 mg Daily    dextrose 50% injection 12.5 g PRN    dextrose 50% injection 25 g PRN    DOPamine 800 mg in dextrose 5 % 250 mL infusion (premix) (NON-TITRATING) Continuous    enoxaparin injection 90 mg Daily    glucagon (human recombinant) injection 1 mg PRN    HYDROcodone-acetaminophen 5-325 mg per tablet 1 tablet Q6H PRN    levothyroxine tablet 75 mcg Before breakfast    magnesium oxide tablet 800 mg PRN    magnesium oxide tablet 800 mg PRN    melatonin tablet 6 mg Nightly PRN    mupirocin 2 % ointment BID    naloxone 0.4 mg/mL injection 0.02 mg PRN    NORepinephrine 4 mg in dextrose 5 % 250 mL infusion Continuous    ondansetron disintegrating tablet 8 mg Q8H PRN    polyethylene glycol packet 17 g Daily PRN    potassium bicarbonate disintegrating tablet 35 mEq PRN    potassium bicarbonate disintegrating tablet 50 mEq PRN    potassium bicarbonate disintegrating tablet 60 mEq PRN    potassium, sodium phosphates 280-160-250 mg packet 2 packet PRN    potassium, sodium phosphates 280-160-250 mg packet 2 packet PRN    potassium, sodium phosphates 280-160-250 mg packet 2 packet PRN    sodium chloride 0.9% flush 10 mL PRN     Family History       Problem Relation (Age of Onset)    Cancer Mother    Heart disease Father          Tobacco Use    Smoking status: Every Day     Packs/day: 1.00     Years: 46.00     Pack years: 46.00     Types: Cigarettes    Smokeless tobacco: Never   Substance and Sexual Activity    Alcohol use: Not Currently    Drug use: Never    Sexual activity: Not  Currently     Review of Systems   All other systems reviewed and are negative.  Objective:     Vital Signs (Most Recent):  Temp: 98.5 °F (36.9 °C) (09/14/22 1536)  Pulse: 102 (09/14/22 1530)  Resp: 14 (09/14/22 1530)  BP: (!) 110/46 (09/14/22 1530)  SpO2: (!) 93 % (09/14/22 1530)  O2 Device (Oxygen Therapy): room air (09/13/22 1354)   Vital Signs (24h Range):  Temp:  [97.8 °F (36.6 °C)-98.5 °F (36.9 °C)] 98.5 °F (36.9 °C)  Pulse:  [] 102  Resp:  [10-24] 14  SpO2:  [75 %-100 %] 93 %  BP: ()/(22-99) 110/46     Weight: 91.2 kg (201 lb 1 oz) (09/14/22 1000)  Body mass index is 29.69 kg/m².  Body surface area is 2.11 meters squared.    I/O last 3 completed shifts:  In: 87.4 [I.V.:87.4]  Out: 300 [Urine:300]    Physical Exam  Vitals reviewed.   HENT:      Head: Normocephalic and atraumatic.      Ears:      Comments: Hard of hearing     Mouth/Throat:      Mouth: Mucous membranes are moist.   Eyes:      Pupils: Pupils are equal, round, and reactive to light.   Cardiovascular:      Rate and Rhythm: Rhythm irregular.   Pulmonary:      Effort: Pulmonary effort is normal.   Abdominal:      Palpations: Abdomen is soft.   Skin:     General: Skin is warm.   Neurological:      Mental Status: He is alert.      Motor: Weakness present.   Psychiatric:         Mood and Affect: Mood normal.       Significant Labs:  BMP:   Recent Labs   Lab 09/14/22  0437 09/14/22  1300   *  --    *  --    K 6.0* 4.4     --    CO2 23  --    BUN 43*  --    CREATININE 4.56*  --    CALCIUM 8.8  --    MG 2.1  --      CBC:   Recent Labs   Lab 09/14/22  0437   WBC 10.61   RBC 3.36*   HGB 10.2*   HCT 31.5*   *   MCV 93.8   MCH 30.4   MCHC 32.4       Significant Imaging:  Labs: Reviewed    Assessment/Plan:     CKD (chronic kidney disease), stage III  Avoid nephrotoxic agents  No NSAIDS    Ischemic cardiomyopathy  EF 15%    A-fib  Chronic condition    Acute on chronic congestive heart failure  Latest ECHO performed and  demonstrates- Results for orders placed during the hospital encounter of 09/13/22    Echo    Interpretation Summary  · Atrial fibrillation observed.  · The left ventricle is moderately enlarged with mild concentric hypertrophy and severely decreased systolic function.  · The estimated ejection fraction is 15%.  · There is severe left ventricular global hypokinesis.  · There is abnormal septal wall motion consistent with right ventricular pacemaker.  · Severe right ventricular enlargement with moderately to severely reduced right ventricular systolic function.  · Severe right atrial enlargement.  · Severe left atrial enlargement.  · Moderate-to-severe mitral regurgitation.  · Severe tricuspid regurgitation.  · Elevated central venous pressure (15 mmHg).  · The estimated PA systolic pressure is 38 mmHg.      Chronic obstructive pulmonary disease  Chronic condition     BMP in AM  Strict I/Os  Protect left arm  Hepatitis panel  Phosphorus    Thank you for your consult. I will follow-up with patient. Please contact us if you have any additional questions.    Bret Marcus Jr, MD  Nephrology  [facility] ICU

## 2022-09-14 NOTE — ASSESSMENT & PLAN NOTE
Upon review of vitals and labs, patient with poor perfusion and end organ damage. Afterload reduction deferred at his time due to hypotension and need for Dopamine and Norepinephrine infusions. ECHO results below. Will plan for RHC on 9/15/22 if mentation not improved.     ECHO with atrial fibrillation observed. LV moderately enlarged with mild concentric hypertrophy and severely decreased systolic function. EF of 15% with severe LV global hypokinesis. Abnormal septal wall motion consistent with RV pacemaker. Severe RV enlargement with moderately to severely reduced right ventricular systolic function. Severe RA and LA enlargement. Moderate-to-severe MR. Severe TR. Elevated CVP (15 mmHg). The estimated PA systolic pressure is 38 mmHg. Compared to prior ECHO from 11/2021; No significant change.

## 2022-09-14 NOTE — SUBJECTIVE & OBJECTIVE
Past Medical History:   Diagnosis Date    Atrial fibrillation     CHF (congestive heart failure)     COPD (chronic obstructive pulmonary disease)     Coronary artery disease     Disorder of kidney and ureter     Hypertension        Past Surgical History:   Procedure Laterality Date    APPENDECTOMY      CARDIAC DEFIBRILLATOR PLACEMENT      CORONARY ANGIOGRAPHY INCLUDING BYPASS GRAFTS WITH CATHETERIZATION OF LEFT HEART N/A 1/5/2022    Procedure: ANGIOGRAM, CORONARY, INCLUDING BYPASS GRAFT, WITH LEFT HEART CATHETERIZATION;  Surgeon: Gerda Castellanos MD;  Location: [facility] CATH LAB;  Service: Cardiology;  Laterality: N/A;    JOINT REPLACEMENT      KIDNEY TRANSPLANT      RIGHT HEART CATHETERIZATION Right 1/5/2022    Procedure: INSERTION, CATHETER, RIGHT HEART;  Surgeon: Gerda Castellanos MD;  Location: [facility] CATH LAB;  Service: Cardiology;  Laterality: Right;       Review of patient's allergies indicates:   Allergen Reactions    Indomethacin Itching and Other (See Comments)     Other reaction(s): ITCHING    Lipitor [atorvastatin] Other (See Comments)     Muscle cramps       No current facility-administered medications on file prior to encounter.     Current Outpatient Medications on File Prior to Encounter   Medication Sig    apixaban (ELIQUIS) 5 mg Tab Take 1 tablet (5 mg total) by mouth 2 (two) times daily.    cyclobenzaprine (FLEXERIL) 10 MG tablet Take 10 mg by mouth every evening.    HYDROcodone-acetaminophen (NORCO) 5-325 mg per tablet Take 1 tablet by mouth every 4 (four) hours as needed for Pain.    levothyroxine (SYNTHROID) 75 MCG tablet Take 75 mcg by mouth before breakfast.    nitroGLYCERIN 0.4 MG/DOSE TL SPRY (NITROLINGUAL) 400 mcg/spray spray Place 1 spray under the tongue every 5 (five) minutes as needed for Chest pain.    ondansetron (ZOFRAN) 8 MG tablet Take by mouth every 6 (six) hours as needed for Nausea.    sacubitriL-valsartan (ENTRESTO) 24-26 mg per tablet Take 1 tablet by mouth 2 (two) times  daily.    torsemide (DEMADEX) 20 MG Tab Take 2 tablets (40 mg total) by mouth 2 (two) times daily before meals.    amiodarone (PACERONE) 200 MG Tab Take 1 tablet (200 mg total) by mouth once daily.    carvediloL (COREG) 3.125 MG tablet Take 1 tablet (3.125 mg total) by mouth 2 (two) times daily with meals. Pt. Takes 1/2 tab daily    clopidogreL (PLAVIX) 75 mg tablet Take 75 mg by mouth once daily.    clotrimazole (LOTRIMIN) 1 % cream Apply to affected area 2 times daily    HYDROcodone-acetaminophen (NORCO)  mg per tablet Take 1 tablet by mouth every 6 (six) hours as needed for Pain.    methocarbamoL (ROBAXIN) 750 MG Tab Take 1 tablet (750 mg total) by mouth 4 (four) times daily.    pantoprazole (PROTONIX) 40 MG tablet Take 1 tablet (40 mg total) by mouth once daily.    pravastatin (PRAVACHOL) 20 MG tablet Take 1 tablet (20 mg total) by mouth once daily.     Family History       Problem Relation (Age of Onset)    Cancer Mother    Heart disease Father          Tobacco Use    Smoking status: Every Day     Packs/day: 1.00     Years: 46.00     Pack years: 46.00     Types: Cigarettes    Smokeless tobacco: Never   Substance and Sexual Activity    Alcohol use: Not Currently    Drug use: Never    Sexual activity: Not Currently     Review of Systems   Constitutional:  Negative for activity change, chills, fatigue and fever.   HENT:  Negative for congestion and sore throat.    Respiratory:  Positive for shortness of breath. Negative for chest tightness.    Gastrointestinal:  Negative for abdominal distention, abdominal pain and diarrhea.   Endocrine: Negative for cold intolerance and heat intolerance.   Genitourinary:  Negative for dysuria.   Musculoskeletal:  Positive for joint swelling. Negative for arthralgias and back pain.        Left lower extremity pain and swelling.    Skin:  Negative for color change and rash.   Neurological:  Positive for weakness. Negative for dizziness, tremors and headaches.    Psychiatric/Behavioral:  Negative for agitation. The patient is not nervous/anxious.    Objective:     Vital Signs (Most Recent):  Temp: 97.7 °F (36.5 °C) (09/13/22 1354)  Pulse: (!) 113 (09/13/22 1904)  Resp: 11 (09/13/22 1544)  BP: 117/60 (09/13/22 1904)  SpO2: (!) 75 % (09/13/22 1904)   Vital Signs (24h Range):  Temp:  [97.7 °F (36.5 °C)] 97.7 °F (36.5 °C)  Pulse:  [] 113  Resp:  [11-17] 11  SpO2:  [75 %-100 %] 75 %  BP: ()/(45-71) 117/60     Weight: 86.2 kg (190 lb)  Body mass index is 28.06 kg/m².    Physical Exam  Vitals and nursing note reviewed.   Constitutional:       Appearance: He is ill-appearing.   HENT:      Head: Normocephalic.      Right Ear: Tympanic membrane normal.      Nose: Nose normal.   Cardiovascular:      Rate and Rhythm: Tachycardia present. Rhythm irregular.      Pulses: Normal pulses.      Heart sounds: Normal heart sounds.   Pulmonary:      Effort: Pulmonary effort is normal.      Breath sounds: Rhonchi and rales present.   Abdominal:      General: Abdomen is flat. Bowel sounds are normal. There is distension.      Palpations: Abdomen is soft.   Musculoskeletal:         General: Swelling and tenderness present.      Cervical back: Normal range of motion.      Right lower leg: Edema present.      Left lower leg: Edema present.      Comments: L>R LE edema    Skin:     General: Skin is warm and dry.   Neurological:      General: No focal deficit present.      Mental Status: He is alert. He is disoriented.      Motor: Weakness present.   Psychiatric:         Mood and Affect: Mood normal.           Significant Labs: All pertinent labs within the past 24 hours have been reviewed.    Significant Imaging: I have reviewed all pertinent imaging results/findings within the past 24 hours.

## 2022-09-14 NOTE — SUBJECTIVE & OBJECTIVE
Interval History: The patient was sent to the ER for refractory hypotension. He has been started on levophed and dopamine. He is currently resting comfortably in no distress. He is afebrile. In a fib with heart rate in 130's    Objective:     Vital Signs (Most Recent):  Temp: 98.5 °F (36.9 °C) (09/14/22 1100)  Pulse: (!) 134 (09/14/22 1100)  Resp: 13 (09/14/22 1100)  BP: (!) 102/49 (09/14/22 1100)  SpO2: 97 % (09/14/22 1100)   Vital Signs (24h Range):  Temp:  [97.7 °F (36.5 °C)-98.5 °F (36.9 °C)] 98.5 °F (36.9 °C)  Pulse:  [] 134  Resp:  [10-23] 13  SpO2:  [75 %-100 %] 97 %  BP: ()/(32-99) 102/49     Weight: 91.2 kg (201 lb 1 oz)  Body mass index is 29.69 kg/m².      Intake/Output Summary (Last 24 hours) at 9/14/2022 1148  Last data filed at 9/14/2022 1101  Gross per 24 hour   Intake 327.39 ml   Output 650 ml   Net -322.61 ml       Physical Exam  Vitals and nursing note reviewed.   Constitutional:       General: He is not in acute distress.     Appearance: He is not ill-appearing.   HENT:      Head: Normocephalic.      Right Ear: External ear normal.      Left Ear: External ear normal.      Nose: Nose normal.      Mouth/Throat:      Pharynx: Oropharynx is clear.   Eyes:      Extraocular Movements: Extraocular movements intact.      Conjunctiva/sclera: Conjunctivae normal.      Pupils: Pupils are equal, round, and reactive to light.   Cardiovascular:      Rate and Rhythm: Tachycardia present. Rhythm irregular.      Pulses: Normal pulses.      Heart sounds: Normal heart sounds.   Pulmonary:      Effort: Pulmonary effort is normal. No respiratory distress.      Breath sounds: No wheezing or rhonchi.   Abdominal:      General: Abdomen is flat. Bowel sounds are normal. There is no distension.      Palpations: Abdomen is soft.   Musculoskeletal:         General: No swelling or tenderness. Normal range of motion.      Cervical back: Normal range of motion and neck supple.      Right lower leg: Edema present.       Left lower leg: Edema present.      Comments: L>R LE edema    Skin:     General: Skin is warm and dry.      Coloration: Skin is not pale.   Neurological:      General: No focal deficit present.      Mental Status: He is alert.      Cranial Nerves: No cranial nerve deficit.      Motor: Weakness present.      Comments: Oriented to person and place. He had some issues with time   Psychiatric:         Mood and Affect: Mood normal.       Vents:       Lines/Drains/Airways       Drain  Duration                  Urethral Catheter 09/13/22 2330 <1 day              Peripheral Intravenous Line  Duration                  Peripheral IV - Single Lumen 09/13/22 1425 18 G Anterior;Distal;Left Upper Arm <1 day         Peripheral IV - Single Lumen 09/14/22 0447 20 G Anterior;Right Forearm <1 day                    Significant Labs:    CBC/Anemia Profile:  Recent Labs   Lab 09/13/22 1429 09/14/22 0437   WBC 8.55 10.61   HGB 10.2* 10.2*   HCT 33.5* 31.5*    149*   MCV 98.2* 93.8   RDW 16.3* 15.9*        Chemistries:  Recent Labs   Lab 09/13/22 1429 09/14/22 0437    135*   K 4.4 6.0*    106   CO2 25 23   BUN 43* 43*   CREATININE 4.59* 4.56*   CALCIUM 9.2 8.8   ALBUMIN 3.5  --    PROT 6.2*  --    BILITOT 1.4*  --    ALKPHOS 170*  --    *  --    *  --    MG 1.8 2.1       All pertinent labs within the past 24 hours have been reviewed.    Significant Imaging:  I have reviewed all pertinent imaging results/findings within the past 24 hours.

## 2022-09-14 NOTE — PLAN OF CARE
[facility]  Initial Discharge Assessment       Primary Care Provider: JOYA Hill    Admission Diagnosis: Dyspnea [R06.00]  Congestive heart failure, unspecified HF chronicity, unspecified heart failure type [I50.9]  Hypotension [I95.9]    Admission Date: 9/13/2022  Expected Discharge Date:     Discharge Barriers Identified: None    Payor: [employer] MANAGED MEDICARE / Plan: [employer] MEDICARE PPO / Product Type: Medicare Advantage /     Extended Emergency Contact Information  Primary Emergency Contact: Renate Dinero  Mobile Phone: [phone]  Relation: Daughter  Preferred language: English   needed? No    Discharge Plan A: Skilled Nursing Facility  Discharge Plan B: Return to Nursing Home      [address] [address]  Phone: [phone] Fax: [phone]    [facility] [address] [place] [address] [address] [address]  Phone: [phone] Fax: [phone]    [facility] [address] [address]  Phone: [phone] Fax: [phone]      Initial Assessment (most recent)       Adult Discharge Assessment - 09/14/22 1021          Discharge Assessment    Assessment Type Discharge Planning Assessment     Confirmed/corrected address, phone number and insurance Yes     Confirmed Demographics Correct on Facesheet     Source of Information family     Communicated MORGAN with patient/caregiver Date not available/Unable to determine     Lives With facility resident     Facility Arrived From: [facility] and rehab     Do you expect to return to your current living situation? Yes     Do you have help at home or someone to help you manage your care at home? Yes     Prior to hospitilization cognitive status: Unable to Assess     Current cognitive status: Unable to Assess     Walking or  Climbing Stairs Difficulty none     Equipment Currently Used at Home none     Patient currently being followed by outpatient case management? No     Do you currently have service(s) that help you manage your care at home? No     Do you have prescription coverage? Yes     Coverage [employer]     How do you get to doctors appointments? other (see comments)     Are you on dialysis? No     Discharge Plan A Skilled Nursing Facility     Discharge Plan B Return to Nursing Home     DME Needed Upon Discharge  none     Discharge Plan discussed with: Patient     Discharge Barriers Identified None                      Ss spoke with pt's hanh peter and pt is a resident at [facility] rehab and may return at d/c. Packet started. Ss following

## 2022-09-14 NOTE — HPI
Mr. Stuart is a 78yo man history of CABG (2004), RHC and C 1/5/22 with triple-vessel disease and elevated pressure with details below, Ischemic cardiomyopathy with EF 20% (11/10/22), AICD, CKD stage 4, CVA 2019 and TIA 2022, tobacco use (60+ years), COPD, hearing loss, and chronic atrial fibrillation on Eliquis who presents with hypotension, lower extremity edema and pain, inability to tolerate oral diuresis at nursing home.  Patient has some dementia and does not recall the details of why he came to the hospital.  His complaint currently is that he has left lower extremity pain and swelling.  Patient had multiple organs affected in the emergency department included liver disease, SHELDON on CKD, hypotension.  Initial plan was for patient to be admitted to the floor but he became hypotensive prior to administration of IV Lasix.  Case discussed with Cardiology and plan to place patient on dopamine and IV Lasix.  Initial plan to start patient on dobutamine was abandoned after patient became hypotensive.  Plan to transfer patient to the ICU for further care        C and RH (1/5/22)  The Dist LM to Ost LAD lesion was 60% stenosed.  The Ost Cx lesion was 70% stenosed.  The Ost RCA to Prox RCA lesion was 100% stenosed.  The Mid LAD lesion was 70% stenosed.  The Dist Graft to Insertion lesion was 50% stenosed.  The estimated blood loss was none.  There was three vessel coronary artery disease. There was left main disease.  Patient is admitted with decompensated heart failure on dobutamine with concern for low output with history of severe ischemic cardiomyopathy.  Point with very mildly elevated at 80.  He did complain chest pains.  Proceeded perform right heart catheterization and left heart catheterization the right femoral artery approach.  Right heart catheterization which showed elevated filling pressures with low cardiac output.  Right atrial pressure was 19,  Right ventricular pressure was 56/15  Pulmonary  arterial pressure was 51/27 with a mean of 37.  Wedge pressure was 34.  Pulmonary arterial saturation was 45%.  Cardiac output is 3.35, cardiac index is 1.69.  These numbers were obtained off dobutamine that was prior to the catheterization when he was noted to be tachycardic having chest pains.    Right common femoral artery access was obtained for angiography under ultrasound guidance.  Patent LIMA to LAD,  Patent SVG to PDA, proximal PDA has a stent which has 30% ISR, other stent is noted in the distal part of the graft which has 50% ISR.  SVG to circumflex is occluded.  Native RCA is completely occluded.  Distal left main appears hazy, both ostial circumflex and ostial 80 have at least intermediate 70% stenosis.  There is a patent long stent in the circumflex with mild ISR.    Patient appears to be in decompensated heart failure, mild troponin elevation is likely from demand ischemia.  We opted not to pursue high-risk PCI to distal left main and ostial circumflex given concerns for compromise of the LAD diagonal system.  This may need mechanical circulatory support during PCI.  At this time would recommend diuresis and dobutamine and then consider PCI once he is more compensated.

## 2022-09-15 PROBLEM — R79.89 TROPONIN LEVEL ELEVATED: Status: RESOLVED | Noted: 2022-01-01 | Resolved: 2022-01-01

## 2022-09-15 NOTE — PLAN OF CARE
Problem: Adult Inpatient Plan of Care  Goal: Plan of Care Review  Outcome: Ongoing, Not Progressing  Goal: Patient-Specific Goal (Individualized)  Outcome: Ongoing, Not Progressing  Goal: Absence of Hospital-Acquired Illness or Injury  Outcome: Ongoing, Not Progressing  Goal: Optimal Comfort and Wellbeing  Outcome: Ongoing, Not Progressing  Goal: Readiness for Transition of Care  Outcome: Ongoing, Not Progressing     Problem: Skin Injury Risk Increased  Goal: Skin Health and Integrity  Outcome: Ongoing, Not Progressing     Problem: Fluid and Electrolyte Imbalance (Acute Kidney Injury/Impairment)  Goal: Fluid and Electrolyte Balance  Outcome: Ongoing, Not Progressing     Problem: Oral Intake Inadequate (Acute Kidney Injury/Impairment)  Goal: Optimal Nutrition Intake  Outcome: Ongoing, Not Progressing     Problem: Renal Function Impairment (Acute Kidney Injury/Impairment)  Goal: Effective Renal Function  Outcome: Ongoing, Not Progressing     Problem: Infection  Goal: Absence of Infection Signs and Symptoms  Outcome: Ongoing, Not Progressing     Problem: Impaired Wound Healing  Goal: Optimal Wound Healing  Outcome: Ongoing, Not Progressing

## 2022-09-15 NOTE — HOSPITAL COURSE
78 yo M with significant cardiac history, well known to Cardiology who presented from [facility] with c/o hypotension and LE edema. Labs significant for proBNP of 80K, Troponin of 1200, BUN/Cr of 43/4.56, hyperkalemia of 6, transaminitis with AST/ALT of 181/273. EKG exhibits Afib with interventricular conduction delay. CXR with cardiomegaly. Original recommendation of dobutamine gtt not initiated due to hypotension. Patient was transferred to ICU and started on Dopamine and Norepi gtt with concern of Cardiogenic Shock.     (9/14/22) Upon examination, patient afebrile, resting comfortably supine. He exhibits lethargy, but is oriented x3. Patient warm to touch with irregular heart rate and bilateral rales noted upon ausculation. He exhibits 3+ pitting edema of bilateral lower extremities. There is concern of underlying infectious process, but thus far workup is negative. Blood cultures pending.

## 2022-09-15 NOTE — ASSESSMENT & PLAN NOTE
Cr of 4.56 with baseline Cr of 2.2. Possible hypoperfusion/congestion associated with poor cardiac output vs. New baseline for patient. Nephrology is following. Continue to monitor.

## 2022-09-15 NOTE — PROGRESS NOTES
[facility]  Pulmonology  Progress Note    Patient Name: Luisito Stuart  MRN: [id number]  Admission Date: 9/13/2022  Hospital Length of Stay: 2 days  Code Status: DNR  Attending Provider: Keenan Gregory DO  Primary Care Provider: JOYA Hill   Principal Problem: Acute on chronic congestive heart failure    Subjective:     Interval History: No acute events overnight. The patient is currently resting comfortably. HR is better. Patient started on amiodarone. He is afebrile.    Objective:     Vital Signs (Most Recent):  Temp: 97.5 °F (36.4 °C) (09/15/22 1107)  Pulse: (!) 111 (09/15/22 1130)  Resp: 13 (09/15/22 1130)  BP: (!) 96/44 (09/15/22 1130)  SpO2: (!) 92 % (09/15/22 1130)   Vital Signs (24h Range):  Temp:  [97.5 °F (36.4 °C)-98.5 °F (36.9 °C)] 97.5 °F (36.4 °C)  Pulse:  [] 111  Resp:  [11-23] 13  SpO2:  [86 %-98 %] 92 %  BP: ()/(20-93) 96/44     Weight: 90.9 kg (200 lb 6.4 oz)  Body mass index is 29.59 kg/m².      Intake/Output Summary (Last 24 hours) at 9/15/2022 1342  Last data filed at 9/15/2022 1100  Gross per 24 hour   Intake 1118.73 ml   Output 2500 ml   Net -1381.27 ml         Physical Exam  Vitals and nursing note reviewed.   Constitutional:       General: He is not in acute distress.     Appearance: He is not ill-appearing.   HENT:      Head: Normocephalic.      Right Ear: External ear normal.      Left Ear: External ear normal.      Nose: Nose normal.      Mouth/Throat:      Pharynx: Oropharynx is clear.   Eyes:      Extraocular Movements: Extraocular movements intact.      Conjunctiva/sclera: Conjunctivae normal.      Pupils: Pupils are equal, round, and reactive to light.   Cardiovascular:      Rate and Rhythm: Tachycardia present. Rhythm irregular.      Pulses: Normal pulses.      Heart sounds: Normal heart sounds.   Pulmonary:      Effort: Pulmonary effort is normal. No respiratory distress.      Breath sounds: No wheezing or rhonchi.   Abdominal:       General: Abdomen is flat. Bowel sounds are normal. There is no distension.      Palpations: Abdomen is soft.   Musculoskeletal:         General: No swelling or tenderness. Normal range of motion.      Cervical back: Normal range of motion and neck supple.      Right lower leg: Edema present.      Left lower leg: Edema present.      Comments: L>R LE edema    Skin:     General: Skin is warm and dry.      Coloration: Skin is not pale.   Neurological:      General: No focal deficit present.      Mental Status: He is alert.      Cranial Nerves: No cranial nerve deficit.      Motor: Weakness present.      Comments: Oriented to person and place. He had some issues with time   Psychiatric:         Mood and Affect: Mood normal.       Vents:  Oxygen Concentration (%): 21 (09/14/22 1700)    Lines/Drains/Airways       Drain  Duration                  Urethral Catheter 09/13/22 2330 1 day              Peripheral Intravenous Line  Duration                  Peripheral IV - Single Lumen 09/13/22 1425 18 G Anterior;Distal;Left Upper Arm 1 day         Peripheral IV - Single Lumen 09/14/22 0447 20 G Anterior;Right Forearm 1 day                    Significant Labs:    CBC/Anemia Profile:  Recent Labs   Lab 09/13/22  1429 09/14/22  0437 09/15/22  0423   WBC 8.55 10.61 9.72   HGB 10.2* 10.2* 10.1*   HCT 33.5* 31.5* 31.6*    149* 195   MCV 98.2* 93.8 94.6   RDW 16.3* 15.9* 16.0*          Chemistries:  Recent Labs   Lab 09/13/22  1429 09/14/22  0437 09/14/22  1300 09/15/22  0423    135*  --  134*   K 4.4 6.0* 4.4 4.7    106  --  100   CO2 25 23  --  25   BUN 43* 43*  --  47*   CREATININE 4.59* 4.56*  --  4.59*   CALCIUM 9.2 8.8  --  9.2   ALBUMIN 3.5  --   --   --    PROT 6.2*  --   --   --    BILITOT 1.4*  --   --   --    ALKPHOS 170*  --   --   --    *  --   --   --    *  --   --   --    MG 1.8 2.1  --  1.8   PHOS  --   --   --  4.4         All pertinent labs within the past 24 hours have been  reviewed.    Significant Imaging:  I have reviewed all pertinent imaging results/findings within the past 24 hours.    Assessment/Plan:     CVA (cerebral vascular accident)  CVA in 2019 and TIA in 2022.    Cardiogenic shock  Patient is hypotensive  Started on dopamine and levophed  Cardiology consulted  9/15 EF now around 15%. Started on dobutamine  Cardiology note reviewed and appreciated    Ischemic cardiomyopathy  EF 20% on past echo  Felt to have component of cardiogenic shock  Patient on entresto outpatient which has been held  Also on carvedilol outpatient  Currently on dopamine and levophed for hypotension  Cardiology consulted  Echo with EF around 15%  Now on dobutamine, dopamine discontinued    Coronary artery disease involving coronary bypass graft of native heart without angina pectoris  He has elevated Troponin  He is on aspirin, plavix, full dose lovenox  Cardiology consulted  Left heart catheterization from 01/05/2022:  Right common femoral artery access was obtained for angiography under ultrasound guidance.  Patent LIMA to LAD,  Patent SVG to PDA, proximal PDA has a stent which has 30% ISR, other stent is noted in the distal part of the graft which has 50% ISR.  SVG to circumflex is occluded.  Native RCA is completely occluded.  Distal left main appears hazy, both ostial circumflex and ostial 80 have at least intermediate 70% stenosis.  There is a patent long stent in the circumflex with mild ISR.    History of cardiac defibrillator placement  Stable  Cardiology consulted   I have spoken with cardiology    Acute renal failure superimposed on chronic kidney disease  Acute on chronic renal failure  Creatinine is 4.56. It was around 3 seven months ago  This is likely due to decreased effective circulating volume  Secondary to Cardiomyopathy with EF around 20%  Nephrology consulted by admitting team  We will try to avoid nephrotoxic medications  Renally dose meds  Follow Bun/Crea and urine  output  Creatinine is 4.59, UOP adequate 2100 ml    History of DVT (deep vein thrombosis)  He is on apixiban at home  No evidence of DVT on doppler  He is currently on full dose lovenox (for a fib)    Chronic obstructive pulmonary disease  Greater than 40 pack per year smoking history  Home meds include no maintenance medications  Currently no acute issues                 Keenan Gregory, DO  Pulmonology  [facility]

## 2022-09-15 NOTE — ASSESSMENT & PLAN NOTE
EF 20% on past echo  Felt to have component of cardiogenic shock  Patient on entresto outpatient which has been held  Also on carvedilol outpatient  Currently on dopamine and levophed for hypotension  Cardiology consulted  Echo with EF around 15%  Now on dobutamine, dopamine discontinued

## 2022-09-15 NOTE — ASSESSMENT & PLAN NOTE
Patient with elevated AST/ALT of 181/273 and Alk Phos of 170. Ammonia resulted wnl at 31. Possible congestive hepatopathy due to TR. Continue to monitor.

## 2022-09-15 NOTE — PROGRESS NOTES
[facility]  Nephrology  Progress Note    Patient Name: Luisito Stuart  MRN: [id number]  Admission Date: 9/13/2022  Hospital Length of Stay: 2 days  Attending Provider: Keenan Gregory DO   Primary Care Physician: JOYA Hill  Principal Problem:Acute on chronic congestive heart failure    Subjective:     HPI: This patient with multiple medical problems that include CAD, CKD, a kidney transplant, atrial fibrillation, CHF with EF of 15% presented with shortness of breath, fatigue and low blood pressure.  He has been admitted to the ICU on dopamine infusion.  Nephrology has been consulted for renal issues.  The patient's serum creatinine is noted to be 4.5.  His lactic acid is 1.4.  The patient is hard of hearing and has a history of dementia.      Interval History: The patient is in Afib with RVR.  This is currently getting managed.  Serum creatinine is 4.59 today.    Review of patient's allergies indicates:   Allergen Reactions    Indomethacin Itching and Other (See Comments)     Other reaction(s): ITCHING    Lipitor [atorvastatin] Other (See Comments)     Muscle cramps     Current Facility-Administered Medications   Medication Frequency    acetaminophen tablet 1,000 mg Q8H PRN    acetaminophen tablet 650 mg Q8H PRN    acetaminophen tablet 650 mg Q4H PRN    aluminum-magnesium hydroxide-simethicone 200-200-20 mg/5 mL suspension 30 mL QID PRN    amiodarone tablet 400 mg BID    aspirin EC tablet 81 mg Daily    clopidogreL tablet 75 mg Daily    dextrose 50% injection 12.5 g PRN    dextrose 50% injection 25 g PRN    DOBUtamine 1000 mg in D5W 250 mL infusion Continuous    glucagon (human recombinant) injection 1 mg PRN    HYDROcodone-acetaminophen 5-325 mg per tablet 1 tablet Q6H PRN    levothyroxine tablet 75 mcg Before breakfast    magnesium oxide tablet 800 mg PRN    magnesium oxide tablet 800 mg PRN    melatonin tablet 6 mg Nightly PRN    mupirocin 2 % ointment BID     naloxone 0.4 mg/mL injection 0.02 mg PRN    NORepinephrine 4 mg in dextrose 5 % 250 mL infusion Continuous    ondansetron disintegrating tablet 8 mg Q8H PRN    polyethylene glycol packet 17 g Daily PRN    potassium bicarbonate disintegrating tablet 35 mEq PRN    potassium bicarbonate disintegrating tablet 50 mEq PRN    potassium bicarbonate disintegrating tablet 60 mEq PRN    potassium, sodium phosphates 280-160-250 mg packet 2 packet PRN    potassium, sodium phosphates 280-160-250 mg packet 2 packet PRN    potassium, sodium phosphates 280-160-250 mg packet 2 packet PRN    sodium chloride 0.9% flush 10 mL PRN       Objective:     Vital Signs (Most Recent):  Temp: 97.7 °F (36.5 °C) (09/15/22 1541)  Pulse: (!) 114 (09/15/22 1655)  Resp: 20 (09/15/22 1623)  BP: (!) 104/56 (09/15/22 1515)  SpO2: (!) 90 % (09/15/22 1505)  O2 Device (Oxygen Therapy): room air (09/15/22 0930)   Vital Signs (24h Range):  Temp:  [97.5 °F (36.4 °C)-98.1 °F (36.7 °C)] 97.7 °F (36.5 °C)  Pulse:  [] 114  Resp:  [10-24] 20  SpO2:  [83 %-99 %] 90 %  BP: ()/(20-93) 104/56     Weight: 90.9 kg (200 lb 6.4 oz) (09/15/22 0315)  Body mass index is 29.59 kg/m².  Body surface area is 2.1 meters squared.    I/O last 3 completed shifts:  In: 1326.1 [P.O.:240; I.V.:1086.1]  Out: 2400 [Urine:2400]    Physical Exam  Vitals reviewed.   Constitutional:       Appearance: He is ill-appearing.   HENT:      Head: Normocephalic and atraumatic.   Eyes:      Pupils: Pupils are equal, round, and reactive to light.   Cardiovascular:      Rate and Rhythm: Rhythm irregular.   Pulmonary:      Effort: Pulmonary effort is normal.   Abdominal:      Palpations: Abdomen is soft.   Neurological:      Mental Status: He is alert.   Psychiatric:         Mood and Affect: Mood normal.       Significant Labs:  BMP:   Recent Labs   Lab 09/15/22  0423      *   K 4.7      CO2 25   BUN 47*   CREATININE 4.59*   CALCIUM 9.2   MG 1.8     CBC:    Recent Labs   Lab 09/15/22  0423   WBC 9.72   RBC 3.34*   HGB 10.1*   HCT 31.6*      MCV 94.6   MCH 30.2   MCHC 32.0        Significant Imaging:  Labs: Reviewed    Assessment/Plan:     * Acute on chronic congestive heart failure  Latest ECHO performed and demonstrates- Results for orders placed during the hospital encounter of 09/13/22    Echo    Interpretation Summary  · Atrial fibrillation observed.  · The left ventricle is moderately enlarged with mild concentric hypertrophy and severely decreased systolic function.  · The estimated ejection fraction is 15%.  · There is severe left ventricular global hypokinesis.  · There is abnormal septal wall motion consistent with right ventricular pacemaker.  · Severe right ventricular enlargement with moderately to severely reduced right ventricular systolic function.  · Severe right atrial enlargement.  · Severe left atrial enlargement.  · Moderate-to-severe mitral regurgitation.  · Severe tricuspid regurgitation.  · Elevated central venous pressure (15 mmHg).  · The estimated PA systolic pressure is 38 mmHg.      Acute renal failure superimposed on chronic kidney disease  Appears to be a chronic condition.    Chronic obstructive pulmonary disease  Chronic condition      At this point, continue to monitor.  Thank you for your consult. I will follow-up with patient. Please contact us if you have any additional questions.    Bret Marcus Jr, MD  Nephrology  [facility]

## 2022-09-15 NOTE — PROGRESS NOTES
[facility]  Cardiology  Progress Note    Patient Name: Luisito Stuart  MRN: [id number]  Admission Date: 9/13/2022  Hospital Length of Stay: 2 days  Code Status: DNR   Attending Physician: Keenan Gregory DO   Primary Care Physician: JOYA Hill  Expected Discharge Date:   Principal Problem:Acute on chronic congestive heart failure    Subjective:     Hospital Course:   78 yo M with significant cardiac history, well known to Cardiology who presented from [facility] with c/o hypotension and LE edema. Labs significant for proBNP of 80K, Troponin of 1200, BUN/Cr of 43/4.56, hyperkalemia of 6, transaminitis with AST/ALT of 181/273. EKG exhibits Afib with interventricular conduction delay. CXR with cardiomegaly. Original recommendation of dobutamine gtt not initiated due to hypotension. Patient was transferred to ICU and started on Dopamine and Norepi gtt with concern of Cardiogenic Shock.     Interval History: Upon examination, patient afebrile, resting comfortably supine. He remains oriented x3 and lethargy has improved. LE edema and bilateral crackles improved and vitals appear grossly normal. proBNP today of 71K and blood culture with NG at 24hr. He remains on dobutamine, amiodarone and norepinephrine infusion. Afib has improved, therefore amiodarone transitioned to PO and dobutamine increased. Will hold off on RHC.    Review of Systems   Constitutional: Negative for fever.   Eyes:  Negative for visual disturbance.   Cardiovascular:  Positive for leg swelling. Negative for chest pain, cyanosis, orthopnea, palpitations and syncope.   Respiratory:  Negative for cough, shortness of breath and wheezing.    Skin:  Negative for color change.   Objective:     Vital Signs (Most Recent):  Temp: 97.9 °F (36.6 °C) (09/15/22 0703)  Pulse: 100 (09/15/22 1045)  Resp: 18 (09/15/22 1045)  BP: (!) 100/48 (09/15/22 1045)  SpO2: (!) 91 % (09/15/22 1045)   Vital Signs (24h Range):  Temp:   [97.7 °F (36.5 °C)-98.5 °F (36.9 °C)] 97.9 °F (36.6 °C)  Pulse:  [] 100  Resp:  [11-24] 18  SpO2:  [88 %-98 %] 91 %  BP: ()/(20-93) 100/48     Weight: 90.9 kg (200 lb 6.4 oz)  Body mass index is 29.59 kg/m².     SpO2: (!) 91 %  O2 Device (Oxygen Therapy): room air      Intake/Output Summary (Last 24 hours) at 9/15/2022 1055  Last data filed at 9/15/2022 0956  Gross per 24 hour   Intake 1118.73 ml   Output 2450 ml   Net -1331.27 ml       Lines/Drains/Airways       Drain  Duration                  Urethral Catheter 09/13/22 2330 1 day              Peripheral Intravenous Line  Duration                  Peripheral IV - Single Lumen 09/13/22 1425 18 G Anterior;Distal;Left Upper Arm 1 day         Peripheral IV - Single Lumen 09/14/22 0447 20 G Anterior;Right Forearm 1 day                    Physical Exam  Vitals reviewed.   Constitutional:       General: He is not in acute distress.     Appearance: He is ill-appearing.   HENT:      Head: Normocephalic.      Right Ear: External ear normal.      Left Ear: External ear normal.      Nose: Nose normal. No congestion or rhinorrhea.      Mouth/Throat:      Pharynx: Oropharynx is clear. No oropharyngeal exudate or posterior oropharyngeal erythema.   Cardiovascular:      Rate and Rhythm: Normal rate and regular rhythm.      Heart sounds: No murmur heard.  Pulmonary:      Effort: Pulmonary effort is normal.      Breath sounds: Rales present. No wheezing or rhonchi.   Abdominal:      General: Abdomen is flat.      Palpations: Abdomen is soft.   Musculoskeletal:      Right lower leg: Edema present.      Left lower leg: Edema present.   Skin:     General: Skin is warm and dry.   Neurological:      Mental Status: He is alert and oriented to person, place, and time.   Psychiatric:         Thought Content: Thought content normal.         Judgment: Judgment normal.       Significant Labs: All pertinent lab results from the last 24 hours have been reviewed.      Assessment and  Plan:       * Acute on chronic congestive heart failure  INTERMACS 3 with patient clinically stable but dependent on inotropic support. Vitals and labs indicate poor perfusion and end organ damage. Afterload reduction deferred at his time due to hypotension and need for Dobutamine and Norepinephrine infusions. Dobutamine increased since Afib is now controlled. ECHO results below. Will hold off on RHC as patient is improving.     ECHO with atrial fibrillation observed. LV moderately enlarged with mild concentric hypertrophy and severely decreased systolic function. EF of 15% with severe LV global hypokinesis. Abnormal septal wall motion consistent with RV pacemaker. Severe RV enlargement with moderately to severely reduced right ventricular systolic function. Severe RA and LA enlargement. Moderate-to-severe MR. Severe TR. Elevated CVP (15 mmHg). The estimated PA systolic pressure is 38 mmHg. Compared to prior ECHO from 11/2021; No significant change.      Acute renal failure superimposed on chronic kidney disease  Cr of 4.56 with baseline Cr of 2.2. Possible hypoperfusion/congestion associated with poor cardiac output vs. New baseline for patient. Nephrology is following. Continue to monitor.    Transaminitis  Patient with elevated AST/ALT of 181/273 and Alk Phos of 170. Ammonia resulted wnl at 31. Possible congestive hepatopathy due to TR. Continue to monitor.      A-fib  Currently stable with Amiodarone infusion transitioned to Amiodarone 200mg PO Daily. Continue ASA and Plavix with monitoring via telemetry.       VTE Risk Mitigation (From admission, onward)      ASA, Plavix and SCDs            Belkis Easley DO  Cardiology  [facility]

## 2022-09-15 NOTE — SUBJECTIVE & OBJECTIVE
Interval History: Upon examination, patient afebrile, resting comfortably supine. He remains oriented x3 and lethargy has improved. LE edema and bilateral crackles improved and vitals appear grossly normal. proBNP today of 71K and blood culture with NG at 24hr. He remains on dobutamine, amiodarone and norepinephrine infusion. Afib has improved, therefore amiodarone transitioned to PO and dobutamine increased. Will hold off on RHC.    Review of Systems   Constitutional: Negative for fever.   Eyes:  Negative for visual disturbance.   Cardiovascular:  Positive for leg swelling. Negative for chest pain, cyanosis, orthopnea, palpitations and syncope.   Respiratory:  Negative for cough, shortness of breath and wheezing.    Skin:  Negative for color change.   Objective:     Vital Signs (Most Recent):  Temp: 97.9 °F (36.6 °C) (09/15/22 0703)  Pulse: 100 (09/15/22 1045)  Resp: 18 (09/15/22 1045)  BP: (!) 100/48 (09/15/22 1045)  SpO2: (!) 91 % (09/15/22 1045)   Vital Signs (24h Range):  Temp:  [97.7 °F (36.5 °C)-98.5 °F (36.9 °C)] 97.9 °F (36.6 °C)  Pulse:  [] 100  Resp:  [11-24] 18  SpO2:  [88 %-98 %] 91 %  BP: ()/(20-93) 100/48     Weight: 90.9 kg (200 lb 6.4 oz)  Body mass index is 29.59 kg/m².     SpO2: (!) 91 %  O2 Device (Oxygen Therapy): room air      Intake/Output Summary (Last 24 hours) at 9/15/2022 1055  Last data filed at 9/15/2022 0956  Gross per 24 hour   Intake 1118.73 ml   Output 2450 ml   Net -1331.27 ml       Lines/Drains/Airways       Drain  Duration                  Urethral Catheter 09/13/22 2330 1 day              Peripheral Intravenous Line  Duration                  Peripheral IV - Single Lumen 09/13/22 1425 18 G Anterior;Distal;Left Upper Arm 1 day         Peripheral IV - Single Lumen 09/14/22 0447 20 G Anterior;Right Forearm 1 day                    Physical Exam  Vitals reviewed.   Constitutional:       General: He is not in acute distress.     Appearance: He is ill-appearing.   HENT:       Head: Normocephalic.      Right Ear: External ear normal.      Left Ear: External ear normal.      Nose: Nose normal. No congestion or rhinorrhea.      Mouth/Throat:      Pharynx: Oropharynx is clear. No oropharyngeal exudate or posterior oropharyngeal erythema.   Cardiovascular:      Rate and Rhythm: Normal rate and regular rhythm.      Heart sounds: No murmur heard.  Pulmonary:      Effort: Pulmonary effort is normal.      Breath sounds: Rales present. No wheezing or rhonchi.   Abdominal:      General: Abdomen is flat.      Palpations: Abdomen is soft.   Musculoskeletal:      Right lower leg: Edema present.      Left lower leg: Edema present.   Skin:     General: Skin is warm and dry.   Neurological:      Mental Status: He is alert and oriented to person, place, and time.   Psychiatric:         Thought Content: Thought content normal.         Judgment: Judgment normal.       Significant Labs: All pertinent lab results from the last 24 hours have been reviewed.

## 2022-09-15 NOTE — SUBJECTIVE & OBJECTIVE
Interval History: The patient is in Afib with RVR.  This is currently getting managed.  Serum creatinine is 4.59 today.    Review of patient's allergies indicates:   Allergen Reactions    Indomethacin Itching and Other (See Comments)     Other reaction(s): ITCHING    Lipitor [atorvastatin] Other (See Comments)     Muscle cramps     Current Facility-Administered Medications   Medication Frequency    acetaminophen tablet 1,000 mg Q8H PRN    acetaminophen tablet 650 mg Q8H PRN    acetaminophen tablet 650 mg Q4H PRN    aluminum-magnesium hydroxide-simethicone 200-200-20 mg/5 mL suspension 30 mL QID PRN    amiodarone tablet 400 mg BID    aspirin EC tablet 81 mg Daily    clopidogreL tablet 75 mg Daily    dextrose 50% injection 12.5 g PRN    dextrose 50% injection 25 g PRN    DOBUtamine 1000 mg in D5W 250 mL infusion Continuous    glucagon (human recombinant) injection 1 mg PRN    HYDROcodone-acetaminophen 5-325 mg per tablet 1 tablet Q6H PRN    levothyroxine tablet 75 mcg Before breakfast    magnesium oxide tablet 800 mg PRN    magnesium oxide tablet 800 mg PRN    melatonin tablet 6 mg Nightly PRN    mupirocin 2 % ointment BID    naloxone 0.4 mg/mL injection 0.02 mg PRN    NORepinephrine 4 mg in dextrose 5 % 250 mL infusion Continuous    ondansetron disintegrating tablet 8 mg Q8H PRN    polyethylene glycol packet 17 g Daily PRN    potassium bicarbonate disintegrating tablet 35 mEq PRN    potassium bicarbonate disintegrating tablet 50 mEq PRN    potassium bicarbonate disintegrating tablet 60 mEq PRN    potassium, sodium phosphates 280-160-250 mg packet 2 packet PRN    potassium, sodium phosphates 280-160-250 mg packet 2 packet PRN    potassium, sodium phosphates 280-160-250 mg packet 2 packet PRN    sodium chloride 0.9% flush 10 mL PRN       Objective:     Vital Signs (Most Recent):  Temp: 97.7 °F (36.5 °C) (09/15/22 1541)  Pulse: (!) 114 (09/15/22 1655)  Resp: 20 (09/15/22 1623)  BP: (!) 104/56 (09/15/22 1515)  SpO2: (!)  90 % (09/15/22 1505)  O2 Device (Oxygen Therapy): room air (09/15/22 0930)   Vital Signs (24h Range):  Temp:  [97.5 °F (36.4 °C)-98.1 °F (36.7 °C)] 97.7 °F (36.5 °C)  Pulse:  [] 114  Resp:  [10-24] 20  SpO2:  [83 %-99 %] 90 %  BP: ()/(20-93) 104/56     Weight: 90.9 kg (200 lb 6.4 oz) (09/15/22 0315)  Body mass index is 29.59 kg/m².  Body surface area is 2.1 meters squared.    I/O last 3 completed shifts:  In: 1326.1 [P.O.:240; I.V.:1086.1]  Out: 2400 [Urine:2400]    Physical Exam  Vitals reviewed.   Constitutional:       Appearance: He is ill-appearing.   HENT:      Head: Normocephalic and atraumatic.   Eyes:      Pupils: Pupils are equal, round, and reactive to light.   Cardiovascular:      Rate and Rhythm: Rhythm irregular.   Pulmonary:      Effort: Pulmonary effort is normal.   Abdominal:      Palpations: Abdomen is soft.   Neurological:      Mental Status: He is alert.   Psychiatric:         Mood and Affect: Mood normal.       Significant Labs:  BMP:   Recent Labs   Lab 09/15/22  0423      *   K 4.7      CO2 25   BUN 47*   CREATININE 4.59*   CALCIUM 9.2   MG 1.8     CBC:   Recent Labs   Lab 09/15/22  0423   WBC 9.72   RBC 3.34*   HGB 10.1*   HCT 31.6*      MCV 94.6   MCH 30.2   MCHC 32.0        Significant Imaging:  Labs: Reviewed

## 2022-09-15 NOTE — ASSESSMENT & PLAN NOTE
Acute on chronic renal failure  Creatinine is 4.56. It was around 3 seven months ago  This is likely due to decreased effective circulating volume  Secondary to Cardiomyopathy with EF around 20%  Nephrology consulted by admitting team  We will try to avoid nephrotoxic medications  Renally dose meds  Follow Bun/Crea and urine output  Creatinine is 4.59, UOP adequate 2100 ml

## 2022-09-15 NOTE — PROGRESS NOTES
[facility]  Pulmonology  Progress Note    Patient Name: Luisito Stuart  MRN: [id number]  Admission Date: 9/13/2022  Hospital Length of Stay: 2 days  Code Status: DNR  Attending Provider: Keenan Gregory DO  Primary Care Provider: JOYA Hill   Principal Problem: Cardiogenic shock    Subjective:     Interval History: The patient was sent to the ER for refractory hypotension. He has been started on levophed and dopamine. He is currently resting comfortably in no distress. He is afebrile. In a fib with heart rate in 130's    Objective:     Vital Signs (Most Recent):  Temp: 98.5 °F (36.9 °C) (09/14/22 1100)  Pulse: (!) 134 (09/14/22 1100)  Resp: 13 (09/14/22 1100)  BP: (!) 102/49 (09/14/22 1100)  SpO2: 97 % (09/14/22 1100)   Vital Signs (24h Range):  Temp:  [97.7 °F (36.5 °C)-98.5 °F (36.9 °C)] 98.5 °F (36.9 °C)  Pulse:  [] 134  Resp:  [10-23] 13  SpO2:  [75 %-100 %] 97 %  BP: ()/(32-99) 102/49     Weight: 91.2 kg (201 lb 1 oz)  Body mass index is 29.69 kg/m².      Intake/Output Summary (Last 24 hours) at 9/14/2022 1148  Last data filed at 9/14/2022 1101  Gross per 24 hour   Intake 327.39 ml   Output 650 ml   Net -322.61 ml       Physical Exam  Vitals and nursing note reviewed.   Constitutional:       General: He is not in acute distress.     Appearance: He is not ill-appearing.   HENT:      Head: Normocephalic.      Right Ear: External ear normal.      Left Ear: External ear normal.      Nose: Nose normal.      Mouth/Throat:      Pharynx: Oropharynx is clear.   Eyes:      Extraocular Movements: Extraocular movements intact.      Conjunctiva/sclera: Conjunctivae normal.      Pupils: Pupils are equal, round, and reactive to light.   Cardiovascular:      Rate and Rhythm: Tachycardia present. Rhythm irregular.      Pulses: Normal pulses.      Heart sounds: Normal heart sounds.   Pulmonary:      Effort: Pulmonary effort is normal. No respiratory distress.      Breath sounds:  No wheezing or rhonchi.   Abdominal:      General: Abdomen is flat. Bowel sounds are normal. There is no distension.      Palpations: Abdomen is soft.   Musculoskeletal:         General: No swelling or tenderness. Normal range of motion.      Cervical back: Normal range of motion and neck supple.      Right lower leg: Edema present.      Left lower leg: Edema present.      Comments: L>R LE edema    Skin:     General: Skin is warm and dry.      Coloration: Skin is not pale.   Neurological:      General: No focal deficit present.      Mental Status: He is alert.      Cranial Nerves: No cranial nerve deficit.      Motor: Weakness present.      Comments: Oriented to person and place. He had some issues with time   Psychiatric:         Mood and Affect: Mood normal.       Vents:       Lines/Drains/Airways       Drain  Duration                  Urethral Catheter 09/13/22 2330 <1 day              Peripheral Intravenous Line  Duration                  Peripheral IV - Single Lumen 09/13/22 1425 18 G Anterior;Distal;Left Upper Arm <1 day         Peripheral IV - Single Lumen 09/14/22 0447 20 G Anterior;Right Forearm <1 day                    Significant Labs:    CBC/Anemia Profile:  Recent Labs   Lab 09/13/22 1429 09/14/22  0437   WBC 8.55 10.61   HGB 10.2* 10.2*   HCT 33.5* 31.5*    149*   MCV 98.2* 93.8   RDW 16.3* 15.9*        Chemistries:  Recent Labs   Lab 09/13/22 1429 09/14/22  0437    135*   K 4.4 6.0*    106   CO2 25 23   BUN 43* 43*   CREATININE 4.59* 4.56*   CALCIUM 9.2 8.8   ALBUMIN 3.5  --    PROT 6.2*  --    BILITOT 1.4*  --    ALKPHOS 170*  --    *  --    *  --    MG 1.8 2.1       All pertinent labs within the past 24 hours have been reviewed.    Significant Imaging:  I have reviewed all pertinent imaging results/findings within the past 24 hours.    Assessment/Plan:     * Cardiogenic shock  Patient is hypotensive  Started on dopamine and levophed  Cardiology consulted    CVA  (cerebral vascular accident)  CVA in 2019 and TIA in 2022.    Ischemic cardiomyopathy  EF 20% on past echo  Felt to have component of cardiogenic shock  Patient on entresto outpatient which has been held  Also on carvedilol outpatient  Currently on dopamine and levophed for hypotension  Cardiology consulted    Coronary artery disease involving coronary bypass graft of native heart without angina pectoris  He has elevated Troponin  He is on aspirin, plavix, full dose lovenox  Cardiology consulted  Left heart catheterization from 01/05/2022:  Right common femoral artery access was obtained for angiography under ultrasound guidance.  Patent LIMA to LAD,  Patent SVG to PDA, proximal PDA has a stent which has 30% ISR, other stent is noted in the distal part of the graft which has 50% ISR.  SVG to circumflex is occluded.  Native RCA is completely occluded.  Distal left main appears hazy, both ostial circumflex and ostial 80 have at least intermediate 70% stenosis.  There is a patent long stent in the circumflex with mild ISR.    A-fib  With RVR  He is on amiodarone  Also on full dose lovenox  He is on levothyroxine  I do not see TSH since 3/2020  I will check TSh and free T4  On full dose lovenox for anticoagulation  Was on eliquis for home medicine    History of cardiac defibrillator placement  Stable  Cardiology consulted by admitting team    Acute renal failure superimposed on chronic kidney disease  Acute on chronic renal failure  Creatinine is 4.56. It was around 3 seven months ago  This is likely due to decreased effective circulating volume  Secondary to Cardiomyopathy with EF around 20%  Nephrology consulted by admitting team  We will try to avoid nephrotoxic medications  Renally dose meds  Follow Bun/Crea and urine output    History of DVT (deep vein thrombosis)  He is on apixiban at home  No evidence of DVT on doppler  He is currently on full dose lovenox (for a fib)    Chronic obstructive pulmonary  disease  Greater than 40 pack per year smoking history  Home meds include no maintenance medications  Currently no acute issues                 Keenan Gregory,   Pulmonology  [facility]

## 2022-09-15 NOTE — SUBJECTIVE & OBJECTIVE
Interval History: No acute events overnight. The patient is currently resting comfortably. HR is better. Patient started on amiodarone. He is afebrile.    Objective:     Vital Signs (Most Recent):  Temp: 97.5 °F (36.4 °C) (09/15/22 1107)  Pulse: (!) 111 (09/15/22 1130)  Resp: 13 (09/15/22 1130)  BP: (!) 96/44 (09/15/22 1130)  SpO2: (!) 92 % (09/15/22 1130)   Vital Signs (24h Range):  Temp:  [97.5 °F (36.4 °C)-98.5 °F (36.9 °C)] 97.5 °F (36.4 °C)  Pulse:  [] 111  Resp:  [11-23] 13  SpO2:  [86 %-98 %] 92 %  BP: ()/(20-93) 96/44     Weight: 90.9 kg (200 lb 6.4 oz)  Body mass index is 29.59 kg/m².      Intake/Output Summary (Last 24 hours) at 9/15/2022 1342  Last data filed at 9/15/2022 1100  Gross per 24 hour   Intake 1118.73 ml   Output 2500 ml   Net -1381.27 ml         Physical Exam  Vitals and nursing note reviewed.   Constitutional:       General: He is not in acute distress.     Appearance: He is not ill-appearing.   HENT:      Head: Normocephalic.      Right Ear: External ear normal.      Left Ear: External ear normal.      Nose: Nose normal.      Mouth/Throat:      Pharynx: Oropharynx is clear.   Eyes:      Extraocular Movements: Extraocular movements intact.      Conjunctiva/sclera: Conjunctivae normal.      Pupils: Pupils are equal, round, and reactive to light.   Cardiovascular:      Rate and Rhythm: Tachycardia present. Rhythm irregular.      Pulses: Normal pulses.      Heart sounds: Normal heart sounds.   Pulmonary:      Effort: Pulmonary effort is normal. No respiratory distress.      Breath sounds: No wheezing or rhonchi.   Abdominal:      General: Abdomen is flat. Bowel sounds are normal. There is no distension.      Palpations: Abdomen is soft.   Musculoskeletal:         General: No swelling or tenderness. Normal range of motion.      Cervical back: Normal range of motion and neck supple.      Right lower leg: Edema present.      Left lower leg: Edema present.      Comments: L>R LE edema     Skin:     General: Skin is warm and dry.      Coloration: Skin is not pale.   Neurological:      General: No focal deficit present.      Mental Status: He is alert.      Cranial Nerves: No cranial nerve deficit.      Motor: Weakness present.      Comments: Oriented to person and place. He had some issues with time   Psychiatric:         Mood and Affect: Mood normal.       Vents:  Oxygen Concentration (%): 21 (09/14/22 1700)    Lines/Drains/Airways       Drain  Duration                  Urethral Catheter 09/13/22 2330 1 day              Peripheral Intravenous Line  Duration                  Peripheral IV - Single Lumen 09/13/22 1425 18 G Anterior;Distal;Left Upper Arm 1 day         Peripheral IV - Single Lumen 09/14/22 0447 20 G Anterior;Right Forearm 1 day                    Significant Labs:    CBC/Anemia Profile:  Recent Labs   Lab 09/13/22 1429 09/14/22  0437 09/15/22  0423   WBC 8.55 10.61 9.72   HGB 10.2* 10.2* 10.1*   HCT 33.5* 31.5* 31.6*    149* 195   MCV 98.2* 93.8 94.6   RDW 16.3* 15.9* 16.0*          Chemistries:  Recent Labs   Lab 09/13/22 1429 09/14/22  0437 09/14/22  1300 09/15/22  0423    135*  --  134*   K 4.4 6.0* 4.4 4.7    106  --  100   CO2 25 23  --  25   BUN 43* 43*  --  47*   CREATININE 4.59* 4.56*  --  4.59*   CALCIUM 9.2 8.8  --  9.2   ALBUMIN 3.5  --   --   --    PROT 6.2*  --   --   --    BILITOT 1.4*  --   --   --    ALKPHOS 170*  --   --   --    *  --   --   --    *  --   --   --    MG 1.8 2.1  --  1.8   PHOS  --   --   --  4.4         All pertinent labs within the past 24 hours have been reviewed.    Significant Imaging:  I have reviewed all pertinent imaging results/findings within the past 24 hours.

## 2022-09-15 NOTE — ASSESSMENT & PLAN NOTE
Currently stable with Amiodarone infusion transitioned to Amiodarone 200mg PO Daily. Continue ASA and Plavix with monitoring via telemetry.

## 2022-09-15 NOTE — PLAN OF CARE
Problem: Adult Inpatient Plan of Care  Goal: Plan of Care Review  Outcome: Ongoing, Progressing  Goal: Patient-Specific Goal (Individualized)  Outcome: Ongoing, Progressing  Goal: Absence of Hospital-Acquired Illness or Injury  Outcome: Ongoing, Progressing  Goal: Optimal Comfort and Wellbeing  Outcome: Ongoing, Progressing  Goal: Readiness for Transition of Care  Outcome: Ongoing, Progressing     Problem: Skin Injury Risk Increased  Goal: Skin Health and Integrity  Outcome: Ongoing, Progressing     Problem: Fluid and Electrolyte Imbalance (Acute Kidney Injury/Impairment)  Goal: Fluid and Electrolyte Balance  Outcome: Ongoing, Progressing     Problem: Oral Intake Inadequate (Acute Kidney Injury/Impairment)  Goal: Optimal Nutrition Intake  Outcome: Ongoing, Progressing     Problem: Renal Function Impairment (Acute Kidney Injury/Impairment)  Goal: Effective Renal Function  Outcome: Ongoing, Progressing     Problem: Infection  Goal: Absence of Infection Signs and Symptoms  Outcome: Ongoing, Progressing     Problem: Impaired Wound Healing  Goal: Optimal Wound Healing  Outcome: Ongoing, Progressing

## 2022-09-15 NOTE — ASSESSMENT & PLAN NOTE
Patient is hypotensive  Started on dopamine and levophed  Cardiology consulted  9/15 EF now around 15%. Started on dobutamine  Cardiology note reviewed and appreciated

## 2022-09-16 PROBLEM — I50.814 BIVENTRICULAR HEART FAILURE WITH REDUCED LEFT VENTRICULAR FUNCTION: Status: ACTIVE | Noted: 2019-04-10

## 2022-09-16 NOTE — ASSESSMENT & PLAN NOTE
He is on apixiban at home  No evidence of DVT on doppler  He was on full dose lovenox (for a fib)---this was dc'd by cardiology

## 2022-09-16 NOTE — SUBJECTIVE & OBJECTIVE
Interval History: The patient is more comfortable today.  He denies any complaints.  His heart rate is better.  Today, serum creatinine is 4.6 which is improved.    Review of patient's allergies indicates:   Allergen Reactions    Indomethacin Itching and Other (See Comments)     Other reaction(s): ITCHING    Lipitor [atorvastatin] Other (See Comments)     Muscle cramps     Current Facility-Administered Medications   Medication Frequency    acetaminophen tablet 1,000 mg Q8H PRN    acetaminophen tablet 650 mg Q8H PRN    acetaminophen tablet 650 mg Q4H PRN    aluminum-magnesium hydroxide-simethicone 200-200-20 mg/5 mL suspension 30 mL QID PRN    amiodarone tablet 400 mg BID    aspirin EC tablet 81 mg Daily    clopidogreL tablet 75 mg Daily    dextrose 50% injection 12.5 g PRN    dextrose 50% injection 25 g PRN    glucagon (human recombinant) injection 1 mg PRN    HYDROcodone-acetaminophen 5-325 mg per tablet 1 tablet Q6H PRN    levothyroxine tablet 75 mcg Before breakfast    magnesium oxide tablet 800 mg PRN    magnesium oxide tablet 800 mg PRN    melatonin tablet 6 mg Nightly PRN    midodrine tablet 5 mg BID WM    mupirocin 2 % ointment BID    naloxone 0.4 mg/mL injection 0.02 mg PRN    NORepinephrine 4 mg in dextrose 5 % 250 mL infusion Continuous    ondansetron disintegrating tablet 8 mg Q8H PRN    polyethylene glycol packet 17 g Daily PRN    potassium bicarbonate disintegrating tablet 35 mEq PRN    potassium bicarbonate disintegrating tablet 50 mEq PRN    potassium bicarbonate disintegrating tablet 60 mEq PRN    potassium, sodium phosphates 280-160-250 mg packet 2 packet PRN    potassium, sodium phosphates 280-160-250 mg packet 2 packet PRN    potassium, sodium phosphates 280-160-250 mg packet 2 packet PRN    sodium chloride 0.9% flush 10 mL PRN       Objective:     Vital Signs (Most Recent):  Temp: 97.8 °F (36.6 °C) (09/16/22 1057)  Pulse: 98 (09/16/22 1230)  Resp: 20 (09/16/22 1230)  BP: (!) 124/58 (09/16/22  1230)  SpO2: (!) 94 % (09/16/22 1230)  O2 Device (Oxygen Therapy): room air (09/15/22 0930)   Vital Signs (24h Range):  Temp:  [97.6 °F (36.4 °C)-98.6 °F (37 °C)] 97.8 °F (36.6 °C)  Pulse:  [] 98  Resp:  [8-24] 20  SpO2:  [83 %-99 %] 94 %  BP: ()/(33-68) 124/58     Weight: 90.1 kg (198 lb 10.2 oz) (09/15/22 2330)  Body mass index is 29.33 kg/m².  Body surface area is 2.09 meters squared.    I/O last 3 completed shifts:  In: 1957.1 [P.O.:320; I.V.:1637.1]  Out: 3300 [Urine:3300]    Physical Exam  Vitals reviewed.   HENT:      Head: Normocephalic and atraumatic.      Mouth/Throat:      Mouth: Mucous membranes are moist.   Cardiovascular:      Rate and Rhythm: Rhythm irregular.   Pulmonary:      Effort: Pulmonary effort is normal.   Abdominal:      Palpations: Abdomen is soft.   Neurological:      Mental Status: He is alert.   Psychiatric:         Mood and Affect: Mood normal.       Significant Labs:  BMP:   Recent Labs   Lab 09/16/22  0418   GLU 75   *   K 5.2*   CL 98   CO2 26   BUN 53*   CREATININE 4.60*   CALCIUM 8.7   MG 1.6*     CBC:   Recent Labs   Lab 09/16/22  0748   WBC 6.31   RBC 2.95*   HGB 8.9*   HCT 29.0*      MCV 98.3*   MCH 30.2   MCHC 30.7*        Significant Imaging:  Labs: Reviewed

## 2022-09-16 NOTE — ASSESSMENT & PLAN NOTE
Acute on chronic renal failure  Creatinine is 4.56. It was around 3 seven months ago  This is likely due to decreased effective circulating volume  Secondary to Cardiomyopathy with EF around 20%  Nephrology consulted by admitting team  We will try to avoid nephrotoxic medications  Renally dose meds  Follow Bun/Crea and urine output  Creatinine is 4.6, UOP adequate 2000 ml---likely new baseline

## 2022-09-16 NOTE — SUBJECTIVE & OBJECTIVE
Interval History: Lying flat in bed without difficulty. Denies chest pain or SOB. Currently on dobutamine and levophed.     Review of Systems   Constitutional: Negative for fever.   Eyes:  Negative for visual disturbance.   Cardiovascular:  Positive for leg swelling. Negative for chest pain, cyanosis, orthopnea, palpitations and syncope.   Respiratory:  Negative for cough, shortness of breath and wheezing.    Skin:  Negative for color change.   Objective:     Vital Signs (Most Recent):  Temp: 97.8 °F (36.6 °C) (09/16/22 1057)  Pulse: 98 (09/16/22 1230)  Resp: 20 (09/16/22 1230)  BP: (!) 124/58 (09/16/22 1230)  SpO2: (!) 94 % (09/16/22 1230)   Vital Signs (24h Range):  Temp:  [97.6 °F (36.4 °C)-98.6 °F (37 °C)] 97.8 °F (36.6 °C)  Pulse:  [] 98  Resp:  [8-24] 20  SpO2:  [83 %-99 %] 94 %  BP: ()/(33-68) 124/58     Weight: 90.1 kg (198 lb 10.2 oz)  Body mass index is 29.33 kg/m².     SpO2: (!) 94 %  O2 Device (Oxygen Therapy): room air      Intake/Output Summary (Last 24 hours) at 9/16/2022 1310  Last data filed at 9/16/2022 1058  Gross per 24 hour   Intake 958.33 ml   Output 1500 ml   Net -541.67 ml       Lines/Drains/Airways       Drain  Duration                  Urethral Catheter 09/13/22 2330 2 days              Peripheral Intravenous Line  Duration                  Peripheral IV - Single Lumen 09/13/22 1425 18 G Anterior;Distal;Left Upper Arm 2 days         Peripheral IV - Single Lumen 09/14/22 0447 20 G Anterior;Right Forearm 2 days                    Physical Exam  Constitutional:       General: He is not in acute distress.     Appearance: He is ill-appearing.   Eyes:      Pupils: Pupils are equal, round, and reactive to light.   Cardiovascular:      Rate and Rhythm: Normal rate. Rhythm irregularly irregular.      Pulses: Normal pulses.      Heart sounds: Normal heart sounds.   Pulmonary:      Effort: Pulmonary effort is normal.      Breath sounds: Normal breath sounds.   Musculoskeletal:       Cervical back: Neck supple. No rigidity.      Right lower leg: Edema present.      Left lower leg: Edema present.   Skin:     General: Skin is warm and dry.   Neurological:      Mental Status: He is alert and oriented to person, place, and time.   Psychiatric:         Mood and Affect: Mood normal.         Behavior: Behavior normal.       Significant Labs: All pertinent lab results from the last 24 hours have been reviewed. and   Recent Lab Results         09/16/22  0748   09/16/22  0418        Anion Gap   13       Aniso 1+         Bands 1         Baso # 0.07         Basophil % 1.1         BUN   53       BUN/CREAT RATIO   12       Calcium   8.7       Chloride   98       CO2   26       Creatinine   4.60       Differential Type Manual         eGFR   13       Eos # 0.38         Eosinophil % 6.0          5         Glucose   75       Hematocrit 29.0         Hemoglobin 8.9         Immature Grans (Abs) 0.01         Immature Granulocytes 0.2         Lymph # 0.55         Lymph % 8.7          10         Magnesium   1.6       MCH 30.2         MCHC 30.7         MCV 98.3         Mono # 0.74         Mono % 11.7          9         MPV 12.5         Neutrophils, Abs 4.56         Neutrophils Relative 72.3         nRBC 0.3         NUCLEATED RBC ABSOLUTE 0.02         Ovalocytes Few         PLATELET MORPHOLOGY Few Large Platelets         Platelets 151         Poly Few         Potassium   5.2       RBC 2.95         RDW 15.8         Segmented Neutrophils, Man % 75         Sodium   132       WBC 6.31                 Significant Imaging: Echocardiogram: Transthoracic echo (TTE) complete (Cupid Only):   Results for orders placed or performed during the hospital encounter of 09/13/22   Echo   Result Value Ref Range    BSA 2.11 m2    Right Atrial Pressure (from IVC) 15 mmHg    EF 15 %    Left Ventricular Outflow Tract Mean Gradient 1.00 mmHg    AORTIC VALVE CUSP SEPERATION 1.73 cm    LVIDd 6.06 (A) 3.5 - 6.0 cm    IVS 1.30 (A) 0.6 - 1.1 cm     Posterior Wall 1.29 (A) 0.6 - 1.1 cm    Ao root annulus 2.70 cm    LVIDs 5.50 (A) 2.1 - 4.0 cm    FS 9 28 - 44 %    IVC ostium 2.0 cm    LV mass 353.93 g    LA size 4.30 cm    RVDD 5.40 cm    TAPSE 1.60 cm    Left Ventricle Relative Wall Thickness 0.43 cm    AV mean gradient 1 mmHg    AV valve area 2.27 cm2    AV Velocity Ratio 0.86     AV index (prosthetic) 1.00     MV mean gradient 22 mmHg    MV valve area p 1/2 method 2.78 cm2    MV valve area by continuity eq 0.24 cm2    E wave deceleration time 163.00 msec    LVOT diameter 1.70 cm    LVOT area 2.3 cm2    LVOT peak jim 0.6 m/s    LVOT peak VTI 6.00 cm    Ao peak jim 0.7 m/s    Ao VTI 6.00 cm    LVOT stroke volume 13.61 cm3    AV peak gradient 2 mmHg    MV peak gradient 36 mmHg    TV rest pulmonary artery pressure 38 mmHg    MV Peak E Jim 2.18 m/s    TR Max Jim 2.40 m/s    MV VTI 56.5 cm    MV stenosis pressure 1/2 time 79.00 ms    LV Systolic Volume 148.00 mL    LV Systolic Volume Index 71.5 mL/m2    LV Diastolic Volume 184.80 mL    LV Diastolic Volume Index 89.28 mL/m2    LV Mass Index 171 g/m2    Echo EF Estimated 20 %    RA Major Axis 6.00 cm    Triscuspid Valve Regurgitation Peak Gradient 23 mmHg    Narrative    · Atrial fibrillation observed.  · The left ventricle is moderately enlarged with mild concentric   hypertrophy and severely decreased systolic function.  · The estimated ejection fraction is 15%.  · There is severe left ventricular global hypokinesis.  · There is abnormal septal wall motion consistent with right ventricular   pacemaker.  · Severe right ventricular enlargement with moderately to severely reduced   right ventricular systolic function.  · Severe right atrial enlargement.  · Severe left atrial enlargement.  · Moderate-to-severe mitral regurgitation.  · Severe tricuspid regurgitation.  · Elevated central venous pressure (15 mmHg).  · The estimated PA systolic pressure is 38 mmHg.  · Compared to prior ECHO from 11/2021; No significant  change

## 2022-09-16 NOTE — ASSESSMENT & PLAN NOTE
EF 20% on past echo  Felt to have component of cardiogenic shock  Patient on entresto outpatient which has been held  Also on carvedilol outpatient  Currently on levophed for hypotension  Cardiology consulted  Echo with EF around 15%  Now on dobutamine, dopamine discontinued

## 2022-09-16 NOTE — ASSESSMENT & PLAN NOTE
- Patient with elevated AST/ALT of 181/273 and Alk Phos of 170. Ammonia resulted wnl at 31  - Possible congestive hepatopathy due to TR  - Continue to monitor

## 2022-09-16 NOTE — PROGRESS NOTES
[facility]  Pulmonology  Progress Note    Patient Name: Luisito Stuart  MRN: [id number]  Admission Date: 9/13/2022  Hospital Length of Stay: 3 days  Code Status: DNR  Attending Provider: Keenan Gregory DO  Primary Care Provider: JOYA Hill   Principal Problem: Acute on chronic congestive heart failure    Subjective:     Interval History: No acute events overnight. The patient is currently resting comfortably. He remains on dobutamine and levophed. He is afebrile.    Objective:     Vital Signs (Most Recent):  Temp: 97.8 °F (36.6 °C) (09/16/22 1057)  Pulse: 75 (09/16/22 1045)  Resp: 11 (09/16/22 1045)  BP: (!) 96/43 (09/16/22 1045)  SpO2: 97 % (09/16/22 1045)   Vital Signs (24h Range):  Temp:  [97.6 °F (36.4 °C)-98.6 °F (37 °C)] 97.8 °F (36.6 °C)  Pulse:  [] 75  Resp:  [8-24] 11  SpO2:  [83 %-99 %] 97 %  BP: ()/(33-68) 96/43     Weight: 90.1 kg (198 lb 10.2 oz)  Body mass index is 29.33 kg/m².      Intake/Output Summary (Last 24 hours) at 9/16/2022 1112  Last data filed at 9/16/2022 1058  Gross per 24 hour   Intake 958.33 ml   Output 1500 ml   Net -541.67 ml         Physical Exam  Vitals and nursing note reviewed.   Constitutional:       General: He is not in acute distress.     Appearance: He is not ill-appearing.   HENT:      Head: Normocephalic.      Right Ear: External ear normal.      Left Ear: External ear normal.      Nose: Nose normal.      Mouth/Throat:      Pharynx: Oropharynx is clear.   Eyes:      Extraocular Movements: Extraocular movements intact.      Conjunctiva/sclera: Conjunctivae normal.      Pupils: Pupils are equal, round, and reactive to light.   Cardiovascular:      Rate and Rhythm: Tachycardia present. Rhythm irregular.      Pulses: Normal pulses.      Heart sounds: Normal heart sounds.   Pulmonary:      Effort: Pulmonary effort is normal. No respiratory distress.      Breath sounds: No wheezing or rhonchi.   Abdominal:      General: Abdomen is  flat. Bowel sounds are normal. There is no distension.      Palpations: Abdomen is soft.   Musculoskeletal:         General: No swelling or tenderness. Normal range of motion.      Cervical back: Normal range of motion and neck supple.      Right lower leg: Edema present.      Left lower leg: Edema present.      Comments: L>R LE edema    Skin:     General: Skin is warm and dry.      Coloration: Skin is not pale.   Neurological:      General: No focal deficit present.      Mental Status: He is alert.      Cranial Nerves: No cranial nerve deficit.      Motor: Weakness present.      Comments: Oriented to person and place. He had some issues with time   Psychiatric:         Mood and Affect: Mood normal.       Vents:  Oxygen Concentration (%): 21 (09/14/22 1700)    Lines/Drains/Airways       Drain  Duration                  Urethral Catheter 09/13/22 2330 2 days              Peripheral Intravenous Line  Duration                  Peripheral IV - Single Lumen 09/13/22 1425 18 G Anterior;Distal;Left Upper Arm 2 days         Peripheral IV - Single Lumen 09/14/22 0447 20 G Anterior;Right Forearm 2 days                    Significant Labs:    CBC/Anemia Profile:  Recent Labs   Lab 09/15/22  0423 09/16/22  0748   WBC 9.72 6.31   HGB 10.1* 8.9*   HCT 31.6* 29.0*    151   MCV 94.6 98.3*   RDW 16.0* 15.8*          Chemistries:  Recent Labs   Lab 09/14/22  1300 09/15/22  0423 09/16/22  0418   NA  --  134* 132*   K 4.4 4.7 5.2*   CL  --  100 98   CO2  --  25 26   BUN  --  47* 53*   CREATININE  --  4.59* 4.60*   CALCIUM  --  9.2 8.7   MG  --  1.8 1.6*   PHOS  --  4.4  --          All pertinent labs within the past 24 hours have been reviewed.    Significant Imaging:  I have reviewed all pertinent imaging results/findings within the past 24 hours.    Assessment/Plan:     CVA (cerebral vascular accident)  CVA in 2019 and TIA in 2022.    Cardiogenic shock  Patient is hypotensive  Started on dopamine and levophed  Cardiology  consulted  9/15 EF now around 15%. Started on dobutamine  Cardiology note reviewed and appreciated  It is felt that his bp is likely going to run lower- cardiology to start midodrine  Weaning dobutamine and levophed    Ischemic cardiomyopathy  EF 20% on past echo  Felt to have component of cardiogenic shock  Patient on entresto outpatient which has been held  Also on carvedilol outpatient  Currently on levophed for hypotension  Cardiology consulted  Echo with EF around 15%  Now on dobutamine, dopamine discontinued    Coronary artery disease involving coronary bypass graft of native heart without angina pectoris  He has elevated Troponin  He is on aspirin, plavix, full dose lovenox  Cardiology consulted  Left heart catheterization from 01/05/2022:  Right common femoral artery access was obtained for angiography under ultrasound guidance.  Patent LIMA to LAD,  Patent SVG to PDA, proximal PDA has a stent which has 30% ISR, other stent is noted in the distal part of the graft which has 50% ISR.  SVG to circumflex is occluded.  Native RCA is completely occluded.  Distal left main appears hazy, both ostial circumflex and ostial 80 have at least intermediate 70% stenosis.  There is a patent long stent in the circumflex with mild ISR.    A-fib  With RVR  He is on amiodarone  Also on full dose lovenox  He is on levothyroxine  I do not see TSH since 3/2020  I will check TSh and free T4  On full dose lovenox for anticoagulation----dc'd by cardiology  Was on eliquis for home medicine    History of cardiac defibrillator placement  Stable  Cardiology consulted   I have spoken with cardiology        Acute renal failure superimposed on chronic kidney disease  Acute on chronic renal failure  Creatinine is 4.56. It was around 3 seven months ago  This is likely due to decreased effective circulating volume  Secondary to Cardiomyopathy with EF around 20%  Nephrology consulted by admitting team  We will try to avoid nephrotoxic  medications  Renally dose meds  Follow Bun/Crea and urine output  Creatinine is 4.6, UOP adequate 2000 ml---likely new baseline    History of DVT (deep vein thrombosis)  He is on apixiban at home  No evidence of DVT on doppler  He was on full dose lovenox (for a fib)---this was dc'd by cardiology      Chronic obstructive pulmonary disease  Greater than 40 pack per year smoking history  Home meds include no maintenance medications  Currently no acute issues                 Keenan Gregory,   Pulmonology  [facility]

## 2022-09-16 NOTE — ASSESSMENT & PLAN NOTE
- Cr of 4.6, likely his new baseline  - pt still with adequate urine output  - Dr. Mckee discussed possible dialysis with the pt if kidney function worsens, pt voiced understanding

## 2022-09-16 NOTE — ASSESSMENT & PLAN NOTE
With RVR  He is on amiodarone  Also on full dose lovenox  He is on levothyroxine  I do not see TSH since 3/2020  I will check TSh and free T4  On full dose lovenox for anticoagulation----dc'd by cardiology  Was on eliquis for home medicine

## 2022-09-16 NOTE — ASSESSMENT & PLAN NOTE
Patient is hypotensive  Started on dopamine and levophed  Cardiology consulted  9/15 EF now around 15%. Started on dobutamine  Cardiology note reviewed and appreciated  It is felt that his bp is likely going to run lower- cardiology to start midodrine  Weaning dobutamine and levophed

## 2022-09-16 NOTE — PROGRESS NOTES
[facility]  Cardiology  Progress Note    Patient Name: Luisito Stuart  MRN: [id number]  Admission Date: 9/13/2022  Hospital Length of Stay: 3 days  Code Status: DNR   Attending Physician: Kenean Gregory DO   Primary Care Physician: JOYA Hill  Expected Discharge Date:   Principal Problem:Biventricular heart failure with reduced left ventricular function    Subjective:     Hospital Course:   76 yo M with significant cardiac history, well known to Cardiology who presented from [facility] with c/o hypotension and LE edema. Labs significant for proBNP of 80K, Troponin of 1200, BUN/Cr of 43/4.56, hyperkalemia of 6, transaminitis with AST/ALT of 181/273. EKG exhibits Afib with interventricular conduction delay. CXR with cardiomegaly. Original recommendation of dobutamine gtt not initiated due to hypotension. Patient was transferred to ICU and started on Dopamine and Norepi gtt with concern of Cardiogenic Shock.     (9/14/22) Upon examination, patient afebrile, resting comfortably supine. He exhibits lethargy, but is oriented x3. Patient warm to touch with irregular heart rate and bilateral rales noted upon ausculation. He exhibits 3+ pitting edema of bilateral lower extremities. There is concern of underlying infectious process, but thus far workup is negative. Blood cultures pending.         Interval History: Lying flat in bed without difficulty. Denies chest pain or SOB. Currently on dobutamine and levophed.     Review of Systems   Constitutional: Negative for fever.   Eyes:  Negative for visual disturbance.   Cardiovascular:  Positive for leg swelling. Negative for chest pain, cyanosis, orthopnea, palpitations and syncope.   Respiratory:  Negative for cough, shortness of breath and wheezing.    Skin:  Negative for color change.   Objective:     Vital Signs (Most Recent):  Temp: 97.8 °F (36.6 °C) (09/16/22 1057)  Pulse: 98 (09/16/22 1230)  Resp: 20 (09/16/22 1230)  BP:  (!) 124/58 (09/16/22 1230)  SpO2: (!) 94 % (09/16/22 1230)   Vital Signs (24h Range):  Temp:  [97.6 °F (36.4 °C)-98.6 °F (37 °C)] 97.8 °F (36.6 °C)  Pulse:  [] 98  Resp:  [8-24] 20  SpO2:  [83 %-99 %] 94 %  BP: ()/(33-68) 124/58     Weight: 90.1 kg (198 lb 10.2 oz)  Body mass index is 29.33 kg/m².     SpO2: (!) 94 %  O2 Device (Oxygen Therapy): room air      Intake/Output Summary (Last 24 hours) at 9/16/2022 1310  Last data filed at 9/16/2022 1058  Gross per 24 hour   Intake 958.33 ml   Output 1500 ml   Net -541.67 ml       Lines/Drains/Airways       Drain  Duration                  Urethral Catheter 09/13/22 2330 2 days              Peripheral Intravenous Line  Duration                  Peripheral IV - Single Lumen 09/13/22 1425 18 G Anterior;Distal;Left Upper Arm 2 days         Peripheral IV - Single Lumen 09/14/22 0447 20 G Anterior;Right Forearm 2 days                    Physical Exam  Constitutional:       General: He is not in acute distress.     Appearance: He is ill-appearing.   Eyes:      Pupils: Pupils are equal, round, and reactive to light.   Cardiovascular:      Rate and Rhythm: Normal rate. Rhythm irregularly irregular.      Pulses: Normal pulses.      Heart sounds: Normal heart sounds.   Pulmonary:      Effort: Pulmonary effort is normal.      Breath sounds: Normal breath sounds.   Musculoskeletal:      Cervical back: Neck supple. No rigidity.      Right lower leg: Edema present.      Left lower leg: Edema present.   Skin:     General: Skin is warm and dry.   Neurological:      Mental Status: He is alert and oriented to person, place, and time.   Psychiatric:         Mood and Affect: Mood normal.         Behavior: Behavior normal.       Significant Labs: All pertinent lab results from the last 24 hours have been reviewed. and   Recent Lab Results         09/16/22  0748   09/16/22  0418        Anion Gap   13       Aniso 1+         Bands 1         Baso # 0.07         Basophil % 1.1          BUN   53       BUN/CREAT RATIO   12       Calcium   8.7       Chloride   98       CO2   26       Creatinine   4.60       Differential Type Manual         eGFR   13       Eos # 0.38         Eosinophil % 6.0          5         Glucose   75       Hematocrit 29.0         Hemoglobin 8.9         Immature Grans (Abs) 0.01         Immature Granulocytes 0.2         Lymph # 0.55         Lymph % 8.7          10         Magnesium   1.6       MCH 30.2         MCHC 30.7         MCV 98.3         Mono # 0.74         Mono % 11.7          9         MPV 12.5         Neutrophils, Abs 4.56         Neutrophils Relative 72.3         nRBC 0.3         NUCLEATED RBC ABSOLUTE 0.02         Ovalocytes Few         PLATELET MORPHOLOGY Few Large Platelets         Platelets 151         Poly Few         Potassium   5.2       RBC 2.95         RDW 15.8         Segmented Neutrophils, Man % 75         Sodium   132       WBC 6.31                 Significant Imaging: Echocardiogram: Transthoracic echo (TTE) complete (Cupid Only):   Results for orders placed or performed during the hospital encounter of 09/13/22   Echo   Result Value Ref Range    BSA 2.11 m2    Right Atrial Pressure (from IVC) 15 mmHg    EF 15 %    Left Ventricular Outflow Tract Mean Gradient 1.00 mmHg    AORTIC VALVE CUSP SEPERATION 1.73 cm    LVIDd 6.06 (A) 3.5 - 6.0 cm    IVS 1.30 (A) 0.6 - 1.1 cm    Posterior Wall 1.29 (A) 0.6 - 1.1 cm    Ao root annulus 2.70 cm    LVIDs 5.50 (A) 2.1 - 4.0 cm    FS 9 28 - 44 %    IVC ostium 2.0 cm    LV mass 353.93 g    LA size 4.30 cm    RVDD 5.40 cm    TAPSE 1.60 cm    Left Ventricle Relative Wall Thickness 0.43 cm    AV mean gradient 1 mmHg    AV valve area 2.27 cm2    AV Velocity Ratio 0.86     AV index (prosthetic) 1.00     MV mean gradient 22 mmHg    MV valve area p 1/2 method 2.78 cm2    MV valve area by continuity eq 0.24 cm2    E wave deceleration time 163.00 msec    LVOT diameter 1.70 cm    LVOT area 2.3 cm2    LVOT peak ricardo 0.6 m/s    LVOT  peak VTI 6.00 cm    Ao peak jim 0.7 m/s    Ao VTI 6.00 cm    LVOT stroke volume 13.61 cm3    AV peak gradient 2 mmHg    MV peak gradient 36 mmHg    TV rest pulmonary artery pressure 38 mmHg    MV Peak E Jim 2.18 m/s    TR Max Jim 2.40 m/s    MV VTI 56.5 cm    MV stenosis pressure 1/2 time 79.00 ms    LV Systolic Volume 148.00 mL    LV Systolic Volume Index 71.5 mL/m2    LV Diastolic Volume 184.80 mL    LV Diastolic Volume Index 89.28 mL/m2    LV Mass Index 171 g/m2    Echo EF Estimated 20 %    RA Major Axis 6.00 cm    Triscuspid Valve Regurgitation Peak Gradient 23 mmHg    Narrative    · Atrial fibrillation observed.  · The left ventricle is moderately enlarged with mild concentric   hypertrophy and severely decreased systolic function.  · The estimated ejection fraction is 15%.  · There is severe left ventricular global hypokinesis.  · There is abnormal septal wall motion consistent with right ventricular   pacemaker.  · Severe right ventricular enlargement with moderately to severely reduced   right ventricular systolic function.  · Severe right atrial enlargement.  · Severe left atrial enlargement.  · Moderate-to-severe mitral regurgitation.  · Severe tricuspid regurgitation.  · Elevated central venous pressure (15 mmHg).  · The estimated PA systolic pressure is 38 mmHg.  · Compared to prior ECHO from 11/2021; No significant change        Assessment and Plan:     Brief HPI: 78 yo M with significant cardiac history, well known to Cardiology who presented from [facility] with c/o hypotension and LE edema. Labs significant for proBNP of 80K, Troponin of 1200, BUN/Cr of 43/4.56, hyperkalemia of 6, transaminitis with AST/ALT of 181/273. EKG exhibits Afib with interventricular conduction delay. CXR with cardiomegaly. Original recommendation of dobutamine gtt not initiated due to hypotension. Patient was transferred to ICU and started on Dopamine and Norepi gtt with concern of Cardiogenic Shock.      (9/14/22) Upon examination, patient afebrile, resting comfortably supine. He exhibits lethargy, but is oriented x3. Patient warm to touch with irregular heart rate and bilateral rales noted upon ausculation. He exhibits 3+ pitting edema of bilateral lower extremities. There is concern of underlying infectious process, but thus far workup is negative. Blood cultures pending.           * Biventricular heart failure with reduced left ventricular function  INTERMACS 3 with patient clinically stable but dependent on inotropic support. Vitals and labs indicate poor perfusion and end organ damage. Afterload reduction deferred at his time due to hypotension and need for Dobutamine and Norepinephrine infusions.     ECHO with atrial fibrillation observed. LV moderately enlarged with mild concentric hypertrophy and severely decreased systolic function. EF of 15% with severe LV global hypokinesis. Abnormal septal wall motion consistent with RV pacemaker. Severe RV enlargement with moderately to severely reduced right ventricular systolic function. Severe RA and LA enlargement. Moderate-to-severe MR. Severe TR. Elevated CVP (15 mmHg). The estimated PA systolic pressure is 38 mmHg. Compared to prior ECHO from 11/2021; No significant change.    - discontinue dobutamine  - wean off levophed  - start midodrine 5mg po bid  - pt remains afib, HR is controlled, continue PO amiodarone  - cardiology will sign off, thank you for the consult, please call if needed      Transaminitis  - Patient with elevated AST/ALT of 181/273 and Alk Phos of 170. Ammonia resulted wnl at 31  - Possible congestive hepatopathy due to TR  - Continue to monitor      Ischemic cardiomyopathy  - has AICD  - discontinue dobutamine  - wean levophed  - start midodrine    Coronary artery disease involving coronary bypass graft of native heart without angina pectoris  - Last [facility] was 01/2022 - 70% stenosis to ostial LCx  - continue ASA, plavix, statin  - no PCI at  this time due to rising creatinine  - continue medical management  - no nitrates due to hypotension    A-fib  - rate controlled  - continue PO amiodarone    Acute renal failure superimposed on chronic kidney disease  - Cr of 4.6, likely his new baseline  - pt still with adequate urine output  - Dr. Mckee discussed possible dialysis with the pt if kidney function worsens, pt voiced understanding          VTE Risk Mitigation (From admission, onward)    None          Adelita Wynne, CRISTOBALP  Cardiology  [facility]

## 2022-09-16 NOTE — PROGRESS NOTES
[facility]  Nephrology  Progress Note    Patient Name: Luisito Stuart  MRN: [id number]  Admission Date: 9/13/2022  Hospital Length of Stay: 3 days  Attending Provider: Keenan Gregory DO   Primary Care Physician: JOYA Hill  Principal Problem:Acute on chronic congestive heart failure    Subjective:     HPI: This patient with multiple medical problems that include CAD, CKD, a kidney transplant, atrial fibrillation, CHF with EF of 15% presented with shortness of breath, fatigue and low blood pressure.  He has been admitted to the ICU on dopamine infusion.  Nephrology has been consulted for renal issues.  The patient's serum creatinine is noted to be 4.5.  His lactic acid is 1.4.  The patient is hard of hearing and has a history of dementia.      Interval History: The patient is more comfortable today.  He denies any complaints.  His heart rate is better.  Today, serum creatinine is 4.6 which is improved.    Review of patient's allergies indicates:   Allergen Reactions    Indomethacin Itching and Other (See Comments)     Other reaction(s): ITCHING    Lipitor [atorvastatin] Other (See Comments)     Muscle cramps     Current Facility-Administered Medications   Medication Frequency    acetaminophen tablet 1,000 mg Q8H PRN    acetaminophen tablet 650 mg Q8H PRN    acetaminophen tablet 650 mg Q4H PRN    aluminum-magnesium hydroxide-simethicone 200-200-20 mg/5 mL suspension 30 mL QID PRN    amiodarone tablet 400 mg BID    aspirin EC tablet 81 mg Daily    clopidogreL tablet 75 mg Daily    dextrose 50% injection 12.5 g PRN    dextrose 50% injection 25 g PRN    glucagon (human recombinant) injection 1 mg PRN    HYDROcodone-acetaminophen 5-325 mg per tablet 1 tablet Q6H PRN    levothyroxine tablet 75 mcg Before breakfast    magnesium oxide tablet 800 mg PRN    magnesium oxide tablet 800 mg PRN    melatonin tablet 6 mg Nightly PRN    midodrine tablet 5 mg BID WM     mupirocin 2 % ointment BID    naloxone 0.4 mg/mL injection 0.02 mg PRN    NORepinephrine 4 mg in dextrose 5 % 250 mL infusion Continuous    ondansetron disintegrating tablet 8 mg Q8H PRN    polyethylene glycol packet 17 g Daily PRN    potassium bicarbonate disintegrating tablet 35 mEq PRN    potassium bicarbonate disintegrating tablet 50 mEq PRN    potassium bicarbonate disintegrating tablet 60 mEq PRN    potassium, sodium phosphates 280-160-250 mg packet 2 packet PRN    potassium, sodium phosphates 280-160-250 mg packet 2 packet PRN    potassium, sodium phosphates 280-160-250 mg packet 2 packet PRN    sodium chloride 0.9% flush 10 mL PRN       Objective:     Vital Signs (Most Recent):  Temp: 97.8 °F (36.6 °C) (09/16/22 1057)  Pulse: 98 (09/16/22 1230)  Resp: 20 (09/16/22 1230)  BP: (!) 124/58 (09/16/22 1230)  SpO2: (!) 94 % (09/16/22 1230)  O2 Device (Oxygen Therapy): room air (09/15/22 0930)   Vital Signs (24h Range):  Temp:  [97.6 °F (36.4 °C)-98.6 °F (37 °C)] 97.8 °F (36.6 °C)  Pulse:  [] 98  Resp:  [8-24] 20  SpO2:  [83 %-99 %] 94 %  BP: ()/(33-68) 124/58     Weight: 90.1 kg (198 lb 10.2 oz) (09/15/22 2330)  Body mass index is 29.33 kg/m².  Body surface area is 2.09 meters squared.    I/O last 3 completed shifts:  In: 1957.1 [P.O.:320; I.V.:1637.1]  Out: 3300 [Urine:3300]    Physical Exam  Vitals reviewed.   HENT:      Head: Normocephalic and atraumatic.      Mouth/Throat:      Mouth: Mucous membranes are moist.   Cardiovascular:      Rate and Rhythm: Rhythm irregular.   Pulmonary:      Effort: Pulmonary effort is normal.   Abdominal:      Palpations: Abdomen is soft.   Neurological:      Mental Status: He is alert.   Psychiatric:         Mood and Affect: Mood normal.       Significant Labs:  BMP:   Recent Labs   Lab 09/16/22  0418   GLU 75   *   K 5.2*   CL 98   CO2 26   BUN 53*   CREATININE 4.60*   CALCIUM 8.7   MG 1.6*     CBC:   Recent Labs   Lab 09/16/22  0748   WBC 6.31   RBC  2.95*   HGB 8.9*   HCT 29.0*      MCV 98.3*   MCH 30.2   MCHC 30.7*        Significant Imaging:  Labs: Reviewed    Assessment/Plan:     A-fib  Chronic condition    Hx of CABG  History of CAD    Acute renal failure superimposed on chronic kidney disease  Appears to be a chronic condition.  9/16/2022  Chronic condition.  Today creatinine is 4.6    Chronic obstructive pulmonary disease  Chronic condition        Thank you for your consult. I will follow-up with patient. Please contact us if you have any additional questions.    Bret Marcus Jr, MD  Nephrology  [facility]

## 2022-09-16 NOTE — SUBJECTIVE & OBJECTIVE
Interval History: No acute events overnight. The patient is currently resting comfortably. He remains on dobutamine and levophed. He is afebrile.    Objective:     Vital Signs (Most Recent):  Temp: 97.8 °F (36.6 °C) (09/16/22 1057)  Pulse: 75 (09/16/22 1045)  Resp: 11 (09/16/22 1045)  BP: (!) 96/43 (09/16/22 1045)  SpO2: 97 % (09/16/22 1045)   Vital Signs (24h Range):  Temp:  [97.6 °F (36.4 °C)-98.6 °F (37 °C)] 97.8 °F (36.6 °C)  Pulse:  [] 75  Resp:  [8-24] 11  SpO2:  [83 %-99 %] 97 %  BP: ()/(33-68) 96/43     Weight: 90.1 kg (198 lb 10.2 oz)  Body mass index is 29.33 kg/m².      Intake/Output Summary (Last 24 hours) at 9/16/2022 1112  Last data filed at 9/16/2022 1058  Gross per 24 hour   Intake 958.33 ml   Output 1500 ml   Net -541.67 ml         Physical Exam  Vitals and nursing note reviewed.   Constitutional:       General: He is not in acute distress.     Appearance: He is not ill-appearing.   HENT:      Head: Normocephalic.      Right Ear: External ear normal.      Left Ear: External ear normal.      Nose: Nose normal.      Mouth/Throat:      Pharynx: Oropharynx is clear.   Eyes:      Extraocular Movements: Extraocular movements intact.      Conjunctiva/sclera: Conjunctivae normal.      Pupils: Pupils are equal, round, and reactive to light.   Cardiovascular:      Rate and Rhythm: Tachycardia present. Rhythm irregular.      Pulses: Normal pulses.      Heart sounds: Normal heart sounds.   Pulmonary:      Effort: Pulmonary effort is normal. No respiratory distress.      Breath sounds: No wheezing or rhonchi.   Abdominal:      General: Abdomen is flat. Bowel sounds are normal. There is no distension.      Palpations: Abdomen is soft.   Musculoskeletal:         General: No swelling or tenderness. Normal range of motion.      Cervical back: Normal range of motion and neck supple.      Right lower leg: Edema present.      Left lower leg: Edema present.      Comments: L>R LE edema    Skin:     General:  Skin is warm and dry.      Coloration: Skin is not pale.   Neurological:      General: No focal deficit present.      Mental Status: He is alert.      Cranial Nerves: No cranial nerve deficit.      Motor: Weakness present.      Comments: Oriented to person and place. He had some issues with time   Psychiatric:         Mood and Affect: Mood normal.       Vents:  Oxygen Concentration (%): 21 (09/14/22 1700)    Lines/Drains/Airways       Drain  Duration                  Urethral Catheter 09/13/22 2330 2 days              Peripheral Intravenous Line  Duration                  Peripheral IV - Single Lumen 09/13/22 1425 18 G Anterior;Distal;Left Upper Arm 2 days         Peripheral IV - Single Lumen 09/14/22 0447 20 G Anterior;Right Forearm 2 days                    Significant Labs:    CBC/Anemia Profile:  Recent Labs   Lab 09/15/22  0423 09/16/22  0748   WBC 9.72 6.31   HGB 10.1* 8.9*   HCT 31.6* 29.0*    151   MCV 94.6 98.3*   RDW 16.0* 15.8*          Chemistries:  Recent Labs   Lab 09/14/22  1300 09/15/22  0423 09/16/22  0418   NA  --  134* 132*   K 4.4 4.7 5.2*   CL  --  100 98   CO2  --  25 26   BUN  --  47* 53*   CREATININE  --  4.59* 4.60*   CALCIUM  --  9.2 8.7   MG  --  1.8 1.6*   PHOS  --  4.4  --          All pertinent labs within the past 24 hours have been reviewed.    Significant Imaging:  I have reviewed all pertinent imaging results/findings within the past 24 hours.

## 2022-09-16 NOTE — ASSESSMENT & PLAN NOTE
INTERMACS 3 with patient clinically stable but dependent on inotropic support. Vitals and labs indicate poor perfusion and end organ damage. Afterload reduction deferred at his time due to hypotension and need for Dobutamine and Norepinephrine infusions.     ECHO with atrial fibrillation observed. LV moderately enlarged with mild concentric hypertrophy and severely decreased systolic function. EF of 15% with severe LV global hypokinesis. Abnormal septal wall motion consistent with RV pacemaker. Severe RV enlargement with moderately to severely reduced right ventricular systolic function. Severe RA and LA enlargement. Moderate-to-severe MR. Severe TR. Elevated CVP (15 mmHg). The estimated PA systolic pressure is 38 mmHg. Compared to prior ECHO from 11/2021; No significant change.    - discontinue dobutamine  - wean off levophed  - start midodrine 5mg po bid  - pt remains afib, HR is controlled, continue PO amiodarone  - cardiology will sign off, thank you for the consult, please call if needed

## 2022-09-16 NOTE — ASSESSMENT & PLAN NOTE
- Last [facility] was 01/2022 - 70% stenosis to ostial LCx  - continue ASA, plavix, statin  - no PCI at this time due to rising creatinine  - continue medical management  - no nitrates due to hypotension

## 2022-09-17 NOTE — PROGRESS NOTES
[facility]  Pulmonology  Progress Note    Patient Name: Luisito Stuart  MRN: [id number]  Admission Date: 9/13/2022  Hospital Length of Stay: 4 days  Code Status: DNR  Attending Provider: Keenan Gregory DO  Primary Care Provider: JOYA Hill   Principal Problem: Biventricular heart failure with reduced left ventricular function    Subjective:     Interval History: No acute events overnight. The patient is currently resting comfortably. He is no longer on dobutamine or levophed. He is afebrile and vital signs are stable    Objective:     Vital Signs (Most Recent):  Temp: 97.8 °F (36.6 °C) (09/17/22 1130)  Pulse: 77 (09/17/22 1130)  Resp: 17 (09/17/22 1130)  BP: 110/62 (09/17/22 1130)  SpO2: (!) 92 % (09/17/22 1130)   Vital Signs (24h Range):  Temp:  [97.5 °F (36.4 °C)-98.3 °F (36.8 °C)] 97.8 °F (36.6 °C)  Pulse:  [] 77  Resp:  [10-23] 17  SpO2:  [85 %-100 %] 92 %  BP: ()/() 110/62     Weight: 90.1 kg (198 lb 10.2 oz)  Body mass index is 29.33 kg/m².      Intake/Output Summary (Last 24 hours) at 9/17/2022 1234  Last data filed at 9/17/2022 0500  Gross per 24 hour   Intake 1102.69 ml   Output 1400 ml   Net -297.31 ml         Physical Exam  Vitals and nursing note reviewed.   Constitutional:       General: He is not in acute distress.     Appearance: He is not ill-appearing.   HENT:      Head: Normocephalic.      Right Ear: External ear normal.      Left Ear: External ear normal.      Nose: Nose normal.      Mouth/Throat:      Pharynx: Oropharynx is clear.   Eyes:      Extraocular Movements: Extraocular movements intact.      Conjunctiva/sclera: Conjunctivae normal.      Pupils: Pupils are equal, round, and reactive to light.   Cardiovascular:      Rate and Rhythm: Tachycardia present. Rhythm irregular.      Pulses: Normal pulses.      Heart sounds: Normal heart sounds.   Pulmonary:      Effort: Pulmonary effort is normal. No respiratory distress.      Breath  sounds: No wheezing or rhonchi.   Abdominal:      General: Abdomen is flat. Bowel sounds are normal. There is no distension.      Palpations: Abdomen is soft.   Musculoskeletal:         General: No swelling or tenderness. Normal range of motion.      Cervical back: Normal range of motion and neck supple.      Right lower leg: Edema present.      Left lower leg: Edema present.      Comments: L>R LE edema    Skin:     General: Skin is warm and dry.      Coloration: Skin is not pale.   Neurological:      General: No focal deficit present.      Mental Status: He is alert.      Cranial Nerves: No cranial nerve deficit.      Motor: Weakness present.      Comments: Oriented to person and place. He had some issues with time   Psychiatric:         Mood and Affect: Mood normal.       Vents:  Oxygen Concentration (%): 21 (09/14/22 1700)    Lines/Drains/Airways       Peripheral Intravenous Line  Duration                  Peripheral IV - Single Lumen 09/13/22 1425 18 G Anterior;Distal;Left Upper Arm 3 days         Peripheral IV - Single Lumen 09/14/22 0447 20 G Anterior;Right Forearm 3 days                    Significant Labs:    CBC/Anemia Profile:  Recent Labs   Lab 09/16/22  0748   WBC 6.31   HGB 8.9*   HCT 29.0*      MCV 98.3*   RDW 15.8*          Chemistries:  Recent Labs   Lab 09/16/22  0418 09/17/22  0251   * 135*   K 5.2* 5.3*   CL 98 100   CO2 26 28   BUN 53* 50*   CREATININE 4.60* 4.07*   CALCIUM 8.7 8.5   MG 1.6*  --          All pertinent labs within the past 24 hours have been reviewed.    Significant Imaging:  I have reviewed all pertinent imaging results/findings within the past 24 hours.    Assessment/Plan:     Cardiogenic shock  Patient is hypotensive  Started on dopamine and levophed  Cardiology consulted  9/15 EF now around 15%. Started on dobutamine  Cardiology note reviewed and appreciated  It is felt that his bp is likely going to run lower- cardiology to start midodrine  Weaning dobutamine and  levophed---they have both been off since midnight      Ischemic cardiomyopathy  EF 20% on past echo  Felt to have component of cardiogenic shock  Patient on entresto outpatient which has been held  Also on carvedilol outpatient  Currently on levophed for hypotension  Cardiology consulted  Echo with EF around 15%  Now on dobutamine, dopamine discontinued  Dobutamine discontinued    Coronary artery disease involving coronary bypass graft of native heart without angina pectoris  He has elevated Troponin  He is on aspirin, plavix, full dose lovenox  Cardiology consulted  Left heart catheterization from 01/05/2022:  Right common femoral artery access was obtained for angiography under ultrasound guidance.  Patent LIMA to LAD,  Patent SVG to PDA, proximal PDA has a stent which has 30% ISR, other stent is noted in the distal part of the graft which has 50% ISR.  SVG to circumflex is occluded.  Native RCA is completely occluded.  Distal left main appears hazy, both ostial circumflex and ostial 80 have at least intermediate 70% stenosis.  There is a patent long stent in the circumflex with mild ISR.    A-fib  With RVR  He is on amiodarone  Also on full dose lovenox  He is on levothyroxine  I do not see TSH since 3/2020  I will check TSh and free T4  On full dose lovenox for anticoagulation----dc'd by cardiology  Was on eliquis for home medicine    History of cardiac defibrillator placement  Stable  Cardiology consulted   I have spoken with cardiology        Acute renal failure superimposed on chronic kidney disease  Acute on chronic renal failure  Creatinine is 4.56. It was around 3 seven months ago  This is likely due to decreased effective circulating volume  Secondary to Cardiomyopathy with EF around 20%  Nephrology consulted by admitting team  We will try to avoid nephrotoxic medications  Renally dose meds  Follow Bun/Crea and urine output  Creatinine is 4.6, UOP adequate 2000 ml---likely new baseline    History of DVT  (deep vein thrombosis)  He is on apixiban at home  No evidence of DVT on doppler  He was on full dose lovenox (for a fib)---this was dc'd by cardiology      Chronic obstructive pulmonary disease  Greater than 40 pack per year smoking history  Home meds include no maintenance medications  Currently no acute issues      Remove lilly today. If he continues to do well will get to the floor tomorrow. Would like for him to be off of pressors for 24 hours before transferring         Keenan Gregory,   Pulmonology  [facility]

## 2022-09-17 NOTE — SUBJECTIVE & OBJECTIVE
Interval History: No acute events overnight. The patient is currently resting comfortably. He is no longer on dobutamine or levophed. He is afebrile and vital signs are stable    Objective:     Vital Signs (Most Recent):  Temp: 97.8 °F (36.6 °C) (09/17/22 1130)  Pulse: 77 (09/17/22 1130)  Resp: 17 (09/17/22 1130)  BP: 110/62 (09/17/22 1130)  SpO2: (!) 92 % (09/17/22 1130)   Vital Signs (24h Range):  Temp:  [97.5 °F (36.4 °C)-98.3 °F (36.8 °C)] 97.8 °F (36.6 °C)  Pulse:  [] 77  Resp:  [10-23] 17  SpO2:  [85 %-100 %] 92 %  BP: ()/() 110/62     Weight: 90.1 kg (198 lb 10.2 oz)  Body mass index is 29.33 kg/m².      Intake/Output Summary (Last 24 hours) at 9/17/2022 1234  Last data filed at 9/17/2022 0500  Gross per 24 hour   Intake 1102.69 ml   Output 1400 ml   Net -297.31 ml         Physical Exam  Vitals and nursing note reviewed.   Constitutional:       General: He is not in acute distress.     Appearance: He is not ill-appearing.   HENT:      Head: Normocephalic.      Right Ear: External ear normal.      Left Ear: External ear normal.      Nose: Nose normal.      Mouth/Throat:      Pharynx: Oropharynx is clear.   Eyes:      Extraocular Movements: Extraocular movements intact.      Conjunctiva/sclera: Conjunctivae normal.      Pupils: Pupils are equal, round, and reactive to light.   Cardiovascular:      Rate and Rhythm: Tachycardia present. Rhythm irregular.      Pulses: Normal pulses.      Heart sounds: Normal heart sounds.   Pulmonary:      Effort: Pulmonary effort is normal. No respiratory distress.      Breath sounds: No wheezing or rhonchi.   Abdominal:      General: Abdomen is flat. Bowel sounds are normal. There is no distension.      Palpations: Abdomen is soft.   Musculoskeletal:         General: No swelling or tenderness. Normal range of motion.      Cervical back: Normal range of motion and neck supple.      Right lower leg: Edema present.      Left lower leg: Edema present.       Comments: L>R LE edema    Skin:     General: Skin is warm and dry.      Coloration: Skin is not pale.   Neurological:      General: No focal deficit present.      Mental Status: He is alert.      Cranial Nerves: No cranial nerve deficit.      Motor: Weakness present.      Comments: Oriented to person and place. He had some issues with time   Psychiatric:         Mood and Affect: Mood normal.       Vents:  Oxygen Concentration (%): 21 (09/14/22 1700)    Lines/Drains/Airways       Peripheral Intravenous Line  Duration                  Peripheral IV - Single Lumen 09/13/22 1425 18 G Anterior;Distal;Left Upper Arm 3 days         Peripheral IV - Single Lumen 09/14/22 0447 20 G Anterior;Right Forearm 3 days                    Significant Labs:    CBC/Anemia Profile:  Recent Labs   Lab 09/16/22  0748   WBC 6.31   HGB 8.9*   HCT 29.0*      MCV 98.3*   RDW 15.8*          Chemistries:  Recent Labs   Lab 09/16/22  0418 09/17/22  0251   * 135*   K 5.2* 5.3*   CL 98 100   CO2 26 28   BUN 53* 50*   CREATININE 4.60* 4.07*   CALCIUM 8.7 8.5   MG 1.6*  --          All pertinent labs within the past 24 hours have been reviewed.    Significant Imaging:  I have reviewed all pertinent imaging results/findings within the past 24 hours.

## 2022-09-17 NOTE — ASSESSMENT & PLAN NOTE
Patient is hypotensive  Started on dopamine and levophed  Cardiology consulted  9/15 EF now around 15%. Started on dobutamine  Cardiology note reviewed and appreciated  It is felt that his bp is likely going to run lower- cardiology to start midodrine  Weaning dobutamine and levophed---they have both been off since midnight

## 2022-09-17 NOTE — PROGRESS NOTES
[facility]  Nephrology  Progress Note    Patient Name: Luisito Stuart  MRN: [id number]  Admission Date: 9/13/2022  Hospital Length of Stay: 4 days  Attending Provider: Keenan Gregory DO   Primary Care Physician: JOYA Hill  Principal Problem:Biventricular heart failure with reduced left ventricular function    Consults  Subjective:     Interval History: Follow up CKD. He's improved and says he's ready to go home. Cheerful     Review of patient's allergies indicates:   Allergen Reactions    Indomethacin Itching and Other (See Comments)     Other reaction(s): ITCHING    Lipitor [atorvastatin] Other (See Comments)     Muscle cramps     Current Facility-Administered Medications   Medication Frequency    acetaminophen tablet 1,000 mg Q8H PRN    acetaminophen tablet 650 mg Q8H PRN    acetaminophen tablet 650 mg Q4H PRN    aluminum-magnesium hydroxide-simethicone 200-200-20 mg/5 mL suspension 30 mL QID PRN    amiodarone tablet 400 mg BID    aspirin EC tablet 81 mg Daily    clopidogreL tablet 75 mg Daily    dextrose 50% injection 12.5 g PRN    dextrose 50% injection 25 g PRN    glucagon (human recombinant) injection 1 mg PRN    HYDROcodone-acetaminophen 5-325 mg per tablet 1 tablet Q6H PRN    levothyroxine tablet 75 mcg Before breakfast    magnesium oxide tablet 800 mg PRN    magnesium oxide tablet 800 mg PRN    melatonin tablet 6 mg Nightly PRN    midodrine tablet 5 mg BID WM    mupirocin 2 % ointment BID    naloxone 0.4 mg/mL injection 0.02 mg PRN    NORepinephrine 4 mg in dextrose 5 % 250 mL infusion Continuous    ondansetron disintegrating tablet 8 mg Q8H PRN    polyethylene glycol packet 17 g Daily PRN    potassium bicarbonate disintegrating tablet 35 mEq PRN    potassium bicarbonate disintegrating tablet 50 mEq PRN    potassium bicarbonate disintegrating tablet 60 mEq PRN    potassium, sodium phosphates 280-160-250 mg packet 2 packet PRN    potassium, sodium phosphates  280-160-250 mg packet 2 packet PRN    potassium, sodium phosphates 280-160-250 mg packet 2 packet PRN    sodium chloride 0.9% flush 10 mL PRN       Objective:     Vital Signs (Most Recent):  Temp: 97.8 °F (36.6 °C) (09/17/22 1130)  Pulse: 77 (09/17/22 1130)  Resp: 17 (09/17/22 1130)  BP: 110/62 (09/17/22 1130)  SpO2: (!) 92 % (09/17/22 1130)  O2 Device (Oxygen Therapy): nasal cannula (09/17/22 0006)   Vital Signs (24h Range):  Temp:  [97.5 °F (36.4 °C)-98.3 °F (36.8 °C)] 97.8 °F (36.6 °C)  Pulse:  [] 77  Resp:  [10-23] 17  SpO2:  [85 %-100 %] 92 %  BP: ()/() 110/62     Weight: 90.1 kg (198 lb 10.2 oz) (09/15/22 2330)  Body mass index is 29.33 kg/m².  Body surface area is 2.09 meters squared.    I/O last 3 completed shifts:  In: 2326.4 [P.O.:1280; I.V.:1046.4]  Out: 2650 [Urine:2650]    Physical Exam No edema. Chest clear. Hard of hearing. Very pleasant.    Significant Labs:sureBMP:   Recent Labs   Lab 09/16/22  0418 09/17/22  0251   GLU 75 75   * 135*   K 5.2* 5.3*   CL 98 100   CO2 26 28   BUN 53* 50*   CREATININE 4.60* 4.07*   CALCIUM 8.7 8.5   MG 1.6*  --      CBC:   Recent Labs   Lab 09/16/22  0748   WBC 6.31   RBC 2.95*   HGB 8.9*   HCT 29.0*      MCV 98.3*   MCH 30.2   MCHC 30.7*     All labs within the past 24 hours have been reviewed.    Significant Imaging:  Labs: Reviewed    Assessment/Plan:     Active Diagnoses:    Diagnosis Date Noted POA    PRINCIPAL PROBLEM:  Biventricular heart failure with reduced left ventricular function [I50.814] 04/10/2019 Yes    Transaminitis [R74.01] 09/14/2022 Yes    Cardiogenic shock [R57.0] 09/13/2022 Yes    CVA (cerebral vascular accident) [I63.9] 09/13/2022 Yes    Hepatic congestion [K76.1] 09/13/2022 Yes    Coagulopathy [D68.9] 09/13/2022 Yes    H/O kidney transplant [Z94.0] 09/13/2022 Not Applicable    Abdominal aortic aneurysm (AAA) without rupture [I71.4] 02/18/2022 Yes    CKD (chronic kidney disease), stage III [N18.30] 02/15/2022 Yes     Coronary artery disease involving coronary bypass graft of native heart without angina pectoris [I25.810] 01/10/2022 Yes    Ischemic cardiomyopathy [I25.5] 01/10/2022 Yes    A-fib [I48.91] 01/03/2022 Yes    History of cardiac defibrillator placement [Z95.810] 12/15/2021 Yes    Hx of CABG [Z95.1] 12/15/2021 Not Applicable    Acute renal failure superimposed on chronic kidney disease [N17.9, N18.9] 12/01/2021 Yes    History of DVT (deep vein thrombosis) [Z86.718] 05/12/2021 Not Applicable    Chronic obstructive pulmonary disease [J44.9] 04/10/2019 Yes    Obstructive sleep apnea [G47.33] 04/10/2019 Yes    Hyperlipidemia [E78.5] 04/10/2019 Yes      Problems Resolved During this Admission:    Diagnosis Date Noted Date Resolved POA    Troponin level elevated [R77.8] 09/14/2022 09/15/2022 Yes    Kidney transplanted [Z94.0] 09/13/2022 09/13/2022 Not Applicable    Congestive heart failure [I50.9] 12/15/2021 09/13/2022 Yes       Creatinine down to 4.1 today from 4.6 yesterday. Doing well    Thank you for your consult. I will follow-up with patient. Please contact us if you have any additional questions.    Lai Cheema MD  Nephrology  [facility]

## 2022-09-17 NOTE — ASSESSMENT & PLAN NOTE
EF 20% on past echo  Felt to have component of cardiogenic shock  Patient on entresto outpatient which has been held  Also on carvedilol outpatient  Currently on levophed for hypotension  Cardiology consulted  Echo with EF around 15%  Now on dobutamine, dopamine discontinued  Dobutamine discontinued

## 2022-09-18 NOTE — PLAN OF CARE
Problem: Skin Injury Risk Increased  Goal: Skin Health and Integrity  Outcome: Ongoing, Progressing  Intervention: Optimize Skin Protection  Flowsheets (Taken 9/18/2022 4987)  Pressure Reduction Techniques:   frequent weight shift encouraged   pressure points protected   weight shift assistance provided  Pressure Reduction Devices:   foam padding utilized   positioning supports utilized   pressure-redistributing mattress utilized  Skin Protection:   adhesive use limited   incontinence pads utilized  Head of Bed (HOB) Positioning: HOB at 20-30 degrees

## 2022-09-18 NOTE — PROGRESS NOTES
[facility]  Nephrology  Progress Note    Patient Name: Luisito Stuart  MRN: [id number]  Admission Date: 9/13/2022  Hospital Length of Stay: 5 days  Attending Provider: Keenan Gregory DO   Primary Care Physician: JOYA Hill  Principal Problem:Biventricular heart failure with reduced left ventricular function    Consults  Subjective:     Interval History: Pt is seen in f/u of his CKD and he's doing well. Remains with stable bp and is talkative.    Review of patient's allergies indicates:   Allergen Reactions    Indomethacin Itching and Other (See Comments)     Other reaction(s): ITCHING    Lipitor [atorvastatin] Other (See Comments)     Muscle cramps     Current Facility-Administered Medications   Medication Frequency    acetaminophen tablet 1,000 mg Q8H PRN    acetaminophen tablet 650 mg Q8H PRN    acetaminophen tablet 650 mg Q4H PRN    aluminum-magnesium hydroxide-simethicone 200-200-20 mg/5 mL suspension 30 mL QID PRN    amiodarone tablet 400 mg BID    aspirin EC tablet 81 mg Daily    clopidogreL tablet 75 mg Daily    dextrose 50% injection 12.5 g PRN    dextrose 50% injection 25 g PRN    glucagon (human recombinant) injection 1 mg PRN    HYDROcodone-acetaminophen 5-325 mg per tablet 1 tablet Q6H PRN    levothyroxine tablet 75 mcg Before breakfast    magnesium oxide tablet 800 mg PRN    magnesium oxide tablet 800 mg PRN    melatonin tablet 6 mg Nightly PRN    midodrine tablet 5 mg BID WM    mupirocin 2 % ointment BID    naloxone 0.4 mg/mL injection 0.02 mg PRN    ondansetron disintegrating tablet 8 mg Q8H PRN    polyethylene glycol packet 17 g Daily PRN    potassium bicarbonate disintegrating tablet 35 mEq PRN    potassium bicarbonate disintegrating tablet 50 mEq PRN    potassium bicarbonate disintegrating tablet 60 mEq PRN    potassium, sodium phosphates 280-160-250 mg packet 2 packet PRN    potassium, sodium phosphates 280-160-250 mg packet 2 packet PRN    potassium, sodium  phosphates 280-160-250 mg packet 2 packet PRN    sodium chloride 0.9% flush 10 mL PRN       Objective:     Vital Signs (Most Recent):  Temp: 97 °F (36.1 °C) (09/18/22 0701)  Pulse: 73 (09/18/22 1030)  Resp: 12 (09/18/22 1030)  BP: 130/61 (09/18/22 1030)  SpO2: 95 % (09/18/22 1030)  O2 Device (Oxygen Therapy): room air (09/18/22 0755) Vital Signs (24h Range):  Temp:  [97 °F (36.1 °C)-98.1 °F (36.7 °C)] 97 °F (36.1 °C)  Pulse:  [68-93] 73  Resp:  [8-27] 12  SpO2:  [86 %-100 %] 95 %  BP: ()/() 130/61     Weight: 90.1 kg (198 lb 10.2 oz) (09/15/22 2330)  Body mass index is 29.33 kg/m².  Body surface area is 2.09 meters squared.    I/O last 3 completed shifts:  In: 432.7 [P.O.:350; I.V.:82.7]  Out: 1550 [Urine:1550]    Physical Exam Cheerful. Chest clear    Significant Labs:sureBMP:   Recent Labs   Lab 09/16/22  0418 09/17/22  0251 09/18/22  0212   GLU 75   < > 85   *   < > 134*   K 5.2*   < > 5.3*   CL 98   < > 100   CO2 26   < > 27   BUN 53*   < > 52*   CREATININE 4.60*   < > 3.93*   CALCIUM 8.7   < > 8.9   MG 1.6*  --   --     < > = values in this interval not displayed.     All labs within the past 24 hours have been reviewed.    Significant Imaging:  Labs: Reviewed    Assessment/Plan:     Active Diagnoses:    Diagnosis Date Noted POA    PRINCIPAL PROBLEM:  Biventricular heart failure with reduced left ventricular function [I50.814] 04/10/2019 Yes    Transaminitis [R74.01] 09/14/2022 Yes    Cardiogenic shock [R57.0] 09/13/2022 Yes    CVA (cerebral vascular accident) [I63.9] 09/13/2022 Yes    Hepatic congestion [K76.1] 09/13/2022 Yes    Coagulopathy [D68.9] 09/13/2022 Yes    H/O kidney transplant [Z94.0] 09/13/2022 Not Applicable    Abdominal aortic aneurysm (AAA) without rupture [I71.4] 02/18/2022 Yes    CKD (chronic kidney disease), stage III [N18.30] 02/15/2022 Yes    Coronary artery disease involving coronary bypass graft of native heart without angina pectoris [I25.810] 01/10/2022 Yes     Ischemic cardiomyopathy [I25.5] 01/10/2022 Yes    A-fib [I48.91] 01/03/2022 Yes    History of cardiac defibrillator placement [Z95.810] 12/15/2021 Yes    Hx of CABG [Z95.1] 12/15/2021 Not Applicable    Acute renal failure superimposed on chronic kidney disease [N17.9, N18.9] 12/01/2021 Yes    History of DVT (deep vein thrombosis) [Z86.718] 05/12/2021 Not Applicable    Chronic obstructive pulmonary disease [J44.9] 04/10/2019 Yes    Obstructive sleep apnea [G47.33] 04/10/2019 Yes    Hyperlipidemia [E78.5] 04/10/2019 Yes      Problems Resolved During this Admission:    Diagnosis Date Noted Date Resolved POA    Troponin level elevated [R77.8] 09/14/2022 09/15/2022 Yes    Kidney transplanted [Z94.0] 09/13/2022 09/13/2022 Not Applicable    Congestive heart failure [I50.9] 12/15/2021 09/13/2022 Yes       Continue to follow renal fct.    Thank you for your consult. I will follow-up with patient. Please contact us if you have any additional questions.    Lai Cheema MD  Nephrology  [facility]

## 2022-09-18 NOTE — PROGRESS NOTES
[facility]  Pulmonology  Progress Note    Patient Name: Luisito Stuart  MRN: [id number]  Admission Date: 9/13/2022  Hospital Length of Stay: 5 days  Code Status: DNR  Attending Provider: Keenan Gregory DO  Primary Care Provider: JOYA Hill   Principal Problem: Biventricular heart failure with reduced left ventricular function    Subjective:     Interval History: No acute events overnight. The patient is currently resting comfortably. He is no longer on dobutamine or levophed. He is afebrile and vital signs are stable.    Objective:     Vital Signs (Most Recent):  Temp: 97 °F (36.1 °C) (09/18/22 0701)  Pulse: 73 (09/18/22 1030)  Resp: 12 (09/18/22 1030)  BP: 130/61 (09/18/22 1030)  SpO2: 95 % (09/18/22 1030)   Vital Signs (24h Range):  Temp:  [97 °F (36.1 °C)-98.1 °F (36.7 °C)] 97 °F (36.1 °C)  Pulse:  [68-93] 73  Resp:  [8-27] 12  SpO2:  [86 %-100 %] 95 %  BP: ()/() 130/61     Weight: 90.1 kg (198 lb 10.2 oz)  Body mass index is 29.33 kg/m².      Intake/Output Summary (Last 24 hours) at 9/18/2022 1239  Last data filed at 9/18/2022 0600  Gross per 24 hour   Intake 350 ml   Output 450 ml   Net -100 ml         Physical Exam  Vitals and nursing note reviewed.   Constitutional:       General: He is not in acute distress.     Appearance: He is not ill-appearing.   HENT:      Head: Normocephalic.      Right Ear: External ear normal.      Left Ear: External ear normal.      Nose: Nose normal.      Mouth/Throat:      Pharynx: Oropharynx is clear.   Eyes:      Extraocular Movements: Extraocular movements intact.      Conjunctiva/sclera: Conjunctivae normal.      Pupils: Pupils are equal, round, and reactive to light.   Cardiovascular:      Rate and Rhythm: Tachycardia present. Rhythm irregular.      Pulses: Normal pulses.      Heart sounds: Normal heart sounds.   Pulmonary:      Effort: Pulmonary effort is normal. No respiratory distress.      Breath sounds: No wheezing or  rhonchi.   Abdominal:      General: Abdomen is flat. Bowel sounds are normal. There is no distension.      Palpations: Abdomen is soft.   Musculoskeletal:         General: No swelling or tenderness. Normal range of motion.      Cervical back: Normal range of motion and neck supple.      Right lower leg: Edema present.      Left lower leg: Edema present.      Comments: L>R LE edema    Skin:     General: Skin is warm and dry.      Coloration: Skin is not pale.   Neurological:      General: No focal deficit present.      Mental Status: He is alert.      Cranial Nerves: No cranial nerve deficit.      Motor: Weakness present.      Comments: Oriented to person and place. He had some issues with time   Psychiatric:         Mood and Affect: Mood normal.       Vents:  Oxygen Concentration (%): 21 (09/14/22 1700)    Lines/Drains/Airways       Peripheral Intravenous Line  Duration                  Peripheral IV - Single Lumen 09/13/22 1425 18 G Anterior;Distal;Left Upper Arm 4 days                    Significant Labs:    CBC/Anemia Profile:  No results for input(s): WBC, HGB, HCT, PLT, MCV, RDW, IRON, FERRITIN, RETIC, FOLATE, [id number], OCCULTBLOOD in the last 48 hours.       Chemistries:  Recent Labs   Lab 09/17/22  0251 09/18/22  0212   * 134*   K 5.3* 5.3*    100   CO2 28 27   BUN 50* 52*   CREATININE 4.07* 3.93*   CALCIUM 8.5 8.9         All pertinent labs within the past 24 hours have been reviewed.    Significant Imaging:  I have reviewed all pertinent imaging results/findings within the past 24 hours.    Assessment/Plan:     Cardiogenic shock  Patient is hypotensive  Started on dopamine and levophed  Cardiology consulted  9/15 EF now around 15%. Started on dobutamine  Cardiology note reviewed and appreciated  It is felt that his bp is likely going to run lower- cardiology to start midodrine  Weaning dobutamine and levophed---they have both been off since midnight  9/18 Improved. He looks good.  Hemodynamically stable      Ischemic cardiomyopathy  EF 20% on past echo  Felt to have component of cardiogenic shock  Patient on entresto outpatient which has been held  Also on carvedilol outpatient  Currently on levophed for hypotension  Cardiology consulted  Echo with EF around 15%  Now on dobutamine, dopamine discontinued  Dobutamine discontinued    Coronary artery disease involving coronary bypass graft of native heart without angina pectoris  He has elevated Troponin  He is on aspirin, plavix, full dose lovenox  Cardiology consulted  Left heart catheterization from 01/05/2022:  Right common femoral artery access was obtained for angiography under ultrasound guidance.  Patent LIMA to LAD,  Patent SVG to PDA, proximal PDA has a stent which has 30% ISR, other stent is noted in the distal part of the graft which has 50% ISR.  SVG to circumflex is occluded.  Native RCA is completely occluded.  Distal left main appears hazy, both ostial circumflex and ostial 80 have at least intermediate 70% stenosis.  There is a patent long stent in the circumflex with mild ISR.    A-fib  With RVR  He is on amiodarone  Also on full dose lovenox  He is on levothyroxine  I do not see TSH since 3/2020  I will check TSh and free T4  On full dose lovenox for anticoagulation----dc'd by cardiology  Was on eliquis for home medicine    History of cardiac defibrillator placement  Stable  Cardiology consulted   I have spoken with cardiology        Acute renal failure superimposed on chronic kidney disease  Acute on chronic renal failure  Creatinine is 4.56. It was around 3 seven months ago  This is likely due to decreased effective circulating volume  Secondary to Cardiomyopathy with EF around 20%  Nephrology consulted by admitting team  We will try to avoid nephrotoxic medications  Renally dose meds  Follow Bun/Crea and urine output  Creatinine is 3.93, UOP adequate ---likely new baseline    History of DVT (deep vein thrombosis)  He is on  apixiban at home  No evidence of DVT on doppler  He was on full dose lovenox (for a fib)---this was dc'd by cardiology      Chronic obstructive pulmonary disease  Greater than 40 pack per year smoking history  Home meds include no maintenance medications  Currently no acute issues                 Keenan Gregory,   Pulmonology  [facility]

## 2022-09-18 NOTE — ASSESSMENT & PLAN NOTE
Acute on chronic renal failure  Creatinine is 4.56. It was around 3 seven months ago  This is likely due to decreased effective circulating volume  Secondary to Cardiomyopathy with EF around 20%  Nephrology consulted by admitting team  We will try to avoid nephrotoxic medications  Renally dose meds  Follow Bun/Crea and urine output  Creatinine is 3.93, UOP adequate ---likely new baseline

## 2022-09-18 NOTE — ASSESSMENT & PLAN NOTE
Patient is hypotensive  Started on dopamine and levophed  Cardiology consulted  9/15 EF now around 15%. Started on dobutamine  Cardiology note reviewed and appreciated  It is felt that his bp is likely going to run lower- cardiology to start midodrine  Weaning dobutamine and levophed---they have both been off since midnight  9/18 Improved. He looks good. Hemodynamically stable

## 2022-09-18 NOTE — SUBJECTIVE & OBJECTIVE
Interval History: No acute events overnight. The patient is currently resting comfortably. He is no longer on dobutamine or levophed. He is afebrile and vital signs are stable.    Objective:     Vital Signs (Most Recent):  Temp: 97 °F (36.1 °C) (09/18/22 0701)  Pulse: 73 (09/18/22 1030)  Resp: 12 (09/18/22 1030)  BP: 130/61 (09/18/22 1030)  SpO2: 95 % (09/18/22 1030)   Vital Signs (24h Range):  Temp:  [97 °F (36.1 °C)-98.1 °F (36.7 °C)] 97 °F (36.1 °C)  Pulse:  [68-93] 73  Resp:  [8-27] 12  SpO2:  [86 %-100 %] 95 %  BP: ()/() 130/61     Weight: 90.1 kg (198 lb 10.2 oz)  Body mass index is 29.33 kg/m².      Intake/Output Summary (Last 24 hours) at 9/18/2022 1239  Last data filed at 9/18/2022 0600  Gross per 24 hour   Intake 350 ml   Output 450 ml   Net -100 ml         Physical Exam  Vitals and nursing note reviewed.   Constitutional:       General: He is not in acute distress.     Appearance: He is not ill-appearing.   HENT:      Head: Normocephalic.      Right Ear: External ear normal.      Left Ear: External ear normal.      Nose: Nose normal.      Mouth/Throat:      Pharynx: Oropharynx is clear.   Eyes:      Extraocular Movements: Extraocular movements intact.      Conjunctiva/sclera: Conjunctivae normal.      Pupils: Pupils are equal, round, and reactive to light.   Cardiovascular:      Rate and Rhythm: Tachycardia present. Rhythm irregular.      Pulses: Normal pulses.      Heart sounds: Normal heart sounds.   Pulmonary:      Effort: Pulmonary effort is normal. No respiratory distress.      Breath sounds: No wheezing or rhonchi.   Abdominal:      General: Abdomen is flat. Bowel sounds are normal. There is no distension.      Palpations: Abdomen is soft.   Musculoskeletal:         General: No swelling or tenderness. Normal range of motion.      Cervical back: Normal range of motion and neck supple.      Right lower leg: Edema present.      Left lower leg: Edema present.      Comments: L>R LE edema     Skin:     General: Skin is warm and dry.      Coloration: Skin is not pale.   Neurological:      General: No focal deficit present.      Mental Status: He is alert.      Cranial Nerves: No cranial nerve deficit.      Motor: Weakness present.      Comments: Oriented to person and place. He had some issues with time   Psychiatric:         Mood and Affect: Mood normal.       Vents:  Oxygen Concentration (%): 21 (09/14/22 1700)    Lines/Drains/Airways       Peripheral Intravenous Line  Duration                  Peripheral IV - Single Lumen 09/13/22 1425 18 G Anterior;Distal;Left Upper Arm 4 days                    Significant Labs:    CBC/Anemia Profile:  No results for input(s): WBC, HGB, HCT, PLT, MCV, RDW, IRON, FERRITIN, RETIC, FOLATE, [id number], OCCULTBLOOD in the last 48 hours.       Chemistries:  Recent Labs   Lab 09/17/22  0251 09/18/22  0212   * 134*   K 5.3* 5.3*    100   CO2 28 27   BUN 50* 52*   CREATININE 4.07* 3.93*   CALCIUM 8.5 8.9         All pertinent labs within the past 24 hours have been reviewed.    Significant Imaging:  I have reviewed all pertinent imaging results/findings within the past 24 hours.

## 2022-09-19 NOTE — ASSESSMENT & PLAN NOTE
- Last [facility] was 01/2022 - 70% stenosis to ostial LCx  - continue ASA, plavix, statin  - no PCI at this time due to rising/elevated creatinine  - continue medical management

## 2022-09-19 NOTE — ASSESSMENT & PLAN NOTE
INTERMACS 3 with patient clinically stable but dependent on inotropic support. Vitals and labs indicate poor perfusion and end organ damage. Afterload reduction deferred at his time due to hypotension and need for Dobutamine and Norepinephrine infusions.     ECHO with atrial fibrillation observed. LV moderately enlarged with mild concentric hypertrophy and severely decreased systolic function. EF of 15% with severe LV global hypokinesis. Abnormal septal wall motion consistent with RV pacemaker. Severe RV enlargement with moderately to severely reduced right ventricular systolic function. Severe RA and LA enlargement. Moderate-to-severe MR. Severe TR. Elevated CVP (15 mmHg). The estimated PA systolic pressure is 38 mmHg. Compared to prior ECHO from 11/2021; No significant change.    - dobutamine and levophed have been weaned off  - BP is improved and stable (systolic BP 120s-130s)  - decrease midodrine to 2.5mg po bid, will continue to wean   - pt remains afib, HR is controlled, continue PO amiodarone

## 2022-09-19 NOTE — DISCHARGE SUMMARY
[facility] Hospital Medicine  Discharge Summary      Patient Name: Luisito Stuart  MRN: [id number]  Patient Class: IP- Inpatient  Admission Date: 9/13/2022  Hospital Length of Stay: 6 days  Discharge Date and Time:      Attending Physician: No att. providers found   Discharging Provider: Leonardo Parker Jr, MD  Primary Care Provider: JYOA Hill      HPI:   Mr. Stuart is a 76yo man history of CABG (2004), RHC and C 1/5/22 with triple-vessel disease and elevated pressure with details below, Ischemic cardiomyopathy with EF 20% (11/10/22), AICD, CKD stage 4, CVA 2019 and TIA 2022, tobacco use (60+ years), COPD, hearing loss, and chronic atrial fibrillation on Eliquis who presents with hypotension, lower extremity edema and pain, inability to tolerate oral diuresis at nursing home.  Patient has some dementia and does not recall the details of why he came to the hospital.  His complaint currently is that he has left lower extremity pain and swelling.  Patient had multiple organs affected in the emergency department included liver disease, SHELDON on CKD, hypotension.  Initial plan was for patient to be admitted to the floor but he became hypotensive prior to administration of IV Lasix.  Case discussed with Cardiology and plan to place patient on dopamine and IV Lasix.  Initial plan to start patient on dobutamine was abandoned after patient became hypotensive.  Plan to transfer patient to the ICU for further care        [facility]  The Dist LM to Ost LAD lesion was 60% stenosed.  The Ost Cx lesion was 70% stenosed.  The Ost RCA to Prox RCA lesion was 100% stenosed.  The Mid LAD lesion was 70% stenosed.  The Dist Graft to Insertion lesion was 50% stenosed.  The estimated blood loss was none.  There was three vessel coronary artery disease. There was left main disease.  Patient is admitted with decompensated heart failure on dobutamine with concern for low output with history of  severe ischemic cardiomyopathy.  Point with very mildly elevated at 80.  He did complain chest pains.  Proceeded perform right heart catheterization and left heart catheterization the right femoral artery approach.  Right heart catheterization which showed elevated filling pressures with low cardiac output.  Right atrial pressure was 19,  Right ventricular pressure was 56/15  Pulmonary arterial pressure was 51/27 with a mean of 37.  Wedge pressure was 34.  Pulmonary arterial saturation was 45%.  Cardiac output is 3.35, cardiac index is 1.69.  These numbers were obtained off dobutamine that was prior to the catheterization when he was noted to be tachycardic having chest pains.    Right common femoral artery access was obtained for angiography under ultrasound guidance.  Patent LIMA to LAD,  Patent SVG to PDA, proximal PDA has a stent which has 30% ISR, other stent is noted in the distal part of the graft which has 50% ISR.  SVG to circumflex is occluded.  Native RCA is completely occluded.  Distal left main appears hazy, both ostial circumflex and ostial 80 have at least intermediate 70% stenosis.  There is a patent long stent in the circumflex with mild ISR.    Patient appears to be in decompensated heart failure, mild troponin elevation is likely from demand ischemia.  We opted not to pursue high-risk PCI to distal left main and ostial circumflex given concerns for compromise of the LAD diagonal system.  This may need mechanical circulatory support during PCI.  At this time would recommend diuresis and dobutamine and then consider PCI once he is more compensated.        * No surgery found *      Hospital Course:   Patient was a 78 y/o WM admitted on 9/13 with biventricular heart failure. Patient with EF 15 %.  He had known ischemic CMP with last cath in January of this year with 70% left circumflex disease.  During this admission patient required pressor support with dobutamine in ICU.  He had acute renal failure  with creatinine rise to > 4..0 that persisted.  This stabilized but did not improve.  Creatine prevented invasive evaluation.  Patient had some CP and SOB overnight into this am that was treated with NTG paste. He had an unremarkable EKG.  Troponin 500, down from 1700 at admit.  This afternoon patient had a bowel movement and while being cleaned up he had manfred arrest with agonal breathing then becme pulseless and apneic.  He was DNR therefull full resuscitation not attempted.  He was pronounced by me at 152pm.     Impression - death secondary to End stage ischemic cardiomyopathy/CHF with reduced ejection fraction.        Goals of Care Treatment Preferences:  Code Status: DNR      Consults:   Consults (From admission, onward)        Status Ordering Provider     Inpatient consult to Registered Dietitian/Nutritionist  Once        Provider:  (Not yet assigned)    Completed RAJEEV ELLIOTT     Inpatient consult to Nephrology  Once        Provider:  Mamadou Ambrosio MD    Acknowledged TERRIE LANGLEY     Inpatient consult to Cardiology  Once        Provider:  Melvin Mckee MD    Completed TERRIE LANGLEY          * Biventricular heart failure with reduced left ventricular function    EF 15%. . Trial of NTG paste.  Renal function makes PCI problematic.  Known 70% obstruction at prior.  Will attempt medical management. Cardiology following.     A-fib  Status post ablation in the past in [place].    Rate mildly elevated at 110.  Will not initiate amiodarone at this time.  Patient is hypotensive continue to monitor carefully.    Patient with Long standing persistent (>12 months) atrial fibrillation which is controlled currently with NA . Patient is currently in atrial fibrillation.[id number] Score: 3. HASBLED Score: Unable to calculate. Anticoagulation not indicated due to coagulopathy INR 2.5. .    Status post ablation in the past in [place].        H/O kidney transplant  History of kidney  transplant mention medical record.    Nephrology consulted given this as we must be careful if this were the case.      Acute renal failure superimposed on chronic kidney disease  Worsened renal function.    Baseline creatinine is 2.5-3 and creatinine is currently 4.59.    May related to diuretic use versus low cardiac output.    Will monitor over coming days and consider Nephrology consult.    K is rising. Kayexalate if rises further.       Kidney transplanted-resolved as of 9/13/2022          Final Active Diagnoses:    Diagnosis Date Noted POA    PRINCIPAL PROBLEM:  Biventricular heart failure with reduced left ventricular function [I50.814] 04/10/2019 Yes    A-fib [I48.91] 01/03/2022 Yes    Transaminitis [R74.01] 09/14/2022 Yes    Cardiogenic shock [R57.0] 09/13/2022 Yes    CVA (cerebral vascular accident) [I63.9] 09/13/2022 Yes    Hepatic congestion [K76.1] 09/13/2022 Yes    Coagulopathy [D68.9] 09/13/2022 Yes    H/O kidney transplant [Z94.0] 09/13/2022 Not Applicable    Abdominal aortic aneurysm (AAA) without rupture [I71.4] 02/18/2022 Yes    CKD (chronic kidney disease), stage III [N18.30] 02/15/2022 Yes    Coronary artery disease involving coronary bypass graft of native heart without angina pectoris [I25.810] 01/10/2022 Yes    Ischemic cardiomyopathy [I25.5] 01/10/2022 Yes    History of cardiac defibrillator placement [Z95.810] 12/15/2021 Yes    Hx of CABG [Z95.1] 12/15/2021 Not Applicable    Acute renal failure superimposed on chronic kidney disease [N17.9, N18.9] 12/01/2021 Yes    History of DVT (deep vein thrombosis) [Z86.718] 05/12/2021 Not Applicable    Chronic obstructive pulmonary disease [J44.9] 04/10/2019 Yes    Obstructive sleep apnea [G47.33] 04/10/2019 Yes    Hyperlipidemia [E78.5] 04/10/2019 Yes      Problems Resolved During this Admission:    Diagnosis Date Noted Date Resolved POA    Troponin level elevated [R77.8] 09/14/2022 09/15/2022 Yes    Kidney transplanted [Z94.0]  2022 Not Applicable    Congestive heart failure [I50.9] 12/15/2021 2022 Yes       Discharged Condition:     Disposition:     Follow Up:    Patient Instructions:   No discharge procedures on file.    Significant Diagnostic Studies: Labs: All labs within the past 24 hours have been reviewed    Pending Diagnostic Studies:     Procedure Component Value Units Date/Time    EKG 12-lead [[id number]] Collected: 22 0734    Order Status: Sent Lab Status: In process Updated: 22 0736    Narrative:      Test Reason : R07.9,    Vent. Rate : 086 BPM     Atrial Rate : 086 BPM     P-R Int : 000 ms          QRS Dur : 120 ms      QT Int : 366 ms       P-R-T Axes : 000 125 -46 degrees     QTc Int : 437 ms    Atrial fibrillation with a competing junctional pacemaker with premature  ventricular or aberrantly conducted complexes  Right axis deviation  Nonspecific intraventricular conduction delay  ST and T wave abnormality, consider inferior ischemia  ST and T wave abnormality, consider anterior ischemia  Abnormal ECG  When compared with ECG of 15-SEP-2022 16:15,  PREVIOUS ECG IS PRESENT    Referred By: [email or web address]   SELF           Confirmed By:     EKG 12-lead [[id number]]     Order Status: Sent Lab Status: No result     EKG 12-lead [[id number]]     Order Status: Sent Lab Status: No result     EKG 12-lead [[id number]] Collected: 22 1351    Order Status: Sent Lab Status: In process Updated: 22 1611    Narrative:      Test Reason : R06.00,    Vent. Rate : 105 BPM     Atrial Rate : 000 BPM     P-R Int : 112 ms          QRS Dur : 122 ms      QT Int : 354 ms       P-R-T Axes : 032 095 180 degrees     QTc Int : 433 ms    Sinus tachycardia  with PAC(s)  Rightward axis  Possible anterior infarct - age undetermined  Inferior/lateral ST-T abnormality  may be due to myocardial ischemia  Abnormal ECG      Referred By: [email or web address]   SELF           Confirmed By:     EXTRA TUBES [[id number]]  Collected: 09/19/22 1225    Order Status: Sent Lab Status: In process Updated: 09/19/22 1225    Specimen: Blood, Venous     Narrative:      The following orders were created for panel order EXTRA TUBES.  Procedure                               Abnormality         Status                     ---------                               -----------         ------                     Light Green Top Hold[[id number]]                             In process                 Lavender Top Hold[[id number]]                                In process                   Please view results for these tests on the individual orders.    Occult blood x 1, stool [[id number]]     Order Status: Sent Lab Status: No result     Specimen: Stool          Medications:  None    Indwelling Lines/Drains at time of discharge:   Lines/Drains/Airways     None                 Time spent on the discharge of patient: 35 minutes         Leonardo Parker Jr, MD  Department of Hospital Medicine  [facility]

## 2022-09-19 NOTE — ASSESSMENT & PLAN NOTE
Worsened renal function.    Baseline creatinine is 2.5-3 and creatinine is currently 4.59.    May related to diuretic use versus low cardiac output.    Will monitor over coming days and consider Nephrology consult.    K is rising. Kayexalate if rises further.

## 2022-09-19 NOTE — PROGRESS NOTES
"[facility]  Cardiology  Progress Note    Patient Name: Luisito Stuart  MRN: [id number]  Admission Date: 9/13/2022  Hospital Length of Stay: 6 days  Code Status: DNR   Attending Physician: Leonardo Parker Jr., MD   Primary Care Physician: JOYA Hill  Expected Discharge Date:   Principal Problem:Biventricular heart failure with reduced left ventricular function    Subjective:     Hospital Course:   76 yo M with significant cardiac history, well known to Cardiology who presented from [facility] with c/o hypotension and LE edema. Labs significant for proBNP of 80K, Troponin of 1200, BUN/Cr of 43/4.56, hyperkalemia of 6, transaminitis with AST/ALT of 181/273. EKG exhibits Afib with interventricular conduction delay. CXR with cardiomegaly. Original recommendation of dobutamine gtt not initiated due to hypotension. Patient was transferred to ICU and started on Dopamine and Norepi gtt with concern of Cardiogenic Shock.     (9/14/22) Upon examination, patient afebrile, resting comfortably supine. He exhibits lethargy, but is oriented x3. Patient warm to touch with irregular heart rate and bilateral rales noted upon ausculation. He exhibits 3+ pitting edema of bilateral lower extremities. There is concern of underlying infectious process, but thus far workup is negative. Blood cultures pending.         Interval History: Pt had c/o chest pain this AM. Cardiology was called and Dr. Pierson ordered GI cocktail, nitro paste, and IV lopressor along with Troponin and EKG. Nursing staff report that pt has been confused this morning, trying to get out of bed and yelling "help" constantly.     Pt seen on rounds later this morning and he still has c/o chest pain. Difficult to obtain ROS other than chest pain. He points to the RUQ of his abdomen and states that is where he hurts. It is reproducible with palpation. Troponin is elevated but decreased from 9/14/22. EKG today unremarkable.  "     Review of Systems   Unable to perform ROS: Mental status change   Cardiovascular:  Positive for chest pain.   Objective:     Vital Signs (Most Recent):  Temp: 98.6 °F (37 °C) (09/19/22 0725)  Pulse: 79 (09/19/22 0725)  Resp: 20 (09/19/22 0725)  BP: 130/77 (09/19/22 0725)  SpO2: 98 % (09/19/22 0725) Vital Signs (24h Range):  Temp:  [97.5 °F (36.4 °C)-98.6 °F (37 °C)] 98.6 °F (37 °C)  Pulse:  [70-88] 79  Resp:  [16-20] 20  SpO2:  [89 %-98 %] 98 %  BP: ()/(55-77) 130/77     Weight: 90.1 kg (198 lb 10.2 oz)  Body mass index is 29.33 kg/m².     SpO2: 98 %  O2 Device (Oxygen Therapy): room air      Intake/Output Summary (Last 24 hours) at 9/19/2022 1305  Last data filed at 9/19/2022 1225  Gross per 24 hour   Intake 480 ml   Output 250 ml   Net 230 ml       Lines/Drains/Airways       Peripheral Intravenous Line  Duration                  Peripheral IV - Single Lumen 09/13/22 1425 18 G Anterior;Distal;Left Upper Arm 5 days                    Physical Exam  Constitutional:       General: He is not in acute distress.     Appearance: He is ill-appearing.   Cardiovascular:      Rate and Rhythm: Normal rate. Rhythm irregularly irregular.      Pulses: Normal pulses.      Heart sounds: Normal heart sounds.   Pulmonary:      Effort: Pulmonary effort is normal.      Breath sounds: Normal breath sounds.   Musculoskeletal:      Right lower leg: Edema present.      Left lower leg: Edema present.   Skin:     General: Skin is warm and dry.   Neurological:      Mental Status: He is disoriented.       Significant Labs: All pertinent lab results from the last 24 hours have been reviewed. and   Recent Lab Results         09/19/22  1123   09/19/22  0732   09/19/22  0306        Anion Gap     14       BUN     53       BUN/CREAT RATIO     13       Calcium     9.0       Chloride     102       CO2     25       Creatinine     4.04       eGFR     15       Glucose     88       Potassium     5.5       Sodium     135       Troponin I High  Sensitivity 457.2   520.9                 Significant Imaging: Echocardiogram: Transthoracic echo (TTE) complete (Cupid Only):   Results for orders placed or performed during the hospital encounter of 09/13/22   Echo   Result Value Ref Range    BSA 2.11 m2    Right Atrial Pressure (from IVC) 15 mmHg    EF 15 %    Left Ventricular Outflow Tract Mean Gradient 1.00 mmHg    AORTIC VALVE CUSP SEPERATION 1.73 cm    LVIDd 6.06 (A) 3.5 - 6.0 cm    IVS 1.30 (A) 0.6 - 1.1 cm    Posterior Wall 1.29 (A) 0.6 - 1.1 cm    Ao root annulus 2.70 cm    LVIDs 5.50 (A) 2.1 - 4.0 cm    FS 9 28 - 44 %    IVC ostium 2.0 cm    LV mass 353.93 g    LA size 4.30 cm    RVDD 5.40 cm    TAPSE 1.60 cm    Left Ventricle Relative Wall Thickness 0.43 cm    AV mean gradient 1 mmHg    AV valve area 2.27 cm2    AV Velocity Ratio 0.86     AV index (prosthetic) 1.00     MV mean gradient 22 mmHg    MV valve area p 1/2 method 2.78 cm2    MV valve area by continuity eq 0.24 cm2    E wave deceleration time 163.00 msec    LVOT diameter 1.70 cm    LVOT area 2.3 cm2    LVOT peak jim 0.6 m/s    LVOT peak VTI 6.00 cm    Ao peak jim 0.7 m/s    Ao VTI 6.00 cm    LVOT stroke volume 13.61 cm3    AV peak gradient 2 mmHg    MV peak gradient 36 mmHg    TV rest pulmonary artery pressure 38 mmHg    MV Peak E Jim 2.18 m/s    TR Max Jim 2.40 m/s    MV VTI 56.5 cm    MV stenosis pressure 1/2 time 79.00 ms    LV Systolic Volume 148.00 mL    LV Systolic Volume Index 71.5 mL/m2    LV Diastolic Volume 184.80 mL    LV Diastolic Volume Index 89.28 mL/m2    LV Mass Index 171 g/m2    Echo EF Estimated 20 %    RA Major Axis 6.00 cm    Triscuspid Valve Regurgitation Peak Gradient 23 mmHg    Narrative    · Atrial fibrillation observed.  · The left ventricle is moderately enlarged with mild concentric   hypertrophy and severely decreased systolic function.  · The estimated ejection fraction is 15%.  · There is severe left ventricular global hypokinesis.  · There is abnormal septal wall  motion consistent with right ventricular   pacemaker.  · Severe right ventricular enlargement with moderately to severely reduced   right ventricular systolic function.  · Severe right atrial enlargement.  · Severe left atrial enlargement.  · Moderate-to-severe mitral regurgitation.  · Severe tricuspid regurgitation.  · Elevated central venous pressure (15 mmHg).  · The estimated PA systolic pressure is 38 mmHg.  · Compared to prior ECHO from 11/2021; No significant change        Assessment and Plan:     Brief HPI: 76 yo M with significant cardiac history, well known to Cardiology who presented from [facility] with c/o hypotension and LE edema. Labs significant for proBNP of 80K, Troponin of 1200, BUN/Cr of 43/4.56, hyperkalemia of 6, transaminitis with AST/ALT of 181/273. EKG exhibits Afib with interventricular conduction delay. CXR with cardiomegaly. Original recommendation of dobutamine gtt not initiated due to hypotension. Patient was transferred to ICU and started on Dopamine and Norepi gtt with concern of Cardiogenic Shock.     (9/14/22) Upon examination, patient afebrile, resting comfortably supine. He exhibits lethargy, but is oriented x3. Patient warm to touch with irregular heart rate and bilateral rales noted upon ausculation. He exhibits 3+ pitting edema of bilateral lower extremities. There is concern of underlying infectious process, but thus far workup is negative. Blood cultures pending.           * Biventricular heart failure with reduced left ventricular function  INTERMACS 3 with patient clinically stable but dependent on inotropic support. Vitals and labs indicate poor perfusion and end organ damage. Afterload reduction deferred at his time due to hypotension and need for Dobutamine and Norepinephrine infusions.     ECHO with atrial fibrillation observed. LV moderately enlarged with mild concentric hypertrophy and severely decreased systolic function. EF of 15% with severe LV global  "hypokinesis. Abnormal septal wall motion consistent with RV pacemaker. Severe RV enlargement with moderately to severely reduced right ventricular systolic function. Severe RA and LA enlargement. Moderate-to-severe MR. Severe TR. Elevated CVP (15 mmHg). The estimated PA systolic pressure is 38 mmHg. Compared to prior ECHO from 11/2021; No significant change.    - dobutamine and levophed have been weaned off  - BP is improved and stable (systolic BP 120s-130s)  - decrease midodrine to 2.5mg po bid, will continue to wean   - pt remains afib, HR is controlled, continue PO amiodarone        Transaminitis  - Patient with elevated AST/ALT of 181/273 and Alk Phos of 170. Ammonia resulted wnl at 31  - Possible congestive hepatopathy due to TR  - Continue to monitor      Ischemic cardiomyopathy  - EF 15%, has AICD  - weaned off dobutamine and levophed  - systolic BP 120s-130s  - decrease midodrine to 2.5mg po bid  - pt with c/o "chest pain" this AM - points to RUQ and pain is reproducible, may consider US of gallbladder  - started hydralazine 10mg TID and imdur 30mg po q daily    Coronary artery disease involving coronary bypass graft of native heart without angina pectoris  - Last [facility] was 01/2022 - 70% stenosis to ostial LCx  - continue ASA, plavix, statin  - no PCI at this time due to rising/elevated creatinine  - continue medical management      A-fib  - rate controlled  - continue PO amiodarone  - continue ASA and plavix    Acute renal failure superimposed on chronic kidney disease  - Cr of 4.0, likely his new baseline  - pt still with adequate urine output  - Dr. Mckee discussed (last week) possible dialysis with the pt if kidney function worsens, pt voiced understanding          VTE Risk Mitigation (From admission, onward)    None          Adelita Wynne, CRISTOBALP  Cardiology  [facility]  "

## 2022-09-19 NOTE — ASSESSMENT & PLAN NOTE
- Patient with elevated AST/ALT of 181/273 and Alk Phos of 170. Ammonia resulted wnl at 31  - Possible congestive hepatopathy due to TR  - Continue to monitor     done

## 2022-09-19 NOTE — SUBJECTIVE & OBJECTIVE
Interval History: Some chest pain and SOB overnight and this morning. Nitropaste ordered but he initially refused. Agreeable at time of my visit so will place. EKG with inferolateral changes c/w ischemia. Troponin elevated in 500s but down from 1700.     Review of Systems   Respiratory:  Positive for shortness of breath.    Cardiovascular:  Positive for chest pain.   Gastrointestinal:  Negative for abdominal pain.   Objective:     Vital Signs (Most Recent):  Temp: 98.6 °F (37 °C) (09/19/22 0725)  Pulse: 79 (09/19/22 0725)  Resp: 20 (09/19/22 0725)  BP: 130/77 (09/19/22 0725)  SpO2: 98 % (09/19/22 0725) Vital Signs (24h Range):  Temp:  [97.5 °F (36.4 °C)-98.6 °F (37 °C)] 98.6 °F (37 °C)  Pulse:  [70-88] 79  Resp:  [13-21] 20  SpO2:  [89 %-98 %] 98 %  BP: ()/(55-77) 130/77     Weight: 90.1 kg (198 lb 10.2 oz)  Body mass index is 29.33 kg/m².    Intake/Output Summary (Last 24 hours) at 9/19/2022 1155  Last data filed at 9/19/2022 0932  Gross per 24 hour   Intake 240 ml   Output 250 ml   Net -10 ml      Physical Exam  Vitals reviewed.   Constitutional:       General: He is not in acute distress.     Appearance: He is not ill-appearing or diaphoretic.   HENT:      Head: Normocephalic.   Eyes:      General: No scleral icterus.  Cardiovascular:      Rate and Rhythm: Normal rate. Rhythm irregular.      Heart sounds: Normal heart sounds.   Pulmonary:      Effort: Pulmonary effort is normal. No respiratory distress.      Breath sounds: Normal breath sounds. No wheezing.   Abdominal:      General: Abdomen is flat. Bowel sounds are normal. There is no distension.      Palpations: Abdomen is soft.      Tenderness: There is no abdominal tenderness.   Neurological:      Mental Status: He is alert.       Significant Labs: All pertinent labs within the past 24 hours have been reviewed.  BMP:   Recent Labs   Lab 09/19/22  0306   GLU 88   *   K 5.5*      CO2 25   BUN 53*   CREATININE 4.04*   CALCIUM 9.0     CBC: No  results for input(s): WBC, HGB, HCT, PLT in the last 48 hours.    Significant Imaging: I have reviewed all pertinent imaging results/findings within the past 24 hours.

## 2022-09-19 NOTE — PLAN OF CARE
[facility]  Discharge Final Note    Primary Care Provider: JOYA Hill    Expected Discharge Date:     Final Discharge Note (most recent)       Final Note - 22 1614          Final Note    Assessment Type Final Discharge Note     Anticipated Discharge Disposition                      Important Message from Medicare  Important Message from Medicare regarding Discharge Appeal Rights: Given to patient/caregiver, Explained to patient/caregiver, Signed/date by patient/caregiver     Date IMM was signed: 22  Time IMM was signed: 1021      Pt .

## 2022-09-19 NOTE — SUBJECTIVE & OBJECTIVE
"Interval History: Pt had c/o chest pain this AM. Cardiology was called and Dr. Pierson ordered GI cocktail, nitro paste, and IV lopressor along with Troponin and EKG. Nursing staff report that pt has been confused this morning, trying to get out of bed and yelling "help" constantly.     Pt seen on rounds later this morning and he still has c/o chest pain. Difficult to obtain ROS other than chest pain. He points to the RUQ of his abdomen and states that is where he hurts. It is reproducible with palpation. Troponin is elevated but decreased from 9/14/22. EKG today unremarkable.      Review of Systems   Unable to perform ROS: Mental status change   Cardiovascular:  Positive for chest pain.   Objective:     Vital Signs (Most Recent):  Temp: 98.6 °F (37 °C) (09/19/22 0725)  Pulse: 79 (09/19/22 0725)  Resp: 20 (09/19/22 0725)  BP: 130/77 (09/19/22 0725)  SpO2: 98 % (09/19/22 0725) Vital Signs (24h Range):  Temp:  [97.5 °F (36.4 °C)-98.6 °F (37 °C)] 98.6 °F (37 °C)  Pulse:  [70-88] 79  Resp:  [16-20] 20  SpO2:  [89 %-98 %] 98 %  BP: ()/(55-77) 130/77     Weight: 90.1 kg (198 lb 10.2 oz)  Body mass index is 29.33 kg/m².     SpO2: 98 %  O2 Device (Oxygen Therapy): room air      Intake/Output Summary (Last 24 hours) at 9/19/2022 1305  Last data filed at 9/19/2022 1225  Gross per 24 hour   Intake 480 ml   Output 250 ml   Net 230 ml       Lines/Drains/Airways       Peripheral Intravenous Line  Duration                  Peripheral IV - Single Lumen 09/13/22 1425 18 G Anterior;Distal;Left Upper Arm 5 days                    Physical Exam  Constitutional:       General: He is not in acute distress.     Appearance: He is ill-appearing.   Cardiovascular:      Rate and Rhythm: Normal rate. Rhythm irregularly irregular.      Pulses: Normal pulses.      Heart sounds: Normal heart sounds.   Pulmonary:      Effort: Pulmonary effort is normal.      Breath sounds: Normal breath sounds.   Musculoskeletal:      Right lower leg: " Edema present.      Left lower leg: Edema present.   Skin:     General: Skin is warm and dry.   Neurological:      Mental Status: He is disoriented.       Significant Labs: All pertinent lab results from the last 24 hours have been reviewed. and   Recent Lab Results         09/19/22  1123   09/19/22  0732   09/19/22  0306        Anion Gap     14       BUN     53       BUN/CREAT RATIO     13       Calcium     9.0       Chloride     102       CO2     25       Creatinine     4.04       eGFR     15       Glucose     88       Potassium     5.5       Sodium     135       Troponin I High Sensitivity 457.2   520.9                 Significant Imaging: Echocardiogram: Transthoracic echo (TTE) complete (Cupid Only):   Results for orders placed or performed during the hospital encounter of 09/13/22   Echo   Result Value Ref Range    BSA 2.11 m2    Right Atrial Pressure (from IVC) 15 mmHg    EF 15 %    Left Ventricular Outflow Tract Mean Gradient 1.00 mmHg    AORTIC VALVE CUSP SEPERATION 1.73 cm    LVIDd 6.06 (A) 3.5 - 6.0 cm    IVS 1.30 (A) 0.6 - 1.1 cm    Posterior Wall 1.29 (A) 0.6 - 1.1 cm    Ao root annulus 2.70 cm    LVIDs 5.50 (A) 2.1 - 4.0 cm    FS 9 28 - 44 %    IVC ostium 2.0 cm    LV mass 353.93 g    LA size 4.30 cm    RVDD 5.40 cm    TAPSE 1.60 cm    Left Ventricle Relative Wall Thickness 0.43 cm    AV mean gradient 1 mmHg    AV valve area 2.27 cm2    AV Velocity Ratio 0.86     AV index (prosthetic) 1.00     MV mean gradient 22 mmHg    MV valve area p 1/2 method 2.78 cm2    MV valve area by continuity eq 0.24 cm2    E wave deceleration time 163.00 msec    LVOT diameter 1.70 cm    LVOT area 2.3 cm2    LVOT peak jim 0.6 m/s    LVOT peak VTI 6.00 cm    Ao peak jim 0.7 m/s    Ao VTI 6.00 cm    LVOT stroke volume 13.61 cm3    AV peak gradient 2 mmHg    MV peak gradient 36 mmHg    TV rest pulmonary artery pressure 38 mmHg    MV Peak E Jim 2.18 m/s    TR Max Jim 2.40 m/s    MV VTI 56.5 cm    MV stenosis pressure 1/2 time  79.00 ms    LV Systolic Volume 148.00 mL    LV Systolic Volume Index 71.5 mL/m2    LV Diastolic Volume 184.80 mL    LV Diastolic Volume Index 89.28 mL/m2    LV Mass Index 171 g/m2    Echo EF Estimated 20 %    RA Major Axis 6.00 cm    Triscuspid Valve Regurgitation Peak Gradient 23 mmHg    Narrative    · Atrial fibrillation observed.  · The left ventricle is moderately enlarged with mild concentric   hypertrophy and severely decreased systolic function.  · The estimated ejection fraction is 15%.  · There is severe left ventricular global hypokinesis.  · There is abnormal septal wall motion consistent with right ventricular   pacemaker.  · Severe right ventricular enlargement with moderately to severely reduced   right ventricular systolic function.  · Severe right atrial enlargement.  · Severe left atrial enlargement.  · Moderate-to-severe mitral regurgitation.  · Severe tricuspid regurgitation.  · Elevated central venous pressure (15 mmHg).  · The estimated PA systolic pressure is 38 mmHg.  · Compared to prior ECHO from 11/2021; No significant change

## 2022-09-19 NOTE — PLAN OF CARE
Problem: Physical Therapy  Goal: Physical Therapy Goal  Description: Short term goals:  1. Supine to sit with Contact Guard Assistance  2. Sit to stand transfer with Contact Guard Assistance  3. Bed to chair transfer with Contact Guard Assistance using Rolling Walker  4. Gait  x 50 feet with Contact Guard Assistance using Rolling Walker.     Long term goals:  1. Supine to sit with Modified [place]  2. Sit to stand transfer with Modified [place]  3. Bed to chair transfer with Modified [place] using Rolling Walker  4. Gait  x 100 feet with Modified [place] using Rolling Walker.     Outcome: Ongoing, Progressing

## 2022-09-19 NOTE — PT/OT/SLP EVAL
Physical Therapy Evaluation    Patient Name:  Luisito Stuart   MRN:  [id number]    Recommendations:     Discharge Recommendations:  intermediate care facility/nursing home, nursing facility, skilled   Discharge Equipment Recommendations: none   Barriers to discharge: Inaccessible home and Decreased caregiver support    Assessment:     Luisito Stuart is a 77 y.o. male admitted with a medical diagnosis of Biventricular heart failure with reduced left ventricular function.  He presents with the following impairments/functional limitations:  weakness, impaired endurance, impaired functional mobility, impaired self care skills, gait instability, decreased lower extremity function, decreased safety awareness, impaired cardiopulmonary response to activity, edema Pt is very hard of hearing and difficult to understand to obtain history. He reports using rollator and wheelchair at nursing home. He was unable to ambulate on evaluation due to shortness of breath but was able to step to the head of the bed using rolling walker. PT appeared somewhat confused and is not safe with decision making. He would benefit from return to NH at d/c    Rehab Prognosis: Fair; patient would benefit from acute skilled PT services to address these deficits and reach maximum level of function.    Recent Surgery: * No surgery found *      Plan:     During this hospitalization, patient to be seen 5 x/week to address the identified rehab impairments via gait training, therapeutic activities, therapeutic exercises, neuromuscular re-education and progress toward the following goals:    Plan of Care Expires:  10/19/22    Subjective     Chief Complaint: weakness/shortness of breath   Patient/Family Comments/goals: Pt is confused  Pain/Comfort:  Pain Rating 1: 7/10 ([employer])  Location 1: chest  Pain Addressed 1: Reposition, Distraction  Pain Rating Post-Intervention 1: 3/10    Patients cultural, spiritual, Jain conflicts given the current  situation: no    Living Environment:  Pt is from NH  Prior to admission, patients level of function was ambulatory with rollator for short distances.  Equipment used at home: wheelchair, rollator.  DME owned (not currently used): none.  Upon discharge, patient will have assistance from facility staff.    Objective:     Communicated with PRECIOUS Mcarthur RN prior to session.  Patient found right sidelying with oxygen, peripheral IV  upon PT entry to room.    General Precautions: Standard, fall, hearing impaired   Orthopedic Precautions:N/A   Braces: N/A  Respiratory Status: Nasal cannula, flow 2 L/min    Exams:  Cognitive Exam:  Patient is oriented to Person  Gross Motor Coordination:  WFL  RLE ROM: WFL  RLE Strength: Deficits: 3/5  LLE ROM: WFL  LLE Strength: Deficits: 3/5    Functional Mobility:  Bed Mobility:     Scooting: minimum assistance  Supine to Sit: minimum assistance  Sit to Supine: minimum assistance  Transfers:     Sit to Stand:  minimum assistance with rolling walker  Gait: unable  Balance: fair    Therapeutic Activities and Exercises:   Pt educated on PT role/POC.   Importance of OOB activity with staff assistance.  Importance of sitting up in the chair throughout the day as tolerated, especially for meals   Safety during functional t/f and mobility with use of rolling walker   Multiple self-care tasks/functional mobility completed- assistance level noted above   All questions/concerns answered within PT scope of practice      AM-PAC 6 CLICK MOBILITY  Total Score:15     Patient left right sidelying with all lines intact and call button in reach.    GOALS:   Multidisciplinary Problems       Physical Therapy Goals          Problem: Physical Therapy    Goal Priority Disciplines Outcome Goal Variances Interventions   Physical Therapy Goal     PT, PT/OT Ongoing, Progressing     Description: Short term goals:  1. Supine to sit with Contact Guard Assistance  2. Sit to stand transfer with Contact Guard  Assistance  3. Bed to chair transfer with Contact Guard Assistance using Rolling Walker  4. Gait  x 50 feet with Contact Guard Assistance using Rolling Walker.     Long term goals:  1. Supine to sit with Modified [place]  2. Sit to stand transfer with Modified [place]  3. Bed to chair transfer with Modified [place] using Rolling Walker  4. Gait  x 100 feet with Modified [place] using Rolling Walker.                          History:     Past Medical History:   Diagnosis Date    Atrial fibrillation     CHF (congestive heart failure)     COPD (chronic obstructive pulmonary disease)     Coronary artery disease     Disorder of kidney and ureter     Hypertension        Past Surgical History:   Procedure Laterality Date    APPENDECTOMY      CARDIAC DEFIBRILLATOR PLACEMENT      CORONARY ANGIOGRAPHY INCLUDING BYPASS GRAFTS WITH CATHETERIZATION OF LEFT HEART N/A 1/5/2022    Procedure: ANGIOGRAM, CORONARY, INCLUDING BYPASS GRAFT, WITH LEFT HEART CATHETERIZATION;  Surgeon: Gerda Castellanos MD;  Location: [facility] CATH LAB;  Service: Cardiology;  Laterality: N/A;    JOINT REPLACEMENT      KIDNEY TRANSPLANT      RIGHT HEART CATHETERIZATION Right 1/5/2022    Procedure: INSERTION, CATHETER, RIGHT HEART;  Surgeon: Greda Castellanos MD;  Location: [facility] CATH LAB;  Service: Cardiology;  Laterality: Right;       Time Tracking:     PT Received On: 09/19/22  PT Start Time: 1012     PT Stop Time: 1028  PT Total Time (min): 16 min     Billable Minutes: Evaluation low complexity      09/19/2022

## 2022-09-19 NOTE — ASSESSMENT & PLAN NOTE
- Cr of 4.0, likely his new baseline  - pt still with adequate urine output  - Dr. Mckee discussed (last week) possible dialysis with the pt if kidney function worsens, pt voiced understanding

## 2022-09-19 NOTE — ASSESSMENT & PLAN NOTE
EF 15%. . Trial of NTG paste.  Renal function makes PCI problematic.  Known 70% obstruction at prior.  Will attempt medical management. Cardiology following.

## 2022-09-19 NOTE — NURSING
1310 Called report to Rajni HUMPHREYS    1330 Transferred pt to floor via ICU bed, PETR Urban and Nayeli CONWAY at bedside. Assisted to bed. No complaints, tolerated well. Bed lowest locked position call bell within reach.   
1345: Zeyad MARION in patient's room at this time, cleaning patient up from bowel movement. Patient rolled to left side and began agonal breathing. Vital sign machine connected, unable to arouse patient. BP un measurable.    1352: GUERRERO Parker MD on floor at this time to pronounce patient.   
1600 Emergency titration of levophed   
19:42-notified dr cox about pt chest pain of 8 out of 10. Hx of CABG, stents, EF 15% and no RHC today and going to medically manage. Also that pt was on dobutamine and levophed. Dr cox wants to decrease cardiac demand. Orders to change dobutamine to 3 and ween levo to maintain map of 60.  20:30-levo weened and pt stated chest pain has decreased and feeling better  21:30-pt resting comfortably in bed asleep. NSD. VSS  
At this time bedside report given to Melissa Mcarthur RN, given in report remaining interventions ordered by doctor cox and continuation of care facilitated. PT VSS at this time but no improvement to SOB and chest pain  
geo all pertinent systems normal

## 2022-09-19 NOTE — HOSPITAL COURSE
Patient was a 78 y/o WM admitted on 9/13 with biventricular heart failure. Patient with EF 15 %.  He had known ischemic CMP with last cath in January of this year with 70% left circumflex disease.  During this admission patient required pressor support with dobutamine in ICU.  He had acute renal failure with creatinine rise to > 4..0 that persisted.  This stabilized but did not improve.  Creatine prevented invasive evaluation.  Patient had some CP and SOB overnight into this am that was treated with NTG paste. He had an unremarkable EKG.  Troponin 500, down from 1700 at admit.  This afternoon patient had a bowel movement and while being cleaned up he had manfred arrest with agonal breathing then becme pulseless and apneic.  He was DNR therefull full resuscitation not attempted.  He was pronounced by me at 152pm.     Impression - death secondary to End stage ischemic cardiomyopathy/CHF with reduced ejection fraction.

## 2022-09-19 NOTE — ASSESSMENT & PLAN NOTE
"- EF 15%, has AICD  - weaned off dobutamine and levophed  - systolic BP 120s-130s  - decrease midodrine to 2.5mg po bid  - pt with c/o "chest pain" this AM - points to RUQ and pain is reproducible, may consider US of gallbladder  - started hydralazine 10mg TID and imdur 30mg po q daily  "

## 2022-09-19 NOTE — ASSESSMENT & PLAN NOTE
Status post ablation in the past in [place].    Rate mildly elevated at 110.  Will not initiate amiodarone at this time.  Patient is hypotensive continue to monitor carefully.    Patient with Long standing persistent (>12 months) atrial fibrillation which is controlled currently with NA . Patient is currently in atrial fibrillation.[id number] Score: 3. HASBLED Score: Unable to calculate. Anticoagulation not indicated due to coagulopathy INR 2.5. .    Status post ablation in the past in [place].

## 2024-01-10 NOTE — SUBJECTIVE & OBJECTIVE
Detail Level: Zone Interval History: Patient seen this morning resting in bed with c/o chest wall pain located at mid-right upper sternum. Pain is reproduced on palpitation, pain does not radiate nor associated with exertion. Patient had an episode of leg pain last night and Norco 5 was give x1. Patient given PHQ-9 with a score of 2, no action required. Patients leg swelling is slightly improved from yesterday. Patient is schedule for LH cath today. Will continue to monitor and follow cardiology's recommendations.     Review of Systems   Constitutional: Negative for activity change, appetite change, chills, diaphoresis and fever.   HENT: Negative for congestion, hearing loss, nosebleeds, postnasal drip, rhinorrhea, sore throat and trouble swallowing.    Eyes: Negative for visual disturbance.   Respiratory: Negative for cough, chest tightness and shortness of breath.    Cardiovascular: Positive for chest pain and leg swelling.   Gastrointestinal: Negative for abdominal pain, blood in stool, constipation, diarrhea, nausea and vomiting.   Genitourinary: Negative for dysuria and hematuria.   Musculoskeletal: Negative for joint swelling.   Skin: Negative for rash.   Neurological: Negative for dizziness, weakness, light-headedness and headaches.   All other systems reviewed and are negative.    Objective:     Vital Signs (Most Recent):  Temp: 97.7 °F (36.5 °C) (01/05/22 0701)  Pulse: 97 (01/05/22 0810)  Resp: (!) 22 (01/05/22 1005)  BP: 117/72 (01/05/22 0701)  SpO2: (!) 93 % (Pt. did not have O2 on) (01/05/22 0810) Vital Signs (24h Range):  Temp:  [96.1 °F (35.6 °C)-97.9 °F (36.6 °C)] 97.7 °F (36.5 °C)  Pulse:  [] 97  Resp:  [13-22] 22  SpO2:  [91 %-99 %] 93 %  BP: (110-122)/(54-72) 117/72     Weight: 82.9 kg (182 lb 12.2 oz)  Body mass index is 26.99 kg/m².    Intake/Output Summary (Last 24 hours) at 1/5/2022 1005  Last data filed at 1/5/2022 0800  Gross per 24 hour   Intake 597 ml   Output 1951 ml   Net -1354 ml      Physical  Exam  Vitals and nursing note reviewed.   Constitutional:       Appearance: Normal appearance. He is not ill-appearing, toxic-appearing or diaphoretic.   HENT:      Head: Normocephalic and atraumatic.      Nose: Nose normal. No congestion or rhinorrhea.      Mouth/Throat:      Mouth: Mucous membranes are moist.      Pharynx: Oropharynx is clear. No oropharyngeal exudate or posterior oropharyngeal erythema.   Eyes:      Conjunctiva/sclera: Conjunctivae normal.      Pupils: Pupils are equal, round, and reactive to light.   Cardiovascular:      Rate and Rhythm: Normal rate and regular rhythm.      Pulses: Normal pulses.      Heart sounds: Normal heart sounds. No murmur heard.  No friction rub.   Pulmonary:      Effort: Pulmonary effort is normal. No respiratory distress.      Breath sounds: Normal breath sounds. No wheezing, rhonchi or rales.   Abdominal:      Tenderness: There is no abdominal tenderness. There is no guarding.   Musculoskeletal:      Right lower leg: Edema present.      Left lower leg: Edema present.   Skin:     Capillary Refill: Capillary refill takes less than 2 seconds.   Neurological:      General: No focal deficit present.      Mental Status: He is alert and oriented to person, place, and time.   Psychiatric:         Mood and Affect: Mood normal.         Behavior: Behavior normal.         Thought Content: Thought content normal.         Judgment: Judgment normal.         Significant Labs:   Recent Lab Results       01/05/22  0331        Anion Gap 13       [place]iso 1+       Bands 2       Baso # 0.09       Basophil % 1.1        2       BUN 39       BUN/CREAT RATIO 18       Calcium 8.9       Chloride 99       CO2 31       Creatinine 2.15       Differential Type Manual       eGFR if non  32       Eos # 0.12       Eosinophil % 1.5        6       Glucose 89       Hematocrit 36.6       Hemoglobin 11.6       Hypo Few       Immature Grans (Abs) 0.03       Immature Granulocytes 0.4       Lymph  # 0.64       Lymph % 8.1        10       MCH 26.9       MCHC 31.7       MCV 84.7       Mono # 0.71       Mono % 9.0        2       MPV 11.8       Neutrophils, Abs 6.31       Neutrophils Relative 79.9       nRBC 0.0       NUCLEATED RBC ABSOLUTE 0.00       Ovalocytes Few       PLATELET MORPHOLOGY Few Large Platelets       Platelets 182       Potassium 4.2       RBC 4.32       RDW 17.6       Segmented Neutrophils, Man % 78       Sodium 139       Target Cells Few       WBC 7.90             Significant Imaging: I have reviewed all pertinent imaging results/findings within the past 24 hours.

## (undated) DEVICE — GLOVE SURGICAL PROTEXIS PI SIZE 7

## (undated) DEVICE — KIT MICROINTRODUCER 4FR MINI STICK II

## (undated) DEVICE — DRESSING IV TEGADERM 10X12CM

## (undated) DEVICE — CLIPPER SURGICAL BLADE ASSEMBLY STERILE SNGL USE

## (undated) DEVICE — SENSOR PULSE OX ADULT

## (undated) DEVICE — CATH IMPULSE 5FR FR4

## (undated) DEVICE — TUBING CAPNOLINE PLUS SMART 02 CONNECTOR(ORDER 65110)

## (undated) DEVICE — Device

## (undated) DEVICE — POSITIONER ULNAR NERVE

## (undated) DEVICE — DEVICE MYNX GRIP VASCULAR CLOSURE(MINIMUM OF 10 EACH)

## (undated) DEVICE — KIT INTRAOPERATIVE ULTRASOUND PROBE COVER 6INX96IN

## (undated) DEVICE — LINER CANISTER 1500CC INSERT MEDIVAC

## (undated) DEVICE — BAG-A-JET FLUID DISPENSER SYSTEM

## (undated) DEVICE — GUIDEWIRE RUNTHROUGH NS 300CM CORONARY

## (undated) DEVICE — SHEATH INTRODUCER 6FR 10CM X 0.038 PINNACLE

## (undated) DEVICE — SET IV EXTENSION 42IN W/2 PORT CLEARLINK

## (undated) DEVICE — STOPCOCK 3-WAY ROTATING

## (undated) DEVICE — GUIDEWIRE BALLOON PUMP 3MM 260CM IABP

## (undated) DEVICE — CDS ANGIOGRAPHY PACK

## (undated) DEVICE — TOWEL OR STERILE BLUE 4/PK 20PK/CS

## (undated) DEVICE — TUBING SUCTION 5MM STERILE 3/16IN X 12FT

## (undated) DEVICE — APPLICATOR CHLORAPREP LITE ORANGE 10.5ML STERILE

## (undated) DEVICE — GLOVE SURGICAL PROTEXIS PI SIZE 7.5

## (undated) DEVICE — GUIDEWIRE RUNTHROUGH NS 180CM CORONARY

## (undated) DEVICE — HANDLE YANKAUER SUCTION K80 LATEX FREE

## (undated) DEVICE — GUIDEWIRE FLOPPY WHOLEY 260 CM EXCHANGE

## (undated) DEVICE — CATH IMPULSE 5FR FL4

## (undated) DEVICE — DEVICE INFLATION BLUE DIAMOND IN7112

## (undated) DEVICE — GUIDE LAUNCHER 6FR EBU 3.5

## (undated) DEVICE — SHEATH INTRODUCER 7FR 10CM 0.038 PINNACLE

## (undated) DEVICE — ISOVUE 370 100ML